# Patient Record
Sex: FEMALE | Race: WHITE | Employment: OTHER | URBAN - NONMETROPOLITAN AREA
[De-identification: names, ages, dates, MRNs, and addresses within clinical notes are randomized per-mention and may not be internally consistent; named-entity substitution may affect disease eponyms.]

---

## 2017-01-16 ENCOUNTER — NURSE ONLY (OUTPATIENT)
Dept: FAMILY MEDICINE CLINIC | Age: 82
End: 2017-01-16

## 2017-01-16 DIAGNOSIS — Z23 NEED FOR INFLUENZA VACCINATION: Primary | ICD-10-CM

## 2017-01-16 PROCEDURE — G0008 ADMIN INFLUENZA VIRUS VAC: HCPCS | Performed by: FAMILY MEDICINE

## 2017-03-08 ENCOUNTER — OFFICE VISIT (OUTPATIENT)
Dept: FAMILY MEDICINE CLINIC | Age: 82
End: 2017-03-08
Payer: MEDICARE

## 2017-03-08 ENCOUNTER — HOSPITAL ENCOUNTER (OUTPATIENT)
Dept: OTHER | Age: 82
Discharge: HOME | End: 2017-03-08
Attending: FAMILY MEDICINE | Admitting: FAMILY MEDICINE

## 2017-03-08 VITALS
BODY MASS INDEX: 30.36 KG/M2 | HEIGHT: 62 IN | DIASTOLIC BLOOD PRESSURE: 72 MMHG | OXYGEN SATURATION: 98 % | HEART RATE: 65 BPM | SYSTOLIC BLOOD PRESSURE: 138 MMHG | WEIGHT: 165 LBS | RESPIRATION RATE: 18 BRPM

## 2017-03-08 DIAGNOSIS — E53.8 B12 DEFICIENCY: ICD-10-CM

## 2017-03-08 DIAGNOSIS — I10 ESSENTIAL HYPERTENSION: ICD-10-CM

## 2017-03-08 DIAGNOSIS — E03.9 HYPOTHYROIDISM, UNSPECIFIED TYPE: ICD-10-CM

## 2017-03-08 DIAGNOSIS — I10 ESSENTIAL HYPERTENSION: Primary | ICD-10-CM

## 2017-03-08 DIAGNOSIS — M19.90 ARTHRITIS: ICD-10-CM

## 2017-03-08 LAB
A/G RATIO: 1.6 (ref 0.8–2)
ALBUMIN SERPL-MCNC: 4.4 G/DL (ref 3.4–4.8)
ALP BLD-CCNC: 73 U/L (ref 25–100)
ALT SERPL-CCNC: 20 U/L (ref 4–36)
ANION GAP SERPL CALCULATED.3IONS-SCNC: 13 MMOL/L (ref 3–16)
AST SERPL-CCNC: 19 U/L (ref 8–33)
BASOPHILS ABSOLUTE: 0.1 K/UL (ref 0–0.1)
BASOPHILS RELATIVE PERCENT: 1.2 %
BILIRUB SERPL-MCNC: <0.2 MG/DL (ref 0.3–1.2)
BUN BLDV-MCNC: 22 MG/DL (ref 6–20)
CALCIUM SERPL-MCNC: 9.5 MG/DL (ref 8.5–10.5)
CHLORIDE BLD-SCNC: 102 MMOL/L (ref 98–107)
CHOLESTEROL, TOTAL: 207 MG/DL (ref 0–200)
CO2: 27 MMOL/L (ref 20–30)
CREAT SERPL-MCNC: 1 MG/DL (ref 0.4–1.2)
EOSINOPHILS ABSOLUTE: 0.1 K/UL (ref 0–0.4)
EOSINOPHILS RELATIVE PERCENT: 2.2 %
FOLATE: 18.02 NG/ML
GFR AFRICAN AMERICAN: >59
GFR NON-AFRICAN AMERICAN: 53
GLOBULIN: 2.8 G/DL
GLUCOSE BLD-MCNC: 141 MG/DL (ref 74–106)
HCT VFR BLD CALC: 40.9 % (ref 37–47)
HDLC SERPL-MCNC: 36 MG/DL (ref 40–60)
HEMOGLOBIN: 13.2 G/DL (ref 11.5–16.5)
LDL CHOLESTEROL CALCULATED: 101 MG/DL
LYMPHOCYTES ABSOLUTE: 2.1 K/UL (ref 1.5–4)
LYMPHOCYTES RELATIVE PERCENT: 34.2 % (ref 20–40)
MCH RBC QN AUTO: 30.6 PG (ref 27–32)
MCHC RBC AUTO-ENTMCNC: 32.3 G/DL (ref 31–35)
MCV RBC AUTO: 94.9 FL (ref 80–100)
MONOCYTES ABSOLUTE: 0.4 K/UL (ref 0.2–0.8)
MONOCYTES RELATIVE PERCENT: 6.1 % (ref 3–10)
NEUTROPHILS ABSOLUTE: 3.4 K/UL (ref 2–7.5)
NEUTROPHILS RELATIVE PERCENT: 56.3 %
PDW BLD-RTO: 13.5 % (ref 11–16)
PLATELET # BLD: 238 K/UL (ref 150–400)
PMV BLD AUTO: 11.5 FL (ref 6–10)
POTASSIUM SERPL-SCNC: 4.5 MMOL/L (ref 3.4–5.1)
RBC # BLD: 4.31 M/UL (ref 3.8–5.8)
SODIUM BLD-SCNC: 142 MMOL/L (ref 136–145)
T4 FREE: 0.86 NG/DL (ref 0.89–1.76)
TOTAL PROTEIN: 7.2 G/DL (ref 6.4–8.3)
TRIGL SERPL-MCNC: 349 MG/DL (ref 0–249)
TSH SERPL DL<=0.05 MIU/L-ACNC: 3.56 UIU/ML (ref 0.35–5.5)
VITAMIN B-12: 454 PG/ML (ref 211–911)
VLDLC SERPL CALC-MCNC: 70 MG/DL
WBC # BLD: 6 K/UL (ref 4–11)

## 2017-03-08 PROCEDURE — G8484 FLU IMMUNIZE NO ADMIN: HCPCS | Performed by: FAMILY MEDICINE

## 2017-03-08 PROCEDURE — G8417 CALC BMI ABV UP PARAM F/U: HCPCS | Performed by: FAMILY MEDICINE

## 2017-03-08 PROCEDURE — G8400 PT W/DXA NO RESULTS DOC: HCPCS | Performed by: FAMILY MEDICINE

## 2017-03-08 PROCEDURE — 1123F ACP DISCUSS/DSCN MKR DOCD: CPT | Performed by: FAMILY MEDICINE

## 2017-03-08 PROCEDURE — 99214 OFFICE O/P EST MOD 30 MIN: CPT | Performed by: FAMILY MEDICINE

## 2017-03-08 PROCEDURE — 1090F PRES/ABSN URINE INCON ASSESS: CPT | Performed by: FAMILY MEDICINE

## 2017-03-08 PROCEDURE — 4040F PNEUMOC VAC/ADMIN/RCVD: CPT | Performed by: FAMILY MEDICINE

## 2017-03-08 PROCEDURE — G8427 DOCREV CUR MEDS BY ELIG CLIN: HCPCS | Performed by: FAMILY MEDICINE

## 2017-03-08 PROCEDURE — 1036F TOBACCO NON-USER: CPT | Performed by: FAMILY MEDICINE

## 2017-03-08 RX ORDER — FUROSEMIDE 20 MG/1
20 TABLET ORAL DAILY PRN
Qty: 30 TABLET | Refills: 5 | Status: SHIPPED | OUTPATIENT
Start: 2017-03-08 | End: 2018-01-09 | Stop reason: SDUPTHER

## 2017-03-08 RX ORDER — NITROGLYCERIN 0.4 MG/1
0.4 TABLET SUBLINGUAL EVERY 5 MIN PRN
Qty: 25 TABLET | Refills: 0 | Status: SHIPPED | OUTPATIENT
Start: 2017-03-08 | End: 2017-09-05 | Stop reason: SDUPTHER

## 2017-03-08 RX ORDER — OMEPRAZOLE 40 MG/1
40 CAPSULE, DELAYED RELEASE ORAL DAILY
Qty: 30 CAPSULE | Refills: 5 | Status: SHIPPED | OUTPATIENT
Start: 2017-03-08 | End: 2017-04-21 | Stop reason: ALTCHOICE

## 2017-03-08 RX ORDER — LORATADINE 10 MG/1
10 TABLET ORAL DAILY
Qty: 30 TABLET | Refills: 5 | Status: SHIPPED | OUTPATIENT
Start: 2017-03-08 | End: 2018-12-05

## 2017-03-08 RX ORDER — ACETAMINOPHEN AND CODEINE PHOSPHATE 300; 30 MG/1; MG/1
1 TABLET ORAL EVERY 6 HOURS PRN
Qty: 30 TABLET | Refills: 0 | Status: SHIPPED | OUTPATIENT
Start: 2017-03-08 | End: 2017-03-29 | Stop reason: SDUPTHER

## 2017-03-08 RX ORDER — LEVOTHYROXINE SODIUM 0.05 MG/1
50 TABLET ORAL DAILY
Qty: 30 TABLET | Refills: 5 | Status: SHIPPED | OUTPATIENT
Start: 2017-03-08 | End: 2017-09-25 | Stop reason: SDUPTHER

## 2017-03-08 RX ORDER — MELOXICAM 15 MG/1
15 TABLET ORAL DAILY
Qty: 30 TABLET | Refills: 5 | Status: SHIPPED | OUTPATIENT
Start: 2017-03-08 | End: 2017-09-05 | Stop reason: SDUPTHER

## 2017-03-08 ASSESSMENT — ENCOUNTER SYMPTOMS
GASTROINTESTINAL NEGATIVE: 1
EYES NEGATIVE: 1
RESPIRATORY NEGATIVE: 1
ROS SKIN COMMENTS: RIGHT ARM

## 2017-03-29 ENCOUNTER — OFFICE VISIT (OUTPATIENT)
Dept: FAMILY MEDICINE CLINIC | Age: 82
End: 2017-03-29
Payer: MEDICARE

## 2017-03-29 VITALS
SYSTOLIC BLOOD PRESSURE: 136 MMHG | DIASTOLIC BLOOD PRESSURE: 60 MMHG | BODY MASS INDEX: 29.63 KG/M2 | OXYGEN SATURATION: 98 % | HEART RATE: 63 BPM | HEIGHT: 62 IN | RESPIRATION RATE: 18 BRPM | WEIGHT: 161 LBS

## 2017-03-29 DIAGNOSIS — M19.90 ARTHRITIS: ICD-10-CM

## 2017-03-29 DIAGNOSIS — M54.2 NECK PAIN: Primary | ICD-10-CM

## 2017-03-29 DIAGNOSIS — I10 ESSENTIAL HYPERTENSION: ICD-10-CM

## 2017-03-29 DIAGNOSIS — K21.9 GASTROESOPHAGEAL REFLUX DISEASE, ESOPHAGITIS PRESENCE NOT SPECIFIED: ICD-10-CM

## 2017-03-29 PROCEDURE — 99214 OFFICE O/P EST MOD 30 MIN: CPT | Performed by: FAMILY MEDICINE

## 2017-03-29 RX ORDER — LISINOPRIL 5 MG/1
1 TABLET ORAL DAILY
Refills: 0 | COMMUNITY
Start: 2017-03-23 | End: 2017-05-08 | Stop reason: SDUPTHER

## 2017-03-29 RX ORDER — PANTOPRAZOLE SODIUM 40 MG/1
1 TABLET, DELAYED RELEASE ORAL DAILY
Refills: 1 | COMMUNITY
Start: 2017-03-23 | End: 2018-12-05

## 2017-03-29 ASSESSMENT — ENCOUNTER SYMPTOMS
EYES NEGATIVE: 1
RESPIRATORY NEGATIVE: 1
GASTROINTESTINAL NEGATIVE: 1
ROS SKIN COMMENTS: RIGHT ARM

## 2017-05-08 RX ORDER — LISINOPRIL 5 MG/1
5 TABLET ORAL DAILY
Qty: 30 TABLET | Refills: 3 | Status: SHIPPED | OUTPATIENT
Start: 2017-05-08 | End: 2017-09-05 | Stop reason: SDUPTHER

## 2017-05-12 RX ORDER — DONEPEZIL HYDROCHLORIDE 10 MG/1
TABLET, FILM COATED ORAL
Qty: 30 TABLET | Refills: 5 | Status: SHIPPED | OUTPATIENT
Start: 2017-05-12 | End: 2017-05-22

## 2017-05-22 ENCOUNTER — HOSPITAL ENCOUNTER (OUTPATIENT)
Dept: OTHER | Age: 82
Discharge: HOME | End: 2017-05-22
Attending: FAMILY MEDICINE | Admitting: FAMILY MEDICINE

## 2017-05-22 ENCOUNTER — OFFICE VISIT (OUTPATIENT)
Dept: FAMILY MEDICINE CLINIC | Age: 82
End: 2017-05-22
Payer: MEDICARE

## 2017-05-22 VITALS
DIASTOLIC BLOOD PRESSURE: 72 MMHG | RESPIRATION RATE: 18 BRPM | OXYGEN SATURATION: 98 % | SYSTOLIC BLOOD PRESSURE: 176 MMHG | HEIGHT: 62 IN | BODY MASS INDEX: 29.32 KG/M2 | WEIGHT: 159.3 LBS | HEART RATE: 62 BPM

## 2017-05-22 DIAGNOSIS — I10 ESSENTIAL HYPERTENSION: ICD-10-CM

## 2017-05-22 DIAGNOSIS — E03.9 HYPOTHYROIDISM, UNSPECIFIED TYPE: ICD-10-CM

## 2017-05-22 DIAGNOSIS — R30.0 DYSURIA: Primary | ICD-10-CM

## 2017-05-22 DIAGNOSIS — M19.90 ARTHRITIS: ICD-10-CM

## 2017-05-22 DIAGNOSIS — R41.3 MEMORY LOSS, SHORT TERM: ICD-10-CM

## 2017-05-22 LAB
A/G RATIO: 1.4 (ref 0.8–2)
ALBUMIN SERPL-MCNC: 4.2 G/DL (ref 3.4–4.8)
ALP BLD-CCNC: 65 U/L (ref 25–100)
ALT SERPL-CCNC: 15 U/L (ref 4–36)
ANION GAP SERPL CALCULATED.3IONS-SCNC: 16 MMOL/L (ref 3–16)
AST SERPL-CCNC: 14 U/L (ref 8–33)
BASOPHILS ABSOLUTE: 0.1 K/UL (ref 0–0.1)
BASOPHILS RELATIVE PERCENT: 1.2 %
BILIRUB SERPL-MCNC: <0.2 MG/DL (ref 0.3–1.2)
BILIRUBIN, POC: NORMAL
BLOOD URINE, POC: NORMAL
BUN BLDV-MCNC: 18 MG/DL (ref 6–20)
CALCIUM SERPL-MCNC: 9.6 MG/DL (ref 8.5–10.5)
CHLORIDE BLD-SCNC: 104 MMOL/L (ref 98–107)
CLARITY, POC: NORMAL
CO2: 23 MMOL/L (ref 20–30)
COLOR, POC: YELLOW
CREAT SERPL-MCNC: 0.8 MG/DL (ref 0.4–1.2)
EOSINOPHILS ABSOLUTE: 0.1 K/UL (ref 0–0.4)
EOSINOPHILS RELATIVE PERCENT: 2 %
GFR AFRICAN AMERICAN: >59
GFR NON-AFRICAN AMERICAN: >60
GLOBULIN: 2.9 G/DL
GLUCOSE BLD-MCNC: 99 MG/DL (ref 74–106)
GLUCOSE URINE, POC: NEGATIVE
HCT VFR BLD CALC: 37.6 % (ref 37–47)
HEMOGLOBIN: 12.7 G/DL (ref 11.5–16.5)
KETONES, POC: NEGATIVE
LEUKOCYTE EST, POC: NORMAL
LYMPHOCYTES ABSOLUTE: 1.4 K/UL (ref 1.5–4)
LYMPHOCYTES RELATIVE PERCENT: 24.6 % (ref 20–40)
MCH RBC QN AUTO: 31.5 PG (ref 27–32)
MCHC RBC AUTO-ENTMCNC: 33.8 G/DL (ref 31–35)
MCV RBC AUTO: 93.3 FL (ref 80–100)
MONOCYTES ABSOLUTE: 0.4 K/UL (ref 0.2–0.8)
MONOCYTES RELATIVE PERCENT: 7.7 % (ref 3–10)
NEUTROPHILS ABSOLUTE: 3.6 K/UL (ref 2–7.5)
NEUTROPHILS RELATIVE PERCENT: 64.5 %
NITRITE, POC: NEGATIVE
PDW BLD-RTO: 12.9 % (ref 11–16)
PH, POC: 5
PLATELET # BLD: 213 K/UL (ref 150–400)
PMV BLD AUTO: 11.9 FL (ref 6–10)
POTASSIUM SERPL-SCNC: 4.3 MMOL/L (ref 3.4–5.1)
PROTEIN, POC: NORMAL
RBC # BLD: 4.03 M/UL (ref 3.8–5.8)
SODIUM BLD-SCNC: 143 MMOL/L (ref 136–145)
SPECIFIC GRAVITY, POC: 1.03
TOTAL PROTEIN: 7.1 G/DL (ref 6.4–8.3)
TSH SERPL DL<=0.05 MIU/L-ACNC: 1.67 UIU/ML (ref 0.35–5.5)
UROBILINOGEN, POC: 0.2
WBC # BLD: 5.6 K/UL (ref 4–11)

## 2017-05-22 PROCEDURE — 81002 URINALYSIS NONAUTO W/O SCOPE: CPT | Performed by: FAMILY MEDICINE

## 2017-05-22 PROCEDURE — G8427 DOCREV CUR MEDS BY ELIG CLIN: HCPCS | Performed by: FAMILY MEDICINE

## 2017-05-22 PROCEDURE — G8420 CALC BMI NORM PARAMETERS: HCPCS | Performed by: FAMILY MEDICINE

## 2017-05-22 PROCEDURE — 99214 OFFICE O/P EST MOD 30 MIN: CPT | Performed by: FAMILY MEDICINE

## 2017-05-22 PROCEDURE — G8400 PT W/DXA NO RESULTS DOC: HCPCS | Performed by: FAMILY MEDICINE

## 2017-05-22 PROCEDURE — 1090F PRES/ABSN URINE INCON ASSESS: CPT | Performed by: FAMILY MEDICINE

## 2017-05-22 PROCEDURE — 1036F TOBACCO NON-USER: CPT | Performed by: FAMILY MEDICINE

## 2017-05-22 PROCEDURE — 1123F ACP DISCUSS/DSCN MKR DOCD: CPT | Performed by: FAMILY MEDICINE

## 2017-05-22 PROCEDURE — 4040F PNEUMOC VAC/ADMIN/RCVD: CPT | Performed by: FAMILY MEDICINE

## 2017-05-22 RX ORDER — CIPROFLOXACIN 250 MG/1
250 TABLET, FILM COATED ORAL 2 TIMES DAILY
Qty: 6 TABLET | Refills: 0 | Status: SHIPPED | OUTPATIENT
Start: 2017-05-22 | End: 2017-06-26 | Stop reason: ALTCHOICE

## 2017-05-22 ASSESSMENT — ENCOUNTER SYMPTOMS
ROS SKIN COMMENTS: RIGHT ARM
RESPIRATORY NEGATIVE: 1
GASTROINTESTINAL NEGATIVE: 1
EYES NEGATIVE: 1

## 2017-05-22 ASSESSMENT — PATIENT HEALTH QUESTIONNAIRE - PHQ9
SUM OF ALL RESPONSES TO PHQ9 QUESTIONS 1 & 2: 0
SUM OF ALL RESPONSES TO PHQ QUESTIONS 1-9: 0
1. LITTLE INTEREST OR PLEASURE IN DOING THINGS: 0
2. FEELING DOWN, DEPRESSED OR HOPELESS: 0

## 2017-05-24 LAB — URINE CULTURE, ROUTINE: NORMAL

## 2017-06-26 ENCOUNTER — OFFICE VISIT (OUTPATIENT)
Dept: FAMILY MEDICINE CLINIC | Age: 82
End: 2017-06-26
Payer: MEDICARE

## 2017-06-26 VITALS
BODY MASS INDEX: 28.34 KG/M2 | WEIGHT: 154 LBS | DIASTOLIC BLOOD PRESSURE: 70 MMHG | HEART RATE: 60 BPM | OXYGEN SATURATION: 98 % | SYSTOLIC BLOOD PRESSURE: 108 MMHG | RESPIRATION RATE: 12 BRPM | HEIGHT: 62 IN
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
## 2017-06-26 DIAGNOSIS — S70.362A: Primary | ICD-10-CM

## 2017-06-26 PROCEDURE — G8417 CALC BMI ABV UP PARAM F/U: HCPCS | Performed by: NURSE PRACTITIONER

## 2017-06-26 PROCEDURE — 1090F PRES/ABSN URINE INCON ASSESS: CPT | Performed by: NURSE PRACTITIONER

## 2017-06-26 PROCEDURE — 1123F ACP DISCUSS/DSCN MKR DOCD: CPT | Performed by: NURSE PRACTITIONER

## 2017-06-26 PROCEDURE — 4040F PNEUMOC VAC/ADMIN/RCVD: CPT | Performed by: NURSE PRACTITIONER

## 2017-06-26 PROCEDURE — G8400 PT W/DXA NO RESULTS DOC: HCPCS | Performed by: NURSE PRACTITIONER

## 2017-06-26 PROCEDURE — 99213 OFFICE O/P EST LOW 20 MIN: CPT | Performed by: NURSE PRACTITIONER

## 2017-06-26 PROCEDURE — 1036F TOBACCO NON-USER: CPT | Performed by: NURSE PRACTITIONER

## 2017-06-26 PROCEDURE — G8427 DOCREV CUR MEDS BY ELIG CLIN: HCPCS | Performed by: NURSE PRACTITIONER

## 2017-06-26 RX ORDER — DONEPEZIL HYDROCHLORIDE 10 MG/1
10 TABLET, FILM COATED ORAL NIGHTLY
Qty: 30 TABLET | Refills: 3 | Status: SHIPPED | OUTPATIENT
Start: 2017-06-26 | End: 2018-01-09

## 2017-06-26 RX ORDER — DOXYCYCLINE HYCLATE 100 MG
100 TABLET ORAL 2 TIMES DAILY
Qty: 20 TABLET | Refills: 0 | Status: SHIPPED | OUTPATIENT
Start: 2017-06-26 | End: 2017-07-06

## 2017-06-30 ASSESSMENT — ENCOUNTER SYMPTOMS
GASTROINTESTINAL NEGATIVE: 1
EYES NEGATIVE: 1
RESPIRATORY NEGATIVE: 1

## 2017-07-14 ENCOUNTER — HOSPITAL ENCOUNTER (OUTPATIENT)
Dept: OTHER | Age: 82
Discharge: HOME | End: 2017-07-14
Attending: FAMILY MEDICINE | Admitting: FAMILY MEDICINE

## 2017-07-14 ENCOUNTER — OFFICE VISIT (OUTPATIENT)
Dept: FAMILY MEDICINE CLINIC | Age: 82
End: 2017-07-14
Payer: MEDICARE

## 2017-07-14 VITALS
OXYGEN SATURATION: 97 % | DIASTOLIC BLOOD PRESSURE: 72 MMHG | WEIGHT: 151.7 LBS | SYSTOLIC BLOOD PRESSURE: 134 MMHG | HEART RATE: 64 BPM | HEIGHT: 62 IN | BODY MASS INDEX: 27.92 KG/M2 | RESPIRATION RATE: 18 BRPM

## 2017-07-14 DIAGNOSIS — R30.0 DYSURIA: Primary | ICD-10-CM

## 2017-07-14 LAB
BILIRUBIN, POC: ABNORMAL
BLOOD URINE, POC: ABNORMAL
CLARITY, POC: ABNORMAL
COLOR, POC: ABNORMAL
GLUCOSE URINE, POC: ABNORMAL
KETONES, POC: ABNORMAL
LEUKOCYTE EST, POC: ABNORMAL
NITRITE, POC: ABNORMAL
PH, POC: 5.5
PROTEIN, POC: 30
SPECIFIC GRAVITY, POC: >1.03
UROBILINOGEN, POC: 1

## 2017-07-14 PROCEDURE — G8427 DOCREV CUR MEDS BY ELIG CLIN: HCPCS | Performed by: FAMILY MEDICINE

## 2017-07-14 PROCEDURE — G8400 PT W/DXA NO RESULTS DOC: HCPCS | Performed by: FAMILY MEDICINE

## 2017-07-14 PROCEDURE — 1090F PRES/ABSN URINE INCON ASSESS: CPT | Performed by: FAMILY MEDICINE

## 2017-07-14 PROCEDURE — G8417 CALC BMI ABV UP PARAM F/U: HCPCS | Performed by: FAMILY MEDICINE

## 2017-07-14 PROCEDURE — 4040F PNEUMOC VAC/ADMIN/RCVD: CPT | Performed by: FAMILY MEDICINE

## 2017-07-14 PROCEDURE — 1123F ACP DISCUSS/DSCN MKR DOCD: CPT | Performed by: FAMILY MEDICINE

## 2017-07-14 PROCEDURE — 99214 OFFICE O/P EST MOD 30 MIN: CPT | Performed by: FAMILY MEDICINE

## 2017-07-14 PROCEDURE — 1036F TOBACCO NON-USER: CPT | Performed by: FAMILY MEDICINE

## 2017-07-14 PROCEDURE — 81002 URINALYSIS NONAUTO W/O SCOPE: CPT | Performed by: FAMILY MEDICINE

## 2017-07-14 RX ORDER — CIPROFLOXACIN 500 MG/1
500 TABLET, FILM COATED ORAL 2 TIMES DAILY
Qty: 10 TABLET | Refills: 0 | Status: SHIPPED | OUTPATIENT
Start: 2017-07-14 | End: 2018-01-09 | Stop reason: ALTCHOICE

## 2017-07-14 RX ORDER — OMEPRAZOLE 40 MG/1
1 CAPSULE, DELAYED RELEASE ORAL DAILY
Refills: 5 | COMMUNITY
Start: 2017-07-05 | End: 2018-01-09 | Stop reason: SDUPTHER

## 2017-07-14 ASSESSMENT — ENCOUNTER SYMPTOMS
GASTROINTESTINAL NEGATIVE: 1
RESPIRATORY NEGATIVE: 1
ROS SKIN COMMENTS: RIGHT ARM
EYES NEGATIVE: 1

## 2017-07-16 LAB
ORGANISM: ABNORMAL
URINE CULTURE, ROUTINE: ABNORMAL

## 2017-09-07 RX ORDER — LISINOPRIL 5 MG/1
TABLET ORAL
Qty: 30 TABLET | Refills: 3 | Status: SHIPPED | OUTPATIENT
Start: 2017-09-07 | End: 2018-01-02 | Stop reason: SDUPTHER

## 2017-09-07 RX ORDER — MELOXICAM 15 MG/1
TABLET ORAL
Qty: 30 TABLET | Refills: 3 | Status: SHIPPED | OUTPATIENT
Start: 2017-09-07 | End: 2018-01-02 | Stop reason: SDUPTHER

## 2017-09-07 RX ORDER — NITROGLYCERIN 0.4 MG/1
TABLET SUBLINGUAL
Qty: 25 TABLET | Refills: 0 | Status: SHIPPED | OUTPATIENT
Start: 2017-09-07 | End: 2018-12-05

## 2017-09-25 RX ORDER — LEVOTHYROXINE SODIUM 0.05 MG/1
TABLET ORAL
Qty: 30 TABLET | Refills: 5 | Status: SHIPPED | OUTPATIENT
Start: 2017-09-25 | End: 2018-03-29 | Stop reason: SDUPTHER

## 2017-12-07 ENCOUNTER — TELEPHONE (OUTPATIENT)
Dept: FAMILY MEDICINE CLINIC | Age: 82
End: 2017-12-07

## 2017-12-07 RX ORDER — AZITHROMYCIN 250 MG/1
TABLET, FILM COATED ORAL
Qty: 1 PACKET | Refills: 0 | Status: SHIPPED | OUTPATIENT
Start: 2017-12-07 | End: 2017-12-17

## 2018-01-02 RX ORDER — MELOXICAM 15 MG/1
TABLET ORAL
Qty: 30 TABLET | Refills: 5 | Status: SHIPPED | OUTPATIENT
Start: 2018-01-02 | End: 2018-06-28 | Stop reason: SDUPTHER

## 2018-01-02 RX ORDER — LISINOPRIL 5 MG/1
TABLET ORAL
Qty: 30 TABLET | Refills: 5 | Status: SHIPPED | OUTPATIENT
Start: 2018-01-02 | End: 2018-06-28 | Stop reason: SDUPTHER

## 2018-01-09 ENCOUNTER — HOSPITAL ENCOUNTER (OUTPATIENT)
Dept: OTHER | Age: 83
Discharge: HOME | End: 2018-01-09
Attending: FAMILY MEDICINE | Admitting: FAMILY MEDICINE

## 2018-01-09 ENCOUNTER — OFFICE VISIT (OUTPATIENT)
Dept: FAMILY MEDICINE CLINIC | Age: 83
End: 2018-01-09
Payer: MEDICARE

## 2018-01-09 VITALS
BODY MASS INDEX: 28.16 KG/M2 | DIASTOLIC BLOOD PRESSURE: 76 MMHG | HEIGHT: 62 IN | OXYGEN SATURATION: 98 % | HEART RATE: 60 BPM | RESPIRATION RATE: 18 BRPM | WEIGHT: 153 LBS | SYSTOLIC BLOOD PRESSURE: 138 MMHG

## 2018-01-09 DIAGNOSIS — I10 ESSENTIAL HYPERTENSION: ICD-10-CM

## 2018-01-09 DIAGNOSIS — N39.0 CHRONIC UTI (URINARY TRACT INFECTION): Primary | ICD-10-CM

## 2018-01-09 DIAGNOSIS — Z23 NEED FOR PROPHYLACTIC VACCINATION AGAINST STREPTOCOCCUS PNEUMONIAE (PNEUMOCOCCUS): ICD-10-CM

## 2018-01-09 DIAGNOSIS — M19.90 ARTHRITIS: ICD-10-CM

## 2018-01-09 DIAGNOSIS — E53.8 B12 DEFICIENCY: ICD-10-CM

## 2018-01-09 DIAGNOSIS — E55.9 VITAMIN D DEFICIENCY: ICD-10-CM

## 2018-01-09 DIAGNOSIS — Z23 INFLUENZA VACCINE NEEDED: ICD-10-CM

## 2018-01-09 DIAGNOSIS — E05.90 HYPERTHYROIDISM: ICD-10-CM

## 2018-01-09 LAB
A/G RATIO: 1.5 (ref 0.8–2)
ALBUMIN SERPL-MCNC: 4.5 G/DL (ref 3.4–4.8)
ALP BLD-CCNC: 58 U/L (ref 25–100)
ALT SERPL-CCNC: 10 U/L (ref 4–36)
ANION GAP SERPL CALCULATED.3IONS-SCNC: 12 MMOL/L (ref 3–16)
AST SERPL-CCNC: 12 U/L (ref 8–33)
BASOPHILS ABSOLUTE: 0.1 K/UL (ref 0–0.1)
BASOPHILS RELATIVE PERCENT: 1.2 %
BILIRUB SERPL-MCNC: <0.2 MG/DL (ref 0.3–1.2)
BILIRUBIN, POC: NEGATIVE
BLOOD URINE, POC: NEGATIVE
BUN BLDV-MCNC: 24 MG/DL (ref 6–20)
CALCIUM SERPL-MCNC: 9.5 MG/DL (ref 8.5–10.5)
CHLORIDE BLD-SCNC: 101 MMOL/L (ref 98–107)
CLARITY, POC: CLEAR
CO2: 29 MMOL/L (ref 20–30)
COLOR, POC: YELLOW
CREAT SERPL-MCNC: 0.9 MG/DL (ref 0.4–1.2)
EOSINOPHILS ABSOLUTE: 0.1 K/UL (ref 0–0.4)
EOSINOPHILS RELATIVE PERCENT: 1.6 %
FOLATE: 12.03 NG/ML
GFR AFRICAN AMERICAN: >59
GFR NON-AFRICAN AMERICAN: 60
GLOBULIN: 3 G/DL
GLUCOSE BLD-MCNC: 103 MG/DL (ref 74–106)
GLUCOSE URINE, POC: NEGATIVE
HCT VFR BLD CALC: 39.4 % (ref 37–47)
HEMOGLOBIN: 12.5 G/DL (ref 11.5–16.5)
IMMATURE GRANULOCYTES #: 0 K/UL
IMMATURE GRANULOCYTES %: 0.3 % (ref 0–5)
KETONES, POC: NEGATIVE
LEUKOCYTE EST, POC: NORMAL
LYMPHOCYTES ABSOLUTE: 2.2 K/UL (ref 1.5–4)
LYMPHOCYTES RELATIVE PERCENT: 36.7 %
MCH RBC QN AUTO: 31.2 PG (ref 27–32)
MCHC RBC AUTO-ENTMCNC: 31.7 G/DL (ref 31–35)
MCV RBC AUTO: 98.3 FL (ref 80–100)
MONOCYTES ABSOLUTE: 0.4 K/UL (ref 0.2–0.8)
MONOCYTES RELATIVE PERCENT: 6.9 %
NEUTROPHILS ABSOLUTE: 3.2 K/UL (ref 2–7.5)
NEUTROPHILS RELATIVE PERCENT: 53.3 %
NITRITE, POC: NEGATIVE
PDW BLD-RTO: 13.1 % (ref 11–16)
PH, POC: 5.5
PLATELET # BLD: 226 K/UL (ref 150–400)
PMV BLD AUTO: 11.7 FL (ref 6–10)
POTASSIUM SERPL-SCNC: 4.4 MMOL/L (ref 3.4–5.1)
PROTEIN, POC: NEGATIVE
RBC # BLD: 4.01 M/UL (ref 3.8–5.8)
SODIUM BLD-SCNC: 142 MMOL/L (ref 136–145)
SPECIFIC GRAVITY, POC: 1.02
TOTAL PROTEIN: 7.5 G/DL (ref 6.4–8.3)
TSH SERPL DL<=0.05 MIU/L-ACNC: 3.1 UIU/ML (ref 0.35–5.5)
UROBILINOGEN, POC: 0.2
VITAMIN B-12: 245 PG/ML (ref 211–911)
VITAMIN D 25-HYDROXY: 21.2 (ref 32–100)
WBC # BLD: 6.1 K/UL (ref 4–11)

## 2018-01-09 PROCEDURE — G0008 ADMIN INFLUENZA VIRUS VAC: HCPCS | Performed by: FAMILY MEDICINE

## 2018-01-09 PROCEDURE — 1090F PRES/ABSN URINE INCON ASSESS: CPT | Performed by: FAMILY MEDICINE

## 2018-01-09 PROCEDURE — G0009 ADMIN PNEUMOCOCCAL VACCINE: HCPCS | Performed by: FAMILY MEDICINE

## 2018-01-09 PROCEDURE — 4040F PNEUMOC VAC/ADMIN/RCVD: CPT | Performed by: FAMILY MEDICINE

## 2018-01-09 PROCEDURE — 81002 URINALYSIS NONAUTO W/O SCOPE: CPT | Performed by: FAMILY MEDICINE

## 2018-01-09 PROCEDURE — G8400 PT W/DXA NO RESULTS DOC: HCPCS | Performed by: FAMILY MEDICINE

## 2018-01-09 PROCEDURE — G8417 CALC BMI ABV UP PARAM F/U: HCPCS | Performed by: FAMILY MEDICINE

## 2018-01-09 PROCEDURE — 90688 IIV4 VACCINE SPLT 0.5 ML IM: CPT | Performed by: FAMILY MEDICINE

## 2018-01-09 PROCEDURE — 99214 OFFICE O/P EST MOD 30 MIN: CPT | Performed by: FAMILY MEDICINE

## 2018-01-09 PROCEDURE — 1123F ACP DISCUSS/DSCN MKR DOCD: CPT | Performed by: FAMILY MEDICINE

## 2018-01-09 PROCEDURE — G8484 FLU IMMUNIZE NO ADMIN: HCPCS | Performed by: FAMILY MEDICINE

## 2018-01-09 PROCEDURE — 1036F TOBACCO NON-USER: CPT | Performed by: FAMILY MEDICINE

## 2018-01-09 PROCEDURE — 90670 PCV13 VACCINE IM: CPT | Performed by: FAMILY MEDICINE

## 2018-01-09 PROCEDURE — G8427 DOCREV CUR MEDS BY ELIG CLIN: HCPCS | Performed by: FAMILY MEDICINE

## 2018-01-09 RX ORDER — OMEPRAZOLE 40 MG/1
40 CAPSULE, DELAYED RELEASE ORAL DAILY
Qty: 30 CAPSULE | Refills: 5 | Status: SHIPPED | OUTPATIENT
Start: 2018-01-09 | End: 2019-02-04 | Stop reason: SDUPTHER

## 2018-01-09 RX ORDER — FUROSEMIDE 20 MG/1
20 TABLET ORAL DAILY PRN
Qty: 30 TABLET | Refills: 5 | Status: SHIPPED | OUTPATIENT
Start: 2018-01-09 | End: 2018-12-05

## 2018-01-09 ASSESSMENT — ENCOUNTER SYMPTOMS
GASTROINTESTINAL NEGATIVE: 1
RESPIRATORY NEGATIVE: 1
ROS SKIN COMMENTS: RIGHT ARM
EYES NEGATIVE: 1

## 2018-01-09 NOTE — PROGRESS NOTES
SUBJECTIVE:    Patient ID: Valentina Sterling is a 80 y.o. female. Chief Complaint   Patient presents with    Hypertension     f/u    Arthritis       HPI:She is complaining of some urinary tract symptoms. She's had some dysuria. She's had some recurrent urinary tract issues. she is having good blood pressures at home. She is still having some memory issues. She Doesn't really feel like the medications working all that well. Her sons with her today. He says that she does seem to have some significant issues worse at times. He seems to be having more issues with her at night. She says that she feels like she sleeps relatively well. She's not have any visual or auditory hallucinations. She's not had any chest pain or shortness of breath. She's not have any other medication problems that she can tell. She does continue to struggle with some arthritic pains though she says they're not too bad. She is trying to continue to stay active. Review of Systems   Constitutional: Negative. HENT: Negative. Eyes: Negative. Respiratory: Negative. Cardiovascular: Negative. Gastrointestinal: Negative. Endocrine: Negative. Genitourinary: Negative. Musculoskeletal: Negative. Skin: Positive for rash. Right arm   Hematological: Bruises/bleeds easily. Psychiatric/Behavioral: Negative. All other systems reviewed and are negative. OBJECTIVE:  Wt Readings from Last 3 Encounters:   01/09/18 153 lb (69.4 kg)   07/14/17 151 lb 11.2 oz (68.8 kg)   06/26/17 154 lb (69.9 kg)     BP Readings from Last 3 Encounters:   01/09/18 138/76   07/14/17 134/72   06/26/17 108/70      Pulse Readings from Last 3 Encounters:   01/09/18 60   07/14/17 64   06/26/17 60     Body mass index is 27.98 kg/m². Resp Readings from Last 3 Encounters:   01/09/18 18   07/14/17 18   06/26/17 12     Physical Exam   Constitutional: She is oriented to person, place, and time. She appears well-developed and well-nourished.

## 2018-01-10 ENCOUNTER — TELEPHONE (OUTPATIENT)
Dept: FAMILY MEDICINE CLINIC | Age: 83
End: 2018-01-10

## 2018-01-10 RX ORDER — ERGOCALCIFEROL 1.25 MG/1
50000 CAPSULE ORAL WEEKLY
Qty: 4 CAPSULE | Refills: 2 | Status: SHIPPED | OUTPATIENT
Start: 2018-01-10 | End: 2018-12-05

## 2018-01-10 NOTE — TELEPHONE ENCOUNTER
Patient's son called, stated they did not get medication for patient's Dementia symptoms. Dr. Amador Baker notified, orders received to give sample of Namzaric Titration Pack. Daily maintenance dose will be sent to patient's pharmacy. Patient's son notified.

## 2018-01-19 ENCOUNTER — NURSE ONLY (OUTPATIENT)
Dept: FAMILY MEDICINE CLINIC | Age: 83
End: 2018-01-19
Payer: MEDICARE

## 2018-01-19 DIAGNOSIS — E53.8 B12 DEFICIENCY: Primary | ICD-10-CM

## 2018-01-19 PROCEDURE — 96372 THER/PROPH/DIAG INJ SC/IM: CPT | Performed by: FAMILY MEDICINE

## 2018-01-19 RX ORDER — CYANOCOBALAMIN 1000 UG/ML
1000 INJECTION INTRAMUSCULAR; SUBCUTANEOUS ONCE
Status: COMPLETED | OUTPATIENT
Start: 2018-01-19 | End: 2018-01-19

## 2018-01-19 RX ADMIN — CYANOCOBALAMIN 1000 MCG: 1000 INJECTION INTRAMUSCULAR; SUBCUTANEOUS at 12:35

## 2018-03-29 RX ORDER — LEVOTHYROXINE SODIUM 0.05 MG/1
TABLET ORAL
Qty: 30 TABLET | Refills: 5 | Status: SHIPPED | OUTPATIENT
Start: 2018-03-29 | End: 2018-09-24 | Stop reason: SDUPTHER

## 2018-06-28 RX ORDER — LISINOPRIL 5 MG/1
TABLET ORAL
Qty: 30 TABLET | Refills: 5 | Status: SHIPPED | OUTPATIENT
Start: 2018-06-28 | End: 2020-03-02 | Stop reason: ALTCHOICE

## 2018-06-28 RX ORDER — MELOXICAM 15 MG/1
TABLET ORAL
Qty: 30 TABLET | Refills: 5 | Status: SHIPPED | OUTPATIENT
Start: 2018-06-28 | End: 2021-06-16 | Stop reason: SDUPTHER

## 2018-08-14 ENCOUNTER — OFFICE VISIT (OUTPATIENT)
Dept: FAMILY MEDICINE CLINIC | Age: 83
End: 2018-08-14
Payer: MEDICARE

## 2018-08-14 ENCOUNTER — HOSPITAL ENCOUNTER (OUTPATIENT)
Facility: HOSPITAL | Age: 83
Discharge: HOME | End: 2018-08-14
Payer: MEDICARE

## 2018-08-14 VITALS
SYSTOLIC BLOOD PRESSURE: 132 MMHG | OXYGEN SATURATION: 94 % | DIASTOLIC BLOOD PRESSURE: 80 MMHG | RESPIRATION RATE: 18 BRPM | HEART RATE: 64 BPM | BODY MASS INDEX: 28.17 KG/M2 | WEIGHT: 153.1 LBS | HEIGHT: 62 IN

## 2018-08-14 DIAGNOSIS — R30.0 DYSURIA: ICD-10-CM

## 2018-08-14 DIAGNOSIS — N30.00 ACUTE CYSTITIS WITHOUT HEMATURIA: Primary | ICD-10-CM

## 2018-08-14 LAB
BILIRUBIN, POC: ABNORMAL
BLOOD URINE, POC: NEGATIVE
CLARITY, POC: CLEAR
COLOR, POC: YELLOW
GLUCOSE URINE, POC: NEGATIVE
KETONES, POC: NEGATIVE
LEUKOCYTE EST, POC: ABNORMAL
NITRITE, POC: NEGATIVE
PH, POC: 6
PROTEIN, POC: ABNORMAL
SPECIFIC GRAVITY, POC: 1.02
UROBILINOGEN, POC: 0.2

## 2018-08-14 PROCEDURE — G8400 PT W/DXA NO RESULTS DOC: HCPCS | Performed by: NURSE PRACTITIONER

## 2018-08-14 PROCEDURE — 1090F PRES/ABSN URINE INCON ASSESS: CPT | Performed by: NURSE PRACTITIONER

## 2018-08-14 PROCEDURE — 81002 URINALYSIS NONAUTO W/O SCOPE: CPT | Performed by: NURSE PRACTITIONER

## 2018-08-14 PROCEDURE — G8427 DOCREV CUR MEDS BY ELIG CLIN: HCPCS | Performed by: NURSE PRACTITIONER

## 2018-08-14 PROCEDURE — 87086 URINE CULTURE/COLONY COUNT: CPT

## 2018-08-14 PROCEDURE — 1101F PT FALLS ASSESS-DOCD LE1/YR: CPT | Performed by: NURSE PRACTITIONER

## 2018-08-14 PROCEDURE — 99213 OFFICE O/P EST LOW 20 MIN: CPT | Performed by: NURSE PRACTITIONER

## 2018-08-14 PROCEDURE — G8417 CALC BMI ABV UP PARAM F/U: HCPCS | Performed by: NURSE PRACTITIONER

## 2018-08-14 PROCEDURE — 1036F TOBACCO NON-USER: CPT | Performed by: NURSE PRACTITIONER

## 2018-08-14 PROCEDURE — 1123F ACP DISCUSS/DSCN MKR DOCD: CPT | Performed by: NURSE PRACTITIONER

## 2018-08-14 PROCEDURE — 4040F PNEUMOC VAC/ADMIN/RCVD: CPT | Performed by: NURSE PRACTITIONER

## 2018-08-14 RX ORDER — CIPROFLOXACIN 500 MG/1
500 TABLET, FILM COATED ORAL 2 TIMES DAILY
Qty: 14 TABLET | Refills: 0 | Status: SHIPPED | OUTPATIENT
Start: 2018-08-14 | End: 2021-08-25 | Stop reason: SDUPTHER

## 2018-08-14 ASSESSMENT — ENCOUNTER SYMPTOMS
ALLERGIC/IMMUNOLOGIC NEGATIVE: 1
VOMITING: 0
DIARRHEA: 0
NAUSEA: 0

## 2018-08-14 ASSESSMENT — PATIENT HEALTH QUESTIONNAIRE - PHQ9
1. LITTLE INTEREST OR PLEASURE IN DOING THINGS: 0
SUM OF ALL RESPONSES TO PHQ QUESTIONS 1-9: 1
SUM OF ALL RESPONSES TO PHQ9 QUESTIONS 1 & 2: 1
2. FEELING DOWN, DEPRESSED OR HOPELESS: 1
SUM OF ALL RESPONSES TO PHQ QUESTIONS 1-9: 1

## 2018-08-14 NOTE — PROGRESS NOTES
sounds are normal. There is no tenderness. There is no CVA tenderness. Neurological: She is alert and oriented to person, place, and time. Skin: Skin is warm and dry. Psychiatric: She has a normal mood and affect. Her behavior is normal.   Nursing note and vitals reviewed. ASSESSMENT/PLAN:   Kevin Aguilar was seen today for urinary tract infection. Diagnoses and all orders for this visit:    Acute cystitis without hematuria  -     ciprofloxacin (CIPRO) 500 MG tablet; Take 1 tablet by mouth 2 times daily for 7 days  -     POCT Urinalysis no Micro  -     Urine Culture  Decrease soda intake. Increase intake of water. RTC if symptoms worsen or fail to resolved. Go to the ER if symptoms worsen or fever develops. Dysuria  -     POCT Urinalysis no Micro- moderate leukocytes  -     Urine Culture        Return if symptoms worsen or fail to improve.         Current Outpatient Prescriptions on File Prior to Visit   Medication Sig Dispense Refill    meloxicam (MOBIC) 15 MG tablet TAKE 1 TABLET BY MOUTH DAILY 30 tablet 5    lisinopril (PRINIVIL;ZESTRIL) 5 MG tablet TAKE ONE TABLET BY MOUTH EVERY DAY 30 tablet 5    levothyroxine (SYNTHROID) 50 MCG tablet TAKE 1 TABLET BY MOUTH DAILY 30 tablet 5    vitamin D (ERGOCALCIFEROL) 53215 units CAPS capsule Take 1 capsule by mouth once a week Take 1 weekly for 3 months then take 2000 iu over the counter daily 4 capsule 2    Memantine HCl-Donepezil HCl (NAMZARIC) 7 & 14 & 21 &28 -10 MG C4PK Take 1 tablet by mouth daily 30 each 2    omeprazole (PRILOSEC) 40 MG delayed release capsule Take 1 capsule by mouth daily 30 capsule 5    furosemide (LASIX) 20 MG tablet Take 1 tablet by mouth daily as needed (Fluid retention) 30 tablet 5    nitroGLYCERIN (NITROSTAT) 0.4 MG SL tablet PLACE 1 TABLET UNDER THE TONGUE EVERY 5 MINUTES AS NEEDED FOR CHEST PAIN 25 tablet 0    pantoprazole (PROTONIX) 40 MG tablet Take 1 tablet by mouth daily  1    loratadine (CLARITIN) 10 MG tablet Take 1 tablet by mouth daily 30 tablet 5    ondansetron (ZOFRAN) 4 MG tablet Take 1 tablet by mouth daily as needed for Nausea or Vomiting 30 tablet 1    acetaminophen-codeine (TYLENOL #3) 300-30 MG per tablet Take 1 tablet by mouth every 6 hours as needed for Pain 30 tablet 2    estradiol (ESTRACE VAGINAL) 0.1 MG/GM vaginal cream Place 1 g vaginally daily To replace premarin cream 1 Tube 3     Current Facility-Administered Medications on File Prior to Visit   Medication Dose Route Frequency Provider Last Rate Last Dose    methylPREDNISolone acetate (DEPO-MEDROL) injection 40 mg  40 mg Intramuscular Once Krystle Duke MD        lidocaine 1 % injection 5 mL  5 mL Intradermal Once Krystle Duke MD

## 2018-08-17 LAB — URINE CULTURE, ROUTINE: NORMAL

## 2018-09-24 RX ORDER — LEVOTHYROXINE SODIUM 0.05 MG/1
TABLET ORAL
Qty: 30 TABLET | Refills: 5 | Status: SHIPPED | OUTPATIENT
Start: 2018-09-24 | End: 2019-03-26 | Stop reason: SDUPTHER

## 2018-10-02 ENCOUNTER — OFFICE VISIT (OUTPATIENT)
Dept: FAMILY MEDICINE CLINIC | Age: 83
End: 2018-10-02
Payer: MEDICARE

## 2018-10-02 ENCOUNTER — HOSPITAL ENCOUNTER (OUTPATIENT)
Facility: HOSPITAL | Age: 83
Discharge: HOME | End: 2018-10-02
Payer: MEDICARE

## 2018-10-02 VITALS
HEART RATE: 60 BPM | SYSTOLIC BLOOD PRESSURE: 128 MMHG | HEIGHT: 62 IN | WEIGHT: 150 LBS | RESPIRATION RATE: 18 BRPM | DIASTOLIC BLOOD PRESSURE: 72 MMHG | BODY MASS INDEX: 27.6 KG/M2 | OXYGEN SATURATION: 98 %

## 2018-10-02 DIAGNOSIS — E05.90 HYPERTHYROIDISM: ICD-10-CM

## 2018-10-02 DIAGNOSIS — I10 ESSENTIAL HYPERTENSION: ICD-10-CM

## 2018-10-02 DIAGNOSIS — E03.9 HYPOTHYROIDISM, UNSPECIFIED TYPE: ICD-10-CM

## 2018-10-02 DIAGNOSIS — E53.8 B12 DEFICIENCY: ICD-10-CM

## 2018-10-02 DIAGNOSIS — R32 URINARY INCONTINENCE, UNSPECIFIED TYPE: Primary | ICD-10-CM

## 2018-10-02 DIAGNOSIS — M19.90 ARTHRITIS: ICD-10-CM

## 2018-10-02 DIAGNOSIS — R41.3 MEMORY LOSS: ICD-10-CM

## 2018-10-02 LAB
A/G RATIO: 1.6 (ref 0.8–2)
ALBUMIN SERPL-MCNC: 4.9 G/DL (ref 3.4–4.8)
ALP BLD-CCNC: 71 U/L (ref 25–100)
ALT SERPL-CCNC: 11 U/L (ref 4–36)
ANION GAP SERPL CALCULATED.3IONS-SCNC: 12 MMOL/L (ref 3–16)
APPEARANCE FLUID: NORMAL
AST SERPL-CCNC: 15 U/L (ref 8–33)
BILIRUB SERPL-MCNC: 0.6 MG/DL (ref 0.3–1.2)
BILIRUBIN, POC: NORMAL
BLOOD URINE, POC: NORMAL
BUN BLDV-MCNC: 17 MG/DL (ref 6–20)
CALCIUM SERPL-MCNC: 10 MG/DL (ref 8.5–10.5)
CHLORIDE BLD-SCNC: 102 MMOL/L (ref 98–107)
CLARITY, POC: CLEAR
CO2: 28 MMOL/L (ref 20–30)
COLOR, POC: YELLOW
CREAT SERPL-MCNC: 0.9 MG/DL (ref 0.4–1.2)
FOLATE: 13.3 NG/ML
GFR AFRICAN AMERICAN: >59
GFR NON-AFRICAN AMERICAN: 60
GLOBULIN: 3 G/DL
GLUCOSE BLD-MCNC: 99 MG/DL (ref 74–106)
GLUCOSE URINE, POC: NORMAL
HCT VFR BLD CALC: 41.8 % (ref 37–47)
HEMOGLOBIN: 13.8 G/DL (ref 11.5–16.5)
KETONES, POC: NORMAL
LEUKOCYTE EST, POC: NORMAL
MCH RBC QN AUTO: 31.2 PG (ref 27–32)
MCHC RBC AUTO-ENTMCNC: 33 G/DL (ref 31–35)
MCV RBC AUTO: 94.6 FL (ref 80–100)
NITRITE, POC: NORMAL
PDW BLD-RTO: 13 % (ref 11–16)
PH, POC: 8.5
PLATELET # BLD: 205 K/UL (ref 150–400)
PMV BLD AUTO: 11.8 FL (ref 6–10)
POTASSIUM SERPL-SCNC: 4.9 MMOL/L (ref 3.4–5.1)
PROTEIN, POC: NORMAL
RBC # BLD: 4.42 M/UL (ref 3.8–5.8)
SODIUM BLD-SCNC: 142 MMOL/L (ref 136–145)
SPECIFIC GRAVITY, POC: 1.02
T4 FREE: 1.24 NG/DL (ref 0.89–1.76)
TOTAL PROTEIN: 7.9 G/DL (ref 6.4–8.3)
TSH SERPL DL<=0.05 MIU/L-ACNC: 2.18 UIU/ML (ref 0.35–5.5)
UROBILINOGEN, POC: 0.2
VITAMIN B-12: >2000 PG/ML (ref 211–911)
WBC # BLD: 5.8 K/UL (ref 4–11)

## 2018-10-02 PROCEDURE — 1036F TOBACCO NON-USER: CPT | Performed by: FAMILY MEDICINE

## 2018-10-02 PROCEDURE — 90688 IIV4 VACCINE SPLT 0.5 ML IM: CPT | Performed by: FAMILY MEDICINE

## 2018-10-02 PROCEDURE — 81002 URINALYSIS NONAUTO W/O SCOPE: CPT | Performed by: FAMILY MEDICINE

## 2018-10-02 PROCEDURE — 84443 ASSAY THYROID STIM HORMONE: CPT

## 2018-10-02 PROCEDURE — 1123F ACP DISCUSS/DSCN MKR DOCD: CPT | Performed by: FAMILY MEDICINE

## 2018-10-02 PROCEDURE — 0509F URINE INCON PLAN DOCD: CPT | Performed by: FAMILY MEDICINE

## 2018-10-02 PROCEDURE — 82607 VITAMIN B-12: CPT

## 2018-10-02 PROCEDURE — G8427 DOCREV CUR MEDS BY ELIG CLIN: HCPCS | Performed by: FAMILY MEDICINE

## 2018-10-02 PROCEDURE — 99214 OFFICE O/P EST MOD 30 MIN: CPT | Performed by: FAMILY MEDICINE

## 2018-10-02 PROCEDURE — 4040F PNEUMOC VAC/ADMIN/RCVD: CPT | Performed by: FAMILY MEDICINE

## 2018-10-02 PROCEDURE — 1090F PRES/ABSN URINE INCON ASSESS: CPT | Performed by: FAMILY MEDICINE

## 2018-10-02 PROCEDURE — G8400 PT W/DXA NO RESULTS DOC: HCPCS | Performed by: FAMILY MEDICINE

## 2018-10-02 PROCEDURE — G0008 ADMIN INFLUENZA VIRUS VAC: HCPCS | Performed by: FAMILY MEDICINE

## 2018-10-02 PROCEDURE — 82746 ASSAY OF FOLIC ACID SERUM: CPT

## 2018-10-02 PROCEDURE — G8482 FLU IMMUNIZE ORDER/ADMIN: HCPCS | Performed by: FAMILY MEDICINE

## 2018-10-02 PROCEDURE — 84439 ASSAY OF FREE THYROXINE: CPT

## 2018-10-02 PROCEDURE — G8417 CALC BMI ABV UP PARAM F/U: HCPCS | Performed by: FAMILY MEDICINE

## 2018-10-02 PROCEDURE — 80053 COMPREHEN METABOLIC PANEL: CPT

## 2018-10-02 PROCEDURE — 96372 THER/PROPH/DIAG INJ SC/IM: CPT | Performed by: FAMILY MEDICINE

## 2018-10-02 PROCEDURE — 85027 COMPLETE CBC AUTOMATED: CPT

## 2018-10-02 PROCEDURE — 1101F PT FALLS ASSESS-DOCD LE1/YR: CPT | Performed by: FAMILY MEDICINE

## 2018-10-02 PROCEDURE — 36415 COLL VENOUS BLD VENIPUNCTURE: CPT

## 2018-10-02 RX ORDER — CYANOCOBALAMIN 1000 UG/ML
1000 INJECTION INTRAMUSCULAR; SUBCUTANEOUS ONCE
Status: COMPLETED | OUTPATIENT
Start: 2018-10-02 | End: 2018-10-02

## 2018-10-02 RX ADMIN — CYANOCOBALAMIN 1000 MCG: 1000 INJECTION INTRAMUSCULAR; SUBCUTANEOUS at 13:18

## 2018-10-02 ASSESSMENT — ENCOUNTER SYMPTOMS
EYES NEGATIVE: 1
ROS SKIN COMMENTS: RIGHT ARM
GASTROINTESTINAL NEGATIVE: 1
RESPIRATORY NEGATIVE: 1

## 2018-10-02 NOTE — PROGRESS NOTES
6.4 - 8.3 g/dL Not in Time Range        Hemoglobin A1C (%)   Date Value   08/05/2015 5.4     Microscopic Examination (no units)   Date Value   06/09/2015 YES     LDL Calculated (mg/dL)   Date Value   03/08/2017 101         Lab Results   Component Value Date    WBC 5.8 10/02/2018    NEUTROABS 3.2 01/09/2018    HGB 13.8 10/02/2018    HCT 41.8 10/02/2018    MCV 94.6 10/02/2018     10/02/2018       Lab Results   Component Value Date    TSH 2.18 10/02/2018         ASSESSMENT:    Diagnosis Orders   1. Urinary incontinence, unspecified type  POCT Urinalysis no Micro    Mirabegron ER 25 MG TB24   2. Hyperthyroidism     3. Essential hypertension  CBC    COMPREHENSIVE METABOLIC PANEL   4. Hypothyroidism, unspecified type  TSH without Reflex    T4, FREE   5. Arthritis     6. Memory loss  Memantine HCl-Donepezil HCl 28-10 MG CP24   7.  B12 deficiency  cyanocobalamin injection 1,000 mcg    VITAMIN B12 & FOLATE        PLAN:  Orders Placed This Encounter   Medications    Mirabegron ER 25 MG TB24     Sig: Take 1 tablet by mouth daily     Dispense:  30 tablet     Refill:  2    Memantine HCl-Donepezil HCl 28-10 MG CP24     Sig: Take 1 tablet by mouth daily     Dispense:  30 capsule     Refill:  2    cyanocobalamin injection 1,000 mcg        Medications Discontinued During This Encounter   Medication Reason    Memantine HCl-Donepezil HCl (NAMZARIC) 7 & 14 & 21 &28 -10 MG C4PK        Controlled Substances Monitoring:

## 2018-12-05 ENCOUNTER — OFFICE VISIT (OUTPATIENT)
Dept: FAMILY MEDICINE CLINIC | Age: 83
End: 2018-12-05
Payer: MEDICARE

## 2018-12-05 ENCOUNTER — HOSPITAL ENCOUNTER (OUTPATIENT)
Facility: HOSPITAL | Age: 83
Discharge: HOME | End: 2018-12-05
Payer: MEDICARE

## 2018-12-05 VITALS
OXYGEN SATURATION: 97 % | HEART RATE: 60 BPM | DIASTOLIC BLOOD PRESSURE: 82 MMHG | WEIGHT: 149.9 LBS | RESPIRATION RATE: 20 BRPM | BODY MASS INDEX: 27.58 KG/M2 | SYSTOLIC BLOOD PRESSURE: 122 MMHG | HEIGHT: 62 IN

## 2018-12-05 DIAGNOSIS — I10 ESSENTIAL HYPERTENSION: ICD-10-CM

## 2018-12-05 DIAGNOSIS — E55.9 VITAMIN D DEFICIENCY: ICD-10-CM

## 2018-12-05 DIAGNOSIS — M19.90 ARTHRITIS: ICD-10-CM

## 2018-12-05 DIAGNOSIS — E05.90 HYPERTHYROIDISM: ICD-10-CM

## 2018-12-05 DIAGNOSIS — R41.3 MEMORY LOSS OR IMPAIRMENT: Primary | ICD-10-CM

## 2018-12-05 LAB
A/G RATIO: 1.6 (ref 0.8–2)
ALBUMIN SERPL-MCNC: 4.4 G/DL (ref 3.4–4.8)
ALP BLD-CCNC: 67 U/L (ref 25–100)
ALT SERPL-CCNC: 10 U/L (ref 4–36)
ANION GAP SERPL CALCULATED.3IONS-SCNC: 10 MMOL/L (ref 3–16)
AST SERPL-CCNC: 12 U/L (ref 8–33)
BILIRUB SERPL-MCNC: <0.2 MG/DL (ref 0.3–1.2)
BUN BLDV-MCNC: 23 MG/DL (ref 6–20)
CALCIUM SERPL-MCNC: 9.6 MG/DL (ref 8.5–10.5)
CHLORIDE BLD-SCNC: 105 MMOL/L (ref 98–107)
CO2: 28 MMOL/L (ref 20–30)
CREAT SERPL-MCNC: 0.9 MG/DL (ref 0.4–1.2)
GFR AFRICAN AMERICAN: >59
GFR NON-AFRICAN AMERICAN: 60
GLOBULIN: 2.7 G/DL
GLUCOSE BLD-MCNC: 116 MG/DL (ref 74–106)
POTASSIUM SERPL-SCNC: 4.7 MMOL/L (ref 3.4–5.1)
SODIUM BLD-SCNC: 143 MMOL/L (ref 136–145)
TOTAL PROTEIN: 7.1 G/DL (ref 6.4–8.3)
TSH SERPL DL<=0.05 MIU/L-ACNC: 2.76 UIU/ML (ref 0.35–5.5)
VITAMIN D 25-HYDROXY: 18.3 (ref 32–100)

## 2018-12-05 PROCEDURE — G8427 DOCREV CUR MEDS BY ELIG CLIN: HCPCS | Performed by: FAMILY MEDICINE

## 2018-12-05 PROCEDURE — 1036F TOBACCO NON-USER: CPT | Performed by: FAMILY MEDICINE

## 2018-12-05 PROCEDURE — G8400 PT W/DXA NO RESULTS DOC: HCPCS | Performed by: FAMILY MEDICINE

## 2018-12-05 PROCEDURE — 1123F ACP DISCUSS/DSCN MKR DOCD: CPT | Performed by: FAMILY MEDICINE

## 2018-12-05 PROCEDURE — 80053 COMPREHEN METABOLIC PANEL: CPT

## 2018-12-05 PROCEDURE — G8417 CALC BMI ABV UP PARAM F/U: HCPCS | Performed by: FAMILY MEDICINE

## 2018-12-05 PROCEDURE — 36415 COLL VENOUS BLD VENIPUNCTURE: CPT

## 2018-12-05 PROCEDURE — 1090F PRES/ABSN URINE INCON ASSESS: CPT | Performed by: FAMILY MEDICINE

## 2018-12-05 PROCEDURE — 4040F PNEUMOC VAC/ADMIN/RCVD: CPT | Performed by: FAMILY MEDICINE

## 2018-12-05 PROCEDURE — G8482 FLU IMMUNIZE ORDER/ADMIN: HCPCS | Performed by: FAMILY MEDICINE

## 2018-12-05 PROCEDURE — 99214 OFFICE O/P EST MOD 30 MIN: CPT | Performed by: FAMILY MEDICINE

## 2018-12-05 PROCEDURE — 84443 ASSAY THYROID STIM HORMONE: CPT

## 2018-12-05 PROCEDURE — 82306 VITAMIN D 25 HYDROXY: CPT

## 2018-12-05 PROCEDURE — 1101F PT FALLS ASSESS-DOCD LE1/YR: CPT | Performed by: FAMILY MEDICINE

## 2018-12-05 ASSESSMENT — ENCOUNTER SYMPTOMS
ROS SKIN COMMENTS: RIGHT ARM
EYES NEGATIVE: 1
GASTROINTESTINAL NEGATIVE: 1
RESPIRATORY NEGATIVE: 1

## 2018-12-05 NOTE — PROGRESS NOTES
CLEANUP    acetaminophen-codeine (TYLENOL #3) 300-30 MG per tablet LIST CLEANUP       Controlled Substances Monitoring:

## 2018-12-06 RX ORDER — ERGOCALCIFEROL 1.25 MG/1
50000 CAPSULE ORAL WEEKLY
Qty: 4 CAPSULE | Refills: 2 | Status: SHIPPED | OUTPATIENT
Start: 2018-12-06 | End: 2019-06-17 | Stop reason: SDUPTHER

## 2018-12-10 DIAGNOSIS — Z12.11 SCREENING FOR COLON CANCER: Primary | ICD-10-CM

## 2018-12-10 LAB
CONTROL: NORMAL
HEMOCCULT STL QL: NEGATIVE

## 2018-12-10 PROCEDURE — 82274 ASSAY TEST FOR BLOOD FECAL: CPT | Performed by: FAMILY MEDICINE

## 2019-02-04 DIAGNOSIS — R32 URINARY INCONTINENCE, UNSPECIFIED TYPE: ICD-10-CM

## 2019-02-04 RX ORDER — MIRABEGRON 25 MG/1
TABLET, FILM COATED, EXTENDED RELEASE ORAL
Qty: 30 TABLET | Refills: 5 | Status: SHIPPED | OUTPATIENT
Start: 2019-02-04 | End: 2021-06-16 | Stop reason: SDUPTHER

## 2019-02-04 RX ORDER — OMEPRAZOLE 40 MG/1
CAPSULE, DELAYED RELEASE ORAL
Qty: 90 CAPSULE | Refills: 3 | Status: SHIPPED | OUTPATIENT
Start: 2019-02-04 | End: 2019-05-24 | Stop reason: CLARIF

## 2019-03-27 RX ORDER — LEVOTHYROXINE SODIUM 0.05 MG/1
TABLET ORAL
Qty: 90 TABLET | Refills: 3 | Status: SHIPPED | OUTPATIENT
Start: 2019-03-27 | End: 2021-06-16 | Stop reason: SDUPTHER

## 2019-05-24 RX ORDER — OMEPRAZOLE AND SODIUM BICARBONATE 40; 1100 MG/1; MG/1
1 CAPSULE ORAL
Qty: 30 CAPSULE | Refills: 3 | Status: SHIPPED | OUTPATIENT
Start: 2019-05-24 | End: 2021-05-26

## 2019-06-18 RX ORDER — ERGOCALCIFEROL 1.25 MG/1
CAPSULE ORAL
Qty: 12 CAPSULE | Refills: 3 | Status: SHIPPED | OUTPATIENT
Start: 2019-06-18 | End: 2021-05-26

## 2020-01-06 ENCOUNTER — APPOINTMENT (OUTPATIENT)
Dept: GENERAL RADIOLOGY | Facility: HOSPITAL | Age: 85
End: 2020-01-06
Payer: MEDICARE

## 2020-01-06 ENCOUNTER — HOSPITAL ENCOUNTER (EMERGENCY)
Facility: HOSPITAL | Age: 85
Discharge: HOME | End: 2020-01-06
Attending: EMERGENCY MEDICINE
Payer: MEDICARE

## 2020-01-06 VITALS
OXYGEN SATURATION: 96 % | BODY MASS INDEX: 27.42 KG/M2 | SYSTOLIC BLOOD PRESSURE: 170 MMHG | HEART RATE: 76 BPM | DIASTOLIC BLOOD PRESSURE: 75 MMHG | TEMPERATURE: 98.4 F | HEIGHT: 62 IN | RESPIRATION RATE: 16 BRPM | WEIGHT: 149 LBS

## 2020-01-06 PROCEDURE — 99283 EMERGENCY DEPT VISIT LOW MDM: CPT

## 2020-01-06 PROCEDURE — 73030 X-RAY EXAM OF SHOULDER: CPT

## 2020-01-07 NOTE — ED NOTES
Sling and swathe intact. No new symptoms. Discharge instructions reviewed with patient with her verbalized understanding. Patient taken to vehicle per wheelchair.      Heron Guillen RN  01/06/20 [id number]

## 2020-01-07 NOTE — ED TRIAGE NOTES
Complaints of left shoulder and left rib pain. Patient tripped and fell going into house. Patient has prior fracture to left shoulder three weeks ago. No bruising noted. Patient has sling and swathe to left arm. Positive left radial pulses. Sensation intact. No SOA.  No LOC

## 2020-01-07 NOTE — ED PROVIDER NOTES
[address]  eMERGENCY dEPARTMENT eNCOUnter      Pt Name: Deuce Caro  MRN: [id number]  [place]: 1935  Date ofevaluation: 9/8/9793  Provider: Anna Mejía MD    [address]       Chief Complaint   Patient presents with    Shoulder Pain    Rib Pain         HISTORY OF PRESENT ILLNESS  (Location/Symptom, Timing/Onset, Context/Setting, Quality, Duration, Modifying Factors, Severity.)   Deuce Caro is a 80 y.o. female who presents to the emergency department who fell while stepping into the house tonight. She tripped on the uneven steps. She already has a broken humerus and saw Dr. Ely Zapata in the office today. Daughter wants to make sure she didn't make it worse. Nursing notes were reviewed. REVIEW OF SYSTEMS    (2-9systems for level 4, 10 or more for level 5)   ROS:  General:  No fevers, no chills, no weakness  HEENT: No sore throat, runny nose or ear pain  Cardiovascular:  No chest pain, no palpitations  Respiratory:  No shortness of breath, no cough, no wheezing  Gastrointestinal:  No pain, no nausea, no vomiting, no diarrhea  Musculoskeletal:  No muscle pain, + left shoulder pain as above. Skin:  No rash, no easy bruising  Genitourinary:  No dysuria, no hematuria    Except as noted above theremainder of the review of systems was reviewed and negative.        PASTMEDICAL HISTORY     Past Medical History:   Diagnosis Date    Allergic rhinitis     Hypertension     Hyperthyroidism     Osteoarthritis          SURGICAL HISTORY       Past Surgical History:   Procedure Laterality Date    BLADDER SUSPENSION  9/2015    CHOLECYSTECTOMY      HYSTERECTOMY      TUBAL LIGATION           CURRENT MEDICATIONS       Discharge Medication List as of 1/6/2020  8:58 PM      CONTINUE these medications which have NOT CHANGED    Details   vitamin D (ERGOCALCIFEROL) 08436 units CAPS capsule TAKE 1 CAPSULE BY MOUTH ONCE A WEEK FOR 3 MONTHS THEN TAKE 2000 IU OTC DAILY, Disp-12 Topics Concern    None   Social History Narrative    None         PHYSICAL EXAM    (up to 7 forlevel 4, 8 or more for level 5)     ED Triage Vitals [01/06/20 2025]   BP Temp Temp Source Pulse Resp SpO2 Height Weight   (!) 170/75 98.4 °F (36.9 °C) Oral 76 -- 96 % 5' 2\" (1.575 m) 149 lb (67.6 kg)       Physical Exam  General :Patient is awake, alert, oriented, in no acute distress, nontoxic appearing  HEENT: Pupils are equally round and reactive to light, EOMI. Cardiac: Heart regular rate, rhythm, no murmurs, rubs, or gallops  Lungs: Lungs are clear to auscultation, there is no wheezing, rhonchi, or rales. Abdomen:Abdomen is soft, nontender, nondistended. Musculoskeletal: Ambulatory; left shoulder with mild tenderness to palpation; some old ecchymosis is present. Back: No midline or bony tenderness. Dermatology: Skin is warm and dry  Psych: Mentation is grossly normal, cognition is grossly normal. Affect is appropriate. DIAGNOSTIC RESULTS       RADIOLOGY:   Non-plain film images such as CT, Ultrasoundand MRI are read by the radiologist. Plain radiographic images are visualized and preliminarily interpreted by the emergency physician with the below findings:      [] Radiologist's Report Reviewed:  XR SHOULDER RIGHT (MIN 2 VIEWS)   Final Result   Impression:    Proximal humerus fracture. ED BEDSIDE ULTRASOUND:   Performed by ED Physician - none    LABS:  Labs Reviewed - No data to display    I have reviewed and interpreted all of the currently available lab resultsfrom this visit (if applicable):  No results found for this visit on 01/06/20. All other labs were within normal range or not returned as of this dictation.     EMERGENCY DEPARTMENT COURSE and DIFFERENTIAL DIAGNOSIS/MDM:   Vitals:    Vitals:    01/06/20 2025 01/06/20 2104   BP: (!) 170/75    Pulse: 76    Resp:  16   Temp: 98.4 °F (36.9 °C)    TempSrc: Oral    SpO2: 96%    Weight: 149 lb (67.6 kg)    Height: 5' 2\" (1.575 m) Texted pictures of xrays to Dr. Pilar Guzman. He reviewed and said the fracture was unchanged. Replace splint and for her to keep her followup appointment as scheduled. The patient will follow-up with their PCP in 1-2 days for reevaluation. If the patient or family members have any further concerns or any worsening symptoms they will return to the ED for reevaluation. CONSULTS:  None    PROCEDURES:  Procedures    CRITICAL CARE TIME    Total Critical Care time was 0 minutes, excluding separately reportable procedures. There was a high probability of clinically significant/life threatening deterioration in the patient's condition which required my urgent intervention. FINAL IMPRESSION      1. Acute pain of left shoulder    2. Closed 3-part fracture of surgical neck of left humerus with routine healing, subsequent encounter          DISPOSITION/PLAN   DISPOSITION Decision To Discharge 01/06/2020 08:56:50 PM      PATIENT REFERRED TO:  Gavi Del Valle MD  Pancho Norton  [phone]    Schedule an appointment as soon as possible for a visit in 2 days  As needed      DISCHARGE MEDICATIONS:  Discharge Medication List as of 1/6/2020  8:58 PM          Comment: Please note this report has been produced using speech recognition software and may contain errors related tothat system including errors in grammar, punctuation, and spelling, as well as words and phrases that may be inappropriate. If there are any questions or concerns please feel free to contact the dictating provider forclarification.     Davonte Kee MD  Attending Emergency Physician                  Davonte Kee MD  [phone]/73/[phone]       Davonte Kee MD  01/12/20 [id number]

## 2020-01-27 ENCOUNTER — HOSPITAL ENCOUNTER (OUTPATIENT)
Dept: PHYSICAL THERAPY | Facility: HOSPITAL | Age: 85
Setting detail: THERAPIES SERIES
Discharge: HOME | End: 2020-01-27
Payer: MEDICARE

## 2020-01-27 PROCEDURE — 97161 PT EVAL LOW COMPLEX 20 MIN: CPT

## 2020-01-27 PROCEDURE — 97110 THERAPEUTIC EXERCISES: CPT

## 2020-01-27 NOTE — PROGRESS NOTES
Physical Therapy  Initial Assessment  Date: 2020  Patient Name: Radames Almaraz  MRN: [id number]  : 1935     Treatment Diagnosis: s/p left proximal humerus fracture; joint stiffness, muscle weakness    Restrictions  Restrictions/Precautions  Restrictions/Precautions: General Precautions  Required Braces or Orthoses?: No    Subjective   General  Chart Reviewed: Yes  Patient assessed for rehabilitation services?: Yes  Response To Previous Treatment: Not applicable  Family / Caregiver Present: No  Referring Practitioner: Jose Lanza MD  Diagnosis: s/p left proximal humerus fracture  Follows Commands: Within Functional Limits  General Comment  Comments: Exam/treatment somewhat complicated by patient having dementia  Subjective  Subjective: Patient/daughter report: patient had a fall at home about 5-6 weeks ago, diagnosed with proximal humerus fracture; c/o minimal pain; primary c/o shoulder stiffness; some weakness and functional loss of left UE.   Pain Screening  Patient Currently in Pain: Denies  Vital Signs  Patient Currently in Pain: Denies    Vision/Hearing  Vision  Vision: Within Functional Limits  Hearing  Hearing: Within functional limits    Orientation  Orientation  Overall Orientation Status: Within Functional Limits    Social/Functional History  Social/Functional History  Lives With: Daughter  Active : No  Mode of Transportation: Family    Objective     Observation/Palpation  Posture: Fair  Palpation: mild tenderness lateral arm  Observation: mild unsteady gait, using cane and SBA x 1; left GH, AC, SC joints; no obvious edema    AROM LUE (degrees)  L Shoulder Flexion 0-180: 100  L Shoulder ABduction 0-180: 75  L Shoulder Int Rotation  0-70: 70  L Shoulder Ext Rotation  0-90: 45    Strength LUE  L Shoulder Flexion: 3-/5  L Shoulder Extension: 3+/5  L Shoulder ABduction: 2+/5  L Shoulder Internal Rotation: 3-/5  L Shoulder External Rotation: 3-/5     Additional Measures  Other: Quick DASH = 48  Sensation  Overall Sensation Status: WFL                   Exercises  Exercise 1: scap squeezes: 2 x 15  Exercise 2: Begin pulleys - GENTLE - 2' x 2  Exercise 3: Left elbow AROM: flex, ext - 2 x 15  Exercise 4: Putty - tan - 2' x 2  Exercise 5: Table slide - AA/PROM - flexion - 3 x 15  Exercise 6: Pendulum: 1' x 3  Exercise 7: Manual: grade 1-2 shoulder mobs, gentle PROM left shoulder                      Assessment   Conditions Requiring Skilled Therapeutic Intervention  Body structures, Functions, Activity limitations: Decreased high-level IADLs;Decreased ROM; Decreased strength;Decreased ADL status  Assessment: s/p left proximal humerus fracture; joint stiffness, muscle weakness  Treatment Diagnosis: s/p left proximal humerus fracture; joint stiffness, muscle weakness  Prognosis: Excellent  Decision Making: Low Complexity  REQUIRES PT FOLLOW UP: Yes  Treatment Initiated : evaluation, therex/HEP  Activity Tolerance  Activity Tolerance: Patient Tolerated treatment well         Plan   Plan  Times per week: 2 x week  Plan weeks: 6-8 weeks  Current Treatment Recommendations: Strengthening, ROM, Modalities, Neuromuscular Re-education, Manual Therapy - Soft Tissue Mobilization, Home Exercise Program, Manual Therapy - Joint Manipulation, Safety Education & Training                 Goals  Short term goals  Time Frame for Short term goals: 2-3 weeks  Short term goal 1: Begin gentle strengthening exercises if cleared by MD. Florence Hernandez term goal 2: Independent with HEP. Long term goals  Time Frame for Long term goals : 6 weeks  Long term goal 1: Achieve left shoulder AROM: FF = 125. ABD = 85, ER = 60. Long term goal 2: Achieve 4/5 left shoulder strength within available ROM. Long term goal 3: Achieve a Quick DASH score of 28 or less.        Therapy Time   Individual Concurrent Group Co-treatment   Time In           Time Out           Minutes                   Electronically signed by Jarad Rios PT on 1/27/2020 at 11:12 AM      Certification of Medical Necessity: It will be understood that this treatment plan is certified medically necessary by the documenting therapist and referring physician mentioned in this report. Unless the physician indicated otherwise through written correspondence with our office, all further referrals will act as certification of medical necessity on this treatment plan. Thank you for this referral.  If you have questions regarding this plan of care, please call [phone].           Revisions to this plan (optional):                     Please sign and return this plan to:   FAX: [phone]      Signature:                                 Date:

## 2020-01-31 ENCOUNTER — HOSPITAL ENCOUNTER (OUTPATIENT)
Dept: PHYSICAL THERAPY | Facility: HOSPITAL | Age: 85
Setting detail: THERAPIES SERIES
Discharge: HOME | End: 2020-01-31
Payer: MEDICARE

## 2020-01-31 PROCEDURE — 97140 MANUAL THERAPY 1/> REGIONS: CPT

## 2020-01-31 PROCEDURE — 97110 THERAPEUTIC EXERCISES: CPT

## 2020-01-31 NOTE — FLOWSHEET NOTE
Physical Therapy Daily Treatment Note   Date:  2020    TIme In:       1033               Time Out:  1113    Patient Name:  Kaleb Caldera    :  1935  MRN: [id number]    Restrictions/Precautions:    Pertinent Medical History:  Medical/Treatment Diagnosis Information:  ·   s/p left proximal humerus fracture  ·    Insurance/Certification information:   Ana Johnston  Physician Information:    Mickey Akers MD  Plan of care signed (Y/N):    Visit# / total visits:    2 /    G-Code (if applicable):      Date / Visit # G-Code Applied:         Progress Note: []  Yes  [x]  No  Next due by: Visit #10      Pain level: 0/10    Subjective: No new c/o; good compliance with HEP.      Objective:   Observation:    Test measurements:     Palpation:    Exercises:  Exercise Resistance/Repetitions Other comments   scap squeezes 2 x 15    Gentle pulleys 2' x 2    Left elbow AROM: flex, ext 2 x 15    Putty Tan - 2' x 2    Table slide - FF AA/PROM 3 x 15    Pendulum: 1' x 3                                    Other Therapeutic Activities:      Manual Treatments:  Grade 1-2 mobs, gentle PROM left shoulder x 10'    Modalities:        Timed Code Treatment Minutes:  45'      Total Treatment Minutes:  36'    Treatment/Activity Tolerance:  [x] Patient tolerated treatment well [] Patient limited by fatigue  [] Patient limited by pain  [] Patient limited by other medical complications  [] Other:     Pain after treatment:    0 /10    Prognosis: [x] Good [] Fair  [] Poor    Patient Requires Follow-up: [x] Yes  [] No    Plan:   [x] Continue per plan of care [] Alter current plan (see comments)  [] Plan of care initiated [] Hold pending MD visit [] Discharge    Plan for Next Session:        Electronically signed by:  Carmen De La Cruz PT

## 2020-02-03 ENCOUNTER — HOSPITAL ENCOUNTER (OUTPATIENT)
Dept: PHYSICAL THERAPY | Facility: HOSPITAL | Age: 85
Setting detail: THERAPIES SERIES
Discharge: HOME | End: 2020-02-03
Payer: MEDICARE

## 2020-02-03 PROCEDURE — 97140 MANUAL THERAPY 1/> REGIONS: CPT

## 2020-02-03 PROCEDURE — 97110 THERAPEUTIC EXERCISES: CPT

## 2020-02-03 NOTE — FLOWSHEET NOTE
Physical Therapy Daily Treatment Note   Date:  2/3/2020    TIme In:       1059               Time Out:  2854    Patient Name:  Sushma Sandra    :  1935  MRN: [id number]    Restrictions/Precautions:    Pertinent Medical History:  Medical/Treatment Diagnosis Information:  ·   s/p left proximal humerus fracture     Insurance/Certification information:   Ana Johnston  Physician Information:    Devon Butler MD  Plan of care signed (Y/N):    Visit# / total visits:    3/    G-Code (if applicable):      Date / Visit # G-Code Applied:         Progress Note: []  Yes  [x]  No  Next due by: Visit #10      Pain level: 5/10    Subjective: Pt reports her sh is hurting some and her daughter expressed that she has to go to the othorpedics today. Objective:   Observation:    Test measurements:     Palpation:    Exercises:  Exercise Resistance/Repetitions Other comments   scap squeezes 2 x 15 3   Gentle pulleys 2' x 2 3   Left elbow AROM: flex, ext 2 x 15 3   Putty Tan - 2' x 2 3   Table slide - FF AA/PROM 3 x 15 3   Pendulum: 1' x 3 3                                   Other Therapeutic Activities:      Manual Treatments:  Grade 1-2 mobs, gentle PROM left shoulder x 8'    Modalities:        Timed Code Treatment Minutes:  50      Total Treatment Minutes:  56    Treatment/Activity Tolerance:  [x] Patient tolerated treatment well [] Patient limited by fatigue  [] Patient limited by pain  [] Patient limited by other medical complications  [x] Other:  Pt completed tx with no pain.     Pain after treatment:    0 /10    Prognosis: [x] Good [] Fair  [] Poor    Patient Requires Follow-up: [x] Yes  [] No    Plan:   [x] Continue per plan of care [] Alter current plan (see comments)  [] Plan of care initiated [] Hold pending MD visit [] Discharge    Plan for Next Session:        Electronically signed by:  Lavelle Bo PTA

## 2020-02-05 ENCOUNTER — HOSPITAL ENCOUNTER (OUTPATIENT)
Dept: PHYSICAL THERAPY | Facility: HOSPITAL | Age: 85
Setting detail: THERAPIES SERIES
Discharge: HOME | End: 2020-02-05
Payer: MEDICARE

## 2020-02-05 ENCOUNTER — APPOINTMENT (OUTPATIENT)
Dept: PHYSICAL THERAPY | Facility: HOSPITAL | Age: 85
End: 2020-02-05
Payer: MEDICARE

## 2020-02-05 PROCEDURE — 97110 THERAPEUTIC EXERCISES: CPT

## 2020-02-05 PROCEDURE — 97140 MANUAL THERAPY 1/> REGIONS: CPT

## 2020-02-10 ENCOUNTER — HOSPITAL ENCOUNTER (OUTPATIENT)
Dept: PHYSICAL THERAPY | Facility: HOSPITAL | Age: 85
Setting detail: THERAPIES SERIES
Discharge: HOME | End: 2020-02-10
Payer: MEDICARE

## 2020-02-10 PROCEDURE — 97140 MANUAL THERAPY 1/> REGIONS: CPT

## 2020-02-10 PROCEDURE — 97110 THERAPEUTIC EXERCISES: CPT

## 2020-02-12 ENCOUNTER — HOSPITAL ENCOUNTER (OUTPATIENT)
Dept: PHYSICAL THERAPY | Facility: HOSPITAL | Age: 85
Setting detail: THERAPIES SERIES
Discharge: HOME | End: 2020-02-12
Payer: MEDICARE

## 2020-02-12 PROCEDURE — 97110 THERAPEUTIC EXERCISES: CPT

## 2020-02-17 ENCOUNTER — HOSPITAL ENCOUNTER (OUTPATIENT)
Dept: PHYSICAL THERAPY | Facility: HOSPITAL | Age: 85
Setting detail: THERAPIES SERIES
Discharge: HOME | End: 2020-02-17
Payer: MEDICARE

## 2020-02-17 PROCEDURE — 97110 THERAPEUTIC EXERCISES: CPT

## 2020-02-17 PROCEDURE — 97140 MANUAL THERAPY 1/> REGIONS: CPT

## 2020-02-19 ENCOUNTER — HOSPITAL ENCOUNTER (OUTPATIENT)
Dept: PHYSICAL THERAPY | Facility: HOSPITAL | Age: 85
Setting detail: THERAPIES SERIES
Discharge: HOME | End: 2020-02-19
Payer: MEDICARE

## 2020-02-19 PROCEDURE — 97140 MANUAL THERAPY 1/> REGIONS: CPT

## 2020-02-19 PROCEDURE — 97110 THERAPEUTIC EXERCISES: CPT

## 2020-02-19 RX ORDER — DONEPEZIL HYDROCHLORIDE 5 MG/1
TABLET, FILM COATED ORAL
Qty: 49 TABLET | Refills: 0 | Status: SHIPPED | OUTPATIENT
Start: 2020-02-19 | End: 2020-03-02 | Stop reason: SINTOL

## 2020-02-19 NOTE — FLOWSHEET NOTE
Physical Therapy Daily Treatment Note   Date:  2020    TIme In:       1058               Time Out:        [address]    Patient Name:  Surendra Molina    :  1935  MRN: [id number]    Restrictions/Precautions:    Pertinent Medical History:  Medical/Treatment Diagnosis Information:  ·   s/p left proximal humerus fracture     Insurance/Certification information:   Ana Johnston  Physician Information:    Maggy Shannon MD  Plan of care signed (Y/N):    Visit# / total visits:    8 /    G-Code (if applicable):      Date / Visit # G-Code Applied:         Progress Note: []  Yes  [x]  No  Next due by: Visit #10      Pain level: 0/10    Subjective: Pt reports she is doing good today, no pain currently. Objective:   Observation:    Test measurements:     Palpation:    Exercises:  Exercise Resistance/Repetitions Other comments   scap squeezes 2 x 15 19   Gentle pulleys 2' x 2 19   Left elbow AROM: flex, ext 2 x 15 19   Putty Tan - 2' x 2 19   Table slide - FF AA/PROM 3 x 15 19   Pendulum: 1' x 3 19                                   Other Therapeutic Activities:      Manual Treatments:  Grade 1-2 mobs, gentle PROM left shoulder x 10'    Modalities:        Timed Code Treatment Minutes:  45'      Total Treatment Minutes:  39'    Treatment/Activity Tolerance:  [x] Patient tolerated treatment well [] Patient limited by fatigue  [] Patient limited by pain  [] Patient limited by other medical complications  [x] Other:  Pt completed tx with no pain.     Pain after treatment:    0 /10    Prognosis: [x] Good [] Fair  [] Poor    Patient Requires Follow-up: [x] Yes  [] No    Plan:   [x] Continue per plan of care [] Alter current plan (see comments)  [] Plan of care initiated [] Hold pending MD visit [] Discharge    Plan for Next Session:        Electronically signed by:  Sandor Slater PT

## 2020-02-24 ENCOUNTER — HOSPITAL ENCOUNTER (OUTPATIENT)
Dept: PHYSICAL THERAPY | Facility: HOSPITAL | Age: 85
Setting detail: THERAPIES SERIES
Discharge: HOME | End: 2020-02-24
Payer: MEDICARE

## 2020-02-24 PROCEDURE — 97140 MANUAL THERAPY 1/> REGIONS: CPT

## 2020-02-24 PROCEDURE — 97110 THERAPEUTIC EXERCISES: CPT

## 2020-02-26 ENCOUNTER — APPOINTMENT (OUTPATIENT)
Dept: PHYSICAL THERAPY | Facility: HOSPITAL | Age: 85
End: 2020-02-26
Payer: MEDICARE

## 2020-03-02 ENCOUNTER — HOSPITAL ENCOUNTER (OUTPATIENT)
Dept: PHYSICAL THERAPY | Facility: HOSPITAL | Age: 85
Setting detail: THERAPIES SERIES
Discharge: HOME | End: 2020-03-02
Payer: MEDICARE

## 2020-03-02 ENCOUNTER — OFFICE VISIT (OUTPATIENT)
Dept: PRIMARY CARE CLINIC | Age: 85
End: 2020-03-02
Payer: MEDICARE

## 2020-03-02 ENCOUNTER — HOSPITAL ENCOUNTER (OUTPATIENT)
Facility: HOSPITAL | Age: 85
Discharge: HOME | End: 2020-03-02
Payer: MEDICARE

## 2020-03-02 VITALS
DIASTOLIC BLOOD PRESSURE: 70 MMHG | HEIGHT: 62 IN | OXYGEN SATURATION: 97 % | HEART RATE: 74 BPM | SYSTOLIC BLOOD PRESSURE: 130 MMHG | WEIGHT: 149 LBS | BODY MASS INDEX: 27.42 KG/M2

## 2020-03-02 LAB
A/G RATIO: 1.5 (ref 0.8–2)
ALBUMIN SERPL-MCNC: 4.7 G/DL (ref 3.4–4.8)
ALP BLD-CCNC: 103 U/L (ref 25–100)
ALT SERPL-CCNC: 9 U/L (ref 4–36)
ANION GAP SERPL CALCULATED.3IONS-SCNC: 12 MMOL/L (ref 3–16)
AST SERPL-CCNC: 11 U/L (ref 8–33)
BILIRUB SERPL-MCNC: 0.3 MG/DL (ref 0.3–1.2)
BUN BLDV-MCNC: 21 MG/DL (ref 6–20)
CALCIUM SERPL-MCNC: 9.8 MG/DL (ref 8.5–10.5)
CHLORIDE BLD-SCNC: 102 MMOL/L (ref 98–107)
CHOLESTEROL, TOTAL: 249 MG/DL (ref 0–200)
CO2: 28 MMOL/L (ref 20–30)
CREAT SERPL-MCNC: 0.9 MG/DL (ref 0.4–1.2)
FOLATE: 7.91 NG/ML
GFR AFRICAN AMERICAN: >59
GFR NON-AFRICAN AMERICAN: 60
GLOBULIN: 3.1 G/DL
GLUCOSE BLD-MCNC: 95 MG/DL (ref 74–106)
HCT VFR BLD CALC: 42.8 % (ref 37–47)
HDLC SERPL-MCNC: 46 MG/DL (ref 40–60)
HEMOGLOBIN: 14 G/DL (ref 11.5–16.5)
LDL CHOLESTEROL CALCULATED: 160 MG/DL
MCH RBC QN AUTO: 31.3 PG (ref 27–32)
MCHC RBC AUTO-ENTMCNC: 32.7 G/DL (ref 31–35)
MCV RBC AUTO: 95.7 FL (ref 80–100)
PDW BLD-RTO: 12.8 % (ref 11–16)
PLATELET # BLD: 257 K/UL (ref 150–400)
PMV BLD AUTO: 11.2 FL (ref 6–10)
POTASSIUM SERPL-SCNC: 4.6 MMOL/L (ref 3.4–5.1)
RBC # BLD: 4.47 M/UL (ref 3.8–5.8)
SODIUM BLD-SCNC: 142 MMOL/L (ref 136–145)
TOTAL PROTEIN: 7.8 G/DL (ref 6.4–8.3)
TRIGL SERPL-MCNC: 213 MG/DL (ref 0–249)
TSH SERPL DL<=0.05 MIU/L-ACNC: 1.85 UIU/ML (ref 0.35–5.5)
VITAMIN B-12: 360 PG/ML (ref 211–911)
VITAMIN D 25-HYDROXY: 36 (ref 32–100)
VLDLC SERPL CALC-MCNC: 43 MG/DL
WBC # BLD: 6.3 K/UL (ref 4–11)

## 2020-03-02 PROCEDURE — 80053 COMPREHEN METABOLIC PANEL: CPT

## 2020-03-02 PROCEDURE — 80061 LIPID PANEL: CPT

## 2020-03-02 PROCEDURE — 97110 THERAPEUTIC EXERCISES: CPT

## 2020-03-02 PROCEDURE — 82746 ASSAY OF FOLIC ACID SERUM: CPT

## 2020-03-02 PROCEDURE — 82607 VITAMIN B-12: CPT

## 2020-03-02 PROCEDURE — 99214 OFFICE O/P EST MOD 30 MIN: CPT | Performed by: NURSE PRACTITIONER

## 2020-03-02 PROCEDURE — 85027 COMPLETE CBC AUTOMATED: CPT

## 2020-03-02 PROCEDURE — 84443 ASSAY THYROID STIM HORMONE: CPT

## 2020-03-02 PROCEDURE — 82306 VITAMIN D 25 HYDROXY: CPT

## 2020-03-02 PROCEDURE — 97140 MANUAL THERAPY 1/> REGIONS: CPT

## 2020-03-02 RX ORDER — LISINOPRIL 10 MG/1
10 TABLET ORAL DAILY
COMMUNITY
End: 2021-05-26

## 2020-03-02 RX ORDER — FUROSEMIDE 20 MG/1
20 TABLET ORAL DAILY
Qty: 60 TABLET | Refills: 3 | Status: SHIPPED | OUTPATIENT
Start: 2020-03-02 | End: 2021-05-26

## 2020-03-02 RX ORDER — QUETIAPINE FUMARATE 25 MG/1
25 TABLET, FILM COATED ORAL NIGHTLY
Qty: 30 TABLET | Refills: 3 | Status: SHIPPED | OUTPATIENT
Start: 2020-03-02 | End: 2020-11-23 | Stop reason: ALTCHOICE

## 2020-03-02 RX ORDER — POTASSIUM CHLORIDE 20 MEQ/1
20 TABLET, EXTENDED RELEASE ORAL DAILY
Qty: 30 TABLET | Refills: 0 | Status: SHIPPED | OUTPATIENT
Start: 2020-03-02 | End: 2021-05-26

## 2020-03-02 ASSESSMENT — ENCOUNTER SYMPTOMS
RESPIRATORY NEGATIVE: 1
GASTROINTESTINAL NEGATIVE: 1

## 2020-03-02 ASSESSMENT — PATIENT HEALTH QUESTIONNAIRE - PHQ9
SUM OF ALL RESPONSES TO PHQ9 QUESTIONS 1 & 2: 1
SUM OF ALL RESPONSES TO PHQ QUESTIONS 1-9: 1
1. LITTLE INTEREST OR PLEASURE IN DOING THINGS: 1
2. FEELING DOWN, DEPRESSED OR HOPELESS: 0
SUM OF ALL RESPONSES TO PHQ QUESTIONS 1-9: 1

## 2020-03-02 NOTE — PATIENT INSTRUCTIONS
dispose of used patches by folding them in half so that the sticky sides meet, and then flushing them down a toilet. They should not be placed in the household trash where children or pets can find them. · If you have any questions, ask your provider or pharmacist for more information. · Be sure to keep all appointments for provider visits or tests. We are committed to providing you with the best care possible. In order to help us achieve these goals please remember to bring all medications, herbal products, and over the counter supplements with you to each visit. If your provider has ordered testing for you, please be sure to follow up with our office if you have not received results within 7 days after the testing took place. *If you receive a survey after visiting one of our offices, please take time to share your experience concerning your physician office visit. These surveys are confidential and no health information about you is shared. We are eager to improve for you and we are counting on your feedback to help make that happen. ips to Help You Stop Smoking       Cigarette smoking is a preventable cause of death in the [place]. If you have thought about quitting but haven't been able to, here are some reasons why you should and some ways to do it. Here's Why   Quitting smoking now can decrease your risk of getting smoking-related illnesses like:   Heart disease   Stroke   Several types of cancer, including:   Lung   Mouth   Esophagus   Larynx   Bladder   Pancreas   Kidney   Chronic lung diseases:   Bronchitis   Emphysema   Asthma   Cataracts   Macular degeneration   Thyroid conditions   Hearing loss   Erectile dysfunction   Dementia   Osteoporosis   Here's How   Once you've decided to quit smoking, set your target quit date a few weeks away.  In the time leading up to your quit day, try some of these ideas offered by the [address] [place] [address] [facility] to help you successfully quit smoking. For the best results, work with your doctor. Together, you can test your lung function and compare the results to those of a nonsmoking person. The results can be given to you as your lung age. Finding out your lung age right after having the test done may help you to stop smoking. Your doctor can also discuss with you all of your options and refer you to smoking-cessation support groups. You may wish to use nicotine replacement (gum, patches, inhaler) or one of the prescription medications that have been shown to increase quit rates and prolong abstinence from smoking. But whatever you and your doctor decide on these matters, it will still be you who decides when an how to quit. Here are some techniques:   Switch Brands   Switch to a brand you find distasteful. Change to a brand that is low in tar and nicotine a couple of weeks before your target quit date. This will help change your smoking behavior. However, do not smoke more cigarettes, inhale them more often or more deeply, or place your fingertips over the holes in the filters. All of these actions will increase your nicotine intake, and the idea is to get your body used to functioning without nicotine. Cut Down the Number of Cigarettes You Smoke   Smoke only half of each cigarette. Each day, postpone the lighting of your first cigarette by one hour. Decide you'll only smoke during odd or even hours of the day. Decide beforehand how many cigarettes you'll smoke during the day. For each additional cigarette, give a dollar to your favorite josé miguel. Change your eating habits to help you cut down. For example, drink milk, which many people consider incompatible with smoking. End meals or snacks with something that won't lead to a cigarette. Reach for a glass of juice instead of a cigarette for a \"pick-me-up. \"   Remember: Cutting down can help you quit, but it's not a substitute for quitting.  If you're down to about seven cigarettes a day, it's time to set your target quit date, and get ready to stick to it. Don't Smoke \"Automatically\"   Smoke only those cigarettes you really want. Catch yourself before you light up a cigarette out of pure habit. Don't empty your ashtrays. This will remind you of how many cigarettes you've smoked each day, and the sight and the smell of stale cigarettes butts will be very unpleasant. Make yourself aware of each cigarette by using the opposite hand or putting cigarettes in an unfamiliar location or a different pocket to break the automatic reach. If you light up many times during the day without even thinking about it, try to look in a mirror each time you put a match to your cigarette. You may decide you don't need it. Make Smoking Inconvenient   Stop buying cigarettes by the carton. Wait until one pack is empty before you buy another. Stop carrying cigarettes with you at home or at work. Make them difficult to get to. Make Smoking Unpleasant   Smoke only under circumstances that aren't especially pleasurable for you. If you like to smoke with others, smoke alone. Turn your chair to an empty corner and focus only on the cigarette you are smoking and all its many negative effects. Collect all your cigarette butts in one large glass container as a visual reminder of the filth made by smoking. Just Before Quitting   Practice going without cigarettes. Don't think of never smoking again. Think of quitting in terms of one day at a time . Tell yourself you won't smoke today, and then don't. Clean your clothes to rid them of the cigarette smell, which can linger a long time. On the Day You Quit   Throw away all your cigarettes and matches. Hide your lighters and ashtrays. Visit the dentist and have your teeth cleaned to get rid of tobacco stains. Notice how nice they look and resolve to keep them that way.    Make a list of things you'd like to buy for

## 2020-03-02 NOTE — PROGRESS NOTES
Intramuscular Once Oliver Roca MD        lidocaine 1 % injection 5 mL  5 mL Intradermal Once Oliver Roca MD           Review of Systems   Constitutional: Negative. HENT: Negative. Respiratory: Negative. Cardiovascular: Positive for leg swelling (she has been off lasix). Gastrointestinal: Negative. Genitourinary: Negative. Musculoskeletal: Positive for arthralgias (left shoulder) and gait problem. Skin: Negative. Psychiatric/Behavioral: Positive for agitation, behavioral problems and confusion. OBJECTIVE:  /70   Pulse 74   Ht 5' 2\" (1.575 m)   Wt 149 lb (67.6 kg)   SpO2 97%   BMI 27.25 kg/m²    Physical Exam  Vitals signs and nursing note reviewed. Constitutional:       Appearance: She is well-developed. HENT:      Head: Normocephalic and atraumatic. Eyes:      Conjunctiva/sclera: Conjunctivae normal.      Pupils: Pupils are equal, round, and reactive to light. Neck:      Musculoskeletal: Normal range of motion and neck supple. Thyroid: No thyromegaly. Vascular: No JVD. Cardiovascular:      Rate and Rhythm: Normal rate and regular rhythm. Heart sounds: No murmur. No friction rub. No gallop. Pulmonary:      Effort: Pulmonary effort is normal. No respiratory distress. Breath sounds: Normal breath sounds. No wheezing or rales. Abdominal:      General: Bowel sounds are normal. There is no distension. Palpations: Abdomen is soft. Tenderness: There is no abdominal tenderness. There is no guarding. Musculoskeletal:      Left shoulder: She exhibits decreased range of motion and tenderness. Right lower le+ Edema present. Left lower le+ Edema present. Skin:     General: Skin is warm and dry. Findings: No rash. Neurological:      Mental Status: She is alert and oriented to person, place, and time. Coordination: Romberg sign negative.  Coordination abnormal.      Gait: Gait abnormal.      Comments: Right leg weakness, walks with a tilt to the right. Able to follow directions with additional prompting. Psychiatric:         Behavior: Behavior is agitated and aggressive. Cognition and Memory: Cognition is impaired. Memory is impaired. She exhibits impaired recent memory and impaired remote memory. Judgment: Judgment normal.      Comments: She became irritated and aggressive if I addressed her daughters instead of her. She says they try to boss her. She denied a memory problem but then when I asked her questions she admitted she could not remember.           Results in Past 30 Days  Result Component Current Result Ref Range Previous Result Ref Range   Alb 4.7 (3/2/2020) 3.4 - 4.8 g/dL Not in Time Range    Albumin/Globulin Ratio 1.5 (3/2/2020) 0.8 - 2.0 Not in Time Range    Alkaline Phosphatase 103 (H) (3/2/2020) 25 - 100 U/L Not in Time Range    ALT 9 (3/2/2020) 4 - 36 U/L Not in Time Range    AST 11 (3/2/2020) 8 - 33 U/L Not in Time Range    BUN 21 (H) (3/2/2020) 6 - 20 mg/dL Not in Time Range    Calcium 9.8 (3/2/2020) 8.5 - 10.5 mg/dL Not in Time Range    Chloride 102 (3/2/2020) 98 - 107 mmol/L Not in Time Range    CO2 28 (3/2/2020) 20 - 30 mmol/L Not in Time Range    CREATININE 0.9 (3/2/2020) 0.4 - 1.2 mg/dL Not in Time Range    GFR  >59 (3/2/2020) >59 Not in Time Range    GFR Non- 60 (3/2/2020) >59 Not in Time Range    Globulin 3.1 (3/2/2020) g/dL Not in Time Range    Glucose 95 (3/2/2020) 74 - 106 mg/dL Not in Time Range    Potassium 4.6 (3/2/2020) 3.4 - 5.1 mmol/L Not in Time Range    Sodium 142 (3/2/2020) 136 - 145 mmol/L Not in Time Range    Total Bilirubin 0.3 (3/2/2020) 0.3 - 1.2 mg/dL Not in Time Range    Total Protein 7.8 (3/2/2020) 6.4 - 8.3 g/dL Not in Time Range        Hemoglobin A1C (%)   Date Value   08/05/2015 5.4     Microscopic Examination (no units)   Date Value   06/09/2015 YES     LDL Calculated (mg/dL)   Date Value   03/02/2020 160 (H)

## 2020-03-03 RX ORDER — FOLIC ACID 1 MG/1
1 TABLET ORAL DAILY
Qty: 90 TABLET | Refills: 1 | Status: SHIPPED | OUTPATIENT
Start: 2020-03-03 | End: 2021-02-17

## 2020-03-04 ENCOUNTER — HOSPITAL ENCOUNTER (OUTPATIENT)
Dept: PHYSICAL THERAPY | Facility: HOSPITAL | Age: 85
Setting detail: THERAPIES SERIES
Discharge: HOME | End: 2020-03-04
Payer: MEDICARE

## 2020-03-04 PROCEDURE — 97110 THERAPEUTIC EXERCISES: CPT

## 2020-03-13 ENCOUNTER — TELEPHONE (OUTPATIENT)
Dept: PRIMARY CARE CLINIC | Age: 85
End: 2020-03-13

## 2020-03-13 NOTE — TELEPHONE ENCOUNTER
Daughter Anne Case [employer] [facility]) called asking for advice r/t she allowed pt to go to her brothers house to stay for a few nights, but now brother will now allow her to have her back. States she is not getting all of her meds or going to appts. I spoke with Alpesh Del Valle and she advised that the daughter go to the court house and file for emergency guardianship and that she would help with any paper work that she needs for courts.  Daughter V/U and appreciative

## 2020-04-15 ENCOUNTER — TELEPHONE (OUTPATIENT)
Dept: PRIMARY CARE CLINIC | Age: 85
End: 2020-04-15

## 2020-04-15 NOTE — TELEPHONE ENCOUNTER
Patients daughter Verlene Pon called to say she talked with the  and  today and the  may be calling you. She had told them that patient needed to have a US due to mini strokes and needed a b12 injection which she is not getting in her brothers care.

## 2020-11-23 ENCOUNTER — OFFICE VISIT (OUTPATIENT)
Dept: PRIMARY CARE CLINIC | Age: 85
End: 2020-11-23
Payer: MEDICARE

## 2020-11-23 VITALS
TEMPERATURE: 96.9 F | HEART RATE: 55 BPM | DIASTOLIC BLOOD PRESSURE: 80 MMHG | HEIGHT: 62 IN | BODY MASS INDEX: 26.5 KG/M2 | RESPIRATION RATE: 16 BRPM | OXYGEN SATURATION: 96 % | SYSTOLIC BLOOD PRESSURE: 158 MMHG | WEIGHT: 144 LBS

## 2020-11-23 PROCEDURE — G0008 ADMIN INFLUENZA VIRUS VAC: HCPCS | Performed by: NURSE PRACTITIONER

## 2020-11-23 PROCEDURE — 99214 OFFICE O/P EST MOD 30 MIN: CPT | Performed by: NURSE PRACTITIONER

## 2020-11-23 PROCEDURE — 90688 IIV4 VACCINE SPLT 0.5 ML IM: CPT | Performed by: NURSE PRACTITIONER

## 2020-11-23 RX ORDER — OMEPRAZOLE 40 MG/1
CAPSULE, DELAYED RELEASE ORAL
COMMUNITY
Start: 2020-09-17 | End: 2021-09-13 | Stop reason: SDUPTHER

## 2020-11-23 ASSESSMENT — ENCOUNTER SYMPTOMS
RESPIRATORY NEGATIVE: 1
GASTROINTESTINAL NEGATIVE: 1

## 2020-11-23 NOTE — PROGRESS NOTES
SUBJECTIVE:    Patient ID: Yane Little is a 80 y.o. female. Chief Complaint   Patient presents with    Follow-up     evaluate for court papers    Dementia         HPI:  She presents about her dementia. Her daughter brings her in. She states she does live with her son and has been there for awhile. She says she likes living there. She has home health and she states she is taking her own bathes. She doesn't know the year. Doesn't know the season. She looks at her daughter for assistance with answers. She knows her birth date is [birth year] but not the year. She says she lives at [facility]    She says she walks every day. She keeps saying she runs all the time. Her daughter says she doesn't know if she is taking her medication. Her blood pressure is elevated today. She becomes increasing agitated if asked questions stating she isn't ready for a test.  She doesn't know why she is here. Patient's medications,allergies, past medical, surgical, social and family histories were reviewed and updated as appropriate.   .  Current Outpatient Medications on File Prior to Visit   Medication Sig Dispense Refill    omeprazole (PRILOSEC) 40 MG delayed release capsule       folic acid (FOLVITE) 1 MG tablet Take 1 tablet by mouth daily 90 tablet 1    lisinopril (PRINIVIL;ZESTRIL) 10 MG tablet Take 10 mg by mouth daily      furosemide (LASIX) 20 MG tablet Take 1 tablet by mouth daily 60 tablet 3    potassium chloride (KLOR-CON M) 20 MEQ extended release tablet Take 1 tablet by mouth daily 30 tablet 0    vitamin D (ERGOCALCIFEROL) 89980 units CAPS capsule TAKE 1 CAPSULE BY MOUTH ONCE A WEEK FOR 3 MONTHS THEN TAKE 2000 IU OTC DAILY 12 capsule 3    omeprazole-sodium bicarbonate (ZEGERID)  MG per capsule Take 1 capsule by mouth every morning (before breakfast) 30 capsule 3    levothyroxine (SYNTHROID) 50 MCG tablet TAKE 1 TABLET BY MOUTH ONCE A DAY 90 tablet 3    MYRBETRIQ 25 MG TB24 TAKE 1 TABLET BY MOUTH ONCE A DAY 30 tablet 5    meloxicam (MOBIC) 15 MG tablet TAKE 1 TABLET BY MOUTH DAILY 30 tablet 5     Current Facility-Administered Medications on File Prior to Visit   Medication Dose Route Frequency Provider Last Rate Last Dose    methylPREDNISolone acetate (DEPO-MEDROL) injection 40 mg  40 mg Intramuscular Once Irena Carlisle MD        lidocaine 1 % injection 5 mL  5 mL Intradermal Once Irena Carlisle MD           Review of Systems   Constitutional: Negative. HENT: Negative. Respiratory: Negative. Cardiovascular: Negative. Gastrointestinal: Negative. Genitourinary: Negative. Musculoskeletal: Negative. Skin: Negative. Neurological: Negative. Psychiatric/Behavioral: Positive for agitation, behavioral problems and decreased concentration. The patient is nervous/anxious. OBJECTIVE:  BP (!) 158/80 (Site: Right Upper Arm, Position: Sitting, Cuff Size: Medium Adult)   Pulse 55   Temp 96.9 °F (36.1 °C) (Temporal)   Resp 16   Ht 5' 2\" (1.575 m)   Wt 144 lb (65.3 kg)   SpO2 96% Comment: room air  BMI 26.34 kg/m²    Physical Exam  Vitals signs and nursing note reviewed. Constitutional:       Appearance: Normal appearance. She is well-developed. Comments: She was dressed appropriate for the season. Appeared with good hygiene and good eye contact. She was socrates cooperative with the exam.  She did not want to answer some questions. Weight has decreased since last visit. HENT:      Head: Normocephalic and atraumatic. Right Ear: External ear normal.      Left Ear: External ear normal.      Nose: Nose normal.   Eyes:      Conjunctiva/sclera: Conjunctivae normal.      Pupils: Pupils are equal, round, and reactive to light. Neck:      Musculoskeletal: Normal range of motion and neck supple. Thyroid: No thyromegaly. Vascular: No JVD. Cardiovascular:      Rate and Rhythm: Normal rate and regular rhythm. Heart sounds: Normal heart sounds. No murmur.  No friction rub. No gallop. Pulmonary:      Effort: Pulmonary effort is normal. No respiratory distress. Breath sounds: Normal breath sounds. No wheezing or rales. Abdominal:      General: Bowel sounds are normal. There is no distension. Palpations: Abdomen is soft. Tenderness: There is no abdominal tenderness. There is no guarding. Hernia: There is no hernia in the left inguinal area. Genitourinary:     Exam position: Supine. Labia:         Right: No rash, tenderness, lesion or injury. Left: No rash, tenderness, lesion or injury. Vagina: No signs of injury and foreign body. No vaginal discharge, erythema, tenderness or bleeding. Cervix: No cervical motion tenderness, discharge or friability. Adnexa:         Right: No mass, tenderness or fullness. Left: No mass, tenderness or fullness. Rectum: No anal fissure or external hemorrhoid. Normal anal tone. Musculoskeletal:         General: No tenderness. Skin:     General: Skin is warm and dry. Findings: No erythema or rash. Neurological:      Mental Status: She is alert and oriented to person, place, and time. Deep Tendon Reflexes: Reflexes are normal and symmetric. Psychiatric:         Mood and Affect: Mood is anxious. Behavior: Behavior is agitated and aggressive. Judgment: Judgment normal.         No results found for requested labs within last 30 days. Hemoglobin A1C (%)   Date Value   08/05/2015 5.4     Microscopic Examination (no units)   Date Value   06/09/2015 YES     LDL Calculated (mg/dL)   Date Value   03/02/2020 160 (H)           Lab Results   Component Value Date    TSH 1.85 03/02/2020         ASSESSMENT/PLAN:     Leon Jones was seen today for follow-up and dementia. Diagnoses and all orders for this visit:    Severe dementia [facility])  She scored a 5/30 on her mini mental exam.  Recommend guardianship for her. See attached forms.   Essential hypertension  Blood pressure elevated. Unable to verify if she is taking blood pressure medication. Will contact the pharmacy to check on medication. I have asked her daughter to check about medication as the medication may need adjusted. Need for influenza vaccination  -     INFLUENZA, QUADV, 3 YRS AND OLDER, IM, MDV, 0.5ML (S[facility] Galvin)    Weight loss  She has had a 5 pound weight loss since last visit several months ago. Advised dietary supplement and close monitoring.      Medications Discontinued During This Encounter   Medication Reason    QUEtiapine (SEROQUEL) 25 MG tablet Therapy completed

## 2020-11-23 NOTE — PROGRESS NOTES
Chief Complaint   Patient presents with    Follow-up     evaluate for court papers    Dementia       Have you seen any other physician or provider since your last visit yes - [place]    Have you had any other diagnostic tests since your last visit? no    Have you changed or stopped any medications since your last visit? yes - stopped seroquel       Diabetic retinal exam completed this year? No                       * If yes please have patient sign a records release to obtain record to update Health Maintenance                       * If no, please order referral for patient to be scheduled     Patient is here for evaluation for papers for court. She does not know her medications. Vaccine Information Sheet, \"Influenza - Inactivated\"  given to Melonie Burnette, or parent/legal guardian of  Melonie Burnette and verbalized understanding. Patient responses:    Have you ever had a reaction to a flu vaccine? No  Do you have any current illness? No  Have you ever had Guillian [place] Syndrome? No  Do you have a serious allergy to any of the follow: Neomycin, Polymyxin, Thimerosal, eggs or egg products? No    Flu vaccine given per order. Please see immunization tab. Risks and benefits explained. Current VIS given.

## 2021-02-17 RX ORDER — FOLIC ACID 1 MG/1
TABLET ORAL
Qty: 90 TABLET | Refills: 0 | Status: SHIPPED | OUTPATIENT
Start: 2021-02-17 | End: 2021-04-26

## 2021-02-17 NOTE — TELEPHONE ENCOUNTER
Refill request received from pharmacy.  Medication pending for approval.       Last Office Visit With PCP:  11/23/2020    Next Visit Date:  Future Appointments   Date Time Provider Torri Lr   3/22/2021  9:30 AM Davied Pollack, APRN [facility]       LISA COPE

## 2021-03-10 ENCOUNTER — OFFICE VISIT (OUTPATIENT)
Dept: PRIMARY CARE CLINIC | Age: 86
End: 2021-03-10
Payer: MEDICARE

## 2021-03-10 VITALS
RESPIRATION RATE: 16 BRPM | HEART RATE: 69 BPM | SYSTOLIC BLOOD PRESSURE: 168 MMHG | HEIGHT: 62 IN | BODY MASS INDEX: 24.66 KG/M2 | TEMPERATURE: 97.1 F | OXYGEN SATURATION: 97 % | DIASTOLIC BLOOD PRESSURE: 80 MMHG | WEIGHT: 134 LBS

## 2021-03-10 DIAGNOSIS — F03.91 DEMENTIA WITH BEHAVIORAL DISTURBANCE, UNSPECIFIED DEMENTIA TYPE: ICD-10-CM

## 2021-03-10 DIAGNOSIS — I10 ESSENTIAL HYPERTENSION: ICD-10-CM

## 2021-03-10 DIAGNOSIS — Z91.81 AT HIGH RISK FOR FALLS: Primary | ICD-10-CM

## 2021-03-10 PROCEDURE — 99214 OFFICE O/P EST MOD 30 MIN: CPT | Performed by: NURSE PRACTITIONER

## 2021-03-10 ASSESSMENT — ENCOUNTER SYMPTOMS
RESPIRATORY NEGATIVE: 1
GASTROINTESTINAL NEGATIVE: 1

## 2021-03-10 ASSESSMENT — PATIENT HEALTH QUESTIONNAIRE - PHQ9: SUM OF ALL RESPONSES TO PHQ QUESTIONS 1-9: 0

## 2021-03-10 NOTE — PROGRESS NOTES
SUBJECTIVE:    Patient ID: Consuelo Tyson is a 80 y.o. female. Chief Complaint   Patient presents with    Dementia     needs to get POA         HPI:  She comes in with her daughter. She has been living with her son. There has been a lot of issues with gardfabianhip due to covid. During her exam today, she says he lives in [facility] with her son Una Scott. She says she walks a lot. She says she fell in the grass. She has home health coming out to the house. She says a lot of good people help her. She says it is , and doesn't know the year. She says she doesn't know her     She says she is 8 years old. She says she walks and and runs every day. She says she has people who wants her to take a shower, but she says she does her own laundry. When asked 2+2 she answers 3. Asked 3 animals she says dog, cats and rabbit. She knows the name of her dog. She cannot remember any words. She has been taking her medication. She says if she had her choice she would like to live back with her ex . She says he looked good to her. He visits her sometimes. She says she would be scared if she had to live by herself. She says she has people who help her take showers. [address] says she and Pineda Huies is working with her better. Leyla Cooper says she would like for Aleyda Joyce Wayne to be her Guardian and she wants to live with Pineda Del Valle. If it is raining out side she says she would need an umbrella. She says she and Soldotnaana Del Valle work together to pay her bills. Patient's medications,allergies, past medical, surgical, social and family histories were reviewed and updated as appropriate.   .  Current Outpatient Medications on File Prior to Visit   Medication Sig Dispense Refill    folic acid (FOLVITE) 1 MG tablet TAKE 1 TABLET BY MOUTH EVERY DAY 90 tablet 0    omeprazole (PRILOSEC) 40 MG delayed release capsule       lisinopril (PRINIVIL;ZESTRIL) 10 MG tablet Take 10 mg by mouth daily      furosemide (LASIX) 20 MG tablet Take 1 tablet by mouth daily 60 tablet 3    potassium chloride (KLOR-CON M) 20 MEQ extended release tablet Take 1 tablet by mouth daily 30 tablet 0    levothyroxine (SYNTHROID) 50 MCG tablet TAKE 1 TABLET BY MOUTH ONCE A DAY 90 tablet 3    meloxicam (MOBIC) 15 MG tablet TAKE 1 TABLET BY MOUTH DAILY 30 tablet 5    vitamin D (ERGOCALCIFEROL) 49251 units CAPS capsule TAKE 1 CAPSULE BY MOUTH ONCE A WEEK FOR 3 MONTHS THEN TAKE 2000 IU OTC DAILY 12 capsule 3    omeprazole-sodium bicarbonate (ZEGERID)  MG per capsule Take 1 capsule by mouth every morning (before breakfast) 30 capsule 3    MYRBETRIQ 25 MG TB24 TAKE 1 TABLET BY MOUTH ONCE A DAY 30 tablet 5     Current Facility-Administered Medications on File Prior to Visit   Medication Dose Route Frequency Provider Last Rate Last Admin    methylPREDNISolone acetate (DEPO-MEDROL) injection 40 mg  40 mg Intramuscular Once Ivan Ortiz MD        lidocaine 1 % injection 5 mL  5 mL Intradermal Once Ivan Ortiz MD           Review of Systems   Constitutional: Negative. HENT: Negative. Respiratory: Negative. Cardiovascular: Negative. Gastrointestinal: Negative. Genitourinary: Negative. Musculoskeletal: Negative. Skin: Negative. Neurological: Negative. Psychiatric/Behavioral: Positive for behavioral problems and confusion. OBJECTIVE:  BP (!) 168/80 (Site: Left Upper Arm, Position: Sitting, Cuff Size: Medium Adult)   Pulse 69   Temp 97.1 °F (36.2 °C) (Temporal)   Resp 16   Ht 5' 2\" (1.575 m)   Wt 134 lb (60.8 kg)   SpO2 97% Comment: room air  BMI 24.51 kg/m²    Physical Exam  Vitals signs and nursing note reviewed. Constitutional:       Appearance: She is well-developed. HENT:      Head: Normocephalic and atraumatic. Eyes:      Conjunctiva/sclera: Conjunctivae normal.      Pupils: Pupils are equal, round, and reactive to light. Neck:      Musculoskeletal: Normal range of motion and neck supple.       Thyroid: No thyromegaly. Vascular: No JVD. Cardiovascular:      Rate and Rhythm: Normal rate and regular rhythm. Heart sounds: No murmur. No friction rub. No gallop. Pulmonary:      Effort: Pulmonary effort is normal. No respiratory distress. Breath sounds: Normal breath sounds. No wheezing or rales. Abdominal:      General: Bowel sounds are normal. There is no distension. Palpations: Abdomen is soft. Tenderness: There is no abdominal tenderness. There is no guarding. Musculoskeletal:         General: No tenderness. Skin:     General: Skin is warm and dry. Findings: No rash. Neurological:      Mental Status: She is alert and oriented to person, place, and time. Psychiatric:         Cognition and Memory: Cognition is impaired. Memory is impaired. She exhibits impaired recent memory and impaired remote memory. Judgment: Judgment normal.         No results found for requested labs within last 30 days. Hemoglobin A1C (%)   Date Value   08/05/2015 5.4     Microscopic Examination (no units)   Date Value   06/09/2015 YES     LDL Calculated (mg/dL)   Date Value   03/02/2020 160 (H)           Lab Results   Component Value Date    TSH 1.85 03/02/2020         ASSESSMENT/PLAN:     Edita Dawson was seen today for dementia. Diagnoses and all orders for this visit:    At high risk for falls    Essential hypertension  Blood pressure is elevated. Unsure about her medication. She is suppose to be seeing another physician. Her daughter doesn't know if she is taking medication. Dementia with behavioral disturbance, unspecified dementia type [facility])  She has advancing dementia. She will require guardianship. Will complete legal paperwork. There are no discontinued medications.

## 2021-03-10 NOTE — PROGRESS NOTES
Chief Complaint   Patient presents with    Dementia     needs to get POA       Have you seen any other physician or provider since your last visit yes - [facility] for UTI    Have you had any other diagnostic tests since your last visit? no    Have you changed or stopped any medications since your last visit? No    Patient has SILVA PITTMAN twice a week. She is here for her dementia. Her daughter states that is is getting worse. Patient has been staying with her son but does stay with her daughter at times. She is needing another evaluation to send to the .

## 2021-03-10 NOTE — PROGRESS NOTES
On the basis of positive falls risk screening, assessment and plan is as follows: in-office gait and balance testing performed using The 30 Second Chair Stand Test was positive for increased falls risk.

## 2021-04-26 RX ORDER — FOLIC ACID 1 MG/1
TABLET ORAL
Qty: 90 TABLET | Refills: 0 | Status: SHIPPED | OUTPATIENT
Start: 2021-04-26 | End: 2021-06-16 | Stop reason: SDUPTHER

## 2021-04-26 NOTE — TELEPHONE ENCOUNTER
Refill request received from pharmacy. Medication pending for approval.       Last Office Visit With PCP:  03/10/21  Next Visit Date:  No future appointments.     LISA COPE

## 2021-05-26 ENCOUNTER — HOSPITAL ENCOUNTER (OUTPATIENT)
Dept: GENERAL RADIOLOGY | Facility: HOSPITAL | Age: 86
Discharge: HOME | End: 2021-05-26
Payer: MEDICARE

## 2021-05-26 ENCOUNTER — HOSPITAL ENCOUNTER (OUTPATIENT)
Facility: HOSPITAL | Age: 86
Discharge: HOME | End: 2021-05-26
Payer: MEDICARE

## 2021-05-26 ENCOUNTER — OFFICE VISIT (OUTPATIENT)
Dept: FAMILY MEDICINE CLINIC | Age: 86
End: 2021-05-26
Payer: MEDICARE

## 2021-05-26 VITALS
HEART RATE: 64 BPM | OXYGEN SATURATION: 96 % | BODY MASS INDEX: 25.38 KG/M2 | DIASTOLIC BLOOD PRESSURE: 78 MMHG | RESPIRATION RATE: 18 BRPM | SYSTOLIC BLOOD PRESSURE: 210 MMHG | WEIGHT: 137.9 LBS | HEIGHT: 62 IN

## 2021-05-26 DIAGNOSIS — M25.521 RIGHT ELBOW PAIN: ICD-10-CM

## 2021-05-26 DIAGNOSIS — R26.2 DIFFICULTY WALKING: ICD-10-CM

## 2021-05-26 DIAGNOSIS — E53.8 B12 DEFICIENCY: ICD-10-CM

## 2021-05-26 DIAGNOSIS — I10 ESSENTIAL HYPERTENSION: Primary | ICD-10-CM

## 2021-05-26 PROCEDURE — 99214 OFFICE O/P EST MOD 30 MIN: CPT | Performed by: FAMILY MEDICINE

## 2021-05-26 PROCEDURE — 96372 THER/PROPH/DIAG INJ SC/IM: CPT | Performed by: FAMILY MEDICINE

## 2021-05-26 PROCEDURE — 73070 X-RAY EXAM OF ELBOW: CPT

## 2021-05-26 RX ORDER — CYANOCOBALAMIN 1000 UG/ML
1000 INJECTION INTRAMUSCULAR; SUBCUTANEOUS ONCE
Status: COMPLETED | OUTPATIENT
Start: 2021-05-26 | End: 2021-05-26

## 2021-05-26 RX ORDER — LISINOPRIL 40 MG/1
40 TABLET ORAL DAILY
Qty: 30 TABLET | Refills: 5 | Status: SHIPPED | OUTPATIENT
Start: 2021-05-26 | End: 2021-07-14

## 2021-05-26 RX ORDER — LISINOPRIL 20 MG/1
20 TABLET ORAL DAILY
COMMUNITY
End: 2021-05-26

## 2021-05-26 RX ADMIN — CYANOCOBALAMIN 1000 MCG: 1000 INJECTION INTRAMUSCULAR; SUBCUTANEOUS at 15:07

## 2021-05-26 SDOH — ECONOMIC STABILITY: FOOD INSECURITY: WITHIN THE PAST 12 MONTHS, THE FOOD YOU BOUGHT JUST DIDN'T LAST AND YOU DIDN'T HAVE MONEY TO GET MORE.: NEVER TRUE

## 2021-05-26 SDOH — ECONOMIC STABILITY: FOOD INSECURITY: WITHIN THE PAST 12 MONTHS, YOU WORRIED THAT YOUR FOOD WOULD RUN OUT BEFORE YOU GOT MONEY TO BUY MORE.: NEVER TRUE

## 2021-05-26 SDOH — ECONOMIC STABILITY: TRANSPORTATION INSECURITY
IN THE PAST 12 MONTHS, HAS THE LACK OF TRANSPORTATION KEPT YOU FROM MEDICAL APPOINTMENTS OR FROM GETTING MEDICATIONS?: NO

## 2021-05-26 SDOH — ECONOMIC STABILITY: TRANSPORTATION INSECURITY
IN THE PAST 12 MONTHS, HAS LACK OF TRANSPORTATION KEPT YOU FROM MEETINGS, WORK, OR FROM GETTING THINGS NEEDED FOR DAILY LIVING?: NO

## 2021-05-26 ASSESSMENT — SOCIAL DETERMINANTS OF HEALTH (SDOH): HOW HARD IS IT FOR YOU TO PAY FOR THE VERY BASICS LIKE FOOD, HOUSING, MEDICAL CARE, AND HEATING?: SOMEWHAT HARD

## 2021-05-26 NOTE — PROGRESS NOTES
SUBJECTIVE:    Patient ID: Vivian Cummins is a 80 y.o. female. Chief Complaint   Patient presents with    Arm Pain     right    Hypertension       HPI: office visit  The office today for some high blood pressure. She has had some fluctuating blood pressures. She is staying part-time with her daughter and part-time with her son. She says she is feeling pretty good. She has had some pain in her shoulder on the right side. She says that she feels like that is probably just arthritis. She does have some difficulty walking at times with some hip and knee pain. She has not had any significant falls or injuries. She still struggling with her memory. Her daughter is taking care of her business at this point. She has not had any chest pain. She denies any shortness of breath. She is not having any significant appetite issues. She denies any bowel or bladder dysfunction. Review of Systems   All other systems reviewed and are negative. OBJECTIVE:  BP (!) 210/78   Pulse 64   Resp 18   Ht 5' 2\" (1.575 m)   Wt 137 lb 14.4 oz (62.6 kg)   SpO2 96% Comment: ra  BMI 25.22 kg/m²    Wt Readings from Last 3 Encounters:   06/16/21 141 lb (64 kg)   05/26/21 137 lb 14.4 oz (62.6 kg)   03/10/21 134 lb (60.8 kg)     BP Readings from Last 3 Encounters:   06/16/21 (!) 176/74   05/26/21 (!) 210/78   03/10/21 (!) 168/80      Pulse Readings from Last 3 Encounters:   06/16/21 65   05/26/21 64   03/10/21 69     Body mass index is 25.22 kg/m². Resp Readings from Last 3 Encounters:   06/16/21 18   05/26/21 18   03/10/21 16     Past medical, surgical, family and social history were reviewed and updated with the patient. Physical Exam  Vitals and nursing note reviewed. Constitutional:       Appearance: She is well-developed. HENT:      Head: Normocephalic and atraumatic. Eyes:      Pupils: Pupils are equal, round, and reactive to light. Cardiovascular:      Rate and Rhythm: Normal rate and regular rhythm. Heart sounds: Normal heart sounds. Pulmonary:      Effort: Pulmonary effort is normal.      Breath sounds: Normal breath sounds. Abdominal:      General: Bowel sounds are normal.      Palpations: Abdomen is soft. Musculoskeletal:         General: Normal range of motion. Cervical back: Normal range of motion and neck supple. Skin:     General: Skin is warm and dry. Neurological:      Mental Status: She is alert and oriented to person, place, and time. No results found for requested labs within last 30 days. Hemoglobin A1C (%)   Date Value   08/05/2015 5.4     Microscopic Examination (no units)   Date Value   06/09/2015 YES     LDL Calculated (mg/dL)   Date Value   03/02/2020 160 (H)       Lab Results   Component Value Date    WBC 6.3 03/02/2020    NEUTROABS 3.2 01/09/2018    HGB 14.0 03/02/2020    HCT 42.8 03/02/2020    MCV 95.7 03/02/2020     03/02/2020     Lab Results   Component Value Date    TSH 1.85 03/02/2020       ASSESSMENT:    Diagnosis Orders   1. Essential hypertension     2. Right elbow pain  XR ELBOW RIGHT (2 VIEWS)   3. Difficulty walking  External Referral To Physical Therapy    XR ELBOW RIGHT (2 VIEWS)   4. B12 deficiency          PLAN:  Orders Placed This Encounter   Medications    cyanocobalamin injection 1,000 mcg    lisinopril (PRINIVIL;ZESTRIL) 40 MG tablet     Sig: Take 1 tablet by mouth daily     Dispense:  30 tablet     Refill:  5        Medications Discontinued During This Encounter   Medication Reason    lisinopril (PRINIVIL;ZESTRIL) 10 MG tablet     omeprazole-sodium bicarbonate (ZEGERID)  MG per capsule LIST CLEANUP    vitamin D (ERGOCALCIFEROL) 24086 units CAPS capsule LIST CLEANUP    potassium chloride (KLOR-CON M) 20 MEQ extended release tablet LIST CLEANUP    furosemide (LASIX) 20 MG tablet LIST CLEANUP    lisinopril (PRINIVIL;ZESTRIL) 20 MG tablet        Controlled Substances Monitoring:      Please note:  This chart was generated using Dragon dictation software. Although every effort was made to ensure the accuracy of this automated transcription, some errors in transcription may have occurred.

## 2021-06-08 ENCOUNTER — HOSPITAL ENCOUNTER (OUTPATIENT)
Dept: PHYSICAL THERAPY | Facility: HOSPITAL | Age: 86
Setting detail: THERAPIES SERIES
Discharge: HOME | End: 2021-06-08
Payer: MEDICARE

## 2021-06-08 PROCEDURE — 97161 PT EVAL LOW COMPLEX 20 MIN: CPT

## 2021-06-08 NOTE — PROGRESS NOTES
Physical Therapy  Initial Assessment  Date: 2021  Patient Name: Laban Mcardle  MRN: [id number]  : 1935     Treatment Diagnosis: Balance deficits    Subjective   General  Chart Reviewed: Yes  Patient assessed for rehabilitation services?: Yes  Family / Caregiver Present: Yes (daughter came in to [facility] to assist with history)  Referring Practitioner: Lebron Gusman MD  Referral Date : 21  Diagnosis: difficulty walking  PT Visit Information  PT Insurance Information: Arthur Ni / [employer]  Subjective  Subjective: Pt presents stating she is unsure why she is here and that she doesn't need PT.  Pt's daughter was brought into the clinic to assist with functional history. Pt's daughter states that her mother has had some falls in the past, the last one just under one year ago. Pt reports that she walks each evening with her son. Daughter states that pt uses a cane or walking stick with walking long distances. Daughter states they are mostly concerned with risk of falls and instability. Pain Screening  Patient Currently in Pain: Denies  Vital Signs  Patient Currently in Pain: Denies    Orientation  Orientation  Overall Orientation Status:  (pt has history of dementia; demonstrates some confusion during PT evaluation, requiring frequent repeating of instructions / questions)  Orientation Level: Oriented to person    Social/Functional History  Social/Functional History  Lives With: Son (pt lives with son and rotates staying with her son and at daughter's house)    Objective     Observation/Palpation  Posture: Fair  Observation: Gait: short step length, occasional lateral sway but able to self correct.     AROM RLE (degrees)  RLE AROM: WNL  AROM LLE (degrees)  LLE AROM : WNL    Strength RLE  Strength RLE: WFL  Strength LLE  Strength LLE: WFL     Additional Measures  Special Tests: Ruelas Balance: 44/56, TU\", MCTSB: 12\" (body sway and timid on balance foam)           Exercises to be issued at next visit:  Exercise 1: Nustep: 5' x L5 for warm up  Exercise 2: Mini squats 3x10  Exercise 3: Calf raises 3x10  Exercise 4: Standing hip abd: 3x10 B  Exercise 5: Standing marching: 3x30  Exercise 6: Stance on foam: 5x20\"  Exercise 7: EO / EC 5x20\"  Issue HEP at next visit          Assessment   Conditions Requiring Skilled Therapeutic Intervention  Body structures, Functions, Activity limitations: Decreased balance  Assessment: Pt will benefit from skilled PT to address balance deficits and decrease risk of falls and improve overall function. Treatment Diagnosis: Balance deficits  Prognosis: Good  Decision Making: Low Complexity  REQUIRES PT FOLLOW UP: Yes  Activity Tolerance  Activity Tolerance: Patient Tolerated treatment well         Plan   Plan  Times per week: 1x/week  Plan weeks: 4-6 weeks  Current Treatment Recommendations: Strengthening, Neuromuscular Re-education, Home Exercise Program, ROM, Safety Education & Training, Patient/Caregiver Education & Training, Balance Training, Functional Mobility Training, Equipment Evaluation, Education, & procurement    Goals  Long term goals  Time Frame for Long term goals : 4 weeks  Long term goal 1: Pt / family to be educated in and I with HEP. Long term goal 2: Ruelas Balance score to improve to 48/56 indicating improved balance and decrease risk of falls. Long term goal 3: MCSTB score to improve to 3/4 indicating improved function. Long term goal 4: Pt to have no falls during POC period. Mercedes Lara, PT     Certification of Medical Necessity: It will be understood that this treatment plan is certified medically necessary by the documenting therapist and referring physician mentioned in this report. Unless the physician indicated otherwise through written correspondence with our office, all further referrals will act as certification of medical necessity on this treatment plan.       Thank you for this referral.  If you have questions regarding this plan of care, please call [phone].           Revisions to this plan (optional):                     Please sign and return this plan to:   FAX: [phone]      Signature:                                 Date:

## 2021-06-14 ENCOUNTER — HOSPITAL ENCOUNTER (OUTPATIENT)
Dept: PHYSICAL THERAPY | Facility: HOSPITAL | Age: 86
Setting detail: THERAPIES SERIES
Discharge: HOME | End: 2021-06-14
Payer: MEDICARE

## 2021-06-14 PROCEDURE — 97110 THERAPEUTIC EXERCISES: CPT

## 2021-06-14 NOTE — FLOWSHEET NOTE
Physical Therapy Daily Treatment Note   Date:  2021    TIme In:   0977                   Time Out: 7720    Patient Name:  Ray Patel    :  1935  MRN: [id number]    Restrictions/Precautions:    Pertinent Medical History: Dementia, difficulty following commands  Medical/Treatment Diagnosis Information:  ·   Balance deficits  ·    Insurance/Certification information:    Forrestine Mons / [employer]  Physician Information:    William Cabot, MD  Plan of care signed (Y/N):    Visit# / total visits:     2/    G-Code (if applicable):      Date / Visit # G-Code Applied:         Progress Note: []  Yes  [x]  No  Next due by: Visit #10      Pain level:   0/10  Subjective: Pt reports she walks a lot for exercise and has good balance. Objective:   Observation:    Test measurements:     Palpation:    Exercises:  Exercise Resistance/Repetitions Other comments   Nustep L5x5' 14   Sit to stands 3x10 14   Calf raises 3x10 14   St hip abd 3x10 B 14   St marches 3x30\" 14   Stance on foam 5x20\" 14   EO/EC 5x20\" 14                              Other Therapeutic Activities:      Manual Treatments:      Modalities:        Timed Code Treatment Minutes:  28      Total Treatment Minutes:  30    Treatment/Activity Tolerance:  [x] Patient tolerated treatment well [] Patient limited by fatigue  [] Patient limited by pain  [] Patient limited by other medical complications  [x] Other: Pt required mod verbal and tactile cueing throughout to perform todays activities.     Pain after treatment:      0/10    Prognosis: [] Good [x] Fair  [] Poor    Patient Requires Follow-up: [x] Yes  [] No    Plan:   [x] Continue per plan of care [] Alter current plan (see comments)  [] Plan of care initiated [] Hold pending MD visit [] Discharge    Plan for Next Session:        Electronically signed by:  Joaquín Bo PTA

## 2021-06-16 ENCOUNTER — OFFICE VISIT (OUTPATIENT)
Dept: FAMILY MEDICINE CLINIC | Age: 86
End: 2021-06-16
Payer: MEDICARE

## 2021-06-16 VITALS
TEMPERATURE: 97.3 F | BODY MASS INDEX: 25.95 KG/M2 | HEIGHT: 62 IN | OXYGEN SATURATION: 97 % | RESPIRATION RATE: 18 BRPM | SYSTOLIC BLOOD PRESSURE: 176 MMHG | DIASTOLIC BLOOD PRESSURE: 74 MMHG | WEIGHT: 141 LBS | HEART RATE: 65 BPM

## 2021-06-16 DIAGNOSIS — R32 URINARY INCONTINENCE, UNSPECIFIED TYPE: ICD-10-CM

## 2021-06-16 DIAGNOSIS — E53.8 B12 DEFICIENCY: ICD-10-CM

## 2021-06-16 DIAGNOSIS — R41.3 MEMORY LOSS, SHORT TERM: ICD-10-CM

## 2021-06-16 DIAGNOSIS — I10 ESSENTIAL HYPERTENSION: Primary | ICD-10-CM

## 2021-06-16 PROCEDURE — 99214 OFFICE O/P EST MOD 30 MIN: CPT | Performed by: FAMILY MEDICINE

## 2021-06-16 PROCEDURE — 96372 THER/PROPH/DIAG INJ SC/IM: CPT | Performed by: FAMILY MEDICINE

## 2021-06-16 RX ORDER — FOLIC ACID 1 MG/1
TABLET ORAL
Qty: 90 TABLET | Refills: 3 | Status: SHIPPED | OUTPATIENT
Start: 2021-06-16 | End: 2021-09-13 | Stop reason: SDUPTHER

## 2021-06-16 RX ORDER — ATORVASTATIN CALCIUM 10 MG/1
10 TABLET, FILM COATED ORAL DAILY
Qty: 30 TABLET | Refills: 5 | Status: SHIPPED | OUTPATIENT
Start: 2021-06-16 | End: 2021-07-19 | Stop reason: SDUPTHER

## 2021-06-16 RX ORDER — CYANOCOBALAMIN 1000 UG/ML
1000 INJECTION INTRAMUSCULAR; SUBCUTANEOUS ONCE
Status: COMPLETED | OUTPATIENT
Start: 2021-06-16 | End: 2021-06-16

## 2021-06-16 RX ORDER — MELOXICAM 15 MG/1
TABLET ORAL
Qty: 90 TABLET | Refills: 3 | Status: SHIPPED | OUTPATIENT
Start: 2021-06-16 | End: 2021-09-13 | Stop reason: SDUPTHER

## 2021-06-16 RX ORDER — LEVOTHYROXINE SODIUM 0.05 MG/1
TABLET ORAL
Qty: 90 TABLET | Refills: 3 | Status: SHIPPED | OUTPATIENT
Start: 2021-06-16 | End: 2021-09-30 | Stop reason: SDUPTHER

## 2021-06-16 RX ADMIN — CYANOCOBALAMIN 1000 MCG: 1000 INJECTION INTRAMUSCULAR; SUBCUTANEOUS at 16:01

## 2021-06-16 NOTE — PROGRESS NOTES
Chief Complaint   Patient presents with    Hypertension       Have you seen any other physician or provider since your last visit no    Have you had any other diagnostic tests since your last visit? no    Have you changed or stopped any medications since your last visit? no          Administrations This Visit     cyanocobalamin injection 1,000 mcg     Admin Date  06/16/2021  16:01 Action  Given Dose  1,000 mcg Route  Intramuscular Site  Deltoid Left Administered By  Robert Salinas MA    Ordering Provider: Sultana Arevalo MD    [facility]: [id number]    : [address] [place] [address]    Patient Supplied?: No              Patient tolerated injection well. Patient advised to wait 20 minutes in the office following the injection. No signs/symptoms of reaction noted after 20 minutes.

## 2021-06-16 NOTE — PROGRESS NOTES
SUBJECTIVE:    Patient ID: Endy Driscoll is a 80 y.o. female. Chief Complaint   Patient presents with    Hypertension       HPI: office visit  He is in the office today in follow-up of her hypertension. Her blood pressures are doing quite a bit better. She seems like she is feeling relatively well. She is spending part of the time with her daughter part of the time with her son. She says that that seems to be doing pretty well. She has not had any recent falls. She continues to struggle with a lot of memory issues. She is eating decent. She has not got as much energy as she did though she is trying to walk. Review of Systems   All other systems reviewed and are negative. OBJECTIVE:  BP (!) 176/74   Pulse 65   Temp 97.3 °F (36.3 °C)   Resp 18   Ht 5' 2\" (1.575 m)   Wt 141 lb (64 kg)   SpO2 97% Comment: ra  BMI 25.79 kg/m²    Wt Readings from Last 3 Encounters:   06/16/21 141 lb (64 kg)   05/26/21 137 lb 14.4 oz (62.6 kg)   03/10/21 134 lb (60.8 kg)     BP Readings from Last 3 Encounters:   06/16/21 (!) 176/74   05/26/21 (!) 210/78   03/10/21 (!) 168/80      Pulse Readings from Last 3 Encounters:   06/16/21 65   05/26/21 64   03/10/21 69     Body mass index is 25.79 kg/m². Resp Readings from Last 3 Encounters:   06/16/21 18   05/26/21 18   03/10/21 16     Past medical, surgical, family and social history were reviewed and updated with the patient. Physical Exam  Vitals and nursing note reviewed. Constitutional:       Appearance: She is well-developed. HENT:      Head: Normocephalic and atraumatic. Eyes:      Pupils: Pupils are equal, round, and reactive to light. Cardiovascular:      Rate and Rhythm: Normal rate and regular rhythm. Heart sounds: Normal heart sounds. Pulmonary:      Effort: Pulmonary effort is normal.      Breath sounds: Normal breath sounds. Abdominal:      General: Bowel sounds are normal.      Palpations: Abdomen is soft.    Musculoskeletal: chart was generated using Dragon dictation software. Although every effort was made to ensure the accuracy of this automated transcription, some errors in transcription may have occurred.

## 2021-06-21 ENCOUNTER — NURSE ONLY (OUTPATIENT)
Dept: FAMILY MEDICINE CLINIC | Age: 86
End: 2021-06-21
Payer: MEDICARE

## 2021-06-21 ENCOUNTER — HOSPITAL ENCOUNTER (OUTPATIENT)
Dept: PHYSICAL THERAPY | Facility: HOSPITAL | Age: 86
Setting detail: THERAPIES SERIES
Discharge: HOME | End: 2021-06-21
Payer: MEDICARE

## 2021-06-21 DIAGNOSIS — E53.8 B12 DEFICIENCY: Primary | ICD-10-CM

## 2021-06-21 PROCEDURE — 96372 THER/PROPH/DIAG INJ SC/IM: CPT | Performed by: FAMILY MEDICINE

## 2021-06-21 PROCEDURE — 97110 THERAPEUTIC EXERCISES: CPT

## 2021-06-21 RX ORDER — CYANOCOBALAMIN 1000 UG/ML
1000 INJECTION INTRAMUSCULAR; SUBCUTANEOUS ONCE
Status: COMPLETED | OUTPATIENT
Start: 2021-06-21 | End: 2021-06-21

## 2021-06-21 RX ADMIN — CYANOCOBALAMIN 1000 MCG: 1000 INJECTION INTRAMUSCULAR; SUBCUTANEOUS at 14:48

## 2021-06-21 NOTE — FLOWSHEET NOTE
Physical Therapy Daily Treatment Note   Date:  2021    TIme In:   1325                   Time Out: [address] [facility]    Patient Name:  Daysi Odell    :  1935  MRN: [id number]    Restrictions/Precautions:    Pertinent Medical History: Dementia, difficulty following commands  Medical/Treatment Diagnosis Information:  ·   Balance deficits     Insurance/Certification information:    [address] / [employer] [facility]  Physician Information:    Carmencita Branch MD  Plan of care signed (Y/N):    Visit# / total visits:     3/    G-Code (if applicable):      Date / Visit # G-Code Applied:         Progress Note: []  Yes  [x]  No  Next due by: Visit #10      Pain level:   0/10  Subjective: Pt reports doing well today; no new reports. Objective:   Observation:    Test measurements:     Palpation:    Exercises:  Exercise Resistance/Repetitions Other comments   Nustep L5x5' 21   Sit to stands 3x10 21   Calf raises 3x10 21   St hip abd 3x10 B 21   St marches 3x30\" 21   Stance on foam 5x20\" 21   EO/EC 5x20\" 21                              Other Therapeutic Activities:      Manual Treatments:      Modalities:        Timed Code Treatment Minutes: 29       Total Treatment Minutes:  29    Treatment/Activity Tolerance:  [x] Patient tolerated treatment well [] Patient limited by fatigue  [] Patient limited by pain  [] Patient limited by other medical complications  [x] Other: Pt required frequent cueing and encouragement during PT session due to dementia. Pt tolerated activity well.      Pain after treatment:      0/10    Prognosis: [] Good [x] Fair  [] Poor    Patient Requires Follow-up: [x] Yes  [] No    Plan:   [x] Continue per plan of care [] Alter current plan (see comments)  [] Plan of care initiated [] Hold pending MD visit [] Discharge    Plan for Next Session:        Electronically signed by:  Gloria Dumont PT

## 2021-06-21 NOTE — PROGRESS NOTES
Administrations This Visit     cyanocobalamin injection 1,000 mcg     Admin Date  06/21/2021  14:48 Action  Given Dose  1,000 mcg Route  Intramuscular Site  Deltoid Left Administered By  Don Bernardo MA    Ordering Provider: Julio C Linton MD    NDC: 56053-151-59    Lot#:     : [id number] [place] [address]    Patient Supplied?: No              Patient tolerated injection well. Patient advised to wait 20 minutes in the office following the injection. No signs/symptoms of reaction noted after 20 minutes.

## 2021-06-24 DIAGNOSIS — R32 URINARY INCONTINENCE, UNSPECIFIED TYPE: ICD-10-CM

## 2021-06-28 ENCOUNTER — NURSE ONLY (OUTPATIENT)
Dept: FAMILY MEDICINE CLINIC | Age: 86
End: 2021-06-28
Payer: MEDICARE

## 2021-06-28 ENCOUNTER — TELEPHONE (OUTPATIENT)
Dept: FAMILY MEDICINE CLINIC | Age: 86
End: 2021-06-28

## 2021-06-28 DIAGNOSIS — E53.8 B12 DEFICIENCY: Primary | ICD-10-CM

## 2021-06-28 PROCEDURE — 96372 THER/PROPH/DIAG INJ SC/IM: CPT | Performed by: NURSE PRACTITIONER

## 2021-06-28 RX ORDER — CYANOCOBALAMIN 1000 UG/ML
1000 INJECTION INTRAMUSCULAR; SUBCUTANEOUS ONCE
Status: COMPLETED | OUTPATIENT
Start: 2021-06-28 | End: 2021-06-28

## 2021-06-28 RX ORDER — QUETIAPINE FUMARATE 25 MG/1
25 TABLET, FILM COATED ORAL NIGHTLY
Qty: 30 TABLET | Refills: 0 | Status: SHIPPED | OUTPATIENT
Start: 2021-06-28 | End: 2021-07-22 | Stop reason: SDUPTHER

## 2021-06-28 RX ADMIN — CYANOCOBALAMIN 1000 MCG: 1000 INJECTION INTRAMUSCULAR; SUBCUTANEOUS at 11:49

## 2021-06-28 NOTE — PROGRESS NOTES
Administrations This Visit     cyanocobalamin injection 1,000 mcg     Admin Date  06/28/2021  11:49 Action  Given Dose  1,000 mcg Route  Intramuscular Site  Deltoid Right Administered By  Carolyn Vargas MA    Ordering Provider: ARNULFO Zimmerman    NDC: 05962-065-43    : [address] [place] [address]    Patient Supplied?: No                Patient tolerated injection well. Patient advised to wait 20 minutes in the office following the injection. No signs/symptoms of reaction noted after 20 minutes.

## 2021-06-28 NOTE — TELEPHONE ENCOUNTER
Patient's daughter/caretaker asked if medication could be sent to help Jose Moore with her behavior - she is angry and acting out aggressively toward her caretaker    Per Dewey Frederick MD orders Seroquel 25mg 1 QHS sent to pharmacy     Patient's daughter notified.

## 2021-07-13 ENCOUNTER — HOSPITAL ENCOUNTER (EMERGENCY)
Facility: HOSPITAL | Age: 86
Discharge: HOME | End: 2021-07-13
Attending: EMERGENCY MEDICINE
Payer: MEDICARE

## 2021-07-13 ENCOUNTER — APPOINTMENT (OUTPATIENT)
Dept: CT IMAGING | Facility: HOSPITAL | Age: 86
End: 2021-07-13
Payer: MEDICARE

## 2021-07-13 VITALS
OXYGEN SATURATION: 98 % | DIASTOLIC BLOOD PRESSURE: 86 MMHG | RESPIRATION RATE: 19 BRPM | TEMPERATURE: 97.8 F | HEART RATE: 71 BPM | SYSTOLIC BLOOD PRESSURE: 162 MMHG

## 2021-07-13 DIAGNOSIS — R55 NEAR SYNCOPE: Primary | ICD-10-CM

## 2021-07-13 LAB
A/G RATIO: 1.3 (ref 0.8–2)
ALBUMIN SERPL-MCNC: 4.2 G/DL (ref 3.4–4.8)
ALP BLD-CCNC: 100 U/L (ref 25–100)
ALT SERPL-CCNC: 8 U/L (ref 4–36)
ANION GAP SERPL CALCULATED.3IONS-SCNC: 7 MMOL/L (ref 3–16)
AST SERPL-CCNC: 12 U/L (ref 8–33)
BASOPHILS ABSOLUTE: 0.1 K/UL (ref 0–0.1)
BASOPHILS RELATIVE PERCENT: 0.8 %
BILIRUB SERPL-MCNC: 0.4 MG/DL (ref 0.3–1.2)
BUN BLDV-MCNC: 21 MG/DL (ref 6–20)
C-REACTIVE PROTEIN: <3 MG/L (ref 0–5.1)
CALCIUM SERPL-MCNC: 9.5 MG/DL (ref 8.5–10.5)
CHLORIDE BLD-SCNC: 104 MMOL/L (ref 98–107)
CO2: 30 MMOL/L (ref 20–30)
CREAT SERPL-MCNC: 1 MG/DL (ref 0.4–1.2)
EOSINOPHILS ABSOLUTE: 0 K/UL (ref 0–0.4)
EOSINOPHILS RELATIVE PERCENT: 0.5 %
GFR AFRICAN AMERICAN: >59
GFR NON-AFRICAN AMERICAN: 53
GLOBULIN: 3.2 G/DL
GLUCOSE BLD-MCNC: 116 MG/DL (ref 74–106)
HCT VFR BLD CALC: 40.6 % (ref 37–47)
HEMOGLOBIN: 12.9 G/DL (ref 11.5–16.5)
IMMATURE GRANULOCYTES #: 0 K/UL
IMMATURE GRANULOCYTES %: 0.2 % (ref 0–5)
INR BLD: 1.07 (ref 0.88–1.11)
LACTIC ACID: 1.8 MMOL/L (ref 0.4–2)
LYMPHOCYTES ABSOLUTE: 0.9 K/UL (ref 1.5–4)
LYMPHOCYTES RELATIVE PERCENT: 11.3 %
MCH RBC QN AUTO: 30.9 PG (ref 27–32)
MCHC RBC AUTO-ENTMCNC: 31.8 G/DL (ref 31–35)
MCV RBC AUTO: 97.1 FL (ref 80–100)
MONOCYTES ABSOLUTE: 0.4 K/UL (ref 0.2–0.8)
MONOCYTES RELATIVE PERCENT: 4.5 %
NEUTROPHILS ABSOLUTE: 6.8 K/UL (ref 2–7.5)
NEUTROPHILS RELATIVE PERCENT: 82.7 %
PDW BLD-RTO: 13 % (ref 11–16)
PLATELET # BLD: 191 K/UL (ref 150–400)
PMV BLD AUTO: 10.7 FL (ref 6–10)
POTASSIUM REFLEX MAGNESIUM: 4.1 MMOL/L (ref 3.4–5.1)
PRO-BNP: 334 PG/ML (ref 0–1800)
PROTHROMBIN TIME: 13.4 SEC (ref 11.6–13.8)
RBC # BLD: 4.18 M/UL (ref 3.8–5.8)
REASON FOR REJECTION: NORMAL
REJECTED TEST: NORMAL
SODIUM BLD-SCNC: 141 MMOL/L (ref 136–145)
TOTAL PROTEIN: 7.4 G/DL (ref 6.4–8.3)
TROPONIN: <0.3 NG/ML
WBC # BLD: 8.2 K/UL (ref 4–11)

## 2021-07-13 PROCEDURE — 36415 COLL VENOUS BLD VENIPUNCTURE: CPT

## 2021-07-13 PROCEDURE — 86140 C-REACTIVE PROTEIN: CPT

## 2021-07-13 PROCEDURE — 6360000004 HC RX CONTRAST MEDICATION: Performed by: EMERGENCY MEDICINE

## 2021-07-13 PROCEDURE — 83880 ASSAY OF NATRIURETIC PEPTIDE: CPT

## 2021-07-13 PROCEDURE — 84484 ASSAY OF TROPONIN QUANT: CPT

## 2021-07-13 PROCEDURE — 85610 PROTHROMBIN TIME: CPT

## 2021-07-13 PROCEDURE — 85025 COMPLETE CBC W/AUTO DIFF WBC: CPT

## 2021-07-13 PROCEDURE — 99283 EMERGENCY DEPT VISIT LOW MDM: CPT

## 2021-07-13 PROCEDURE — 83605 ASSAY OF LACTIC ACID: CPT

## 2021-07-13 PROCEDURE — 70450 CT HEAD/BRAIN W/O DYE: CPT

## 2021-07-13 PROCEDURE — 74177 CT ABD & PELVIS W/CONTRAST: CPT

## 2021-07-13 PROCEDURE — 80053 COMPREHEN METABOLIC PANEL: CPT

## 2021-07-13 RX ADMIN — IOPAMIDOL 100 ML: 755 INJECTION, SOLUTION INTRAVENOUS at 16:11

## 2021-07-13 NOTE — ED PROVIDER NOTES
[address] [place] [address] Court  eMERGENCY dEPARTMENT eNCOUnter      Pt Name: Yane Little  MRN: [id number]  YOB: 1935  Date ofevaluation: 7/13/2021  Provider: Alaina Kauffman, [address] [place] [address]       Chief Complaint   Patient presents with    Loss of Consciousness         HISTORY OF PRESENT ILLNESS  (Location/Symptom, Timing/Onset, Context/Setting, Quality, Duration, Modifying Factors, Severity.)   Yane Little is a 80 y.o. female who presents to the emergency department with complaint of near syncopal episode while trying to have bowel movement. This is the third time she tried to have a bowel movement with near syncopal episodes because of vasovagal problems. She was just recently seen at [facility] for the same complaints. Denies any vision changes, headache, fever, chills, chest pain or shortness of breath. Nursing notes were reviewed. REVIEW OF SYSTEMS    (2-9systems for level 4, 10 or more for level 5)   ROS:  General:  No fevers, no chills, no weakness  HEENT: No sore throat, runny nose or ear pain  Cardiovascular:  No chest pain, no palpitations  Respiratory:  No shortness of breath, no cough, no wheezing  Gastrointestinal:  No pain, no nausea, no vomiting, no diarrhea  Musculoskeletal:  No muscle pain, no joint pain. Neurology: Patient admits to near syncopal episode during bowel movement. Skin:  No rash, no easy bruising  Genitourinary:  No dysuria, no hematuria    Except as noted above theremainder of the review of systems was reviewed and negative.        PASTMEDICAL HISTORY     Past Medical History:   Diagnosis Date    Allergic rhinitis     Dementia ([address]     Hypertension     Hyperthyroidism     Osteoarthritis          SURGICAL HISTORY       Past Surgical History:   Procedure Laterality Date    BLADDER SUSPENSION  9/2015    CHOLECYSTECTOMY      HYSTERECTOMY      TUBAL LIGATION           CURRENT MEDICATIONS       Previous Medications ATORVASTATIN (LIPITOR) 10 MG TABLET    Take 1 tablet by mouth daily    FOLIC ACID (FOLVITE) 1 MG TABLET    TAKE 1 TABLET BY MOUTH EVERY DAY    LEVOTHYROXINE (SYNTHROID) 50 MCG TABLET    TAKE 1 TABLET BY MOUTH ONCE A DAY    LISINOPRIL (PRINIVIL;ZESTRIL) 40 MG TABLET    Take 1 tablet by mouth daily    MELOXICAM (MOBIC) 15 MG TABLET    TAKE 1 TABLET BY MOUTH DAILY    MIRABEGRON (MYRBETRIQ) 25 MG TB24    TAKE 1 TABLET BY MOUTH ONCE A DAY    OMEPRAZOLE (PRILOSEC) 40 MG DELAYED RELEASE CAPSULE        QUETIAPINE (SEROQUEL) 25 MG TABLET    Take 1 tablet by mouth nightly       ALLERGIES     Sulfa antibiotics    FAMILY HISTORY     History reviewed. No pertinent family history. SOCIAL HISTORY       Social History     Socioeconomic History    Marital status:      Spouse name: None    Number of children: None    Years of education: None    Highest education level: None   Occupational History    None   Tobacco Use    Smoking status: Never Smoker    Smokeless tobacco: Never Used   Substance and Sexual Activity    Alcohol use: No     Alcohol/week: 0.0 standard drinks    Drug use: No    Sexual activity: Not Currently   Other Topics Concern    None   Social History Narrative    None     Social Determinants of Health     Financial Resource Strain: Medium Risk    Difficulty of Paying Living Expenses: Somewhat hard   Food Insecurity: No Food Insecurity    Worried About Running Out of Food in the Last Year: Never true    Abigail of Food in the Last Year: Never true   Transportation Needs: No Transportation Needs    Lack of Transportation (Medical): No    Lack of Transportation (Non-Medical):  No   Physical Activity:     Days of Exercise per Week:     Minutes of Exercise per Session:    Stress:     Feeling of Stress :    Social Connections:     Frequency of Communication with Friends and Family:     Frequency of Social Gatherings with Friends and Family:     Attends Adventism Services:     Active Member of Clubs or Organizations:     Attends Club or Organization Meetings:     Marital Status:    Intimate Partner Violence:     Fear of Current or Ex-Partner:     Emotionally Abused:     Physically Abused:     Sexually Abused:          PHYSICAL EXAM    (up to 7 forlevel 4, 8 or more for level 5)     ED Triage Vitals   BP Temp Temp src Pulse Resp SpO2 Height Weight   -- -- -- -- -- -- -- --       Physical Exam  General :Patient is awake, alert, oriented, in no acute distress, nontoxic appearing  HEENT: Pupils are equally round and reactive to light, EOMI. Cardiac: Heart regular rate, rhythm, no murmurs, rubs, or gallops  Lungs: Lungs are clear to auscultation, there is no wheezing, rhonchi, or rales. Abdomen:Abdomen is soft, nontender, nondistended. Musculoskeletal: Ambulatory  Neuro: Cranial nerves II through XII are intact and no focal sensorimotor deficits  Back: No midline or bony tenderness. Dermatology: Skin is warm and dry  Psych: Mentation is grossly normal, cognition is grossly normal. Affect is appropriate. DIAGNOSTIC RESULTS       RADIOLOGY:   Non-plain film images such as CT, Ultrasoundand MRI are read by the radiologist. Plain radiographic images are visualized and preliminarily interpreted by the emergency physician with the below findings:      [] Radiologist's Report Reviewed:  CT ABDOMEN PELVIS W IV CONTRAST Additional Contrast? None   Final Result      1. No acute intra-abdominopelvic abnormality. 2. 5.4 cm complex cystic lesion in the dome of the liver which has thick, calcified walls. A follow-up CT of the abdomen and pelvis is recommended in 3-6 months. If there is any prior imaging, this would be helpful for comparison. CT Head WO Contrast   Final Result      Age-related changes in the brain without acute intracranial abnormality.                   ED BEDSIDE ULTRASOUND:   Performed by ED Physician - none    LABS:  Labs Reviewed   CBC WITH AUTO DIFFERENTIAL - Abnormal; Notable for the following components:       Result Value    MPV 10.7 (*)     Lymphocytes Absolute 0.9 (*)     All other components within normal limits    Narrative:     Performed at:  [address] [place] [address] Laboratory  [address] [place], [place] [address], [address]   Phone [phone]   COMPREHENSIVE METABOLIC PANEL W/ REFLEX TO MG FOR LOW K - Abnormal; Notable for the following components:    Glucose 116 (*)     BUN 21 (*)     GFR Non- 53 (*)     All other components within normal limits    Narrative:     Performed at:  [address] [place] [address] Laboratory  [address] [place] [address],  [place], [address]   Phone [phone]   PROTIME-INR    Narrative:     Performed at:  [address] [place] [address] Laboratory  [address] [place], [place] [address], [address]   Phone [phone]   LACTIC ACID, PLASMA    Narrative:     Performed at:  [address] [place] [address] Laboratory  [address] [place], [place] [address], [address]   Phone [phone]    Narrative:     Performed at:  [address] [place] [address] Laboratory  [address] [place], [place] [address], [address]   Phone [phone]   TROPONIN    Narrative:     Performed at:  [address] [place] [address] Laboratory  [address] [place], [place] [address], [address]   Phone [phone]   C-REACTIVE PROTEIN    Narrative:     Performed at:  [address] [place] [address] Laboratory  [address] [place], [place] [address], [address]   Phone [phone]   SPECIMEN REJECTION    Narrative:     Performed at:  [address] [place] [address] Laboratory  [address] [place], [place] [address], [address]   Phone [phone]   URINE RT REFLEX TO CULTURE       I have reviewed and interpreted all of the currently available lab resultsfrom this visit (if applicable):  Results for orders placed or performed during the hospital encounter of 07/13/21   CBC Auto Differential   Result Value Ref Range    WBC 8.2 4.0 - 11.0 K/uL    RBC 4.18 3.80 - 5.80 M/uL    Hemoglobin 12.9 11.5 - 16.5 g/dL    Hematocrit 40.6 37.0 - 47.0 %    MCV 97.1 80.0 - 100.0 fL    MCH 30.9 27.0 - 32.0 pg    MCHC 31.8 31.0 - 35.0 g/dL    RDW 13.0 11.0 - 16.0 %    Platelets 153 259 - 082 K/uL    MPV 10.7 (H) 6.0 - 10.0 fL    Neutrophils % 82.7 %    Immature Granulocytes % 0.2 0.0 - 5.0 %    Lymphocytes % 11.3 %    Monocytes % 4.5 %    Eosinophils % 0.5 %    Basophils % 0.8 %    Neutrophils Absolute 6.8 2.0 - 7.5 K/uL    Immature Granulocytes # 0.0 K/uL    Lymphocytes Absolute 0.9 (L) 1.5 - 4.0 K/uL    Monocytes Absolute 0.4 0.2 - 0.8 K/uL    Eosinophils Absolute 0.0 0.0 - 0.4 K/uL    Basophils Absolute 0.1 0.0 - 0.1 K/uL   Protime-INR   Result Value Ref Range    Protime 13.4 11.6 - 13.8 sec    INR 1.07 0.88 - 1.11   Lactic Acid, Plasma   Result Value Ref Range    Lactic Acid 1.8 0.4 - 2.0 mmol/L   Comprehensive Metabolic Panel w/ Reflex to MG   Result Value Ref Range    Sodium 141 136 - 145 mmol/L    Potassium reflex Magnesium 4.1 3.4 - 5.1 mmol/L    Chloride 104 98 - 107 mmol/L    CO2 30 20 - 30 mmol/L    Anion Gap 7 3 - 16    Glucose 116 (H) 74 - 106 mg/dL    BUN 21 (H) 6 - 20 mg/dL    CREATININE 1.0 0.4 - 1.2 mg/dL    GFR Non- 53 (L) >59    GFR African American >59 >59    Calcium 9.5 8.5 - 10.5 mg/dL    Total Protein 7.4 6.4 - 8.3 g/dL    Albumin 4.2 3.4 - 4.8 g/dL    Albumin/Globulin Ratio 1.3 0.8 - 2.0    Total Bilirubin 0.4 0.3 - 1.2 mg/dL    Alkaline Phosphatase 100 25 - 100 U/L    ALT 8 4 - 36 U/L    AST 12 8 - 33 U/L    Globulin 3.2 g/dL   Brain Natriuretic Peptide   Result Value Ref Range    Pro- 0 - 1,800 pg/mL   Troponin   Result Value Ref Range    Troponin <0.30 <0.30 ng/mL   C-Reactive Protein   Result Value Ref Range    CRP <3.0 0.0 - 5.1 mg/L   SPECIMEN REJECTION   Result Value Ref Range    Rejected Test chemistries     Reason for Rejection see below         All other labs were within normal range or not returned as of this dictation. EMERGENCY DEPARTMENT COURSE and DIFFERENTIAL DIAGNOSIS/MDM:   Vitals:    Vitals:    07/13/21 1315 07/13/21 1330   BP: (!) 171/71 (!) 159/72   Pulse:  72   Resp:  17   SpO2:  99%           The patient will follow-up with their PCP in 1-2 days for reevaluation. If the patient or family members have any further concerns or any worsening symptoms they will return to the ED for reevaluation. CONSULTS:  None    PROCEDURES:  Procedures    CRITICAL CARE TIME    Total Critical Care time was 0 minutes, excluding separately reportable procedures. There was a high probability of clinically significant/life threatening deterioration in the patient's condition which required my urgent intervention. FINAL IMPRESSION      1. Near syncope Stable         DISPOSITION/PLAN   DISPOSITION Decision To Discharge 07/13/2021 05:42:16 PM      PATIENT REFERRED TO:  MD Pancho Wright  [phone]    In 1 day  If symptoms worsen. Patient is scheduled to see her primary care physician in the morning. DISCHARGE MEDICATIONS:  New Prescriptions    No medications on file       Comment: Please note this report has been produced using speech recognition software and may contain errors related tothat system including errors in grammar, punctuation, and spelling, as well as words and phrases that may be inappropriate. If there are any questions or concerns please feel free to contact the dictating provider forclarification.     Trey Akers DO  Attending Emergency Physician                  Kerry Chamberlain DO  07/13/21 2207

## 2021-07-13 NOTE — ED NOTES
Patient was trying to have a bowel movement this am and had a syncopal episode, family states that patient had the diarrhea. Family states that this is the 3rd time that she has done this and she knows that 2 of the times patient was having a diarrhea stool when this happened, a couple weeks ago patient did this at her pcp office and patient was given cpr and became responsive in the office and sent to [facility]. Daughter states that they did ekg, chest xrys and head ct with normal results.      Ana Lawler RN  07/13/21 4513

## 2021-07-14 ENCOUNTER — OFFICE VISIT (OUTPATIENT)
Dept: FAMILY MEDICINE CLINIC | Age: 86
End: 2021-07-14
Payer: MEDICARE

## 2021-07-14 VITALS
WEIGHT: 136 LBS | DIASTOLIC BLOOD PRESSURE: 68 MMHG | SYSTOLIC BLOOD PRESSURE: 148 MMHG | BODY MASS INDEX: 24.87 KG/M2 | TEMPERATURE: 97.9 F | HEART RATE: 66 BPM

## 2021-07-14 DIAGNOSIS — R41.3 MEMORY LOSS, SHORT TERM: ICD-10-CM

## 2021-07-14 DIAGNOSIS — F41.9 ANXIETY: ICD-10-CM

## 2021-07-14 DIAGNOSIS — R45.1 AGITATION: ICD-10-CM

## 2021-07-14 DIAGNOSIS — G47.00 INSOMNIA, UNSPECIFIED TYPE: ICD-10-CM

## 2021-07-14 DIAGNOSIS — R55 SYNCOPE, UNSPECIFIED SYNCOPE TYPE: Primary | ICD-10-CM

## 2021-07-14 PROCEDURE — 99214 OFFICE O/P EST MOD 30 MIN: CPT | Performed by: FAMILY MEDICINE

## 2021-07-14 RX ORDER — SERTRALINE HYDROCHLORIDE 25 MG/1
25 TABLET, FILM COATED ORAL DAILY
Qty: 90 TABLET | Refills: 1 | Status: SHIPPED | OUTPATIENT
Start: 2021-07-14 | End: 2021-08-12

## 2021-07-14 RX ORDER — BUSPIRONE HYDROCHLORIDE 10 MG/1
10 TABLET ORAL 3 TIMES DAILY
Qty: 90 TABLET | Refills: 0 | Status: SHIPPED | OUTPATIENT
Start: 2021-07-14 | End: 2021-08-12 | Stop reason: SDUPTHER

## 2021-07-14 NOTE — PROGRESS NOTES
Chief Complaint   Patient presents with    Altered Mental Status     aggression    Hypertension     what mg of lisinopril she is on       Have you seen any other physician or provider since your last visit yes - [facility]    Have you had any other diagnostic tests since your last visit?  yes - CT of head and and abdomen    Have you changed or stopped any medications since your last visit? no

## 2021-07-14 NOTE — PROGRESS NOTES
SUBJECTIVE:    Patient ID: Sherman Johnson is a 80 y.o. female. Chief Complaint   Patient presents with    Altered Mental Status     aggression    Hypertension     what mg of lisinopril she is on       HPI: office visit  In the office today to follow-up on her hypertension. She is still struggling a little bit with her blood sugars. She is doing pretty well with her other medicines. She is having a lot of agitation though the daughter says that she is constantly getting frustrated. She is having more difficulty with her day-to-day activities. She is staying frustrated all the time. She is sleeping a little better. Still having some significant memory problems. Review of Systems   Constitutional: Positive for fatigue. Neurological: Positive for weakness. All other systems reviewed and are negative. OBJECTIVE:  BP (!) 148/68   Pulse 66   Temp 97.9 °F (36.6 °C)   Wt 136 lb (61.7 kg)   BMI 24.87 kg/m²    Wt Readings from Last 3 Encounters:   07/14/21 136 lb (61.7 kg)   06/16/21 141 lb (64 kg)   05/26/21 137 lb 14.4 oz (62.6 kg)     BP Readings from Last 3 Encounters:   07/14/21 (!) 148/68   07/13/21 (!) 162/86   06/16/21 (!) 176/74      Pulse Readings from Last 3 Encounters:   07/14/21 66   07/13/21 71   06/16/21 65     Body mass index is 24.87 kg/m². Resp Readings from Last 3 Encounters:   07/13/21 19   06/16/21 18   05/26/21 18     Past medical, surgical, family and social history were reviewed and updated with the patient. Physical Exam  Vitals and nursing note reviewed. Constitutional:       Appearance: She is well-developed. HENT:      Head: Normocephalic and atraumatic. Right Ear: Decreased hearing noted. Left Ear: Decreased hearing noted. Nose: Nose normal.   Eyes:      Pupils: Pupils are equal, round, and reactive to light. Cardiovascular:      Rate and Rhythm: Normal rate and regular rhythm.       Heart sounds: Normal heart sounds, S1 normal and S2 normal. No murmur heard. No friction rub. No gallop. Pulmonary:      Effort: Pulmonary effort is normal.      Breath sounds: Normal breath sounds. Abdominal:      General: Bowel sounds are normal.      Palpations: Abdomen is soft. Musculoskeletal:         General: Normal range of motion. Cervical back: Normal range of motion and neck supple. Right lower leg: No edema. Left lower leg: No edema. Skin:     General: Skin is warm and dry. Neurological:      Mental Status: She is alert and oriented to person, place, and time. Psychiatric:         Attention and Perception: Attention normal.         Mood and Affect: Affect normal. Mood is anxious. Speech: Speech normal.         Behavior: Behavior is cooperative. Cognition and Memory: Memory is impaired. She exhibits impaired recent memory and impaired remote memory.           Results in Past 30 Days  Result Component Current Result Ref Range Previous Result Ref Range   Albumin/Globulin Ratio 1.3 (7/13/2021) 0.8 - 2.0 Not in Time Range    Albumin 4.2 (7/13/2021) 3.4 - 4.8 g/dL Not in Time Range    Alkaline Phosphatase 100 (7/13/2021) 25 - 100 U/L Not in Time Range    ALT 8 (7/13/2021) 4 - 36 U/L Not in Time Range    AST 12 (7/13/2021) 8 - 33 U/L Not in Time Range    BUN 21 (H) (7/13/2021) 6 - 20 mg/dL Not in Time Range    Calcium 9.5 (7/13/2021) 8.5 - 10.5 mg/dL Not in Time Range    Chloride 104 (7/13/2021) 98 - 107 mmol/L Not in Time Range    CO2 30 (7/13/2021) 20 - 30 mmol/L Not in Time Range    CREATININE 1.0 (7/13/2021) 0.4 - 1.2 mg/dL Not in Time Range    GFR  >59 (7/13/2021) >59 Not in Time Range    GFR Non- 53 (L) (7/13/2021) >59 Not in Time Range    Globulin 3.2 (7/13/2021) g/dL Not in Time Range    Glucose 116 (H) (7/13/2021) 74 - 106 mg/dL Not in Time Range    Potassium reflex Magnesium 4.1 (7/13/2021) 3.4 - 5.1 mmol/L Not in Time Range    Sodium 141 (7/13/2021) 136 - 145 mmol/L Not in Time Range Total Bilirubin 0.4 (7/13/2021) 0.3 - 1.2 mg/dL Not in Time Range    Total Protein 7.4 (7/13/2021) 6.4 - 8.3 g/dL Not in Time Range      Hemoglobin A1C (%)   Date Value   08/05/2015 5.4     Microscopic Examination (no units)   Date Value   06/09/2015 YES     LDL Calculated (mg/dL)   Date Value   03/02/2020 160 (H)       Lab Results   Component Value Date    WBC 8.2 07/13/2021    NEUTROABS 6.8 07/13/2021    HGB 12.9 07/13/2021    HCT 40.6 07/13/2021    MCV 97.1 07/13/2021     07/13/2021     Lab Results   Component Value Date    TSH 1.85 03/02/2020       ASSESSMENT:    Diagnosis Orders   1. Syncope, unspecified syncope type  External Referral To Cardiology   2. Memory loss, short term     3. Anxiety     4. Agitation     5. Insomnia, unspecified type          PLAN:  Orders Placed This Encounter   Medications    busPIRone (BUSPAR) 10 MG tablet     Sig: Take 1 tablet by mouth 3 times daily     Dispense:  90 tablet     Refill:  0    sertraline (ZOLOFT) 25 MG tablet     Sig: Take 1 tablet by mouth daily     Dispense:  90 tablet     Refill:  1        Medications Discontinued During This Encounter   Medication Reason    lisinopril (PRINIVIL;ZESTRIL) 40 MG tablet        Controlled Substances Monitoring:      Please note: This chart was generated using Dragon dictation software. Although every effort was made to ensure the accuracy of this automated transcription, some errors in transcription may have occurred.

## 2021-07-19 RX ORDER — ATORVASTATIN CALCIUM 10 MG/1
10 TABLET, FILM COATED ORAL DAILY
Qty: 30 TABLET | Refills: 5 | Status: SHIPPED | OUTPATIENT
Start: 2021-07-19 | End: 2022-01-04

## 2021-07-22 DIAGNOSIS — R32 URINARY INCONTINENCE, UNSPECIFIED TYPE: ICD-10-CM

## 2021-07-22 RX ORDER — QUETIAPINE FUMARATE 25 MG/1
25 TABLET, FILM COATED ORAL NIGHTLY
Qty: 90 TABLET | Refills: 3 | Status: SHIPPED | OUTPATIENT
Start: 2021-07-22 | End: 2022-07-16

## 2021-07-26 RX ORDER — QUETIAPINE FUMARATE 25 MG/1
TABLET, FILM COATED ORAL
Qty: 30 TABLET | OUTPATIENT
Start: 2021-07-26

## 2021-07-26 NOTE — TELEPHONE ENCOUNTER
Refill request received from pharmacy.  Medication pending for approval.       Last Office Visit With PCP:  07/14/2021    Next Visit Date:  Future Appointments   Date Time Provider Torri Lr   8/12/2021 10:45 AM Irena Carlisle MD [facility]

## 2021-08-12 ENCOUNTER — OFFICE VISIT (OUTPATIENT)
Dept: FAMILY MEDICINE CLINIC | Age: 86
End: 2021-08-12
Payer: MEDICARE

## 2021-08-12 VITALS
RESPIRATION RATE: 18 BRPM | SYSTOLIC BLOOD PRESSURE: 160 MMHG | BODY MASS INDEX: 27.82 KG/M2 | HEIGHT: 59 IN | TEMPERATURE: 98.2 F | HEART RATE: 66 BPM | WEIGHT: 138 LBS | OXYGEN SATURATION: 98 % | DIASTOLIC BLOOD PRESSURE: 60 MMHG

## 2021-08-12 DIAGNOSIS — R41.3 MEMORY LOSS, SHORT TERM: ICD-10-CM

## 2021-08-12 DIAGNOSIS — R55 SYNCOPE, UNSPECIFIED SYNCOPE TYPE: Primary | ICD-10-CM

## 2021-08-12 DIAGNOSIS — I10 ESSENTIAL HYPERTENSION: ICD-10-CM

## 2021-08-12 PROCEDURE — 99214 OFFICE O/P EST MOD 30 MIN: CPT | Performed by: FAMILY MEDICINE

## 2021-08-12 RX ORDER — LISINOPRIL 40 MG/1
40 TABLET ORAL DAILY
Qty: 30 TABLET | Refills: 5 | Status: SHIPPED | OUTPATIENT
Start: 2021-08-12 | End: 2022-02-03

## 2021-08-12 RX ORDER — BUSPIRONE HYDROCHLORIDE 10 MG/1
10 TABLET ORAL 3 TIMES DAILY
Qty: 90 TABLET | Refills: 5 | Status: SHIPPED | OUTPATIENT
Start: 2021-08-12 | End: 2021-09-11

## 2021-08-12 ASSESSMENT — PATIENT HEALTH QUESTIONNAIRE - PHQ9
1. LITTLE INTEREST OR PLEASURE IN DOING THINGS: 0
SUM OF ALL RESPONSES TO PHQ9 QUESTIONS 1 & 2: 0
SUM OF ALL RESPONSES TO PHQ QUESTIONS 1-9: 0
SUM OF ALL RESPONSES TO PHQ QUESTIONS 1-9: 0
2. FEELING DOWN, DEPRESSED OR HOPELESS: 0
SUM OF ALL RESPONSES TO PHQ QUESTIONS 1-9: 0

## 2021-08-12 NOTE — PROGRESS NOTES
SUBJECTIVE:    Patient ID: Morgan Waddell is a 80 y.o. female. Chief Complaint   Patient presents with    Memory Loss    Hypertension     has not taken Lisinopril x4 days     Anxiety       HPI: office visit  She is in the office after her syncopal episode. She says that she has had a couple more episodes. She does have cardiology follow-up. She still having some issues with her blood pressures. She is having some low blood pressure so her daughter has stopped the lisinopril. She still having some memory issues. She still has a lot of fatigue. She has not had any recent chest pain or shortness of breath. She is still struggling with her dementia. Review of Systems   Constitutional: Positive for fatigue. Neurological: Positive for weakness. All other systems reviewed and are negative. OBJECTIVE:  BP (!) 160/60   Pulse 66   Temp 98.2 °F (36.8 °C) (Infrared)   Resp 18   Ht 4' 11\" (1.499 m)   Wt 138 lb (62.6 kg)   SpO2 98% Comment: room air  Breastfeeding No   BMI 27.87 kg/m²    Wt Readings from Last 3 Encounters:   08/25/21 134 lb (60.8 kg)   08/12/21 138 lb (62.6 kg)   07/14/21 136 lb (61.7 kg)     BP Readings from Last 3 Encounters:   08/25/21 116/72   08/12/21 (!) 160/60   07/14/21 (!) 148/68      Pulse Readings from Last 3 Encounters:   08/25/21 67   08/12/21 66   07/14/21 66     Body mass index is 27.87 kg/m². Resp Readings from Last 3 Encounters:   08/25/21 14   08/12/21 18   07/13/21 19     Past medical, surgical, family and social history were reviewed and updated with the patient. Physical Exam  Vitals and nursing note reviewed. Constitutional:       Appearance: She is well-developed. HENT:      Head: Normocephalic and atraumatic. Right Ear: Decreased hearing noted. Left Ear: Decreased hearing noted. Nose: Nose normal.   Eyes:      Pupils: Pupils are equal, round, and reactive to light.    Cardiovascular:      Rate and Rhythm: Normal rate and regular rhythm. Heart sounds: Normal heart sounds, S1 normal and S2 normal. No murmur heard. No friction rub. No gallop. Pulmonary:      Effort: Pulmonary effort is normal.      Breath sounds: Normal breath sounds. Abdominal:      General: Bowel sounds are normal.      Palpations: Abdomen is soft. Musculoskeletal:         General: Normal range of motion. Cervical back: Normal range of motion and neck supple. Right lower leg: No edema. Left lower leg: No edema. Skin:     General: Skin is warm and dry. Neurological:      Mental Status: She is alert and oriented to person, place, and time. Psychiatric:         Attention and Perception: Attention normal.         Mood and Affect: Affect normal. Mood is anxious. Speech: Speech normal.         Behavior: Behavior is cooperative. Cognition and Memory: Memory is impaired. She exhibits impaired recent memory and impaired remote memory. No results found for requested labs within last 30 days. Hemoglobin A1C (%)   Date Value   08/05/2015 5.4     Microscopic Examination (no units)   Date Value   06/09/2015 YES     LDL Calculated (mg/dL)   Date Value   03/02/2020 160 (H)       Lab Results   Component Value Date    WBC 8.2 07/13/2021    NEUTROABS 6.8 07/13/2021    HGB 12.9 07/13/2021    HCT 40.6 07/13/2021    MCV 97.1 07/13/2021     07/13/2021     Lab Results   Component Value Date    TSH 1.85 03/02/2020       ASSESSMENT:    Diagnosis Orders   1. Syncope, unspecified syncope type     2. Essential hypertension     3.  Memory loss, short term          PLAN:  Orders Placed This Encounter   Medications    busPIRone (BUSPAR) 10 MG tablet     Sig: Take 1 tablet by mouth 3 times daily     Dispense:  90 tablet     Refill:  5    lisinopril (PRINIVIL;ZESTRIL) 40 MG tablet     Sig: Take 1 tablet by mouth daily     Dispense:  30 tablet     Refill:  5    linaCLOtide (LINZESS) 72 MCG CAPS capsule     Sig: Take 1 capsule by mouth every morning (before breakfast)     Dispense:  30 capsule     Refill:  2    sertraline (ZOLOFT) 50 MG tablet     Sig: Take 1 tablet by mouth daily     Dispense:  30 tablet     Refill:  5        Medications Discontinued During This Encounter   Medication Reason    busPIRone (BUSPAR) 10 MG tablet REORDER    sertraline (ZOLOFT) 25 MG tablet        Controlled Substances Monitoring:      Please note: This chart was generated using Dragon dictation software. Although every effort was made to ensure the accuracy of this automated transcription, some errors in transcription may have occurred.

## 2021-08-25 ENCOUNTER — OFFICE VISIT (OUTPATIENT)
Dept: FAMILY MEDICINE CLINIC | Age: 86
End: 2021-08-25
Payer: MEDICARE

## 2021-08-25 VITALS
WEIGHT: 134 LBS | DIASTOLIC BLOOD PRESSURE: 72 MMHG | HEIGHT: 59 IN | TEMPERATURE: 97.4 F | OXYGEN SATURATION: 97 % | RESPIRATION RATE: 14 BRPM | BODY MASS INDEX: 27.01 KG/M2 | HEART RATE: 67 BPM | SYSTOLIC BLOOD PRESSURE: 116 MMHG

## 2021-08-25 DIAGNOSIS — M54.50 ACUTE BILATERAL LOW BACK PAIN WITHOUT SCIATICA: Primary | ICD-10-CM

## 2021-08-25 DIAGNOSIS — N30.00 ACUTE CYSTITIS WITHOUT HEMATURIA: ICD-10-CM

## 2021-08-25 LAB
BILIRUBIN, POC: NORMAL
BLOOD URINE, POC: NORMAL
CLARITY, POC: NORMAL
COLOR, POC: NORMAL
GLUCOSE URINE, POC: NORMAL
KETONES, POC: NORMAL
LEUKOCYTE EST, POC: NORMAL
NITRITE, POC: NORMAL
PH, POC: 5.5
PROTEIN, POC: 30
SPECIFIC GRAVITY, POC: >=1.03
UROBILINOGEN, POC: 1

## 2021-08-25 PROCEDURE — 81002 URINALYSIS NONAUTO W/O SCOPE: CPT | Performed by: NURSE PRACTITIONER

## 2021-08-25 PROCEDURE — 99213 OFFICE O/P EST LOW 20 MIN: CPT | Performed by: NURSE PRACTITIONER

## 2021-08-25 RX ORDER — CIPROFLOXACIN 500 MG/1
500 TABLET, FILM COATED ORAL 2 TIMES DAILY
Qty: 14 TABLET | Refills: 0 | Status: SHIPPED | OUTPATIENT
Start: 2021-08-25 | End: 2021-09-01

## 2021-08-25 ASSESSMENT — ENCOUNTER SYMPTOMS
EYES NEGATIVE: 1
RESPIRATORY NEGATIVE: 1
GASTROINTESTINAL NEGATIVE: 1
BACK PAIN: 1

## 2021-08-25 NOTE — PROGRESS NOTES
SUBJECTIVE:  Sapna Bowen is a 80 y.o. female that presents with   Chief Complaint   Patient presents with    Back Pain     low back pain - X2 days    Urinary Frequency   . HPI:    This is an 80year old white female who presents today with complaints of low back pain and polyuria. She denies dysuria but does says the urine is foul and strong. She denies nausea but caregiver tells me that yesterday she got upset with her and would not eat and would not drink anything. She denies fever or chills. No chest pain or shortness of breath. Review of Systems   Constitutional: Negative. HENT: Negative. Eyes: Negative. Respiratory: Negative. Cardiovascular: Negative. Gastrointestinal: Negative. Endocrine: Negative. Genitourinary: Negative. Musculoskeletal: Positive for arthralgias, back pain and myalgias. Skin: Negative. Neurological: Negative. Hematological: Negative. Psychiatric/Behavioral: Negative. All other systems reviewed and are negative. OBJECTIVE:  /72   Pulse 67   Temp 97.4 °F (36.3 °C) (Infrared)   Resp 14   Ht 4' 11\" (1.499 m)   Wt 134 lb (60.8 kg)   SpO2 97%   BMI 27.06 kg/m²   Physical Exam  Vitals and nursing note reviewed. Constitutional:       Appearance: Normal appearance. She is well-developed and normal weight. HENT:      Head: Normocephalic and atraumatic. Right Ear: External ear normal.      Left Ear: External ear normal.      Nose: Nose normal.   Eyes:      Conjunctiva/sclera: Conjunctivae normal.      Pupils: Pupils are equal, round, and reactive to light. Cardiovascular:      Rate and Rhythm: Normal rate and regular rhythm. Pulses: Normal pulses. Heart sounds: Normal heart sounds. Pulmonary:      Effort: Pulmonary effort is normal.      Breath sounds: Normal breath sounds. Abdominal:      General: Abdomen is flat. Bowel sounds are normal.      Palpations: Abdomen is soft.    Musculoskeletal: General: Normal range of motion. Cervical back: Normal range of motion and neck supple. Skin:     General: Skin is warm and dry. Capillary Refill: Capillary refill takes less than 2 seconds. Neurological:      Mental Status: She is alert and oriented to person, place, and time. Deep Tendon Reflexes: Reflexes are normal and symmetric. Psychiatric:         Attention and Perception: Attention normal.         Mood and Affect: Mood normal. Affect is blunt. Behavior: Behavior is uncooperative. Thought Content: Thought content normal.         Cognition and Memory: Cognition is impaired. Memory is impaired. Judgment: Judgment normal.       No results found for requested labs within last 30 days. Hemoglobin A1C (%)   Date Value   08/05/2015 5.4     Microscopic Examination (no units)   Date Value   06/09/2015 YES     LDL Calculated (mg/dL)   Date Value   03/02/2020 160 (H)         Lab Results   Component Value Date    WBC 8.2 07/13/2021    NEUTROABS 6.8 07/13/2021    HGB 12.9 07/13/2021    HCT 40.6 07/13/2021    MCV 97.1 07/13/2021     07/13/2021       Lab Results   Component Value Date    TSH 1.85 03/02/2020         ASSESSMENT/PLAN:   Diagnosis Orders   1. Acute bilateral low back pain without sciatica  POCT Urinalysis no Micro   2.  Acute cystitis without hematuria  ciprofloxacin (CIPRO) 500 MG tablet           Orders Placed This Encounter   Procedures    POCT Urinalysis no Micro        Outpatient Encounter Medications as of 8/25/2021   Medication Sig Dispense Refill    ciprofloxacin (CIPRO) 500 MG tablet Take 1 tablet by mouth 2 times daily for 7 days 14 tablet 0    busPIRone (BUSPAR) 10 MG tablet Take 1 tablet by mouth 3 times daily 90 tablet 5    lisinopril (PRINIVIL;ZESTRIL) 40 MG tablet Take 1 tablet by mouth daily 30 tablet 5    linaCLOtide (LINZESS) 72 MCG CAPS capsule Take 1 capsule by mouth every morning (before breakfast) 30 capsule 2    sertraline (ZOLOFT) 50 MG tablet Take 1 tablet by mouth daily 30 tablet 5    QUEtiapine (SEROQUEL) 25 MG tablet Take 1 tablet by mouth nightly 90 tablet 3    mirabegron (MYRBETRIQ) 25 MG TB24 TAKE 1 TABLET BY MOUTH ONCE A DAY 90 tablet 3    atorvastatin (LIPITOR) 10 MG tablet Take 1 tablet by mouth daily 30 tablet 5    folic acid (FOLVITE) 1 MG tablet TAKE 1 TABLET BY MOUTH EVERY DAY 90 tablet 3    levothyroxine (SYNTHROID) 50 MCG tablet TAKE 1 TABLET BY MOUTH ONCE A DAY 90 tablet 3    meloxicam (MOBIC) 15 MG tablet TAKE 1 TABLET BY MOUTH DAILY 90 tablet 3    omeprazole (PRILOSEC) 40 MG delayed release capsule       [DISCONTINUED] methylPREDNISolone acetate (DEPO-MEDROL) injection 40 mg       [DISCONTINUED] lidocaine 1 % injection 5 mL        No facility-administered encounter medications on file as of 8/25/2021. Return if symptoms worsen or fail to improve. PATIENT COUNSELING    Counseling was provided today regardingthe following topics: Healthy eating habits, Regular exercise, substance abuse and healthy sleep habits. Discussed use, benefit, and side effectsof prescribed medications. Barriers to medication compliance addressed. All patient questions answered. Pt voiced understanding.     increase fluids

## 2021-09-08 ENCOUNTER — NURSE ONLY (OUTPATIENT)
Dept: FAMILY MEDICINE CLINIC | Age: 86
End: 2021-09-08

## 2021-09-08 ENCOUNTER — TELEPHONE (OUTPATIENT)
Dept: FAMILY MEDICINE CLINIC | Age: 86
End: 2021-09-08

## 2021-09-08 DIAGNOSIS — Z20.822 EXPOSURE TO COVID-19 VIRUS: Primary | ICD-10-CM

## 2021-09-08 NOTE — TELEPHONE ENCOUNTER
----- Message from Trell Cornejo sent at 9/8/2021 10:15 AM EDT -----  Subject: Message to Provider    QUESTIONS  Information for Provider? Patient would like to get a covid 19 test, no   symptoms but may have been around someone that had been exposed to it.  ---------------------------------------------------------------------------  --------------  [address]  What is the best way for the office to contact you? OK to leave message on   voicemail  Preferred Call Back Phone Number? [phone]  ---------------------------------------------------------------------------  --------------  SCRIPT ANSWERS  Relationship to Patient? Other  Representative Name? Caterina Devine  Is the Representative on the appropriate HIPAA document in Epic?  Yes

## 2021-09-13 RX ORDER — MELOXICAM 15 MG/1
TABLET ORAL
Qty: 90 TABLET | Refills: 3 | Status: SHIPPED | OUTPATIENT
Start: 2021-09-13 | End: 2022-06-08

## 2021-09-13 RX ORDER — FOLIC ACID 1 MG/1
TABLET ORAL
Qty: 90 TABLET | Refills: 3 | Status: SHIPPED | OUTPATIENT
Start: 2021-09-13 | End: 2022-06-08 | Stop reason: SDUPTHER

## 2021-09-13 RX ORDER — OMEPRAZOLE 40 MG/1
40 CAPSULE, DELAYED RELEASE ORAL DAILY
Qty: 90 CAPSULE | Refills: 3 | Status: SHIPPED | OUTPATIENT
Start: 2021-09-13 | End: 2022-06-08 | Stop reason: SDUPTHER

## 2021-09-23 ENCOUNTER — OFFICE VISIT (OUTPATIENT)
Dept: FAMILY MEDICINE CLINIC | Age: 86
End: 2021-09-23
Payer: MEDICARE

## 2021-09-23 VITALS
SYSTOLIC BLOOD PRESSURE: 130 MMHG | TEMPERATURE: 97.5 F | BODY MASS INDEX: 26.21 KG/M2 | WEIGHT: 130 LBS | HEART RATE: 72 BPM | OXYGEN SATURATION: 95 % | RESPIRATION RATE: 18 BRPM | DIASTOLIC BLOOD PRESSURE: 72 MMHG | HEIGHT: 59 IN

## 2021-09-23 DIAGNOSIS — Z23 NEED FOR INFLUENZA VACCINATION: ICD-10-CM

## 2021-09-23 DIAGNOSIS — R41.3 MEMORY LOSS, SHORT TERM: ICD-10-CM

## 2021-09-23 DIAGNOSIS — I10 ESSENTIAL HYPERTENSION: ICD-10-CM

## 2021-09-23 DIAGNOSIS — R63.4 WEIGHT LOSS: Primary | ICD-10-CM

## 2021-09-23 PROCEDURE — 90694 VACC AIIV4 NO PRSRV 0.5ML IM: CPT | Performed by: FAMILY MEDICINE

## 2021-09-23 PROCEDURE — G0008 ADMIN INFLUENZA VIRUS VAC: HCPCS | Performed by: FAMILY MEDICINE

## 2021-09-23 PROCEDURE — 99214 OFFICE O/P EST MOD 30 MIN: CPT | Performed by: FAMILY MEDICINE

## 2021-09-23 ASSESSMENT — PATIENT HEALTH QUESTIONNAIRE - PHQ9
2. FEELING DOWN, DEPRESSED OR HOPELESS: 0
SUM OF ALL RESPONSES TO PHQ QUESTIONS 1-9: 0
SUM OF ALL RESPONSES TO PHQ9 QUESTIONS 1 & 2: 0
SUM OF ALL RESPONSES TO PHQ QUESTIONS 1-9: 0
SUM OF ALL RESPONSES TO PHQ QUESTIONS 1-9: 0
1. LITTLE INTEREST OR PLEASURE IN DOING THINGS: 0

## 2021-09-23 NOTE — PROGRESS NOTES
Immunizations Administered     Name Date Dose Route    Influenza, Quadv, adjuvanted, 65 yrs +, IM, PF (Fluad) 9/23/2021 0.5 mL Intramuscular    Site: Deltoid- Right    Lot: [id number]    NDC: 75236-547-05        Patient tolerated injection well. Patient advised to wait 20 minutes in the office following the injection. No signs/symptoms of reaction noted after 20 minutes.

## 2021-09-30 RX ORDER — LEVOTHYROXINE SODIUM 0.05 MG/1
TABLET ORAL
Qty: 90 TABLET | Refills: 3 | Status: SHIPPED | OUTPATIENT
Start: 2021-09-30 | End: 2022-06-08 | Stop reason: SDUPTHER

## 2021-10-18 NOTE — TELEPHONE ENCOUNTER
Patient called, requested refill.        Next Office Visit Date:  Future Appointments   Date Time Provider Torri Adeline   3/23/2022  1:00 PM Michell You MD [address] please review via [address]

## 2021-10-18 NOTE — TELEPHONE ENCOUNTER
----- Message from Luis Matute sent at 10/18/2021  1:15 PM EDT -----  Memantine HCl-Donepezil HCl 7 & 14 & 21 &28 -10 MG Ranjit Sawyer is needing this refilled at [facility]

## 2021-11-05 RX ORDER — LINACLOTIDE 72 UG/1
CAPSULE, GELATIN COATED ORAL
Qty: 30 CAPSULE | Refills: 2 | Status: SHIPPED | OUTPATIENT
Start: 2021-11-05 | End: 2022-02-03

## 2021-11-05 NOTE — TELEPHONE ENCOUNTER
Patient called, requested refill.        Next Office Visit Date:  Future Appointments   Date Time Provider Torri Lr   3/23/2022  1:00 PM Natasha Cox MD [address] please review via [address]

## 2021-11-09 ENCOUNTER — OFFICE VISIT (OUTPATIENT)
Dept: FAMILY MEDICINE CLINIC | Age: 86
End: 2021-11-09
Payer: MEDICARE

## 2021-11-09 VITALS
HEART RATE: 67 BPM | HEIGHT: 59 IN | DIASTOLIC BLOOD PRESSURE: 70 MMHG | OXYGEN SATURATION: 100 % | WEIGHT: 129.4 LBS | RESPIRATION RATE: 18 BRPM | BODY MASS INDEX: 26.08 KG/M2 | SYSTOLIC BLOOD PRESSURE: 118 MMHG

## 2021-11-09 DIAGNOSIS — K14.8 TONGUE DISCOLORATION: Primary | ICD-10-CM

## 2021-11-09 PROCEDURE — 99213 OFFICE O/P EST LOW 20 MIN: CPT | Performed by: NURSE PRACTITIONER

## 2021-11-09 RX ORDER — SERTRALINE HYDROCHLORIDE 25 MG/1
TABLET, FILM COATED ORAL
COMMUNITY
Start: 2021-10-08 | End: 2022-01-03

## 2021-11-09 NOTE — PROGRESS NOTES
Agustina Santacruz 80 y.o. presents today for   Chief Complaint   Patient presents with    Other     tip of tongue red and pulling at it        HPI:  Agustina Santacruz is an 79 y/o female who presents to the clinic today with her daughter. Her daughter says for the past few days she has been sticking her tongue out and picking at it. They haven't been able to tell what is wrong with it and don't really know why she is doing it. The patient states her tongue has felt strange but says she doesn't know why and thinks maybe her dentures have caused it. The patient has cognitive decline and was confused during visit. No family history on file. Social History     Socioeconomic History    Marital status:      Spouse name: Not on file    Number of children: Not on file    Years of education: Not on file    Highest education level: Not on file   Occupational History    Not on file   Tobacco Use    Smoking status: Never Smoker    Smokeless tobacco: Never Used   Substance and Sexual Activity    Alcohol use: No     Alcohol/week: 0.0 standard drinks    Drug use: No    Sexual activity: Not Currently   Other Topics Concern    Not on file   Social History Narrative    Not on file     Social Determinants of Health     Financial Resource Strain: Medium Risk    Difficulty of Paying Living Expenses: Somewhat hard   Food Insecurity: No Food Insecurity    Worried About Running Out of Food in the Last Year: Never true    Abigail of Food in the Last Year: Never true   Transportation Needs: No Transportation Needs    Lack of Transportation (Medical): No    Lack of Transportation (Non-Medical):  No   Physical Activity:     Days of Exercise per Week: Not on file    Minutes of Exercise per Session: Not on file   Stress:     Feeling of Stress : Not on file   Social Connections:     Frequency of Communication with Friends and Family: Not on file    Frequency of Social Gatherings with Friends and Family: Not on file    Attends Bahai Services: Not on file    Active Member of Clubs or Organizations: Not on file    Attends Club or Organization Meetings: Not on file    Marital Status: Not on file   Intimate Partner Violence:     Fear of Current or Ex-Partner: Not on file    Emotionally Abused: Not on file    Physically Abused: Not on file    Sexually Abused: Not on file   Housing Stability:     Unable to Pay for Housing in the Last Year: Not on file    Number of [place] in the Last Year: Not on file    Unstable Housing in the Last Year: Not on file        Past Surgical History:   Procedure Laterality Date    BLADDER SUSPENSION  9/2015    CHOLECYSTECTOMY      HYSTERECTOMY      TUBAL LIGATION          Past Medical History:   Diagnosis Date    Allergic rhinitis     Dementia ([facility] [address]     Hypertension     Hyperthyroidism     Osteoarthritis         Current Outpatient Medications   Medication Sig Dispense Refill    sertraline (ZOLOFT) 25 MG tablet TAKE 1 TABLET BY MOUTH DAILY      nystatin (MYCOSTATIN) 832249 UNIT/ML suspension Take 5 mLs by mouth 4 times daily for 10 days Retain in mouth as long as possible 200 mL 0    LINZESS 72 MCG CAPS capsule TAKE 1 CAPSULE BY MOUTH EVERY MORNING BEFORE BREAKFAST 30 capsule 2    Memantine HCl-Donepezil HCl 7 & 14 & 21 &28 -10 MG C4PK Take 1 tablet by mouth daily 28 each 0    levothyroxine (SYNTHROID) 50 MCG tablet TAKE 1 TABLET BY MOUTH ONCE A DAY 90 tablet 3    folic acid (FOLVITE) 1 MG tablet TAKE 1 TABLET BY MOUTH EVERY DAY 90 tablet 3    meloxicam (MOBIC) 15 MG tablet TAKE 1 TABLET BY MOUTH DAILY 90 tablet 3    omeprazole (PRILOSEC) 40 MG delayed release capsule Take 1 capsule by mouth daily 90 capsule 3    lisinopril (PRINIVIL;ZESTRIL) 40 MG tablet Take 1 tablet by mouth daily 30 tablet 5    sertraline (ZOLOFT) 50 MG tablet Take 1 tablet by mouth daily 30 tablet 5    QUEtiapine (SEROQUEL) 25 MG tablet Take 1 tablet by mouth nightly 90 tablet 3    mirabegron (MYRBETRIQ) 25 MG TB24 TAKE 1 TABLET BY MOUTH ONCE A DAY 90 tablet 3    atorvastatin (LIPITOR) 10 MG tablet Take 1 tablet by mouth daily 30 tablet 5     No current facility-administered medications for this visit. Review of Systems   HENT:        Tongue irritation   All other systems reviewed and are negative. /70   Pulse 67   Resp 18   Ht 4' 11\" (1.499 m)   Wt 129 lb 6.4 oz (58.7 kg)   SpO2 100% Comment: RA  BMI 26.14 kg/m²      Physical Exam  Vitals reviewed. Constitutional:       Appearance: Normal appearance. HENT:      Head: Normocephalic and atraumatic. Right Ear: Tympanic membrane, ear canal and external ear normal.      Left Ear: Tympanic membrane, ear canal and external ear normal.      Nose: Nose normal.      Mouth/Throat:      Mouth: Mucous membranes are moist.      Pharynx: Oropharynx is clear. Eyes:      Extraocular Movements: Extraocular movements intact. Conjunctiva/sclera: Conjunctivae normal.      Pupils: Pupils are equal, round, and reactive to light. Cardiovascular:      Rate and Rhythm: Normal rate and regular rhythm. Pulses: Normal pulses. Heart sounds: Normal heart sounds. Pulmonary:      Effort: Pulmonary effort is normal.      Breath sounds: Normal breath sounds. Musculoskeletal:         General: Normal range of motion. Cervical back: Normal range of motion and neck supple. Skin:     General: Skin is warm and dry. Capillary Refill: Capillary refill takes less than 2 seconds. Comments: Three small pinhead erythematous bumps on tip of tongue   Neurological:      General: No focal deficit present. Mental Status: She is alert and oriented to person, place, and time. Psychiatric:         Mood and Affect: Mood normal.         Behavior: Behavior normal.          ASSESSMENT/PLAN    1.  Tongue discoloration  Advised pt and her daughter to assist her in using medication as directed and to f/u if s/s persist or worsen. They are both agreeable to poc.  - nystatin (MYCOSTATIN) 734316 UNIT/ML suspension;  Take 5 mLs by mouth 4 times daily for 10 days Retain in mouth as long as possible  Dispense: 200 mL; Refill: 0             ARNULFO Martel - CNP

## 2021-11-09 NOTE — PROGRESS NOTES
Chief Complaint   Patient presents with    Mouth Injury     tip of tongue red and pulling at it       Have you seen any other physician or provider since your last visit no    Have you had any other diagnostic tests since your last visit? no    Have you changed or stopped any medications since your last visit? no

## 2021-12-09 ENCOUNTER — OFFICE VISIT (OUTPATIENT)
Dept: FAMILY MEDICINE CLINIC | Age: 86
End: 2021-12-09
Payer: MEDICARE

## 2021-12-09 VITALS
HEART RATE: 61 BPM | HEIGHT: 59 IN | RESPIRATION RATE: 18 BRPM | TEMPERATURE: 97.2 F | SYSTOLIC BLOOD PRESSURE: 172 MMHG | WEIGHT: 130 LBS | DIASTOLIC BLOOD PRESSURE: 72 MMHG | BODY MASS INDEX: 26.21 KG/M2 | OXYGEN SATURATION: 98 %

## 2021-12-09 DIAGNOSIS — R41.3 MEMORY LOSS, SHORT TERM: Primary | ICD-10-CM

## 2021-12-09 DIAGNOSIS — I10 ESSENTIAL HYPERTENSION: ICD-10-CM

## 2021-12-09 DIAGNOSIS — R45.1 AGITATION: ICD-10-CM

## 2021-12-09 DIAGNOSIS — G47.00 INSOMNIA, UNSPECIFIED TYPE: ICD-10-CM

## 2021-12-09 DIAGNOSIS — F41.9 ANXIETY: ICD-10-CM

## 2021-12-09 PROCEDURE — 96372 THER/PROPH/DIAG INJ SC/IM: CPT | Performed by: FAMILY MEDICINE

## 2021-12-09 PROCEDURE — 99214 OFFICE O/P EST MOD 30 MIN: CPT | Performed by: FAMILY MEDICINE

## 2021-12-09 RX ORDER — CYANOCOBALAMIN 1000 UG/ML
1000 INJECTION INTRAMUSCULAR; SUBCUTANEOUS ONCE
Status: COMPLETED | OUTPATIENT
Start: 2021-12-09 | End: 2021-12-09

## 2021-12-09 RX ORDER — BUSPIRONE HYDROCHLORIDE 10 MG/1
TABLET ORAL
COMMUNITY
Start: 2021-11-05 | End: 2021-12-09 | Stop reason: SDUPTHER

## 2021-12-09 RX ORDER — BUSPIRONE HYDROCHLORIDE 10 MG/1
TABLET ORAL
Qty: 90 TABLET | Refills: 2 | Status: SHIPPED | OUTPATIENT
Start: 2021-12-09 | End: 2022-06-08 | Stop reason: SDUPTHER

## 2021-12-09 RX ADMIN — CYANOCOBALAMIN 1000 MCG: 1000 INJECTION INTRAMUSCULAR; SUBCUTANEOUS at 11:42

## 2021-12-09 NOTE — PROGRESS NOTES
SUBJECTIVE:    Patient ID: Gibson Babinski is a 80 y.o. female. Chief Complaint   Patient presents with    Hypertension    Memory Loss    Headache       HPI: office visit  She is in the office today in follow-up of her memory issues. She seems to be doing pretty well with her current medicines. She is having some anxiety at times. Still having some significant confusion at times as well. She does seem to be somewhat better. Her blood pressures have been up and down. She has not been having any significantly high blood pressures. She is complaining occasionally of a headache. She is not had a being obvious medication problems. She has not had any falls recently. She denies any chest pain or shortness of breath. Review of Systems   Constitutional: Positive for fatigue. Neurological: Positive for weakness. All other systems reviewed and are negative. OBJECTIVE:  BP (!) 172/72   Pulse 61   Temp 97.2 °F (36.2 °C)   Resp 18   Ht 4' 11\" (1.499 m)   Wt 130 lb (59 kg)   SpO2 98% Comment: ra  Breastfeeding No   BMI 26.26 kg/m²    Wt Readings from Last 3 Encounters:   12/09/21 130 lb (59 kg)   11/09/21 129 lb 6.4 oz (58.7 kg)   09/23/21 130 lb (59 kg)     BP Readings from Last 3 Encounters:   12/09/21 (!) 172/72   11/09/21 118/70   09/23/21 130/72      Pulse Readings from Last 3 Encounters:   12/09/21 61   11/09/21 67   09/23/21 72     Body mass index is 26.26 kg/m². Resp Readings from Last 3 Encounters:   12/09/21 18   11/09/21 18   09/23/21 18     Past medical, surgical, family and social history were reviewed and updated with the patient. Physical Exam  Vitals and nursing note reviewed. Constitutional:       Appearance: She is well-developed. HENT:      Head: Normocephalic and atraumatic. Right Ear: Decreased hearing noted. Left Ear: Decreased hearing noted. Nose: Nose normal.   Eyes:      Pupils: Pupils are equal, round, and reactive to light.    Cardiovascular:      Rate and Rhythm: Normal rate and regular rhythm. Heart sounds: Normal heart sounds, S1 normal and S2 normal. No murmur heard. No friction rub. No gallop. Pulmonary:      Effort: Pulmonary effort is normal.      Breath sounds: Normal breath sounds. Abdominal:      General: Bowel sounds are normal.      Palpations: Abdomen is soft. Musculoskeletal:         General: Normal range of motion. Cervical back: Normal range of motion and neck supple. Right lower leg: No edema. Left lower leg: No edema. Skin:     General: Skin is warm and dry. Neurological:      Mental Status: She is alert and oriented to person, place, and time. Psychiatric:         Attention and Perception: Attention normal.         Mood and Affect: Affect normal. Mood is anxious. Speech: Speech normal.         Behavior: Behavior is cooperative. Cognition and Memory: Memory is impaired. She exhibits impaired recent memory and impaired remote memory. No results found for requested labs within last 30 days. Hemoglobin A1C (%)   Date Value   08/05/2015 5.4     Microscopic Examination (no units)   Date Value   06/09/2015 YES     LDL Calculated (mg/dL)   Date Value   03/02/2020 160 (H)       Lab Results   Component Value Date    WBC 8.2 07/13/2021    NEUTROABS 6.8 07/13/2021    HGB 12.9 07/13/2021    HCT 40.6 07/13/2021    MCV 97.1 07/13/2021     07/13/2021     Lab Results   Component Value Date    TSH 1.85 03/02/2020       ASSESSMENT:    Diagnosis Orders   1. Memory loss, short term     2. Essential hypertension     3. Anxiety     4. Agitation     5.  Insomnia, unspecified type          PLAN:  Orders Placed This Encounter   Medications    busPIRone (BUSPAR) 10 MG tablet     Sig: TAKE 1 TABLET BY MOUTH THREE TIMES DAILY     Dispense:  90 tablet     Refill:  2    cyanocobalamin injection 1,000 mcg        Medications Discontinued During This Encounter   Medication Reason    busPIRone (BUSPAR) 10 MG

## 2021-12-09 NOTE — PROGRESS NOTES
Chief Complaint   Patient presents with    Hypertension    Memory Loss       Have you seen any other physician or provider since your last visit yes - maonhar    Have you had any other diagnostic tests since your last visit? yes - heart moniter, echo and ekg    Have you changed or stopped any medications since your last visit? no       Administrations This Visit     cyanocobalamin injection 1,000 mcg     Admin Date  12/09/2021  11:42 Action  Given Dose  1,000 mcg Route  IntraMUSCular Site  Deltoid Left Administered By  Darrin Lawson MA    Ordering Provider: Michell You MD    NDC: 66787-581-89    : [facility] [place]    Patient Supplied?: No              Patient tolerated injection well. Patient advised to wait 20 minutes in the office following the injection. No signs/symptoms of reaction noted after 20 minutes.

## 2022-01-03 RX ORDER — SERTRALINE HYDROCHLORIDE 25 MG/1
TABLET, FILM COATED ORAL
Qty: 90 TABLET | Refills: 1 | Status: SHIPPED | OUTPATIENT
Start: 2022-01-03 | End: 2022-02-21

## 2022-01-03 NOTE — TELEPHONE ENCOUNTER
Patient called, requested refill.        Next Office Visit Date:  Future Appointments   Date Time Provider Torri Adeline   1/12/2022  2:15 PM Megha Ordoñez MD [address]   3/9/2022  1:15 PM [facility] via [address]

## 2022-01-04 RX ORDER — ATORVASTATIN CALCIUM 10 MG/1
10 TABLET, FILM COATED ORAL DAILY
Qty: 30 TABLET | Refills: 5 | Status: SHIPPED | OUTPATIENT
Start: 2022-01-04 | End: 2022-06-06 | Stop reason: SDUPTHER

## 2022-01-12 ENCOUNTER — OFFICE VISIT (OUTPATIENT)
Dept: FAMILY MEDICINE CLINIC | Age: 87
End: 2022-01-12
Payer: MEDICARE

## 2022-01-12 VITALS
BODY MASS INDEX: 26.53 KG/M2 | OXYGEN SATURATION: 97 % | TEMPERATURE: 97.2 F | HEART RATE: 58 BPM | HEIGHT: 59 IN | DIASTOLIC BLOOD PRESSURE: 82 MMHG | RESPIRATION RATE: 18 BRPM | WEIGHT: 131.6 LBS | SYSTOLIC BLOOD PRESSURE: 180 MMHG

## 2022-01-12 DIAGNOSIS — Z00.00 ROUTINE GENERAL MEDICAL EXAMINATION AT A HEALTH CARE FACILITY: Primary | ICD-10-CM

## 2022-01-12 PROCEDURE — G0438 PPPS, INITIAL VISIT: HCPCS | Performed by: FAMILY MEDICINE

## 2022-01-12 ASSESSMENT — LIFESTYLE VARIABLES: HOW OFTEN DO YOU HAVE A DRINK CONTAINING ALCOHOL: 0

## 2022-01-12 ASSESSMENT — PATIENT HEALTH QUESTIONNAIRE - PHQ9
SUM OF ALL RESPONSES TO PHQ QUESTIONS 1-9: 0
1. LITTLE INTEREST OR PLEASURE IN DOING THINGS: 0
SUM OF ALL RESPONSES TO PHQ QUESTIONS 1-9: 0
2. FEELING DOWN, DEPRESSED OR HOPELESS: 0
SUM OF ALL RESPONSES TO PHQ QUESTIONS 1-9: 0
SUM OF ALL RESPONSES TO PHQ9 QUESTIONS 1 & 2: 0
SUM OF ALL RESPONSES TO PHQ QUESTIONS 1-9: 0

## 2022-01-12 NOTE — PROGRESS NOTES
Medicare Annual Wellness Visit  Name: Marcy Cabral Date: 2022   MRN: [id number] Sex: Female   Age: 80 y.o. Ethnicity: Non- / Non    : 1935 Race: White (non-)      Akanksha Han is here for Medicare AWV    Screenings for behavioral, psychosocial and functional/safety risks, and cognitive dysfunction are all negative except as indicated below. These results, as well as other patient data from the [address] [facility] [address] form, are documented in Flowsheets linked to this Encounter. Allergies   Allergen Reactions    Sulfa Antibiotics        Prior to Visit Medications    Medication Sig Taking?  Authorizing Provider   atorvastatin (LIPITOR) 10 MG tablet TAKE 1 TABLET BY MOUTH DAILY Yes Cody Clemons MD   sertraline (ZOLOFT) 25 MG tablet TAKE 1 TABLET BY MOUTH DAILY Yes Cody Clemons MD   busPIRone (BUSPAR) 10 MG tablet TAKE 1 TABLET BY MOUTH THREE TIMES DAILY Yes Cody Clemons MD   Memantine HCl-Donepezil HCl 28-10 MG CP24 Take 1 capsule by mouth daily Yes oCdy Clemons MD   LINZESS 72 MCG CAPS capsule TAKE 1 CAPSULE BY MOUTH EVERY MORNING BEFORE BREAKFAST Yes Cody Clemons MD   levothyroxine (SYNTHROID) 50 MCG tablet TAKE 1 TABLET BY MOUTH ONCE A DAY Yes Cody Clemons MD   folic acid (FOLVITE) 1 MG tablet TAKE 1 TABLET BY MOUTH EVERY DAY Yes Cody Clemons MD   meloxicam (MOBIC) 15 MG tablet TAKE 1 TABLET BY MOUTH DAILY Yes Cody Clemons MD   omeprazole (PRILOSEC) 40 MG delayed release capsule Take 1 capsule by mouth daily Yes Cody Clemons MD   lisinopril (PRINIVIL;ZESTRIL) 40 MG tablet Take 1 tablet by mouth daily Yes Cody Clemons MD   sertraline (ZOLOFT) 50 MG tablet Take 1 tablet by mouth daily Yes Cody Clemons MD   QUEtiapine (SEROQUEL) 25 MG tablet Take 1 tablet by mouth nightly Yes Cody Clemons MD   mirabegron (MYRBETRIQ) 25 MG TB24 TAKE 1 TABLET BY MOUTH ONCE A DAY Yes Cody Clemons MD       Past Medical History:   Diagnosis Date    Allergic rhinitis     Dementia ([facility] [address]     Hypertension     Hyperthyroidism     Osteoarthritis        Past Surgical History:   Procedure Laterality Date    BLADDER SUSPENSION  9/2015    CHOLECYSTECTOMY      HYSTERECTOMY      TUBAL LIGATION         History reviewed. No pertinent family history. CareTeam (Including outside providers/suppliers regularly involved in providing care):   Patient Care Team:  Laura Zarco MD as PCP - General (Family Medicine)  Laura Zarco MD as PCP - [facility]    Wt Readings from Last 3 Encounters:   01/12/22 131 lb 9.6 oz (59.7 kg)   12/09/21 130 lb (59 kg)   11/09/21 129 lb 6.4 oz (58.7 kg)     Vitals:    01/12/22 1506 01/12/22 1511 01/12/22 1512   BP: (!) 160/80 (!) 172/80 (!) 180/82   Pulse: 58     Resp: 18     Temp: 97.2 °F (36.2 °C)     SpO2: 97%     Weight: 131 lb 9.6 oz (59.7 kg)     Height: 4' 11\" (1.499 m)       Body mass index is 26.58 kg/m². Based upon direct observation of the patient, evaluation of cognition reveals she is still having some issues with memory        Patient's complete Health Risk Assessment and screening values have been reviewed and are found in Flowsheets. The following problems were reviewed today and where indicated follow up appointments were made and/or referrals ordered. Positive Risk Factor Screenings with Interventions:      Cognitive: Words recalled: 2 Words Recalled  Clock Drawing Test (CDT) Score: Normal  Total Score Interpretation: Positive Mini-Cog  Cognitive Impairment Interventions:  · Known diagnosis of dementia         General Health and ACP:  General  In general, how would you say your health is?: Very Good  In the past 7 days, have you experienced any of the following?  New or Increased Pain, New or Increased Fatigue, Loneliness, Social Isolation, Stress or Anger?: None of These  Do you get the social and emotional support that you need?: Yes  Do you have a Living Will?: (!) No  Advance Directives     Power of [facility] Will ACP-Advance Directive ACP-Power of     Not on File Not on File Not on File Filed      General Health Risk Interventions:  · Discussed the need for a living will , discussed with emi     Hearing/Vision:  No exam data present  Hearing/Vision  Do you or your family notice any trouble with your hearing that hasn't been managed with hearing aids?: (!) Yes  Do you have difficulty driving, watching TV, or doing any of your daily activities because of your eyesight?: (!) Yes  Have you had an eye exam within the past year?: Yes  Hearing/Vision Interventions:  · Discussed the need for vision evals, and hearing test     ADL:  ADLs  In the past 7 days, did you need help from others to perform any of the following everyday activities? Eating, dressing, grooming, bathing, toileting, or walking/balance?: (!) Dressing,Bathing,Walking/Balance  In the past 7 days, did you need help from others to take care of any of the following?  Laundry, housekeeping, banking/finances, shopping, telephone use, food preparation, transportation, or taking medications?: (!) Laundry,Housekeeping,Banking/Finances,Shopping,Food Preparation,Transportation,Taking Medications  ADL Interventions:  · She lives with her daughter and she helps her with all that      Personalized Preventive Plan   Current Health Maintenance Status  Immunization History   Administered Date(s) Administered    COVID-19, [employer], Primary or Immunocompromised, PF, 100mcg/0.5mL 03/18/2021, 04/19/2021    Influenza 10/12/2012, 10/29/2013    Influenza Virus Vaccine 10/08/2014, 10/30/2015    Influenza, Quadv, 6 mo and older, IM (Fluzone, Flulaval) 01/09/2018    Influenza, Quadv, IM, (6 mo and older Fluzone, Flulaval, Fluarix and 3 yrs and older Afluria) 10/02/2018, 11/23/2020    Influenza, Quadv, adjuvanted, 65 yrs +, IM, PF (Fluad) 09/23/2021    Influenza, Triv, 3 Years and older, IM (Afluria (5 yrs and older) 01/16/2017    Pneumococcal Conjugate 13-valent [place]) 01/09/2018    Pneumococcal Polysaccharide ([id number]) 09/08/2016        Health Maintenance   Topic Date Due    DTaP/Tdap/Td vaccine (1 - Tdap) Never done    Shingles Vaccine (1 of 2) Never done   [employer] Visit (AWV)  Never done    Lipid screen  03/02/2021    TSH testing  03/02/2021    COVID-19 Vaccine (3 - Booster for Moderna series) 10/19/2021    Potassium monitoring  07/13/2022    Creatinine monitoring  07/13/2022    Depression Screen  09/23/2022    Flu vaccine  Completed    Pneumococcal 65+ years Vaccine  Completed    Hepatitis A vaccine  Aged Out    Hepatitis B vaccine  Aged Out    Hib vaccine  Aged Out    Meningococcal (ACWY) vaccine  Aged Out     Recommendations for [employer] Due: see orders and patient instructions/AVS.  . Recommended screening schedule for the next 5-10 years is provided to the patient in written form: see Patient Instructions/AVS.    There are no diagnoses linked to this encounter.

## 2022-01-12 NOTE — PATIENT INSTRUCTIONS
The medication list included in this document is our record of what you are currently taking, including any changes that were made at today's visit.  If you find any differences when compared to your medications at home, or have any questions that were not answered at your visit, please contact the office. We are committed to providing you with the best care possible. In order to help us achieve these goals please remember to bring all medications, herbal products, and over the counter supplements with you to each visit. If your provider has ordered testing for you, please be sure to follow up with our office if you have not received results within 7 days after the testing took place. *If you receive a survey after visiting one of our offices, please take time to share your experience concerning your physician office visit. These surveys are confidential and no health information about you is shared. We are eager to improve for you and we are counting on your feedback to help make that happen. · Keep a list of your medicines with you. List all of the prescription medicines, nonprescription medicines, supplements, natural remedies, and vitamins that you take. Tell your healthcare providers who treat you about all of the products you are taking. Your provider can provide you with a form to keep track of them. Just ask. · Follow the directions that come with your medicine, including information about food or alcohol. Make sure you know how and when to take your medicine. Do not take more or less than you are supposed to take. · Keep all medicines out of the reach of children. · Store medicines according to the directions on the label. · Monitor yourself. Learn to know how your body reacts to your new medicine and keep track of how it makes you feel before attempting (If your provider has allowed you to do so) to drive or go to work.    · Seek emergency medical attention if you think you have used too much of this medicine. An overdose of any prescription medicine can be fatal. Overdose symptoms may include extreme drowsiness, muscle weakness, confusion, cold and clammy skin, pinpoint pupils, shallow breathing, slow heart rate, fainting, or coma. · Don't share prescription medicines with others, even when they seem to have the same symptoms. What may be good for you may be harmful to others. · If you are no longer taking a prescribed medication and you have pills left please take your pills out of their original containers. Mix crushed pills with an undesirable substance, such as cat litter or used coffee grounds. Put the mixture into a disposable container with a lid, such as an empty margarine tub, or into a sealable bag. Cover up or remove any of your personal information on the empty containers by covering it with black permanent marker or duct tape. Place the sealed container with the mixture, and the empty drug containers, in the trash. · If you use a medication that is in the form of a patch, dispose of used patches by folding them in half so that the sticky sides meet, and then flushing them down a toilet. They should not be placed in the household trash where children or pets can find them. · If you have any questions, ask your provider or pharmacist for more information. · Be sure to keep all appointments for provider visits or tests. ·   Personalized Preventive Plan for Luisito Dodge - 1/12/2022  Medicare offers a range of preventive health benefits. Some of the tests and screenings are paid in full while other may be subject to a deductible, co-insurance, and/or copay. Some of these benefits include a comprehensive review of your medical history including lifestyle, illnesses that may run in your family, and various assessments and screenings as appropriate.     After reviewing your medical record and screening and assessments performed today your provider may have ordered immunizations, labs, imaging, and/or referrals for you. A list of these orders (if applicable) as well as your Preventive Care list are included within your After Visit Summary for your review. Other Preventive Recommendations:    A preventive eye exam performed by an eye specialist is recommended every 1-2 years to screen for glaucoma; cataracts, macular degeneration, and other eye disorders. A preventive dental visit is recommended every 6 months. Try to get at least 150 minutes of exercise per week or 10,000 steps per day on a pedometer . Order or download the FREE \"Exercise & Physical Activity: Your Everyday Guide\" from The [employer] on A[employer]. Call 4-161.447.1994 or search The [employer] Aging online. You need 8380-1682 mg of calcium and 4563-8844 IU of vitamin D per day. It is possible to meet your calcium requirement with diet alone, but a vitamin D supplement is usually necessary to meet this goal.  When exposed to the sun, use a sunscreen that protects against both UVA and UVB radiation with an SPF of 30 or greater. Reapply every 2 to 3 hours or after sweating, drying off with a towel, or swimming. Always wear a seat belt when traveling in a car. Always wear a helmet when riding a bicycle or motorcycle. Personalized Preventive Plan for Autumn Foster - 1/12/2022  Medicare offers a range of preventive health benefits. Some of the tests and screenings are paid in full while other may be subject to a deductible, co-insurance, and/or copay. Some of these benefits include a comprehensive review of your medical history including lifestyle, illnesses that may run in your family, and various assessments and screenings as appropriate. After reviewing your medical record and screening and assessments performed today your provider may have ordered immunizations, labs, imaging, and/or referrals for you.   A list of these orders (if applicable) as well as your Preventive Care list are included within your After Visit Summary for your review. Other Preventive Recommendations:    A preventive eye exam performed by an eye specialist is recommended every 1-2 years to screen for glaucoma; cataracts, macular degeneration, and other eye disorders. A preventive dental visit is recommended every 6 months. Try to get at least 150 minutes of exercise per week or 10,000 steps per day on a pedometer . Order or download the FREE \"Exercise & Physical Activity: Your Everyday Guide\" from [employer]. Call 8-220.691.3739 or search [employer] online. You need 2546-2492 mg of calcium and 9379-1091 IU of vitamin D per day. It is possible to meet your calcium requirement with diet alone, but a vitamin D supplement is usually necessary to meet this goal.  When exposed to the sun, use a sunscreen that protects against both UVA and UVB radiation with an SPF of 30 or greater. Reapply every 2 to 3 hours or after sweating, drying off with a towel, or swimming. Always wear a seat belt when traveling in a car. Always wear a helmet when riding a bicycle or motorcycle.

## 2022-02-03 RX ORDER — LINACLOTIDE 72 UG/1
CAPSULE, GELATIN COATED ORAL
Qty: 30 CAPSULE | Refills: 2 | Status: SHIPPED | OUTPATIENT
Start: 2022-02-03 | End: 2022-05-04

## 2022-02-03 RX ORDER — LISINOPRIL 40 MG/1
40 TABLET ORAL DAILY
Qty: 30 TABLET | Refills: 5 | Status: ON HOLD | OUTPATIENT
Start: 2022-02-03 | End: 2022-08-11

## 2022-02-21 ENCOUNTER — OFFICE VISIT (OUTPATIENT)
Dept: FAMILY MEDICINE CLINIC | Age: 87
End: 2022-02-21
Payer: MEDICARE

## 2022-02-21 ENCOUNTER — HOSPITAL ENCOUNTER (OUTPATIENT)
Facility: HOSPITAL | Age: 87
Discharge: HOME | End: 2022-02-21
Payer: MEDICARE

## 2022-02-21 VITALS
HEART RATE: 53 BPM | SYSTOLIC BLOOD PRESSURE: 118 MMHG | RESPIRATION RATE: 16 BRPM | BODY MASS INDEX: 26.61 KG/M2 | HEIGHT: 59 IN | OXYGEN SATURATION: 98 % | TEMPERATURE: 97.6 F | DIASTOLIC BLOOD PRESSURE: 68 MMHG | WEIGHT: 132 LBS

## 2022-02-21 DIAGNOSIS — R10.9 FLANK PAIN: Primary | ICD-10-CM

## 2022-02-21 LAB
BILIRUBIN, POC: NORMAL
BLOOD URINE, POC: NORMAL
CLARITY, POC: CLEAR
COLOR, POC: YELLOW
GLUCOSE URINE, POC: NORMAL
KETONES, POC: NORMAL
LEUKOCYTE EST, POC: NORMAL
NITRITE, POC: NORMAL
PH, POC: 5.5
PROTEIN, POC: NORMAL
SPECIFIC GRAVITY, POC: 1.03
UROBILINOGEN, POC: 1

## 2022-02-21 PROCEDURE — 99213 OFFICE O/P EST LOW 20 MIN: CPT | Performed by: NURSE PRACTITIONER

## 2022-02-21 PROCEDURE — 81002 URINALYSIS NONAUTO W/O SCOPE: CPT | Performed by: NURSE PRACTITIONER

## 2022-02-21 PROCEDURE — 87086 URINE CULTURE/COLONY COUNT: CPT

## 2022-02-21 RX ORDER — NITROFURANTOIN 25; 75 MG/1; MG/1
100 CAPSULE ORAL 2 TIMES DAILY
Qty: 20 CAPSULE | Refills: 0 | Status: SHIPPED | OUTPATIENT
Start: 2022-02-21 | End: 2022-03-03

## 2022-02-21 ASSESSMENT — ENCOUNTER SYMPTOMS: BACK PAIN: 1

## 2022-02-21 NOTE — PROGRESS NOTES
Rangel Fair 80 y.o. presents today for   Chief Complaint   Patient presents with    Back Pain     daughter thinks its a poss UTI        HPI:  Rangel Fair states her mid back is hurting x several days, daugheter says it is always a kidney infection when her back hurts. She admits she never pees very much at a time just goes frequently and that is normal for her. No hematuria or fever. History reviewed. No pertinent family history. Social History     Socioeconomic History    Marital status:      Spouse name: Not on file    Number of children: Not on file    Years of education: Not on file    Highest education level: Not on file   Occupational History    Not on file   Tobacco Use    Smoking status: Never Smoker    Smokeless tobacco: Never Used   Substance and Sexual Activity    Alcohol use: No     Alcohol/week: 0.0 standard drinks    Drug use: No    Sexual activity: Not Currently   Other Topics Concern    Not on file   Social History Narrative    Not on file     Social Determinants of Health     Financial Resource Strain: Medium Risk    Difficulty of Paying Living Expenses: Somewhat hard   Food Insecurity: No Food Insecurity    Worried About Running Out of Food in the Last Year: Never true    Abigail of Food in the Last Year: Never true   Transportation Needs: No Transportation Needs    Lack of Transportation (Medical): No    Lack of Transportation (Non-Medical):  No   Physical Activity:     Days of Exercise per Week: Not on file    Minutes of Exercise per Session: Not on file   Stress:     Feeling of Stress : Not on file   Social Connections:     Frequency of Communication with Friends and Family: Not on file    Frequency of Social Gatherings with Friends and Family: Not on file    Attends Jain Services: Not on file    Active Member of Clubs or Organizations: Not on file    Attends Club or Organization Meetings: Not on file    Marital Status: Not on file Intimate Partner Violence:     Fear of Current or Ex-Partner: Not on file    Emotionally Abused: Not on file    Physically Abused: Not on file    Sexually Abused: Not on file   Housing Stability:     Unable to Pay for Housing in the Last Year: Not on file    Number of Places Lived in the Last Year: Not on file    Unstable Housing in the Last Year: Not on file        Past Surgical History:   Procedure Laterality Date    BLADDER SUSPENSION  9/2015    CHOLECYSTECTOMY      HYSTERECTOMY      TUBAL LIGATION          Past Medical History:   Diagnosis Date    Allergic rhinitis     [facility] [address] [address]     Hypertension     Hyperthyroidism     Osteoarthritis         Current Outpatient Medications   Medication Sig Dispense Refill    nitrofurantoin, macrocrystal-monohydrate, (MACROBID) 100 MG capsule Take 1 capsule by mouth 2 times daily for 10 days 20 capsule 0    lisinopril (PRINIVIL;ZESTRIL) 40 MG tablet TAKE 1 TABLET BY MOUTH DAILY 30 tablet 5    LINZESS 72 MCG CAPS capsule TAKE 1 CAPSULE BY MOUTH EVERY MORNING BEFORE BREAKFAST 30 capsule 2    sertraline (ZOLOFT) 50 MG tablet TAKE 1 TABLET BY MOUTH DAILY 30 tablet 5    atorvastatin (LIPITOR) 10 MG tablet TAKE 1 TABLET BY MOUTH DAILY 30 tablet 5    busPIRone (BUSPAR) 10 MG tablet TAKE 1 TABLET BY MOUTH THREE TIMES DAILY 90 tablet 2    Memantine HCl-Donepezil HCl 28-10 MG CP24 Take 1 capsule by mouth daily 30 capsule 5    levothyroxine (SYNTHROID) 50 MCG tablet TAKE 1 TABLET BY MOUTH ONCE A DAY 90 tablet 3    folic acid (FOLVITE) 1 MG tablet TAKE 1 TABLET BY MOUTH EVERY DAY 90 tablet 3    meloxicam (MOBIC) 15 MG tablet TAKE 1 TABLET BY MOUTH DAILY 90 tablet 3    omeprazole (PRILOSEC) 40 MG delayed release capsule Take 1 capsule by mouth daily 90 capsule 3    QUEtiapine (SEROQUEL) 25 MG tablet Take 1 tablet by mouth nightly 90 tablet 3    mirabegron (MYRBETRIQ) 25 MG TB24 TAKE 1 TABLET BY MOUTH ONCE A DAY 90 tablet 3     No current facility-administered medications for this visit. Review of Systems   Genitourinary: Positive for flank pain. Musculoskeletal: Positive for back pain. All other systems reviewed and are negative. /68   Pulse 53   Temp 97.6 °F (36.4 °C) (Infrared)   Resp 16   Ht 4' 11\" (1.499 m)   Wt 132 lb (59.9 kg)   SpO2 98% Comment: ra  BMI 26.66 kg/m²      Physical Exam  Constitutional:       Appearance: Normal appearance. HENT:      Head: Normocephalic and atraumatic. Nose: Nose normal.      Mouth/Throat:      Mouth: Mucous membranes are moist.      Pharynx: Oropharynx is clear. Eyes:      Extraocular Movements: Extraocular movements intact. Conjunctiva/sclera: Conjunctivae normal.      Pupils: Pupils are equal, round, and reactive to light. Cardiovascular:      Rate and Rhythm: Normal rate and regular rhythm. Pulses: Normal pulses. Heart sounds: Normal heart sounds. Pulmonary:      Effort: Pulmonary effort is normal.      Breath sounds: Normal breath sounds. Abdominal:      General: Bowel sounds are normal.      Palpations: Abdomen is soft. Tenderness: There is right CVA tenderness and left CVA tenderness. Musculoskeletal:         General: Normal range of motion. Cervical back: Normal range of motion and neck supple. Skin:     General: Skin is warm and dry. Capillary Refill: Capillary refill takes less than 2 seconds. Neurological:      General: No focal deficit present. Mental Status: She is alert and oriented to person, place, and time. Psychiatric:         Mood and Affect: Mood normal.         Behavior: Behavior normal.          ASSESSMENT/PLAN    1. Flank pain  Advised pt's daughter to help pt take medication as directed and to ensure she is drinking plenty of water. They are both agreeable to poc. - POCT Urinalysis no Micro  - nitrofurantoin, macrocrystal-monohydrate, (MACROBID) 100 MG capsule;  Take 1 capsule by mouth 2 times daily for 10 days  Dispense: 20 capsule;  Refill: 0          Gretchen Dom, APRN - CNP

## 2022-02-21 NOTE — PROGRESS NOTES
Chief Complaint   Patient presents with    Back Pain     daughter thinks its a poss UTI       Have you seen any other physician or provider since your last visit no    Have you had any other diagnostic tests since your last visit? no    Have you changed or stopped any medications since your last visit? no

## 2022-02-23 LAB — URINE CULTURE, ROUTINE: NORMAL

## 2022-03-17 ENCOUNTER — HOSPITAL ENCOUNTER (OUTPATIENT)
Facility: HOSPITAL | Age: 87
Discharge: HOME | End: 2022-03-17
Payer: MEDICARE

## 2022-03-17 ENCOUNTER — OFFICE VISIT (OUTPATIENT)
Dept: FAMILY MEDICINE CLINIC | Age: 87
End: 2022-03-17
Payer: MEDICARE

## 2022-03-17 VITALS
OXYGEN SATURATION: 96 % | RESPIRATION RATE: 16 BRPM | WEIGHT: 132 LBS | DIASTOLIC BLOOD PRESSURE: 70 MMHG | HEIGHT: 59 IN | HEART RATE: 64 BPM | TEMPERATURE: 98.4 F | BODY MASS INDEX: 26.61 KG/M2 | SYSTOLIC BLOOD PRESSURE: 138 MMHG

## 2022-03-17 DIAGNOSIS — F41.9 ANXIETY: ICD-10-CM

## 2022-03-17 DIAGNOSIS — E53.8 B12 DEFICIENCY: ICD-10-CM

## 2022-03-17 DIAGNOSIS — I10 ESSENTIAL HYPERTENSION: ICD-10-CM

## 2022-03-17 DIAGNOSIS — R53.83 FATIGUE, UNSPECIFIED TYPE: ICD-10-CM

## 2022-03-17 DIAGNOSIS — I10 ESSENTIAL HYPERTENSION: Primary | ICD-10-CM

## 2022-03-17 PROCEDURE — 85027 COMPLETE CBC AUTOMATED: CPT

## 2022-03-17 PROCEDURE — 80053 COMPREHEN METABOLIC PANEL: CPT

## 2022-03-17 PROCEDURE — 96372 THER/PROPH/DIAG INJ SC/IM: CPT | Performed by: FAMILY MEDICINE

## 2022-03-17 PROCEDURE — 80061 LIPID PANEL: CPT

## 2022-03-17 PROCEDURE — 84443 ASSAY THYROID STIM HORMONE: CPT

## 2022-03-17 PROCEDURE — 99213 OFFICE O/P EST LOW 20 MIN: CPT | Performed by: FAMILY MEDICINE

## 2022-03-17 PROCEDURE — 82607 VITAMIN B-12: CPT

## 2022-03-17 PROCEDURE — 82746 ASSAY OF FOLIC ACID SERUM: CPT

## 2022-03-17 RX ORDER — CYANOCOBALAMIN 1000 UG/ML
1000 INJECTION INTRAMUSCULAR; SUBCUTANEOUS ONCE
Status: COMPLETED | OUTPATIENT
Start: 2022-03-17 | End: 2022-03-17

## 2022-03-17 RX ADMIN — CYANOCOBALAMIN 1000 MCG: 1000 INJECTION INTRAMUSCULAR; SUBCUTANEOUS at 16:43

## 2022-03-17 ASSESSMENT — PATIENT HEALTH QUESTIONNAIRE - PHQ9
SUM OF ALL RESPONSES TO PHQ QUESTIONS 1-9: 0
2. FEELING DOWN, DEPRESSED OR HOPELESS: 0
1. LITTLE INTEREST OR PLEASURE IN DOING THINGS: 0
SUM OF ALL RESPONSES TO PHQ QUESTIONS 1-9: 0
SUM OF ALL RESPONSES TO PHQ QUESTIONS 1-9: 0
SUM OF ALL RESPONSES TO PHQ9 QUESTIONS 1 & 2: 0
SUM OF ALL RESPONSES TO PHQ QUESTIONS 1-9: 0

## 2022-03-17 NOTE — PROGRESS NOTES
Administrations This Visit     cyanocobalamin injection 1,000 mcg     Admin Date  03/17/2022  16:43 Action  Given Dose  1,000 mcg Route  IntraMUSCular Site  Deltoid Left Administered By  Jaylin Saini RN    Ordering Provider: Odie Cowden, MD [address]: [phone]    Lot#: [id number]    : [employer]    Patient Supplied?: No              Patient tolerated injection well. Patient advised to wait 20 minutes in the office following the injection. No signs/symptoms of reaction noted after 20 minutes.

## 2022-03-17 NOTE — PROGRESS NOTES
SUBJECTIVE:    Patient Lubna Akins is a 80 y.o. female. Chief Complaint   Patient presents with    Memory Loss    Hypertension    Mood Swings     Situational \"doing a little better\" family states      HPI:office visit  She is doing little better. Her daughter is with her today. She is struggling still with some significant dementia but she is pleasant and she is not having as much anxiety. Her fatigue level seems to be a little less. Her blood pressures have been doing good at home. They are not have any medication problems. She is not having quite as much agitation. Patient'smedications, allergies, past medical, surgical, social and family histories werereviewed and updated as appropriate. Review of Systems   Constitutional: Positive for fatigue. Neurological: Positive for weakness. All other systems reviewed and are negative. OBJECTIVE:  /70   Pulse 64   Temp 98.4 °F (36.9 °C) (Infrared)   Resp 16   Ht 4' 11\" (1.499 m)   Wt 132 lb (59.9 kg)   SpO2 96% Comment: room air  Breastfeeding No   BMI 26.66 kg/m²    Physical Exam  Vitals and nursing note reviewed. Constitutional:       Appearance: She is well-developed. HENT:      Head: Normocephalic and atraumatic. Right Ear: Decreased hearing noted. Left Ear: Decreased hearing noted. Nose: Nose normal.   Eyes:      Pupils: Pupils are equal, round, and reactive to light. Cardiovascular:      Rate and Rhythm: Normal rate and regular rhythm. Heart sounds: Normal heart sounds, S1 normal and S2 normal. No murmur heard. No friction rub. No gallop. Pulmonary:      Effort: Pulmonary effort is normal.      Breath sounds: Normal breath sounds. Abdominal:      General: Bowel sounds are normal.      Palpations: Abdomen is soft. Musculoskeletal:         General: Normal range of motion. Cervical back: Normal range of motion and neck supple. Right lower leg: No edema.       Left lower leg: No edema.   Skin:     General: Skin is warm and dry. Neurological:      Mental Status: She is alert and oriented to person, place, and time. Psychiatric:         Attention and Perception: Attention normal.         Mood and Affect: Affect normal. Mood is anxious. Speech: Speech normal.         Behavior: Behavior is cooperative. Cognition and Memory: Memory is impaired. She exhibits impaired recent memory and impaired remote memory.          Results in Past 30 Days  Result Component Current Result Ref Range Previous Result Ref Range   Albumin/Globulin Ratio 1.7 (3/17/2022) 0.8 - 2.0 Not in Time Range    Albumin 4.5 (3/17/2022) 3.4 - 4.8 g/dL Not in Time Range    Alkaline Phosphatase 73 (3/17/2022) 25 - 100 U/L Not in Time Range    ALT 6 (3/17/2022) 4 - 36 U/L Not in Time Range    AST 11 (3/17/2022) 8 - 33 U/L Not in Time Range    BUN 20 (3/17/2022) 6 - 20 mg/dL Not in Time Range    Calcium 9.5 (3/17/2022) 8.5 - 10.5 mg/dL Not in Time Range    Chloride 105 (3/17/2022) 98 - 107 mmol/L Not in Time Range    CO2 26 (3/17/2022) 20 - 30 mmol/L Not in Time Range    CREATININE 1.1 (3/17/2022) 0.4 - 1.2 mg/dL Not in Time Range    GFR  57 (L) (3/17/2022) >59 Not in Time Range    GFR Non- 47 (L) (3/17/2022) >59 Not in Time Range    Globulin 2.7 (3/17/2022) Not Established g/dL Not in Time Range    Glucose 94 (3/17/2022) 74 - 106 mg/dL Not in Time Range    Potassium 4.6 (3/17/2022) 3.4 - 5.1 mmol/L Not in Time Range    Sodium 142 (3/17/2022) 136 - 145 mmol/L Not in Time Range    Total Bilirubin 0.4 (3/17/2022) 0.3 - 1.2 mg/dL Not in Time Range    Total Protein 7.2 (3/17/2022) 6.4 - 8.3 g/dL Not in Time Range        Hemoglobin A1C (%)   Date Value   08/05/2015 5.4     Microscopic Examination (no units)   Date Value   06/09/2015 YES     LDL Calculated (mg/dL)   Date Value   03/17/2022 79         @BRIEFLAB(WBC:1,NEUTROABS:1,HGB:1,HCT:1,MCV:1,PLT:1)@    Lab Results   Component Value Date TSH 1.96 03/17/2022         ASSESSMENT/PLAN:     Problem List Items Addressed This Visit     Essential hypertension - Primary    Relevant Orders    Comprehensive Metabolic Panel    LIPID PANEL    CBC  Continue to monitor levels. Her blood pressure monitor at home seem to be reading well. She is not having any significant chest pain or other symptoms but no meds need to change medicines at this time.     B12 deficiency    Relevant Orders    Vitamin B12 & Folate  Monitoring levels to make sure were doing correct adjustments in medication      Other Visit Diagnoses     Anxiety    seems to be stable with the current regimen    Fatigue, unspecified type        Relevant Orders    TSH as evidenced by level of oral intake equating to between 26 and 50% of estimated needs.

## 2022-03-18 LAB
A/G RATIO: 1.7 (ref 0.8–2)
ALBUMIN SERPL-MCNC: 4.5 G/DL (ref 3.4–4.8)
ALP BLD-CCNC: 73 U/L (ref 25–100)
ALT SERPL-CCNC: 6 U/L (ref 4–36)
ANION GAP SERPL CALCULATED.3IONS-SCNC: 11 MMOL/L (ref 3–16)
AST SERPL-CCNC: 11 U/L (ref 8–33)
BILIRUB SERPL-MCNC: 0.4 MG/DL (ref 0.3–1.2)
BUN BLDV-MCNC: 20 MG/DL (ref 6–20)
CALCIUM SERPL-MCNC: 9.5 MG/DL (ref 8.5–10.5)
CHLORIDE BLD-SCNC: 105 MMOL/L (ref 98–107)
CHOLESTEROL, TOTAL: 159 MG/DL (ref 0–200)
CO2: 26 MMOL/L (ref 20–30)
CREAT SERPL-MCNC: 1.1 MG/DL (ref 0.4–1.2)
FOLATE: >20 NG/ML
GFR AFRICAN AMERICAN: 57
GFR NON-AFRICAN AMERICAN: 47
GLOBULIN: 2.7 G/DL
GLUCOSE BLD-MCNC: 94 MG/DL (ref 74–106)
HCT VFR BLD CALC: 38.4 % (ref 37–47)
HDLC SERPL-MCNC: 57 MG/DL (ref 40–60)
HEMOGLOBIN: 12.3 G/DL (ref 11.5–16.5)
LDL CHOLESTEROL CALCULATED: 79 MG/DL
MCH RBC QN AUTO: 31.3 PG (ref 27–32)
MCHC RBC AUTO-ENTMCNC: 32 G/DL (ref 31–35)
MCV RBC AUTO: 97.7 FL (ref 80–100)
PDW BLD-RTO: 13.5 % (ref 11–16)
PLATELET # BLD: 162 K/UL (ref 150–400)
PMV BLD AUTO: 12.2 FL (ref 6–10)
POTASSIUM SERPL-SCNC: 4.6 MMOL/L (ref 3.4–5.1)
RBC # BLD: 3.93 M/UL (ref 3.8–5.8)
SODIUM BLD-SCNC: 142 MMOL/L (ref 136–145)
TOTAL PROTEIN: 7.2 G/DL (ref 6.4–8.3)
TRIGL SERPL-MCNC: 115 MG/DL (ref 0–249)
TSH SERPL DL<=0.05 MIU/L-ACNC: 1.96 UIU/ML (ref 0.27–4.2)
VITAMIN B-12: 438 PG/ML (ref 211–911)
VLDLC SERPL CALC-MCNC: 23 MG/DL
WBC # BLD: 5.6 K/UL (ref 4–11)

## 2022-04-11 ENCOUNTER — NURSE ONLY (OUTPATIENT)
Dept: FAMILY MEDICINE CLINIC | Age: 87
End: 2022-04-11

## 2022-04-11 DIAGNOSIS — Z01.812 ENCOUNTER FOR PREOPERATIVE SCREENING LABORATORY TESTING FOR COVID-19 VIRUS: Primary | ICD-10-CM

## 2022-04-11 DIAGNOSIS — Z20.822 ENCOUNTER FOR PREOPERATIVE SCREENING LABORATORY TESTING FOR COVID-19 VIRUS: Primary | ICD-10-CM

## 2022-04-11 LAB
Lab: NORMAL
QC PASS/FAIL: NORMAL
SARS-COV-2 RDRP RESP QL NAA+PROBE: NEGATIVE

## 2022-04-11 PROCEDURE — 87635 SARS-COV-2 COVID-19 AMP PRB: CPT | Performed by: FAMILY MEDICINE

## 2022-04-25 ENCOUNTER — NURSE ONLY (OUTPATIENT)
Dept: FAMILY MEDICINE CLINIC | Age: 87
End: 2022-04-25

## 2022-04-25 DIAGNOSIS — Z01.818 PRE-OP TESTING: Primary | ICD-10-CM

## 2022-04-25 LAB
Lab: NORMAL
QC PASS/FAIL: NORMAL
SARS-COV-2 RDRP RESP QL NAA+PROBE: NEGATIVE

## 2022-04-25 PROCEDURE — 87635 SARS-COV-2 COVID-19 AMP PRB: CPT | Performed by: FAMILY MEDICINE

## 2022-04-25 NOTE — PROGRESS NOTES
Chief Complaint   Patient presents with    Covid Testing     pre op     Pt here for Pre op testing for covid only.

## 2022-05-04 RX ORDER — LINACLOTIDE 72 UG/1
CAPSULE, GELATIN COATED ORAL
Qty: 30 CAPSULE | Refills: 2 | Status: SHIPPED | OUTPATIENT
Start: 2022-05-04 | End: 2022-06-08 | Stop reason: SDUPTHER

## 2022-05-27 RX ORDER — MEMANTINE HYDROCHLORIDE AND DONEPEZIL HYDROCHLORIDE 28; 10 MG/1; MG/1
CAPSULE ORAL
Qty: 30 CAPSULE | Refills: 5 | OUTPATIENT
Start: 2022-05-27

## 2022-05-31 ENCOUNTER — NURSE ONLY (OUTPATIENT)
Dept: FAMILY MEDICINE CLINIC | Age: 87
End: 2022-05-31

## 2022-05-31 DIAGNOSIS — Z01.812 ENCOUNTER FOR PREOPERATIVE SCREENING LABORATORY TESTING FOR COVID-19 VIRUS: Primary | ICD-10-CM

## 2022-05-31 DIAGNOSIS — Z20.822 ENCOUNTER FOR PREOPERATIVE SCREENING LABORATORY TESTING FOR COVID-19 VIRUS: Primary | ICD-10-CM

## 2022-05-31 LAB
Lab: NORMAL
QC PASS/FAIL: NORMAL
SARS-COV-2 RDRP RESP QL NAA+PROBE: NEGATIVE

## 2022-05-31 PROCEDURE — 87635 SARS-COV-2 COVID-19 AMP PRB: CPT | Performed by: FAMILY MEDICINE

## 2022-06-02 ENCOUNTER — CARE COORDINATION (OUTPATIENT)
Dept: CARE COORDINATION | Age: 87
End: 2022-06-02

## 2022-06-03 NOTE — CARE COORDINATION
[facility] Transitions Initial Follow Up Call    Call within 2 business days of discharge: Yes     Patient: Romana Dallas Patient : 1935 MRN: [id number]    Last Discharge [facility]       Complaint Diagnosis Description Type Department Provider    21 Loss of Consciousness Near syncope ED (DISCHARGE) [facility] ED Dennie Hand, [address] [place] [address]  22  Syncope       RARS: No data recorded     Spoke with: Attempting HFU , unsuccessful. Message left for daughter Nicole Yanez with contact information.      Discharge department/facility: [facility]    Non-face-to-face services provided:  Scheduled appointment with PCP-Demond  Obtained and reviewed discharge summary and/or continuity of care documents    Follow Up  Future Appointments   Date Time Provider Torri Lr   2022  1:00 PM Christopher Ashton MD [address] [place] [address]   6/15/2022  3:15 PM Christopher Ashton MD [address] [place] [address]   2023  1:00 PM Christopher Ashton MD [address] [place] [address]       Nidhi Bran RN

## 2022-06-06 RX ORDER — ATORVASTATIN CALCIUM 10 MG/1
10 TABLET, FILM COATED ORAL DAILY
Qty: 90 TABLET | Refills: 3 | Status: SHIPPED | OUTPATIENT
Start: 2022-06-06

## 2022-06-06 NOTE — CARE COORDINATION
[facility] Transitions Initial Follow Up Call    Call within 2 business days of discharge: Yes     Patient: Morgan Waddell Patient : 1935 MRN: [id number]    Last Discharge [facility]       Complaint Diagnosis Description Type Department Provider    21 Loss of Consciousness Near syncope ED (DISCHARGE) [facility] ED Melchor Frost Nzeogu, DO   DC  [facility] 22  Syncope       RARS: No data recorded     Spoke with: Confirmed with Granddaughter that Georgean Kocher is doing well, no issues.       Discharge department/facility: [facility]    Non-face-to-face services provided:  Scheduled appointment with PCP-Demond  Obtained and reviewed discharge summary and/or continuity of care documents    Follow Up  Future Appointments   Date Time Provider Torri Lr   2022  1:00 PM Dov James MD [address] [place] [address]   6/15/2022  3:15 PM Dov James MD [address] [place] [address]   2023  1:00 PM Dov James MD [address] [place] [address]       Kathlynn Severin, RN

## 2022-06-08 ENCOUNTER — OFFICE VISIT (OUTPATIENT)
Dept: FAMILY MEDICINE CLINIC | Age: 87
End: 2022-06-08
Payer: MEDICARE

## 2022-06-08 VITALS — DIASTOLIC BLOOD PRESSURE: 70 MMHG | TEMPERATURE: 97 F | SYSTOLIC BLOOD PRESSURE: 132 MMHG | HEART RATE: 61 BPM

## 2022-06-08 DIAGNOSIS — M19.90 ARTHRITIS: ICD-10-CM

## 2022-06-08 DIAGNOSIS — I10 ESSENTIAL HYPERTENSION: ICD-10-CM

## 2022-06-08 DIAGNOSIS — F41.9 ANXIETY: Primary | ICD-10-CM

## 2022-06-08 DIAGNOSIS — E03.9 HYPOTHYROIDISM, UNSPECIFIED TYPE: ICD-10-CM

## 2022-06-08 DIAGNOSIS — R41.3 MEMORY LOSS, SHORT TERM: ICD-10-CM

## 2022-06-08 DIAGNOSIS — R32 URINARY INCONTINENCE, UNSPECIFIED TYPE: ICD-10-CM

## 2022-06-08 PROCEDURE — 1123F ACP DISCUSS/DSCN MKR DOCD: CPT | Performed by: FAMILY MEDICINE

## 2022-06-08 PROCEDURE — 99213 OFFICE O/P EST LOW 20 MIN: CPT | Performed by: FAMILY MEDICINE

## 2022-06-08 RX ORDER — BUSPIRONE HYDROCHLORIDE 10 MG/1
TABLET ORAL
Qty: 90 TABLET | Refills: 5 | Status: SHIPPED | OUTPATIENT
Start: 2022-06-08

## 2022-06-08 RX ORDER — FOLIC ACID 1 MG/1
TABLET ORAL
Qty: 90 TABLET | Refills: 3 | Status: SHIPPED | OUTPATIENT
Start: 2022-06-08

## 2022-06-08 RX ORDER — LEVOTHYROXINE SODIUM 0.05 MG/1
TABLET ORAL
Qty: 90 TABLET | Refills: 3 | Status: SHIPPED | OUTPATIENT
Start: 2022-06-08 | End: 2022-10-28

## 2022-06-08 RX ORDER — OMEPRAZOLE 40 MG/1
40 CAPSULE, DELAYED RELEASE ORAL DAILY
Qty: 90 CAPSULE | Refills: 3 | Status: SHIPPED | OUTPATIENT
Start: 2022-06-08 | End: 2022-09-19 | Stop reason: SDUPTHER

## 2022-06-08 SDOH — ECONOMIC STABILITY: FOOD INSECURITY: WITHIN THE PAST 12 MONTHS, YOU WORRIED THAT YOUR FOOD WOULD RUN OUT BEFORE YOU GOT MONEY TO BUY MORE.: NEVER TRUE

## 2022-06-08 SDOH — ECONOMIC STABILITY: FOOD INSECURITY: WITHIN THE PAST 12 MONTHS, THE FOOD YOU BOUGHT JUST DIDN'T LAST AND YOU DIDN'T HAVE MONEY TO GET MORE.: NEVER TRUE

## 2022-06-08 ASSESSMENT — SOCIAL DETERMINANTS OF HEALTH (SDOH): HOW HARD IS IT FOR YOU TO PAY FOR THE VERY BASICS LIKE FOOD, HOUSING, MEDICAL CARE, AND HEATING?: VERY HARD

## 2022-06-08 NOTE — PROGRESS NOTES
SUBJECTIVE:    Patient ID: Melonie Burnette is a 80 y.o. female. Chief Complaint   Patient presents with    Loss of Consciousness     hospital f/u       HPI: office visit  She has had a spell last week. She was not having a bowel movement. She was in the car with her daughter. She was passed out for several minutes. Her daughter says her teeth fell out and she was having a lot of saliva. She had all kinds of test.  She says the cardiologist is going to in plant an event recorder. Review of Systems   Constitutional: Positive for fatigue. Neurological: Positive for weakness. All other systems reviewed and are negative. OBJECTIVE:  /70   Pulse 61   Temp 97 °F (36.1 °C)    Wt Readings from Last 3 Encounters:   03/17/22 132 lb (59.9 kg)   02/21/22 132 lb (59.9 kg)   01/12/22 131 lb 9.6 oz (59.7 kg)     BP Readings from Last 3 Encounters:   06/08/22 132/70   03/17/22 138/70   02/21/22 118/68      Pulse Readings from Last 3 Encounters:   06/08/22 61   03/17/22 64   02/21/22 53     There is no height or weight on file to calculate BMI. Resp Readings from Last 3 Encounters:   03/17/22 16   02/21/22 16   01/12/22 18     Past medical, surgical, family and social history were reviewed and updated with the patient. Physical Exam  Vitals and nursing note reviewed. Constitutional:       Appearance: She is well-developed. HENT:      Head: Normocephalic and atraumatic. Right Ear: Decreased hearing noted. Left Ear: Decreased hearing noted. Nose: Nose normal.   Eyes:      Pupils: Pupils are equal, round, and reactive to light. Cardiovascular:      Rate and Rhythm: Normal rate and regular rhythm. Heart sounds: Normal heart sounds, S1 normal and S2 normal. No murmur heard. No friction rub. No gallop. Pulmonary:      Effort: Pulmonary effort is normal.      Breath sounds: Normal breath sounds.    Abdominal:      General: Bowel sounds are normal.      Palpations: Abdomen is soft.   Musculoskeletal:         General: Normal range of motion. Cervical back: Normal range of motion and neck supple. Right lower leg: No edema. Left lower leg: No edema. Skin:     General: Skin is warm and dry. Neurological:      Mental Status: She is alert and oriented to person, place, and time. Psychiatric:         Attention and Perception: Attention normal.         Mood and Affect: Affect normal. Mood is anxious. Speech: Speech normal.         Behavior: Behavior is cooperative. Cognition and Memory: Memory is impaired. She exhibits impaired recent memory and impaired remote memory. No results found for requested labs within last 30 days. Hemoglobin A1C (%)   Date Value   08/05/2015 5.4     Microscopic Examination (no units)   Date Value   06/09/2015 YES     LDL Calculated (mg/dL)   Date Value   03/17/2022 79       Lab Results   Component Value Date    WBC 5.6 03/17/2022    NEUTROABS 6.8 07/13/2021    HGB 12.3 03/17/2022    HCT 38.4 03/17/2022    MCV 97.7 03/17/2022     03/17/2022     Lab Results   Component Value Date    TSH 1.96 03/17/2022       ASSESSMENT/PLAN    Diagnosis Orders   1. Anxiety  busPIRone (BUSPAR) 10 MG tablet   2. Urinary incontinence, unspecified type  mirabegron (MYRBETRIQ) 25 MG TB24   3. Arthritis     4. Memory loss, short term     5. Essential hypertension     6.  Hypothyroidism, unspecified type  levothyroxine (SYNTHROID) 50 MCG tablet       Orders Placed This Encounter   Medications    linaCLOtide (LINZESS) 72 MCG CAPS capsule     Sig: TAKE 1 CAPSULE BY MOUTH EVERY MORNING BEFORE BREAKFAST     Dispense:  90 capsule     Refill:  3    busPIRone (BUSPAR) 10 MG tablet     Sig: TAKE 1 TABLET BY MOUTH THREE TIMES DAILY     Dispense:  90 tablet     Refill:  5    levothyroxine (SYNTHROID) 50 MCG tablet     Sig: TAKE 1 TABLET BY MOUTH ONCE A DAY     Dispense:  90 tablet     Refill:  3    folic acid (FOLVITE) 1 MG tablet     Sig: TAKE 1 TABLET BY MOUTH EVERY DAY     Dispense:  90 tablet     Refill:  3     04/23/2021 2:54:47 PM    omeprazole (PRILOSEC) 40 MG delayed release capsule     Sig: Take 1 capsule by mouth daily     Dispense:  90 capsule     Refill:  3    mirabegron (MYRBETRIQ) 25 MG TB24     Sig: TAKE 1 TABLET BY MOUTH ONCE A DAY     Dispense:  90 tablet     Refill:  3        Medications Discontinued During This Encounter   Medication Reason    meloxicam (MOBIC) 15 MG tablet LIST CLEANUP    mirabegron (MYRBETRIQ) 25 MG TQ06 REORDER    folic acid (FOLVITE) 1 MG tablet REORDER    omeprazole (PRILOSEC) 40 MG delayed release capsule REORDER    levothyroxine (SYNTHROID) 50 MCG tablet REORDER    busPIRone (BUSPAR) 10 MG tablet REORDER    LINZESS 72 MCG CAPS capsule REORDER       Controlled Substances Monitoring:      Please note: This chart was generated using Dragon dictation software. Although every effort was made to ensure the accuracy of this automated transcription, some errors in transcription may have occurred.

## 2022-06-08 NOTE — PATIENT INSTRUCTIONS
The medication list included in this document is our record of what you are currently taking, including any changes that were made at today's visit.  If you find any differences when compared to your medications at home, or have any questions that were not answered at your visit, please contact the office. The medication list included in this document is our record of what you are currently taking, including any changes that were made at today's visit.  If you find any differences when compared to your medications at home, or have any questions that were not answered at your visit, please contact the office. We are committed to providing you with the best care possible. In order to help us achieve these goals please remember to bring all medications, herbal products, and over the counter supplements with you to each visit. If your provider has ordered testing for you, please be sure to follow up with our office if you have not received results within 7 days after the testing took place. *If you receive a survey after visiting one of our offices, please take time to share your experience concerning your physician office visit. These surveys are confidential and no health information about you is shared. We are eager to improve for you and we are counting on your feedback to help make that happen. · Keep a list of your medicines with you. List all of the prescription medicines, nonprescription medicines, supplements, natural remedies, and vitamins that you take. Tell your healthcare providers who treat you about all of the products you are taking. Your provider can provide you with a form to keep track of them. Just ask. · Follow the directions that come with your medicine, including information about food or alcohol. Make sure you know how and when to take your medicine. Do not take more or less than you are supposed to take. · Keep all medicines out of the reach of children.   · Store medicines according to the directions on the label. · Monitor yourself. Learn to know how your body reacts to your new medicine and keep track of how it makes you feel before attempting (If your provider has allowed you to do so) to drive or go to work. · Seek emergency medical attention if you think you have used too much of this medicine. An overdose of any prescription medicine can be fatal. Overdose symptoms may include extreme drowsiness, muscle weakness, confusion, cold and clammy skin, pinpoint pupils, shallow breathing, slow heart rate, fainting, or coma. · Don't share prescription medicines with others, even when they seem to have the same symptoms. What may be good for you may be harmful to others. · If you are no longer taking a prescribed medication and you have pills left please take your pills out of their original containers. Mix crushed pills with an undesirable substance, such as cat litter or used coffee grounds. Put the mixture into a disposable container with a lid, such as an empty margarine tub, or into a sealable bag. Cover up or remove any of your personal information on the empty containers by covering it with black permanent marker or duct tape. Place the sealed container with the mixture, and the empty drug containers, in the trash. · If you use a medication that is in the form of a patch, dispose of used patches by folding them in half so that the sticky sides meet, and then flushing them down a toilet. They should not be placed in the household trash where children or pets can find them. · If you have any questions, ask your provider or pharmacist for more information. · Be sure to keep all appointments for provider visits or tests.   ·

## 2022-06-08 NOTE — PROGRESS NOTES
Chief Complaint   Patient presents with    Loss of Consciousness     hospital f/u       Have you seen any other physician or provider since your last visit yes [facility] hospital f/u    Have you had any other diagnostic tests since your last visit? no    Have you changed or stopped any medications since your last visit? no

## 2022-06-23 NOTE — FLOWSHEET NOTE
Physical Therapy Daily Treatment Note   Date:  3/2/2020    TIme In:       1100               Time Out:        1140    Patient Name:  Lexi Castillo    :  1935  MRN: [id number]    Restrictions/Precautions:    Pertinent Medical History:  Medical/Treatment Diagnosis Information:  ·   s/p left proximal humerus fracture     Insurance/Certification information:   [facility]  Physician Information:    Che Lafleur MD  Plan of care signed (Y/N):    Visit# / total visits:    10 /    G-Code (if applicable):      Date / Visit # G-Code Applied:         Progress Note: []  Yes  [x]  No  Next due by: Visit #10      Pain level: 0/10    Subjective: Pt reports she is doing good today, no pain currently. Objective:   Observation:    Test measurements:     Palpation:    Exercises:  Exercise Resistance/Repetitions Other comments   scap squeezes 2 x 15 2   Gentle pulleys 2' x 2 2   Left elbow AROM: flex, ext 2 x 15 2   Putty Tan - 2' x 2 2   Table slide - FF AA/PROM 3 x 15 2   Pendulum: 1' x 3 2                                   Other Therapeutic Activities:      Manual Treatments:  Grade 1-2 mobs, gentle PROM left shoulder x 8'    Modalities:        Timed Code Treatment Minutes:  38      Total Treatment Minutes:  40    Treatment/Activity Tolerance:  [x] Patient tolerated treatment well [] Patient limited by fatigue  [] Patient limited by pain  [] Patient limited by other medical complications  [x] Other:  Pt completed tx with no pain, pleased with progress.     Pain after treatment:    0 /10    Prognosis: [x] Good [] Fair  [] Poor    Patient Requires Follow-up: [x] Yes  [] No    Plan:   [x] Continue per plan of care [] Alter current plan (see comments)  [] Plan of care initiated [] Hold pending MD visit [] Discharge    Plan for Next Session:        Electronically signed by:  Michael Lagunas PTA
[employer]

## 2022-06-28 ENCOUNTER — OFFICE VISIT (OUTPATIENT)
Dept: FAMILY MEDICINE CLINIC | Age: 87
End: 2022-06-28
Payer: MEDICARE

## 2022-06-28 VITALS — OXYGEN SATURATION: 94 % | HEART RATE: 86 BPM

## 2022-06-28 DIAGNOSIS — R05.9 COUGH: ICD-10-CM

## 2022-06-28 DIAGNOSIS — U07.1 COVID-19 VIRUS RNA TEST RESULT POSITIVE AT LIMIT OF DETECTION: Primary | ICD-10-CM

## 2022-06-28 DIAGNOSIS — Z11.52 ENCOUNTER FOR SCREENING FOR COVID-19: ICD-10-CM

## 2022-06-28 LAB
Lab: ABNORMAL
QC PASS/FAIL: ABNORMAL
SARS-COV-2 RDRP RESP QL NAA+PROBE: POSITIVE

## 2022-06-28 PROCEDURE — 1123F ACP DISCUSS/DSCN MKR DOCD: CPT | Performed by: NURSE PRACTITIONER

## 2022-06-28 PROCEDURE — 87635 SARS-COV-2 COVID-19 AMP PRB: CPT | Performed by: NURSE PRACTITIONER

## 2022-06-28 PROCEDURE — 99213 OFFICE O/P EST LOW 20 MIN: CPT | Performed by: NURSE PRACTITIONER

## 2022-06-28 RX ORDER — AZITHROMYCIN 250 MG/1
250 TABLET, FILM COATED ORAL SEE ADMIN INSTRUCTIONS
Qty: 6 TABLET | Refills: 0 | Status: SHIPPED | OUTPATIENT
Start: 2022-06-28 | End: 2022-07-03

## 2022-06-28 RX ORDER — BROMPHENIRAMINE MALEATE, PSEUDOEPHEDRINE HYDROCHLORIDE, AND DEXTROMETHORPHAN HYDROBROMIDE 2; 30; 10 MG/5ML; MG/5ML; MG/5ML
5 SYRUP ORAL 4 TIMES DAILY PRN
Qty: 118 ML | Refills: 0 | Status: SHIPPED | OUTPATIENT
Start: 2022-06-28 | End: 2022-07-08

## 2022-06-28 ASSESSMENT — ENCOUNTER SYMPTOMS: COUGH: 1

## 2022-06-28 NOTE — PATIENT INSTRUCTIONS
We are committed to providing you with the best care possible. In order to help us achieve these goals please remember to bring all medications, herbal products, and over the counter supplements with you to each visit. If your provider has ordered testing for you, please be sure to follow up with our office if you have not received results within 7 days after the testing took place. *If you receive a survey after visiting one of our offices, please take time to share your experience concerning your physician office visit. These surveys are confidential and no health information about you is shared. We are eager to improve for you and we are counting on your feedback to help make that happen. We are committed to providing you with the best care possible. In order to help us achieve these goals please remember to bring all medications, herbal products, and over the counter supplements with you to each visit. If your provider has ordered testing for you, please be sure to follow up with our office if you have not received results within 7 days after the testing took place. *If you receive a survey after visiting one of our offices, please take time to share your experience concerning your physician office visit. These surveys are confidential and no health information about you is shared. We are eager to improve for you and we are counting on your feedback to help make that happen.

## 2022-06-28 NOTE — PROGRESS NOTES
Yaakov Landeros 80 y.o. presents today for   Chief Complaint   Patient presents with    Congestion    Cough    Fever        HPI:  Yaakov Landeros presents with her daughter today for complaints of congestion, fever and cough. She says they were at a baby shower this weekend and a  and they didn't wear masks. She would like to see if she has COVID. No family history on file. Social History     Socioeconomic History    Marital status:      Spouse name: Not on file    Number of children: Not on file    Years of education: Not on file    Highest education level: Not on file   Occupational History    Not on file   Tobacco Use    Smoking status: Never Smoker    Smokeless tobacco: Never Used   Substance and Sexual Activity    Alcohol use: No     Alcohol/week: 0.0 standard drinks    Drug use: No    Sexual activity: Not Currently   Other Topics Concern    Not on file   Social History Narrative    Not on file     Social Determinants of Health     Financial Resource Strain: High Risk    Difficulty of Paying Living Expenses: Very hard   Food Insecurity: No Food Insecurity    Worried About Running Out of Food in the Last Year: Never true    Abigail of Food in the Last Year: Never true   Transportation Needs:     Lack of Transportation (Medical): Not on file    Lack of Transportation (Non-Medical):  Not on file   Physical Activity:     Days of Exercise per Week: Not on file    Minutes of Exercise per Session: Not on file   Stress:     Feeling of Stress : Not on file   Social Connections:     Frequency of Communication with Friends and Family: Not on file    Frequency of Social Gatherings with Friends and Family: Not on file    Attends Christian Services: Not on file    Active Member of Clubs or Organizations: Not on file    Attends Club or Organization Meetings: Not on file    Marital Status: Not on file   Intimate Partner Violence:     Fear of Current or Ex-Partner: Not on file    Emotionally Abused: Not on file    Physically Abused: Not on file    Sexually Abused: Not on file   Housing Stability:     Unable to Pay for Housing in the Last Year: Not on file    Number of [place] in the Last Year: Not on file    Unstable Housing in the Last Year: Not on file        Past Surgical History:   Procedure Laterality Date    BLADDER SUSPENSION  9/2015    CHOLECYSTECTOMY      HYSTERECTOMY (CERVIX STATUS UNKNOWN)      TUBAL LIGATION          Past Medical History:   Diagnosis Date    Allergic rhinitis     Dementia ([facility] [address]     Hypertension     Hyperthyroidism     Osteoarthritis         Current Outpatient Medications   Medication Sig Dispense Refill    azithromycin (ZITHROMAX) 250 MG tablet Take 1 tablet by mouth See Admin Instructions for 5 days 500mg on day 1 followed by 250mg on days 2 - 5 6 tablet 0    brompheniramine-pseudoephedrine-DM 2-30-10 MG/5ML syrup Take 5 mLs by mouth 4 times daily as needed for Congestion or Cough 118 mL 0    molnupiravir 200 MG capsule Take 4 capsules by mouth every 12 hours for 5 days 40 capsule 0    linaCLOtide (LINZESS) 72 MCG CAPS capsule TAKE 1 CAPSULE BY MOUTH EVERY MORNING BEFORE BREAKFAST 90 capsule 3    busPIRone (BUSPAR) 10 MG tablet TAKE 1 TABLET BY MOUTH THREE TIMES DAILY 90 tablet 5    levothyroxine (SYNTHROID) 50 MCG tablet TAKE 1 TABLET BY MOUTH ONCE A DAY 90 tablet 3    folic acid (FOLVITE) 1 MG tablet TAKE 1 TABLET BY MOUTH EVERY DAY 90 tablet 3    omeprazole (PRILOSEC) 40 MG delayed release capsule Take 1 capsule by mouth daily 90 capsule 3    mirabegron (MYRBETRIQ) 25 MG TB24 TAKE 1 TABLET BY MOUTH ONCE A DAY 90 tablet 3    atorvastatin (LIPITOR) 10 MG tablet Take 1 tablet by mouth daily 90 tablet 3    Memantine HCl-Donepezil HCl 28-10 MG CP24 Take 1 capsule by mouth daily 30 capsule 5    lisinopril (PRINIVIL;ZESTRIL) 40 MG tablet TAKE 1 TABLET BY MOUTH DAILY 30 tablet 5    sertraline (ZOLOFT) 50 MG tablet TAKE 1 TABLET BY MOUTH DAILY 30 tablet 5    QUEtiapine (SEROQUEL) 25 MG tablet Take 1 tablet by mouth nightly 90 tablet 3     No current facility-administered medications for this visit. Review of Systems   Constitutional: Positive for fatigue and fever. HENT: Positive for congestion. Respiratory: Positive for cough. All other systems reviewed and are negative. Pulse 86   SpO2 94% Comment: ra     Physical Exam  Constitutional:       Appearance: Normal appearance. HENT:      Head: Normocephalic and atraumatic. Nose: Congestion present. Mouth/Throat:      Mouth: Mucous membranes are moist.      Pharynx: Oropharynx is clear. Eyes:      Extraocular Movements: Extraocular movements intact. Conjunctiva/sclera: Conjunctivae normal.      Pupils: Pupils are equal, round, and reactive to light. Cardiovascular:      Rate and Rhythm: Normal rate and regular rhythm. Pulses: Normal pulses. Heart sounds: Normal heart sounds. Pulmonary:      Effort: Pulmonary effort is normal.      Breath sounds: Normal breath sounds. Musculoskeletal:      Cervical back: Normal range of motion and neck supple. Skin:     General: Skin is warm and dry. Capillary Refill: Capillary refill takes less than 2 seconds. Neurological:      General: No focal deficit present. Mental Status: She is alert and oriented to person, place, and time. Psychiatric:         Mood and Affect: Mood normal.         Behavior: Behavior normal.          ASSESSMENT/PLAN    1. COVID-19 virus RNA test result positive at limit of detection  Advised pt's daughter to give her medication as directed and to f/u if s/s persist or worsen. She is agreeable. - azithromycin (ZITHROMAX) 250 MG tablet; Take 1 tablet by mouth See Admin Instructions for 5 days 500mg on day 1 followed by 250mg on days 2 - 5  Dispense: 6 tablet; Refill: 0  - molnupiravir 200 MG capsule;  Take 4 capsules by mouth every 12 hours for 5 days Dispense: 40 capsule; Refill: 0    2. Cough  Advised pt's daughter to give her medication as directed and to f/u if s/s persist or worsen. She is agreeable. - brompheniramine-pseudoephedrine-DM 2-30-10 MG/5ML syrup; Take 5 mLs by mouth 4 times daily as needed for Congestion or Cough  Dispense: 118 mL; Refill: 0    3.  Encounter for screening for COVID-19  POSITIVE COVID  - POCT COVID-19 Rapid, NAAT             ARNULFO Lopez - CNP

## 2022-07-03 ENCOUNTER — APPOINTMENT (OUTPATIENT)
Dept: GENERAL RADIOLOGY | Facility: HOSPITAL | Age: 87
End: 2022-07-03
Payer: MEDICARE

## 2022-07-03 ENCOUNTER — HOSPITAL ENCOUNTER (EMERGENCY)
Facility: HOSPITAL | Age: 87
Discharge: HOME | End: 2022-07-03
Attending: EMERGENCY MEDICINE
Payer: MEDICARE

## 2022-07-03 VITALS
BODY MASS INDEX: 24.19 KG/M2 | OXYGEN SATURATION: 100 % | SYSTOLIC BLOOD PRESSURE: 160 MMHG | TEMPERATURE: 98.6 F | HEIGHT: 59 IN | RESPIRATION RATE: 14 BRPM | WEIGHT: 120 LBS | HEART RATE: 60 BPM | DIASTOLIC BLOOD PRESSURE: 69 MMHG

## 2022-07-03 DIAGNOSIS — J12.82 PNEUMONIA DUE TO COVID-19 VIRUS: Primary | ICD-10-CM

## 2022-07-03 DIAGNOSIS — U07.1 PNEUMONIA DUE TO COVID-19 VIRUS: Primary | ICD-10-CM

## 2022-07-03 LAB
A/G RATIO: 1.3 (ref 0.8–2)
ALBUMIN SERPL-MCNC: 3.8 G/DL (ref 3.4–4.8)
ALP BLD-CCNC: 60 U/L (ref 25–100)
ALT SERPL-CCNC: 9 U/L (ref 4–36)
ANION GAP SERPL CALCULATED.3IONS-SCNC: 7 MMOL/L (ref 3–16)
AST SERPL-CCNC: 14 U/L (ref 8–33)
BASOPHILS ABSOLUTE: 0 K/UL (ref 0–0.1)
BASOPHILS RELATIVE PERCENT: 0.3 %
BILIRUB SERPL-MCNC: 0.4 MG/DL (ref 0.3–1.2)
BUN BLDV-MCNC: 22 MG/DL (ref 6–20)
CALCIUM SERPL-MCNC: 8.9 MG/DL (ref 8.5–10.5)
CHLORIDE BLD-SCNC: 102 MMOL/L (ref 98–107)
CO2: 29 MMOL/L (ref 20–30)
CREAT SERPL-MCNC: 1.1 MG/DL (ref 0.4–1.2)
EOSINOPHILS ABSOLUTE: 0 K/UL (ref 0–0.4)
EOSINOPHILS RELATIVE PERCENT: 0.3 %
GFR AFRICAN AMERICAN: 57
GFR NON-AFRICAN AMERICAN: 47
GLOBULIN: 2.9 G/DL
GLUCOSE BLD-MCNC: 124 MG/DL (ref 74–106)
HCT VFR BLD CALC: 37.2 % (ref 37–47)
HEMOGLOBIN: 12.1 G/DL (ref 11.5–16.5)
IMMATURE GRANULOCYTES #: 0 K/UL
IMMATURE GRANULOCYTES %: 0.3 % (ref 0–5)
LYMPHOCYTES ABSOLUTE: 0.9 K/UL (ref 1.5–4)
LYMPHOCYTES RELATIVE PERCENT: 30.7 %
MCH RBC QN AUTO: 31 PG (ref 27–32)
MCHC RBC AUTO-ENTMCNC: 32.5 G/DL (ref 31–35)
MCV RBC AUTO: 95.4 FL (ref 80–100)
MONOCYTES ABSOLUTE: 0.2 K/UL (ref 0.2–0.8)
MONOCYTES RELATIVE PERCENT: 7 %
NEUTROPHILS ABSOLUTE: 1.8 K/UL (ref 2–7.5)
NEUTROPHILS RELATIVE PERCENT: 61.4 %
PDW BLD-RTO: 12.7 % (ref 11–16)
PLATELET # BLD: 102 K/UL (ref 150–400)
PMV BLD AUTO: 11.4 FL (ref 6–10)
POTASSIUM REFLEX MAGNESIUM: 3.7 MMOL/L (ref 3.4–5.1)
RBC # BLD: 3.9 M/UL (ref 3.8–5.8)
SODIUM BLD-SCNC: 138 MMOL/L (ref 136–145)
TOTAL PROTEIN: 6.7 G/DL (ref 6.4–8.3)
WBC # BLD: 3 K/UL (ref 4–11)

## 2022-07-03 PROCEDURE — 80053 COMPREHEN METABOLIC PANEL: CPT

## 2022-07-03 PROCEDURE — 71045 X-RAY EXAM CHEST 1 VIEW: CPT

## 2022-07-03 PROCEDURE — 93005 ELECTROCARDIOGRAM TRACING: CPT

## 2022-07-03 PROCEDURE — 2580000003 HC RX 258: Performed by: EMERGENCY MEDICINE

## 2022-07-03 PROCEDURE — 36415 COLL VENOUS BLD VENIPUNCTURE: CPT

## 2022-07-03 PROCEDURE — 99285 EMERGENCY DEPT VISIT HI MDM: CPT

## 2022-07-03 PROCEDURE — 85025 COMPLETE CBC W/AUTO DIFF WBC: CPT

## 2022-07-03 RX ORDER — MEGESTROL ACETATE 125 MG/ML
625 SUSPENSION ORAL DAILY
Qty: 50 ML | Refills: 0 | Status: SHIPPED | OUTPATIENT
Start: 2022-07-03 | End: 2022-07-20

## 2022-07-03 RX ORDER — SODIUM CHLORIDE, SODIUM LACTATE, POTASSIUM CHLORIDE, AND CALCIUM CHLORIDE .6; .31; .03; .02 G/100ML; G/100ML; G/100ML; G/100ML
1000 INJECTION, SOLUTION INTRAVENOUS ONCE
Status: COMPLETED | OUTPATIENT
Start: 2022-07-03 | End: 2022-07-03

## 2022-07-03 RX ADMIN — SODIUM CHLORIDE, POTASSIUM CHLORIDE, SODIUM LACTATE AND CALCIUM CHLORIDE 1000 ML: 600; 310; 30; 20 INJECTION, SOLUTION INTRAVENOUS at 14:55

## 2022-07-03 NOTE — ED NOTES
Patients daughter Terry Enamorado called [phone] at this time, she did not answer her phone at this time.      Awilda Goodman RN  07/03/22 2284

## 2022-07-03 NOTE — ED PROVIDER NOTES
 TUBAL LIGATION           CURRENT MEDICATIONS       Previous Medications    ATORVASTATIN (LIPITOR) 10 MG TABLET    Take 1 tablet by mouth daily    AZITHROMYCIN (ZITHROMAX) 250 MG TABLET    Take 1 tablet by mouth See Admin Instructions for 5 days 500mg on day 1 followed by 250mg on days 2 - 5    BROMPHENIRAMINE-PSEUDOEPHEDRINE-DM 2-30-10 MG/5ML SYRUP    Take 5 mLs by mouth 4 times daily as needed for Congestion or Cough    BUSPIRONE (BUSPAR) 10 MG TABLET    TAKE 1 TABLET BY MOUTH THREE TIMES DAILY    FOLIC ACID (FOLVITE) 1 MG TABLET    TAKE 1 TABLET BY MOUTH EVERY DAY    LEVOTHYROXINE (SYNTHROID) 50 MCG TABLET    TAKE 1 TABLET BY MOUTH ONCE A DAY    LINACLOTIDE (LINZESS) 72 MCG CAPS CAPSULE    TAKE 1 CAPSULE BY MOUTH EVERY MORNING BEFORE BREAKFAST    LISINOPRIL (PRINIVIL;ZESTRIL) 40 MG TABLET    TAKE 1 TABLET BY MOUTH DAILY    MEMANTINE HCL-DONEPEZIL HCL 28-10 MG CP24    Take 1 capsule by mouth daily    MIRABEGRON (MYRBETRIQ) 25 MG TB24    TAKE 1 TABLET BY MOUTH ONCE A DAY    MOLNUPIRAVIR 200 MG CAPSULE    Take 4 capsules by mouth every 12 hours for 5 days    OMEPRAZOLE (PRILOSEC) 40 MG DELAYED RELEASE CAPSULE    Take 1 capsule by mouth daily    QUETIAPINE (SEROQUEL) 25 MG TABLET    Take 1 tablet by mouth nightly    SERTRALINE (ZOLOFT) 50 MG TABLET    TAKE 1 TABLET BY MOUTH DAILY       ALLERGIES     Sulfa antibiotics    FAMILY HISTORY     History reviewed. No pertinent family history.        SOCIAL HISTORY       Social History     Socioeconomic History    Marital status:      Spouse name: None    Number of children: None    Years of education: None    Highest education level: None   Occupational History    None   Tobacco Use    Smoking status: Never Smoker    Smokeless tobacco: Never Used   Substance and Sexual Activity    Alcohol use: No     Alcohol/week: 0.0 standard drinks    Drug use: No    Sexual activity: Not Currently   Other Topics Concern    None   Social History Narrative    None Social Determinants of Health     Financial Resource Strain: High Risk    Difficulty of Paying Living Expenses: Very hard   Food Insecurity: No Food Insecurity    Worried About Running Out of Food in the Last Year: Never true    Abigail of Food in the Last Year: Never true   Transportation Needs:     Lack of Transportation (Medical): Not on file    Lack of Transportation (Non-Medical): Not on file   Physical Activity:     Days of Exercise per Week: Not on file    Minutes of Exercise per Session: Not on file   Stress:     Feeling of Stress : Not on file   Social Connections:     Frequency of Communication with Friends and Family: Not on file    Frequency of Social Gatherings with Friends and Family: Not on file    Attends Bahai Services: Not on file    Active Member of [address] [place] [address] [employer] or Organizations: Not on file    Attends Club or Organization Meetings: Not on file    Marital Status: Not on file   Intimate Partner Violence:     Fear of Current or Ex-Partner: Not on file    Emotionally Abused: Not on file    Physically Abused: Not on file    Sexually Abused: Not on file   Housing Stability:     Unable to Pay for Housing in the Last Year: Not on file    Number of [place] in the Last Year: Not on file    Unstable Housing in the Last Year: Not on file         PHYSICAL EXAM    (up to 7 for level 4, 8 or more for level 5)     ED Triage Vitals [07/03/22 1425]   BP Temp Temp Source Heart Rate Resp SpO2 Height Weight   (!) 144/65 98.6 °F (37 °C) Oral 57 18 96 % 4' 11\" (1.499 m) 120 lb (54.4 kg)       Physical Exam  General :Patient is awake, alert, in no acute distress, nontoxic appearing  HEENT: Pupils are equally round and reactive to light, EOMI, conjunctivae clear. Neck: Neck is supple, full range of motion, trachea midline  Cardiac: Hear bradycardic t rate, rhythm, no murmurs, rubs, or gallops  Lungs: Lungs are clear to auscultation, there is no wheezing, rhonchi, or rales.  There is no use of accessory muscles. Chest wall: There is no tenderness to palpation over the chest wall or over ribs  Abdomen: Abdomen is soft, nontender, nondistended. There is no firm or pulsatile masses, no rebound rigidity or guarding. Musculoskeletal:  No focal muscle deficits are appreciated  Neuro: Motor intact, sensory intact, level of consciousness is normal,    Dermatology: Skin is warm and dry  Psych: Deepa Red Affect is appropriate. DIAGNOSTIC RESULTS     EKG: All EKG's are interpreted by the Emergency Department Physician who either signs or Co-signs this chart in the [address] a cardiologist.    The EKG interpreted by me shows paced rhythm rate of 58 no acute ST changes    RADIOLOGY:   Non-plain film images such as CT, Ultrasound and MRI are read by the radiologist. Plain radiographic images are visualized and preliminarily interpreted by the emergency physician with the below findings:      ? Radiologist's Report Reviewed:  XR CHEST PORTABLE   Final Result   Diffuse mild interstitial process compatible with an atypical viral etiology such as Covid.             ED BEDSIDE ULTRASOUND:   Performed by ED Physician - none    LABS:    I have reviewed and interpreted all of the currently available lab results from this visit (ifapplicable):  Results for orders placed or performed during the hospital encounter of 07/03/22   CBC with Auto Differential   Result Value Ref Range    WBC 3.0 (L) 4.0 - 11.0 K/uL    RBC 3.90 3.80 - 5.80 M/uL    Hemoglobin 12.1 11.5 - 16.5 g/dL    Hematocrit 37.2 37.0 - 47.0 %    MCV 95.4 80.0 - 100.0 fL    MCH 31.0 27.0 - 32.0 pg    MCHC 32.5 31.0 - 35.0 g/dL    RDW 12.7 11.0 - 16.0 %    Platelets 610 (L) 421 - 400 K/uL    MPV 11.4 (H) 6.0 - 10.0 fL    Neutrophils % 61.4 %    Immature Granulocytes % 0.3 0.0 - 5.0 %    Lymphocytes % 30.7 %    Monocytes % 7.0 %    Eosinophils % 0.3 %    Basophils % 0.3 %    Neutrophils Absolute 1.8 (L) 2.0 - 7.5 K/uL    Immature Granulocytes # 0.0 K/uL    Lymphocytes Absolute 0.9 (L) 1.5 - 4.0 K/uL    Monocytes Absolute 0.2 0.2 - 0.8 K/uL    Eosinophils Absolute 0.0 0.0 - 0.4 K/uL    Basophils Absolute 0.0 0.0 - 0.1 K/uL   Comprehensive Metabolic Panel w/ Reflex to MG   Result Value Ref Range    Sodium 138 136 - 145 mmol/L    Potassium reflex Magnesium 3.7 3.4 - 5.1 mmol/L    Chloride 102 98 - 107 mmol/L    CO2 29 20 - 30 mmol/L    Anion Gap 7 3 - 16    Glucose 124 (H) 74 - 106 mg/dL    BUN 22 (H) 6 - 20 mg/dL    CREATININE 1.1 0.4 - 1.2 mg/dL    GFR Non- 47 (L) >59    GFR  57 (L) >59    Calcium 8.9 8.5 - 10.5 mg/dL    Total Protein 6.7 6.4 - 8.3 g/dL    Albumin 3.8 3.4 - 4.8 g/dL    Albumin/Globulin Ratio 1.3 0.8 - 2.0    Total Bilirubin 0.4 0.3 - 1.2 mg/dL    Alkaline Phosphatase 60 25 - 100 U/L    ALT 9 4 - 36 U/L    AST 14 8 - 33 U/L    Globulin 2.9 Not Established g/dL        All other labs were within normal range or not returned as of this dictation. EMERGENCY DEPARTMENT COURSE and DIFFERENTIAL DIAGNOSIS/MDM:   Vitals:    Vitals:    07/03/22 1455 07/03/22 1500 07/03/22 1510 07/03/22 1530   BP:  130/60 (!) 146/71 (!) 159/66   Pulse: 54 54 57 58   Resp: 14 13 16 14   Temp:       TempSrc:       SpO2: 95% 95% 94% 96%   Weight:       Height:           MEDICATIONS ADMINISTERED IN ED:  Medications   lactated ringers bolus (1,000 mLs IntraVENous New Bag 7/3/22 1455)       Doing well says she is very ready to go home and promised to eat. She is really not dehydrated she does have diffuse mild interstitial pneumonia from the COVID-19 her white count is somewhat low 3000 platelet count little lower 102,000 therefore I feel she would be best at home rather than being exposed to the hospital environment. Her sats have been hanging 95% without oxygen. I will prescribe her some Megace to try to stimulate her appetite and get her eating again and she should follow-up with her primary care in 4 to 5 days.     The patient will follow-up with their PCP in 1-2 days for reevaluation. If the patient or family members have anyfurther concerns or any worsening symptoms they will return to the ED for reevaluation. CONSULTS:  None    PROCEDURES:  Procedures    CRITICAL CARE TIME    Total Critical Care time was 0 minutes, excluding separately reportable procedures. There was a high probability of clinically significant/life threatening deterioration in the patient's condition which required my urgent intervention. FINAL IMPRESSION      1. Pneumonia due to COVID-19 virus Stable         DISPOSITION/PLAN   DISPOSITION    Stable discharge to home    PATIENT REFERRED TO:  MD Pancho Elkins  Torri Rhodesin  [phone]    Schedule an appointment as soon as possible for a visit in 3 days        DISCHARGE MEDICATIONS:  New Prescriptions    MEGESTROL (MEGACE ES) 625 MG/5ML SUSPENSION    Take 5 mLs by mouth daily for 10 days       Comment: Please note this report has been produced using speech recognition software and may contain errorsrelated to that system including errors in grammar, punctuation, and spelling, as well as words and phrases that may be inappropriate. If there are any questions or concerns please feel free to contact the dictating providerfor clarification.     Anitha Magana MD  Attending Emergency Physician              Anitha Magana MD  07/03/22 5963

## 2022-07-03 NOTE — ED NOTES
Patients daughter called back at this time, discussed plan of care and treatment with her at this time. She verbalized understanding and will watch for me to bring patient out via wheelchair.      Chilo Adames RN  07/03/22 6977

## 2022-07-03 NOTE — ED NOTES
Patient diagnosed with covid on Tuesday as [facility] office, patient is not eating or drinking, patient with diarrhea off and on, patient has had diarrhea x3 today. Patient also has a cough that started Monday.      Kristi Burgos RN  07/03/22 [id number]

## 2022-07-05 RX ORDER — SERTRALINE HYDROCHLORIDE 25 MG/1
TABLET, FILM COATED ORAL
Qty: 90 TABLET | Refills: 1 | OUTPATIENT
Start: 2022-07-05

## 2022-07-09 ENCOUNTER — HOSPITAL ENCOUNTER (EMERGENCY)
Facility: HOSPITAL | Age: 87
Discharge: HOME | End: 2022-07-09
Attending: HOSPITALIST
Payer: MEDICARE

## 2022-07-09 VITALS
DIASTOLIC BLOOD PRESSURE: 62 MMHG | HEART RATE: 59 BPM | WEIGHT: 120 LBS | BODY MASS INDEX: 24.19 KG/M2 | SYSTOLIC BLOOD PRESSURE: 134 MMHG | HEIGHT: 59 IN | RESPIRATION RATE: 14 BRPM | TEMPERATURE: 97.6 F | OXYGEN SATURATION: 100 %

## 2022-07-09 DIAGNOSIS — N30.00 ACUTE CYSTITIS WITHOUT HEMATURIA: Primary | ICD-10-CM
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
## 2022-07-09 LAB
A/G RATIO: 1.1 (ref 0.8–2)
ALBUMIN SERPL-MCNC: 3.9 G/DL (ref 3.4–4.8)
ALP BLD-CCNC: 66 U/L (ref 25–100)
ALT SERPL-CCNC: 6 U/L (ref 4–36)
ANION GAP SERPL CALCULATED.3IONS-SCNC: 11 MMOL/L (ref 3–16)
AST SERPL-CCNC: 14 U/L (ref 8–33)
BACTERIA: ABNORMAL /HPF
BASOPHILS ABSOLUTE: 0 K/UL (ref 0–0.1)
BASOPHILS RELATIVE PERCENT: 0.4 %
BILIRUB SERPL-MCNC: 0.7 MG/DL (ref 0.3–1.2)
BILIRUBIN URINE: NEGATIVE
BLOOD, URINE: ABNORMAL
BUN BLDV-MCNC: 22 MG/DL (ref 6–20)
CALCIUM SERPL-MCNC: 9.2 MG/DL (ref 8.5–10.5)
CHLORIDE BLD-SCNC: 102 MMOL/L (ref 98–107)
CLARITY: ABNORMAL
CO2: 28 MMOL/L (ref 20–30)
COLOR: YELLOW
CREAT SERPL-MCNC: 1.3 MG/DL (ref 0.4–1.2)
EOSINOPHILS ABSOLUTE: 0.1 K/UL (ref 0–0.4)
EOSINOPHILS RELATIVE PERCENT: 0.7 %
EPITHELIAL CELLS, UA: ABNORMAL /HPF (ref 0–5)
GFR AFRICAN AMERICAN: 47
GFR NON-AFRICAN AMERICAN: 39
GLOBULIN: 3.4 G/DL
GLUCOSE BLD-MCNC: 106 MG/DL (ref 74–106)
GLUCOSE URINE: NEGATIVE MG/DL
HCT VFR BLD CALC: 37.9 % (ref 37–47)
HEMOGLOBIN: 12.4 G/DL (ref 11.5–16.5)
IMMATURE GRANULOCYTES #: 0.1 K/UL
IMMATURE GRANULOCYTES %: 0.7 % (ref 0–5)
KETONES, URINE: NEGATIVE MG/DL
LEUKOCYTE ESTERASE, URINE: ABNORMAL
LYMPHOCYTES ABSOLUTE: 1.3 K/UL (ref 1.5–4)
LYMPHOCYTES RELATIVE PERCENT: 19.4 %
MAGNESIUM: 2.2 MG/DL (ref 1.7–2.4)
MCH RBC QN AUTO: 30.9 PG (ref 27–32)
MCHC RBC AUTO-ENTMCNC: 32.7 G/DL (ref 31–35)
MCV RBC AUTO: 94.5 FL (ref 80–100)
MICROSCOPIC EXAMINATION: YES
MONOCYTES ABSOLUTE: 0.6 K/UL (ref 0.2–0.8)
MONOCYTES RELATIVE PERCENT: 9 %
NEUTROPHILS ABSOLUTE: 4.8 K/UL (ref 2–7.5)
NEUTROPHILS RELATIVE PERCENT: 69.8 %
NITRITE, URINE: NEGATIVE
PDW BLD-RTO: 12.6 % (ref 11–16)
PH UA: 6.5 (ref 5–8)
PLATELET # BLD: 184 K/UL (ref 150–400)
PMV BLD AUTO: 11.5 FL (ref 6–10)
POTASSIUM REFLEX MAGNESIUM: 3.1 MMOL/L (ref 3.4–5.1)
PRO-BNP: 798 PG/ML (ref 0–1800)
PROTEIN UA: ABNORMAL MG/DL
RBC # BLD: 4.01 M/UL (ref 3.8–5.8)
RBC UA: ABNORMAL /HPF (ref 0–4)
SODIUM BLD-SCNC: 141 MMOL/L (ref 136–145)
SPECIFIC GRAVITY UA: 1.02 (ref 1–1.03)
TOTAL PROTEIN: 7.3 G/DL (ref 6.4–8.3)
TROPONIN: <0.3 NG/ML
URINE REFLEX TO CULTURE: YES
URINE TYPE: ABNORMAL
UROBILINOGEN, URINE: 1 E.U./DL
WBC # BLD: 6.9 K/UL (ref 4–11)
WBC UA: >100 /HPF (ref 0–5)

## 2022-07-09 PROCEDURE — 81001 URINALYSIS AUTO W/SCOPE: CPT

## 2022-07-09 PROCEDURE — 6360000002 HC RX W HCPCS: Performed by: HOSPITALIST

## 2022-07-09 PROCEDURE — 80053 COMPREHEN METABOLIC PANEL: CPT

## 2022-07-09 PROCEDURE — 96365 THER/PROPH/DIAG IV INF INIT: CPT

## 2022-07-09 PROCEDURE — 36415 COLL VENOUS BLD VENIPUNCTURE: CPT

## 2022-07-09 PROCEDURE — 83735 ASSAY OF MAGNESIUM: CPT

## 2022-07-09 PROCEDURE — 87077 CULTURE AEROBIC IDENTIFY: CPT

## 2022-07-09 PROCEDURE — 99284 EMERGENCY DEPT VISIT MOD MDM: CPT

## 2022-07-09 PROCEDURE — 87186 SC STD MICRODIL/AGAR DIL: CPT

## 2022-07-09 PROCEDURE — 85025 COMPLETE CBC W/AUTO DIFF WBC: CPT

## 2022-07-09 PROCEDURE — 87086 URINE CULTURE/COLONY COUNT: CPT

## 2022-07-09 PROCEDURE — 84484 ASSAY OF TROPONIN QUANT: CPT

## 2022-07-09 PROCEDURE — 2580000003 HC RX 258: Performed by: HOSPITALIST

## 2022-07-09 PROCEDURE — 93005 ELECTROCARDIOGRAM TRACING: CPT

## 2022-07-09 PROCEDURE — 83880 ASSAY OF NATRIURETIC PEPTIDE: CPT

## 2022-07-09 RX ORDER — CEFDINIR 300 MG/1
300 CAPSULE ORAL DAILY
Qty: 10 CAPSULE | Refills: 0 | Status: SHIPPED | OUTPATIENT
Start: 2022-07-09 | End: 2022-07-19

## 2022-07-09 RX ORDER — 0.9 % SODIUM CHLORIDE 0.9 %
500 INTRAVENOUS SOLUTION INTRAVENOUS ONCE
Status: COMPLETED | OUTPATIENT
Start: 2022-07-09 | End: 2022-07-09

## 2022-07-09 RX ADMIN — SODIUM CHLORIDE 500 ML: 9 INJECTION, SOLUTION INTRAVENOUS at 14:04

## 2022-07-09 RX ADMIN — CEFTRIAXONE 1000 MG: 1 INJECTION, POWDER, FOR SOLUTION INTRAMUSCULAR; INTRAVENOUS at 15:03

## 2022-07-09 NOTE — ED TRIAGE NOTES
Pt arrives to ED via wheelchair after a fall at home. Daughter advised that Pt has history of dementia. Pt has no obvious injuries and denies pain. After fall, daughter advised that Pt had BP in the 80's. Pt has history of dementia.

## 2022-07-09 NOTE — ED PROVIDER NOTES
[address] ENCOUNTER      Pt Name: Katrin Wong  MRN: [id number]  YOB: 1935  Date of evaluation: 7/9/2022  Provider: Kimberly Rincon [address] [place] [address]       Chief Complaint   Patient presents with   Dez Allred    Positive For Covid-19     6/28         HISTORY OF PRESENT ILLNESS  (Location/Symptom, Timing/Onset, Context/Setting, Quality, Duration, Modifying Factors, Severity.)   Katrin Wong is a 80 y.o. female who presents to the emergency department for COVID-19 possible fall possible low blood pressure. Patient has dementia so she is declining any problems at this time. She does not even know why she is here she states. She is only alert and light oriented times self. Do believe this is the patient's baseline she is answering questions appropriately she tries to. We had patient's family come back to find out a little more why she was here for evaluation. Patient tested positive for COVID-19 back on 6/28/2022. She has had a negative home COVID test since then. Family states that she was in the bathroom using the shower and when she opened the shower to get out she noticed that her mother was laying in the floor with her pants down. She states she never heard her come in and she never heard her fall that she was awake and oriented normal normal self. She denied any pain so she helped her back up. She states that yesterday she had an episode where she got a little lightheaded and dizzy putting her clothes on like she was going to fall. Patient's daughter states that she helped her sit down.   She checked her blood pressure at that time and she states it was reading 89 systolic but she checked it multiple times within the next 30 minutes without switching arms or giving it a break in between and states that it never got any better so she was just concerned when she had the episode today when she found her down so she brought her here to the emergency department for evaluation. Patient was seen here on 7/3/2022 and was found to have COVID-pneumonia on chest radiograph but she had normal oxygen saturations. Her blood work was really not out of the realm of ordinary for her. She was found to be safe to discharge home which she was. Family was concerned because she is not eating which could be secondary to her dimensional status. She was given Megace prescription on the third. Family states that she still not eating and drinking much at this time. Otherwise again the patient denies any complaints. Nursing notes were reviewed. REVIEW OFSYSTEMS    (2-9 systems for level 4, 10 or more for level 5)   ROS:  General:  No fevers, no chills, no weakness, + creased appetite  Cardiovascular:  No chest pain, no palpitations, ? Hypotension-resolved  Respiratory:  No shortness of breath, no cough, no wheezing  Gastrointestinal:  No pain, no nausea, no vomiting, no diarrhea  Musculoskeletal:  No muscle pain, no joint pain  Skin:  No rash, no easy bruising  Neurologic:  No speech problems, no headache, no extremity weakness, + dementia  Psychiatric:  No anxiety  Genitourinary:  No dysuria, no hematuria    Except as noted above the remainder of the review of systems was reviewed and negative.        PAST MEDICAL HISTORY     Past Medical History:   Diagnosis Date    Allergic rhinitis     Dementia ([facility] [address]     Hypertension     Hyperthyroidism     Osteoarthritis          SURGICAL HISTORY       Past Surgical History:   Procedure Laterality Date    BLADDER SUSPENSION  9/2015    CHOLECYSTECTOMY      HYSTERECTOMY (CERVIX STATUS UNKNOWN)      TUBAL LIGATION           CURRENT MEDICATIONS       Previous Medications    ATORVASTATIN (LIPITOR) 10 MG TABLET    Take 1 tablet by mouth daily    BUSPIRONE (BUSPAR) 10 MG TABLET    TAKE 1 TABLET BY MOUTH THREE TIMES DAILY    FOLIC ACID (FOLVITE) 1 MG TABLET    TAKE 1 TABLET BY MOUTH EVERY DAY    LEVOTHYROXINE : Not on file   Social Connections:     Frequency of Communication with Friends and Family: Not on file    Frequency of Social Gatherings with Friends and Family: Not on file    Attends [employer]ist Services: Not on file    Active Member of [address] [place] [address] or Organizations: Not on file    Attends Club or Organization Meetings: Not on file    Marital Status: Not on file   Intimate Partner Violence:     Fear of Current or Ex-Partner: Not on file    Emotionally Abused: Not on file    Physically Abused: Not on file    Sexually Abused: Not on file   Housing Stability:     Unable to Pay for Housing in the Last Year: Not on file    Number of [place] in the Last Year: Not on file    Unstable Housing in the Last Year: Not on file         PHYSICAL EXAM    (up to 7 for level 4, 8 or more for level 5)     ED Triage Vitals   BP Temp Temp Source Heart Rate Resp SpO2 Height Weight   07/09/22 1314 07/09/22 1320 07/09/22 1320 07/09/22 1316 07/09/22 1316 07/09/22 1314 07/09/22 1317 07/09/22 1317   112/63 97.6 °F (36.4 °C) Oral 68 17 100 % 4' 11\" (1.499 m) 120 lb (54.4 kg)       Physical Exam  General :Patient is awake, alert, oriented, in no acute distress, nontoxic appearing  HEENT: Pupils are equally round and reactive to light, EOMI, conjunctivae clear. Oral mucosa is moist, no exudate. Uvula is midline  Cardiac: Heart regular rate, rhythm, no murmurs, rubs, or gallops  Lungs: Lungs are clear to auscultation, there is no wheezing, rhonchi, or rales. There is no use of accessory muscles. Chest wall: There is no tenderness to palpation over the chest wall or over ribs  Abdomen: Abdomen is soft, nontender, nondistended. There is no firm or pulsatile masses, no rebound rigidity or guarding. Musculoskeletal: 5 out of 5 strength in all 4 extremities. No focal muscle deficits are appreciated. No decreased range of motion to the upper or lower extremities. There is no obvious injury noted.   No obvious deformity to any of her joints. No contusion or bruising noted. Neuro: Motor intact, sensory intact, level of consciousness is normal  Dermatology: Skin is warm and dry  Psych: Mentation is grossly normal, cognition is grossly normal. Affect is appropriate. DIAGNOSTIC RESULTS     EKG: All EKG's are interpreted by the Emergency Department Physician who either signs or Co-signs this chart in the [address] a cardiologist.    The EKG interpreted by me shows sinus or ectopic atrial rhythm. Right bundle branch block. Heart rate is 65 bpm, GA intervals 177, QRS durations 141, QT is 452 and QTC is 469 ms. No acute T wave inversions concerning for acute myocardial ischemia. No ST elevations concerning for acute myocardial infarction.     RADIOLOGY:   Non-plain film images such as CT, Ultrasound and MRI are read by the radiologist. Plain radiographic images are visualized and preliminarily interpreted by the emergency physician with the below findings:      ? Radiologist's Report Reviewed:  No orders to display         ED BEDSIDE ULTRASOUND:   Performed by ED Physician - none    LABS:    I have reviewed and interpreted all of the currently available lab results from this visit (ifapplicable):  Results for orders placed or performed during the hospital encounter of 07/09/22   CBC with Auto Differential   Result Value Ref Range    WBC 6.9 4.0 - 11.0 K/uL    RBC 4.01 3.80 - 5.80 M/uL    Hemoglobin 12.4 11.5 - 16.5 g/dL    Hematocrit 37.9 37.0 - 47.0 %    MCV 94.5 80.0 - 100.0 fL    MCH 30.9 27.0 - 32.0 pg    MCHC 32.7 31.0 - 35.0 g/dL    RDW 12.6 11.0 - 16.0 %    Platelets 111 893 - 908 K/uL    MPV 11.5 (H) 6.0 - 10.0 fL    Neutrophils % 69.8 %    Immature Granulocytes % 0.7 0.0 - 5.0 %    Lymphocytes % 19.4 %    Monocytes % 9.0 %    Eosinophils % 0.7 %    Basophils % 0.4 %    Neutrophils Absolute 4.8 2.0 - 7.5 K/uL    Immature Granulocytes # 0.1 K/uL    Lymphocytes Absolute 1.3 (L) 1.5 - 4.0 K/uL    Monocytes Absolute 0.6 0.2 - 0.8 K/uL Eosinophils Absolute 0.1 0.0 - 0.4 K/uL    Basophils Absolute 0.0 0.0 - 0.1 K/uL   Comprehensive Metabolic Panel w/ Reflex to MG   Result Value Ref Range    Sodium 141 136 - 145 mmol/L    Potassium reflex Magnesium 3.1 (L) 3.4 - 5.1 mmol/L    Chloride 102 98 - 107 mmol/L    CO2 28 20 - 30 mmol/L    Anion Gap 11 3 - 16    Glucose 106 74 - 106 mg/dL    BUN 22 (H) 6 - 20 mg/dL    CREATININE 1.3 (H) 0.4 - 1.2 mg/dL    GFR Non-African American 39 (L) >59    GFR  47 (L) >59    Calcium 9.2 8.5 - 10.5 mg/dL    Total Protein 7.3 6.4 - 8.3 g/dL    Albumin 3.9 3.4 - 4.8 g/dL    Albumin/Globulin Ratio 1.1 0.8 - 2.0    Total Bilirubin 0.7 0.3 - 1.2 mg/dL    Alkaline Phosphatase 66 25 - 100 U/L    ALT 6 4 - 36 U/L    AST 14 8 - 33 U/L    Globulin 3.4 Not Established g/dL   Troponin   Result Value Ref Range    Troponin <0.30 <0.30 ng/mL   Brain Natriuretic Peptide   Result Value Ref Range    Pro- 0 - 1,800 pg/mL   Urinalysis with Reflex to Culture    Specimen: Urine   Result Value Ref Range    Color, UA Yellow Straw/Yellow    Clarity, UA CLOUDY (A) Clear    Glucose, Ur Negative Negative mg/dL    Bilirubin Urine Negative Negative    Ketones, Urine Negative Negative mg/dL    Specific Gravity, UA 1.020 1.005 - 1.030    Blood, Urine TRACE-INTACT (A) Negative    pH, UA 6.5 5.0 - 8.0    Protein, UA TRACE (A) Negative mg/dL    Urobilinogen, Urine 1.0 <2.0 E.U./dL    Nitrite, Urine Negative Negative    Leukocyte Esterase, Urine LARGE (A) Negative    Microscopic Examination YES     Urine Type clean catch     Urine Reflex to Culture Yes    Microscopic Urinalysis   Result Value Ref Range    WBC, UA >100 (A) 0 - 5 /HPF    RBC, UA None seen 0 - 4 /HPF    Epithelial Cells, UA 2-5 0 - 5 /HPF    Bacteria, UA 3+ (A) None Seen /HPF   Magnesium   Result Value Ref Range    Magnesium 2.2 1.7 - 2.4 mg/dL        All other labs were within normal range or not returned as of this dictation.     EMERGENCY DEPARTMENT COURSE and benign except for potassium of 3.1 slightly low, BUN of 22 and a creatinine 1.3. Troponin was nondetectable less than 0.30. proBNP is negative at 798. Magnesium is normal at 2.2. Previous blood work back on 7/3/2022 showed a BUN of 22 and a creatinine of 1.1. Patient still around baseline. UA showed trace intact blood, large leukoesterase. Greater than 100 white cells. +3 bacteria. No red cells seen. Patient's diagnostic studies were discussed with her family they do state her understanding. Ana with the patient and her family about the finding of the urinary tract infection which could have been the reason why the patient was in the floor today and then may have had the episode yesterday. Patient's family was offered admission to the hospital but they prefer taking her back home because she still able to eat and drink and take antibiotics they are hoping that they can get it cleared up there without having to be admitted because of the COVID status between her and the rest of the family they are afraid that no one else would be able to come in and be with her because of the positive status. Advised that the patient's alert white orient her vitals are normal on the monitor she has had no acute distress here I am okay with discharging her home with oral antibiotics as long as the family is. This is their preference. She states she has Pedialyte and Gatorade along with Ensure at home for her to drink if she is not eating. Otherwise we will give her a dose of Rocephin here 1 g IV will place her on Omnicef 1 tablet twice a day for 10 days her computer calculation criteria 300 mg twice a day is apparently too high of a dose for this patient. Advised that will take 3 to 5 days for culture results to return.   If Motrin sensitivity is appropriate to the antibiotic and then they will not hear anything from the hospital however if the antibiotic is not appropriate based on the patient's the culture results then someone from the hospital contact them to either a have her come back for reevaluation or send another antibiotic into the pharmacy. Otherwise the patient be discharged home in stable condition after infusion of the Rocephin 1 g IV and appropriate wait time. Advised they do need to follow-up with a regular family physician within the next 1 to 2 days for evaluation. Also given instruction of the symptoms worsens or new symptoms arise or should return back to the emergency department for further evaluation work-up. CONSULTS:  None    PROCEDURES:  Procedures    CRITICAL CARE TIME    Total Critical Care time was 0 minutes, excluding separately reportable procedures. There was a high probability of clinically significant/life threatening deterioration in the patient's condition which required my urgent intervention. FINAL IMPRESSION      1. Acute cystitis without hematuria    2. Fall, initial encounter          DISPOSITION/PLAN   DISPOSITION        PATIENT REFERRED TO:  Michell You MD  [facility]  [phone]    In 2 days      [facility] Emergency Department  [facility] [address] [facility]  [phone]    As needed, If symptoms worsen      DISCHARGE MEDICATIONS:  New Prescriptions    CEFDINIR (OMNICEF) 300 MG CAPSULE    Take 1 capsule by mouth daily for 10 days       Comment: Please note this report has been produced using speech recognition software and may contain errorsrelated to that system including errors in grammar, punctuation, and spelling, as well as words and phrases that may be inappropriate. If there are any questions or concerns please feel free to contact the dictating providerfor clarification.     Becca Aleman DO  Attending Emergency Physician              Becca Aleman, DO  07/09/22 [address],   07/09/22 5706

## 2022-07-09 NOTE — ED NOTES
Daughter at bedside to discuss plan of care with Dr. Minerva Decker.       Suresh Hopper, RN  07/09/22 1102
23-Dec-2020 12:59

## 2022-07-11 ENCOUNTER — HOSPITAL ENCOUNTER (OUTPATIENT)
Facility: HOSPITAL | Age: 87
Discharge: HOME | End: 2022-07-11
Payer: MEDICARE

## 2022-07-11 DIAGNOSIS — R34 DECREASED URINE OUTPUT: ICD-10-CM

## 2022-07-11 DIAGNOSIS — R34 DECREASED URINE OUTPUT: Primary | ICD-10-CM

## 2022-07-11 LAB
A/G RATIO: 1.4 (ref 0.8–2)
ALBUMIN SERPL-MCNC: 3.7 G/DL (ref 3.4–4.8)
ALP BLD-CCNC: 64 U/L (ref 25–100)
ALT SERPL-CCNC: 6 U/L (ref 4–36)
ANION GAP SERPL CALCULATED.3IONS-SCNC: 13 MMOL/L (ref 3–16)
AST SERPL-CCNC: 8 U/L (ref 8–33)
BILIRUB SERPL-MCNC: 0.6 MG/DL (ref 0.3–1.2)
BUN BLDV-MCNC: 17 MG/DL (ref 6–20)
CALCIUM SERPL-MCNC: 8.7 MG/DL (ref 8.5–10.5)
CHLORIDE BLD-SCNC: 107 MMOL/L (ref 98–107)
CO2: 25 MMOL/L (ref 20–30)
CREAT SERPL-MCNC: 1 MG/DL (ref 0.4–1.2)
GFR AFRICAN AMERICAN: >59
GFR NON-AFRICAN AMERICAN: 52
GLOBULIN: 2.7 G/DL
GLUCOSE BLD-MCNC: 97 MG/DL (ref 74–106)
HCT VFR BLD CALC: 33.8 % (ref 37–47)
HEMOGLOBIN: 11.3 G/DL (ref 11.5–16.5)
MCH RBC QN AUTO: 30.9 PG (ref 27–32)
MCHC RBC AUTO-ENTMCNC: 33.4 G/DL (ref 31–35)
MCV RBC AUTO: 92.3 FL (ref 80–100)
PDW BLD-RTO: 12.7 % (ref 11–16)
PLATELET # BLD: 195 K/UL (ref 150–400)
PMV BLD AUTO: 11.2 FL (ref 6–10)
POTASSIUM SERPL-SCNC: 3.5 MMOL/L (ref 3.4–5.1)
RBC # BLD: 3.66 M/UL (ref 3.8–5.8)
SODIUM BLD-SCNC: 145 MMOL/L (ref 136–145)
TOTAL PROTEIN: 6.4 G/DL (ref 6.4–8.3)
WBC # BLD: 6.6 K/UL (ref 4–11)

## 2022-07-11 PROCEDURE — 85027 COMPLETE CBC AUTOMATED: CPT

## 2022-07-11 PROCEDURE — 80053 COMPREHEN METABOLIC PANEL: CPT

## 2022-07-11 PROCEDURE — 36415 COLL VENOUS BLD VENIPUNCTURE: CPT

## 2022-07-12 LAB
ORGANISM: ABNORMAL
URINE CULTURE, ROUTINE: ABNORMAL

## 2022-07-15 NOTE — TELEPHONE ENCOUNTER
Patient called, requested refill.        Next Office Visit Date:  Future Appointments   Date Time Provider [address] Lr   7/20/2022  2:45 PM Mitch Munoz MD [address]   1/17/2023  1:00 PM Mitch Munoz MD [address] please review via [address]

## 2022-07-16 RX ORDER — QUETIAPINE FUMARATE 25 MG/1
TABLET, FILM COATED ORAL
Qty: 90 TABLET | Refills: 3 | Status: SHIPPED | OUTPATIENT
Start: 2022-07-16 | End: 2022-10-25

## 2022-07-18 DIAGNOSIS — D64.9 LOW HEMOGLOBIN: Primary | ICD-10-CM

## 2022-07-18 LAB
HEMOCCULT STL QL: NEGATIVE

## 2022-07-18 PROCEDURE — 82270 OCCULT BLOOD FECES: CPT | Performed by: FAMILY MEDICINE

## 2022-07-20 ENCOUNTER — HOSPITAL ENCOUNTER (OUTPATIENT)
Facility: HOSPITAL | Age: 87
Discharge: HOME | End: 2022-07-20
Payer: MEDICARE

## 2022-07-20 ENCOUNTER — OFFICE VISIT (OUTPATIENT)
Dept: FAMILY MEDICINE CLINIC | Age: 87
End: 2022-07-20
Payer: MEDICARE

## 2022-07-20 VITALS
HEART RATE: 67 BPM | OXYGEN SATURATION: 98 % | TEMPERATURE: 97.3 F | HEIGHT: 59 IN | BODY MASS INDEX: 24.88 KG/M2 | SYSTOLIC BLOOD PRESSURE: 112 MMHG | DIASTOLIC BLOOD PRESSURE: 52 MMHG | WEIGHT: 123.4 LBS

## 2022-07-20 DIAGNOSIS — R41.3 MEMORY LOSS, SHORT TERM: ICD-10-CM

## 2022-07-20 DIAGNOSIS — R55 SYNCOPE, UNSPECIFIED SYNCOPE TYPE: ICD-10-CM

## 2022-07-20 DIAGNOSIS — I10 ESSENTIAL HYPERTENSION: ICD-10-CM

## 2022-07-20 DIAGNOSIS — U07.1 COVID-19 VIRUS RNA TEST RESULT POSITIVE AT LIMIT OF DETECTION: ICD-10-CM

## 2022-07-20 DIAGNOSIS — K59.00 CONSTIPATION, UNSPECIFIED CONSTIPATION TYPE: Primary | ICD-10-CM

## 2022-07-20 PROCEDURE — 36415 COLL VENOUS BLD VENIPUNCTURE: CPT

## 2022-07-20 PROCEDURE — 80053 COMPREHEN METABOLIC PANEL: CPT

## 2022-07-20 PROCEDURE — 1123F ACP DISCUSS/DSCN MKR DOCD: CPT | Performed by: FAMILY MEDICINE

## 2022-07-20 PROCEDURE — 85027 COMPLETE CBC AUTOMATED: CPT

## 2022-07-20 PROCEDURE — 99213 OFFICE O/P EST LOW 20 MIN: CPT | Performed by: FAMILY MEDICINE

## 2022-07-20 NOTE — PROGRESS NOTES
SUBJECTIVE:    Patient ID: Sri Han is a 80 y.o. female. Chief Complaint   Patient presents with    Follow-up    Constipation    Post-COVID Symptoms       HPI: office visit  Today in follow-up of her constipation. She still having some issues. Her daughter is concerned because it seems like she does vagal down whenever she is having a lot of constipation. She does not want her to have on those passing out spells. She does seem to be recovering from [place] pretty well. She is still a little fatigued but not as much as the daughter is. She says she is feeling pretty well. She seems to be eating and drinking well. She has not had any chest pain shortness of breath or other issues. She is not having any medication problems. She is staying active. She has not had any falls or injuries recently. Blood pressures at home seem to be doing well. She does have cardiology follow-up about the syncopal episodes. Review of Systems   Constitutional:  Positive for fatigue. Neurological:  Positive for weakness. All other systems reviewed and are negative. OBJECTIVE:  BP (!) 112/52 (Site: Left Upper Arm, Position: Sitting)   Pulse 67   Temp 97.3 °F (36.3 °C) (Temporal)   Ht 4' 11\" (1.499 m)   Wt 123 lb 6.4 oz (56 kg)   SpO2 98%   BMI 24.92 kg/m²    Wt Readings from Last 3 Encounters:   07/20/22 123 lb 6.4 oz (56 kg)   07/09/22 120 lb (54.4 kg)   07/03/22 120 lb (54.4 kg)     BP Readings from Last 3 Encounters:   07/20/22 (!) 112/52   07/09/22 134/62   07/03/22 (!) 160/69      Pulse Readings from Last 3 Encounters:   07/20/22 67   07/09/22 59   07/03/22 60     Body mass index is 24.92 kg/m². Resp Readings from Last 3 Encounters:   07/09/22 14   07/03/22 14   03/17/22 16     Past medical, surgical, family and social history were reviewed and updated with the patient. Physical Exam  Vitals and nursing note reviewed. Constitutional:       Appearance: She is well-developed.    HENT:      Head: 0.4 - 1.2 mg/dL 1.0 (7/11/2022) 0.4 - 1.2 mg/dL   GFR  51 (L) (7/20/2022) >59 >59 (7/11/2022) >59   GFR Non- 42 (L) (7/20/2022) >59 52 (L) (7/11/2022) >59   Globulin 2.9 (7/20/2022) Not Established g/dL 2.7 (7/11/2022) Not Established g/dL   Glucose 86 (7/20/2022) 74 - 106 mg/dL 97 (7/11/2022) 74 - 106 mg/dL   Potassium 4.2 (7/20/2022) 3.4 - 5.1 mmol/L 3.5 (7/11/2022) 3.4 - 5.1 mmol/L   Sodium 142 (7/20/2022) 136 - 145 mmol/L 145 (7/11/2022) 136 - 145 mmol/L   Total Bilirubin <0.2 (L) (7/20/2022) 0.3 - 1.2 mg/dL 0.6 (7/11/2022) 0.3 - 1.2 mg/dL   Total Protein 6.9 (7/20/2022) 6.4 - 8.3 g/dL 6.4 (7/11/2022) 6.4 - 8.3 g/dL     Hemoglobin A1C (%)   Date Value   08/05/2015 5.4     Microscopic Examination (no units)   Date Value   07/09/2022 YES     LDL Calculated (mg/dL)   Date Value   03/17/2022 79       Lab Results   Component Value Date/Time    WBC 6.4 07/20/2022 03:43 PM    NEUTROABS 4.8 07/09/2022 01:18 PM    HGB 11.6 07/20/2022 03:43 PM    HCT 35.9 07/20/2022 03:43 PM    MCV 96.5 07/20/2022 03:43 PM     07/20/2022 03:43 PM     Lab Results   Component Value Date    TSH 1.96 03/17/2022       ASSESSMENT/PLAN    Diagnosis Orders   1. Constipation, unspecified constipation type getting a little worse. We will go ahead and start some Linzess. Hopefully that will help. She is encouraged to drink plenty of fluids. And be as active as she can. 2. Essential hypertension to be doing well at home. I given she and her daughter the copy of the blood pressure parameters of the know when to be concerned       3. Memory loss, short term to be progressing into full-blown dementia. She is aware of who I am today. Tells me the same thing at least 4 times. 4. Syncope, unspecified syncope type recently. She does have cardiology follow-up scheduled.            Orders Placed This Encounter   Medications    linaclotide (LINZESS) 145 MCG capsule     Sig: Take 1 capsule by mouth every morning (before breakfast)     Dispense:  30 capsule     Refill:  5        Medications Discontinued During This Encounter   Medication Reason    linaCLOtide (LINZESS) 72 MCG CAPS capsule     megestrol (MEGACE ES) 625 MG/5ML suspension        Controlled Substances Monitoring:      Please note: This chart was generated using Dragon dictation software. Although every effort was made to ensure the accuracy of this automated transcription, some errors in transcription may have occurred.

## 2022-07-21 LAB
A/G RATIO: 1.4 (ref 0.8–2)
ALBUMIN SERPL-MCNC: 4 G/DL (ref 3.4–4.8)
ALP BLD-CCNC: 74 U/L (ref 25–100)
ALT SERPL-CCNC: 6 U/L (ref 4–36)
ANION GAP SERPL CALCULATED.3IONS-SCNC: 13 MMOL/L (ref 3–16)
AST SERPL-CCNC: 9 U/L (ref 8–33)
BILIRUB SERPL-MCNC: <0.2 MG/DL (ref 0.3–1.2)
BUN BLDV-MCNC: 17 MG/DL (ref 6–20)
CALCIUM SERPL-MCNC: 9.3 MG/DL (ref 8.5–10.5)
CHLORIDE BLD-SCNC: 106 MMOL/L (ref 98–107)
CO2: 23 MMOL/L (ref 20–30)
CREAT SERPL-MCNC: 1.2 MG/DL (ref 0.4–1.2)
GFR AFRICAN AMERICAN: 51
GFR NON-AFRICAN AMERICAN: 42
GLOBULIN: 2.9 G/DL
GLUCOSE BLD-MCNC: 86 MG/DL (ref 74–106)
HCT VFR BLD CALC: 35.9 % (ref 37–47)
HEMOGLOBIN: 11.6 G/DL (ref 11.5–16.5)
MCH RBC QN AUTO: 31.2 PG (ref 27–32)
MCHC RBC AUTO-ENTMCNC: 32.3 G/DL (ref 31–35)
MCV RBC AUTO: 96.5 FL (ref 80–100)
PDW BLD-RTO: 13.8 % (ref 11–16)
PLATELET # BLD: 226 K/UL (ref 150–400)
PMV BLD AUTO: 11.2 FL (ref 6–10)
POTASSIUM SERPL-SCNC: 4.2 MMOL/L (ref 3.4–5.1)
RBC # BLD: 3.72 M/UL (ref 3.8–5.8)
SODIUM BLD-SCNC: 142 MMOL/L (ref 136–145)
TOTAL PROTEIN: 6.9 G/DL (ref 6.4–8.3)
WBC # BLD: 6.4 K/UL (ref 4–11)

## 2022-08-01 ENCOUNTER — HOSPITAL ENCOUNTER (EMERGENCY)
Facility: HOSPITAL | Age: 87
Discharge: ANOTHER HOSPITAL | End: 2022-08-02
Attending: EMERGENCY MEDICINE
Payer: MEDICARE

## 2022-08-01 ENCOUNTER — APPOINTMENT (OUTPATIENT)
Dept: GENERAL RADIOLOGY | Facility: HOSPITAL | Age: 87
End: 2022-08-01
Payer: MEDICARE

## 2022-08-01 DIAGNOSIS — S72.001A CLOSED RIGHT HIP FRACTURE, INITIAL ENCOUNTER (HCC): Primary | ICD-10-CM

## 2022-08-01 LAB
A/G RATIO: 1.3 (ref 0.8–2)
ALBUMIN SERPL-MCNC: 4.1 G/DL (ref 3.4–4.8)
ALP BLD-CCNC: 74 U/L (ref 25–100)
ALT SERPL-CCNC: 16 U/L (ref 4–36)
ANION GAP SERPL CALCULATED.3IONS-SCNC: 10 MMOL/L (ref 3–16)
AST SERPL-CCNC: 14 U/L (ref 8–33)
BACTERIA: ABNORMAL /HPF
BASOPHILS ABSOLUTE: 0 K/UL (ref 0–0.1)
BASOPHILS RELATIVE PERCENT: 0.5 %
BILIRUB SERPL-MCNC: 0.4 MG/DL (ref 0.3–1.2)
BILIRUBIN URINE: NEGATIVE
BLOOD, URINE: NEGATIVE
BUN BLDV-MCNC: 19 MG/DL (ref 6–20)
CALCIUM SERPL-MCNC: 8.8 MG/DL (ref 8.5–10.5)
CHLORIDE BLD-SCNC: 106 MMOL/L (ref 98–107)
CLARITY: CLEAR
CO2: 24 MMOL/L (ref 20–30)
COLOR: YELLOW
CREAT SERPL-MCNC: 1.2 MG/DL (ref 0.4–1.2)
EOSINOPHILS ABSOLUTE: 0 K/UL (ref 0–0.4)
EOSINOPHILS RELATIVE PERCENT: 0.4 %
EPITHELIAL CELLS, UA: ABNORMAL /HPF (ref 0–5)
GFR AFRICAN AMERICAN: 51
GFR NON-AFRICAN AMERICAN: 42
GLOBULIN: 3.2 G/DL
GLUCOSE BLD-MCNC: 102 MG/DL (ref 74–106)
GLUCOSE URINE: NEGATIVE MG/DL
HCT VFR BLD CALC: 34.8 % (ref 37–47)
HEMOGLOBIN: 11.6 G/DL (ref 11.5–16.5)
IMMATURE GRANULOCYTES #: 0.1 K/UL
IMMATURE GRANULOCYTES %: 0.6 % (ref 0–5)
KETONES, URINE: NEGATIVE MG/DL
LEUKOCYTE ESTERASE, URINE: NEGATIVE
LYMPHOCYTES ABSOLUTE: 0.9 K/UL (ref 1.5–4)
LYMPHOCYTES RELATIVE PERCENT: 10.9 %
MCH RBC QN AUTO: 31.2 PG (ref 27–32)
MCHC RBC AUTO-ENTMCNC: 33.3 G/DL (ref 31–35)
MCV RBC AUTO: 93.5 FL (ref 80–100)
MICROSCOPIC EXAMINATION: YES
MONOCYTES ABSOLUTE: 0.3 K/UL (ref 0.2–0.8)
MONOCYTES RELATIVE PERCENT: 3.8 %
NEUTROPHILS ABSOLUTE: 7.1 K/UL (ref 2–7.5)
NEUTROPHILS RELATIVE PERCENT: 83.8 %
NITRITE, URINE: NEGATIVE
PDW BLD-RTO: 13.5 % (ref 11–16)
PH UA: 5 (ref 5–8)
PLATELET # BLD: 146 K/UL (ref 150–400)
PMV BLD AUTO: 10.7 FL (ref 6–10)
POTASSIUM SERPL-SCNC: 3.8 MMOL/L (ref 3.4–5.1)
PROTEIN UA: ABNORMAL MG/DL
RBC # BLD: 3.72 M/UL (ref 3.8–5.8)
RBC UA: ABNORMAL /HPF (ref 0–4)
SARS-COV-2, NAAT: NOT DETECTED
SODIUM BLD-SCNC: 140 MMOL/L (ref 136–145)
SPECIFIC GRAVITY UA: >=1.03 (ref 1–1.03)
TOTAL PROTEIN: 7.3 G/DL (ref 6.4–8.3)
URINE REFLEX TO CULTURE: ABNORMAL
URINE TYPE: ABNORMAL
UROBILINOGEN, URINE: 1 E.U./DL
WBC # BLD: 8.5 K/UL (ref 4–11)
WBC UA: ABNORMAL /HPF (ref 0–5)

## 2022-08-01 PROCEDURE — 93005 ELECTROCARDIOGRAM TRACING: CPT

## 2022-08-01 PROCEDURE — 96376 TX/PRO/DX INJ SAME DRUG ADON: CPT

## 2022-08-01 PROCEDURE — 71045 X-RAY EXAM CHEST 1 VIEW: CPT

## 2022-08-01 PROCEDURE — 96375 TX/PRO/DX INJ NEW DRUG ADDON: CPT

## 2022-08-01 PROCEDURE — 85025 COMPLETE CBC W/AUTO DIFF WBC: CPT

## 2022-08-01 PROCEDURE — 73502 X-RAY EXAM HIP UNI 2-3 VIEWS: CPT

## 2022-08-01 PROCEDURE — 6360000002 HC RX W HCPCS: Performed by: EMERGENCY MEDICINE

## 2022-08-01 PROCEDURE — 87635 SARS-COV-2 COVID-19 AMP PRB: CPT

## 2022-08-01 PROCEDURE — 99285 EMERGENCY DEPT VISIT HI MDM: CPT

## 2022-08-01 PROCEDURE — 36415 COLL VENOUS BLD VENIPUNCTURE: CPT

## 2022-08-01 PROCEDURE — 96374 THER/PROPH/DIAG INJ IV PUSH: CPT

## 2022-08-01 PROCEDURE — 51702 INSERT TEMP BLADDER CATH: CPT

## 2022-08-01 PROCEDURE — 80053 COMPREHEN METABOLIC PANEL: CPT

## 2022-08-01 PROCEDURE — 81001 URINALYSIS AUTO W/SCOPE: CPT

## 2022-08-01 RX ORDER — MORPHINE SULFATE 2 MG/ML
2 INJECTION, SOLUTION INTRAMUSCULAR; INTRAVENOUS ONCE
Status: COMPLETED | OUTPATIENT
Start: 2022-08-01 | End: 2022-08-01

## 2022-08-01 RX ORDER — ONDANSETRON 2 MG/ML
4 INJECTION INTRAMUSCULAR; INTRAVENOUS ONCE
Status: COMPLETED | OUTPATIENT
Start: 2022-08-01 | End: 2022-08-01

## 2022-08-01 RX ORDER — MORPHINE SULFATE 4 MG/ML
2 INJECTION, SOLUTION INTRAMUSCULAR; INTRAVENOUS
Status: DISCONTINUED | OUTPATIENT
Start: 2022-08-01 | End: 2022-08-02 | Stop reason: HOSPADM

## 2022-08-01 RX ADMIN — MORPHINE SULFATE 2 MG: 2 INJECTION, SOLUTION INTRAMUSCULAR; INTRAVENOUS at 19:04

## 2022-08-01 RX ADMIN — MORPHINE SULFATE 2 MG: 4 INJECTION, SOLUTION INTRAMUSCULAR; INTRAVENOUS at 22:49

## 2022-08-01 RX ADMIN — ONDANSETRON 4 MG: 2 INJECTION INTRAMUSCULAR; INTRAVENOUS at 19:04

## 2022-08-01 NOTE — ED NOTES
Dr Benedicto Donis (ortho) called from [facility] to speak with Dr Missael Cabrales at this time.      Nancy Galvin, KOBE  08/01/22 2601

## 2022-08-01 NOTE — ED NOTES
[facility] to contact [facility]. Awaiting call back.      Christos Shall  08/01/22 9336

## 2022-08-01 NOTE — ED NOTES
Chepe Centeno at [facility] called requesting face sheet to be faxed- reports may be a little a while before we can get a bed- this was reported to KOBE [place] RN  08/01/22 1922

## 2022-08-01 NOTE — ED PROVIDER NOTES
[facility] ENCOUNTER      Pt Name: Jolene Howard  MRN: [id number]  [place]: 1935  Date of evaluation: 8/1/2022  Provider: Bre Levine MD    CHIEF COMPLAINT       Chief Complaint   Patient presents with    Hip Pain         HISTORY OF PRESENT ILLNESS  (Location/Symptom, Timing/Onset, Context/Setting, Quality, Duration, Modifying Factors, Severity.)   Jolene Howard is a 80 y.o. female who presents to the emergency department patient with dementia history per daughter's states that her  was outside the house the son-in-law saw her fall landing on her buttock on the porch floor he is not sure if she slipped or tripped they went and got her up into a chair and when they did she grabbed her right hip and complained of pain there she did not hit her head did not lose consciousness. She has no other complaints other than right hip thigh pain at this time      Nursing notes were reviewed. REVIEW OFSYSTEMS    (2-9 systems for level 4, 10 or more for level 5)   ROS:  General:  No fevers, no chills, no weakness  Cardiovascular:  No chest pain, no palpitations  Respiratory:  No shortness of breath, no cough, no wheezing  Gastrointestinal:  No pain, no nausea, no vomiting, no diarrhea  Musculoskeletal:  + muscle pain, + joint pain  Skin:  No rash, no easy bruising  Neurologic:  No speech problems, no headache, no extremity weakness  Psychiatric:  No anxiety  Genitourinary:  No dysuria, no hematuria    Except as noted above the remainder of the review of systems was reviewed and negative.        PAST MEDICAL HISTORY     Past Medical History:   Diagnosis Date    Allergic rhinitis     Dementia ([facility] [address]     Hypertension     Hyperthyroidism     Osteoarthritis          SURGICAL HISTORY       Past Surgical History:   Procedure Laterality Date    BLADDER SUSPENSION  9/2015    CHOLECYSTECTOMY      HYSTERECTOMY (CERVIX STATUS UNKNOWN)      TUBAL LIGATION CURRENT MEDICATIONS       Previous Medications    ATORVASTATIN (LIPITOR) 10 MG TABLET    Take 1 tablet by mouth daily    BUSPIRONE (BUSPAR) 10 MG TABLET    TAKE 1 TABLET BY MOUTH THREE TIMES DAILY    FOLIC ACID (FOLVITE) 1 MG TABLET    TAKE 1 TABLET BY MOUTH EVERY DAY    LEVOTHYROXINE (SYNTHROID) 50 MCG TABLET    TAKE 1 TABLET BY MOUTH ONCE A DAY    LINACLOTIDE (LINZESS) 145 MCG CAPSULE    Take 1 capsule by mouth every morning (before breakfast)    LISINOPRIL (PRINIVIL;ZESTRIL) 40 MG TABLET    TAKE 1 TABLET BY MOUTH DAILY    MEMANTINE HCL-DONEPEZIL HCL 28-10 MG CP24    Take 1 capsule by mouth daily    MIRABEGRON (MYRBETRIQ) 25 MG TB24    TAKE 1 TABLET BY MOUTH ONCE A DAY    OMEPRAZOLE (PRILOSEC) 40 MG DELAYED RELEASE CAPSULE    Take 1 capsule by mouth daily    QUETIAPINE (SEROQUEL) 25 MG TABLET    TAKE 1 TABLET BY MOUTH EVERY NIGHT    SERTRALINE (ZOLOFT) 50 MG TABLET    TAKE 1 TABLET BY MOUTH DAILY       ALLERGIES     Sulfa antibiotics    FAMILY HISTORY     History reviewed. No pertinent family history.        SOCIAL HISTORY       Social History     Socioeconomic History    Marital status:      Spouse name: None    Number of children: None    Years of education: None    Highest education level: None   Tobacco Use    Smoking status: Never    Smokeless tobacco: Never   Vaping Use    Vaping Use: Never used   Substance and Sexual Activity    Alcohol use: No     Alcohol/week: 0.0 standard drinks    Drug use: No    Sexual activity: Not Currently     Social Determinants of Health     Financial Resource Strain: High Risk    Difficulty of Paying Living Expenses: Very hard   Food Insecurity: No Food Insecurity    Worried About Running Out of Food in the Last Year: Never true    Ran Out of Food in the Last Year: Never true         PHYSICAL EXAM    (up to 7 for level 4, 8 or more for level 5)     ED Triage Vitals [08/01/22 1625]   BP Temp Temp Source Heart Rate Resp SpO2 Height Weight   124/63 97.9 °F (36.6 °C) Oral 57 16 96 % 4' 11\" (1.499 m) 123 lb (55.8 kg)       Physical Exam  General :Patient is awake, alert, oriented, in no acute distress, nontoxic appearing  HEENT: Pupils are equally round and reactive to light, EOMI, conjunctivae clear. Neck: Neck is supple, full range of motion, trachea midline  Cardiac: Heart regular rate, rhythm, no murmurs, rubs, or gallops  Lungs: Lungs are clear to auscultation, there is no wheezing, rhonchi, or rales. There is no use of accessory muscles. Chest wall: There is no tenderness to palpation over the chest wall or over ribs  Abdomen: Abdomen is soft, nontender, nondistended. There is no firm or pulsatile masses, no rebound rigidity or guarding. Musculoskeletal: 5 out of 5 strength in all 4 extremities. No focal muscle deficits are appreciated patient is unable to flex her right knee or flex her right hip secondary to severe pain. She has 2+ femoral popliteal posterior tib and dorsal pedal pulses sensation is intact there is a questionable decrease sensation on the medial aspect of the right foot. She has full range of motion of the ankle and toes. Neuro: Motor intact, sensory intact, level of consciousness is normal, Dermatology: Skin is warm and dry  Psych: Mentation is grossly normal, cognition is grossly normal. Affect is appropriate. DIAGNOSTIC RESULTS     EKG: All EKG's are interpreted by the Emergency Department Physician who either signs or Co-signs this chart in the [address] a cardiologist.    The EKG interpreted by me shows paced rhythm with occasional PVCs no acute ST changes baseline artifact. RADIOLOGY:   Non-plain film images such as CT, Ultrasound and MRI are read by the radiologist. Plain radiographic images are visualized and preliminarily interpreted by the emergency physician with the below findings:      [] Radiologist's Report Reviewed:  XR CHEST PORTABLE   Final Result      No acute cardiopulmonary findings.       XR HIP RIGHT (2-3 VIEWS) Final Result      Right femoral neck bases cervical fracture with displacement and varus impaction. No definite intertrochanteric extension but consider CT if needed for preoperative planning.             ED BEDSIDE ULTRASOUND:   Performed by ED Physician - none    LABS:    I have reviewed and interpreted all of the currently available lab results from this visit (ifapplicable):  Results for orders placed or performed during the hospital encounter of 08/01/22   COVID-19, Rapid    Specimen: Nasopharyngeal Swab   Result Value Ref Range    SARS-CoV-2, NAAT Not Detected Not Detected   CBC with Auto Differential   Result Value Ref Range    WBC 8.5 4.0 - 11.0 K/uL    RBC 3.72 (L) 3.80 - 5.80 M/uL    Hemoglobin 11.6 11.5 - 16.5 g/dL    Hematocrit 34.8 (L) 37.0 - 47.0 %    MCV 93.5 80.0 - 100.0 fL    MCH 31.2 27.0 - 32.0 pg    MCHC 33.3 31.0 - 35.0 g/dL    RDW 13.5 11.0 - 16.0 %    Platelets 507 (L) 764 - 400 K/uL    MPV 10.7 (H) 6.0 - 10.0 fL    Neutrophils % 83.8 %    Immature Granulocytes % 0.6 0.0 - 5.0 %    Lymphocytes % 10.9 %    Monocytes % 3.8 %    Eosinophils % 0.4 %    Basophils % 0.5 %    Neutrophils Absolute 7.1 2.0 - 7.5 K/uL    Immature Granulocytes # 0.1 K/uL    Lymphocytes Absolute 0.9 (L) 1.5 - 4.0 K/uL    Monocytes Absolute 0.3 0.2 - 0.8 K/uL    Eosinophils Absolute 0.0 0.0 - 0.4 K/uL    Basophils Absolute 0.0 0.0 - 0.1 K/uL   Comprehensive Metabolic Panel   Result Value Ref Range    Sodium 140 136 - 145 mmol/L    Potassium 3.8 3.4 - 5.1 mmol/L    Chloride 106 98 - 107 mmol/L    CO2 24 20 - 30 mmol/L    Anion Gap 10 3 - 16    Glucose 102 74 - 106 mg/dL    BUN 19 6 - 20 mg/dL    Creatinine 1.2 0.4 - 1.2 mg/dL    GFR Non-African American 42 (L) >59    GFR  51 (L) >59    Calcium 8.8 8.5 - 10.5 mg/dL    Total Protein 7.3 6.4 - 8.3 g/dL    Albumin 4.1 3.4 - 4.8 g/dL    Albumin/Globulin Ratio 1.3 0.8 - 2.0    Total Bilirubin 0.4 0.3 - 1.2 mg/dL    Alkaline Phosphatase 74 25 - 100 U/L    ALT 16 4 - 36 U/L    AST 14 8 - 33 U/L    Globulin 3.2 Not Established g/dL   Urinalysis with Reflex to Culture    Specimen: Urine   Result Value Ref Range    Color, UA Yellow Straw/Yellow    Clarity, UA Clear Clear    Glucose, Ur Negative Negative mg/dL    Bilirubin Urine Negative Negative    Ketones, Urine Negative Negative mg/dL    Specific Gravity, UA >=1.030 1.005 - 1.030    Blood, Urine Negative Negative    pH, UA 5.0 5.0 - 8.0    Protein, UA TRACE (A) Negative mg/dL    Urobilinogen, Urine 1.0 <2.0 E.U./dL    Nitrite, Urine Negative Negative    Leukocyte Esterase, Urine Negative Negative    Microscopic Examination YES     Urine Type Catheter     Urine Reflex to Culture Not Indicated    Microscopic Urinalysis   Result Value Ref Range    WBC, UA 3-5 0 - 5 /HPF    RBC, UA None seen 0 - 4 /HPF    Epithelial Cells, UA 6-10 (A) 0 - 5 /HPF    Bacteria, UA Rare (A) None Seen /HPF        All other labs were within normal range or not returned as of this dictation. EMERGENCY DEPARTMENT COURSE and DIFFERENTIAL DIAGNOSIS/MDM:   Vitals:    Vitals:    08/01/22 1830 08/01/22 1845 08/01/22 1900 08/01/22 1915   BP: 139/73 (!) 149/66 113/64 127/69   Pulse:       Resp:       Temp:       TempSrc:       SpO2:       Weight:       Height:           MEDICATIONS ADMINISTERED IN ED:  Medications   morphine (PF) injection 2 mg (2 mg IntraVENous Given 8/1/22 1904)   ondansetron (ZOFRAN) injection 4 mg (4 mg IntraVENous Given 8/1/22 1904)       Patient is stable chest x-ray is clear blood work is within normal limits EKG shows occasional PVC. Hip x-ray shows a right femoral neck fracture. I discussed the case with Dr. Ailyn Ca orthopedics at [facility] which was the patient's family's request I have also spoken to the hospitalist Dr. Peewee Bagley at [facility] the patient is on the wait list they are not sure when they will have a bed Dr. Ailyn Ca is putting the patient on his or schedule for the morning however.   Therefore she will be kept n.p.o. after midnight and we presume that if the unable to get a bed tonight she will probably be transferred directly to pre. Is this patient to be included in the SEP-1 Core Measure due to severe sepsis or septic shock? No   Exclusion criteria - the patient is NOT to be included for SEP-1 Core Measure due to: Infection is not suspected     The patient will follow-up with their PCP in 1-2 days for reevaluation. If the patient or family members have anyfurther concerns or any worsening symptoms they will return to the ED for reevaluation. CONSULTS:  None    PROCEDURES:  Procedures    CRITICAL CARE TIME    Total Critical Care time was 0 minutes, excluding separately reportable procedures. There was a high probability of clinically significant/life threatening deterioration in the patient's condition which required my urgent intervention. FINAL IMPRESSION      1. Closed right hip fracture, initial encounter [facility]) New Problem         DISPOSITION/PLAN   DISPOSITION Decision To Transfer 08/01/2022 07:46:39 PM  Stable transfer to [facility]. PATIENT REFERRED TO:  No follow-up provider specified. DISCHARGE MEDICATIONS:  New Prescriptions    No medications on file       Comment: Please note this report has been produced using speech recognition software and may contain errorsrelated to that system including errors in grammar, punctuation, and spelling, as well as words and phrases that may be inappropriate. If there are any questions or concerns please feel free to contact the dictating providerfor clarification.     Kevin Rm MD  Attending Emergency Physician                 Kevin Rm MD  08/01/22 1104

## 2022-08-02 ENCOUNTER — APPOINTMENT (OUTPATIENT)
Dept: GENERAL RADIOLOGY | Facility: HOSPITAL | Age: 87
End: 2022-08-02

## 2022-08-02 ENCOUNTER — ANESTHESIA EVENT (OUTPATIENT)
Dept: PERIOP | Facility: HOSPITAL | Age: 87
End: 2022-08-02

## 2022-08-02 ENCOUNTER — ANESTHESIA (OUTPATIENT)
Dept: PERIOP | Facility: HOSPITAL | Age: 87
End: 2022-08-02

## 2022-08-02 ENCOUNTER — HOSPITAL ENCOUNTER (INPATIENT)
Facility: HOSPITAL | Age: 87
LOS: 8 days | Discharge: NURSING, REHAB OR LONG-TERM CARE | End: 2022-08-10
Attending: INTERNAL MEDICINE | Admitting: HOSPITALIST

## 2022-08-02 ENCOUNTER — ANESTHESIA EVENT CONVERTED (OUTPATIENT)
Dept: ANESTHESIOLOGY | Facility: HOSPITAL | Age: 87
End: 2022-08-02

## 2022-08-02 ENCOUNTER — APPOINTMENT (OUTPATIENT)
Dept: OTHER | Facility: HOSPITAL | Age: 87
End: 2022-08-02

## 2022-08-02 VITALS
BODY MASS INDEX: 24.8 KG/M2 | HEIGHT: 59 IN | TEMPERATURE: 97.9 F | OXYGEN SATURATION: 93 % | WEIGHT: 123 LBS | RESPIRATION RATE: 16 BRPM | HEART RATE: 69 BPM | SYSTOLIC BLOOD PRESSURE: 133 MMHG | DIASTOLIC BLOOD PRESSURE: 73 MMHG

## 2022-08-02 DIAGNOSIS — S72.001A CLOSED FRACTURE OF RIGHT HIP, INITIAL ENCOUNTER: Primary | ICD-10-CM

## 2022-08-02 PROBLEM — F03.90 DEMENTIA: Status: ACTIVE | Noted: 2022-08-02

## 2022-08-02 PROBLEM — E03.9 HYPOTHYROIDISM (ACQUIRED): Status: ACTIVE | Noted: 2022-08-02

## 2022-08-02 PROBLEM — S72.009A HIP FRACTURE: Status: ACTIVE | Noted: 2022-08-02

## 2022-08-02 PROBLEM — I10 HTN (HYPERTENSION): Status: ACTIVE | Noted: 2022-08-02

## 2022-08-02 LAB
ANION GAP SERPL CALCULATED.3IONS-SCNC: 12 MMOL/L (ref 5–15)
BUN SERPL-MCNC: 20 MG/DL (ref 8–23)
BUN/CREAT SERPL: 20 (ref 7–25)
CALCIUM SPEC-SCNC: 8.8 MG/DL (ref 8.6–10.5)
CHLORIDE SERPL-SCNC: 109 MMOL/L (ref 98–107)
CO2 SERPL-SCNC: 22 MMOL/L (ref 22–29)
CREAT SERPL-MCNC: 1 MG/DL (ref 0.57–1)
EGFRCR SERPLBLD CKD-EPI 2021: 54.6 ML/MIN/1.73
EKG ATRIAL RATE: 67 BPM
EKG DIAGNOSIS: NORMAL
EKG P AXIS: 57 DEGREES
EKG P-R INTERVAL: 142 MS
EKG Q-T INTERVAL: 452 MS
EKG QRS DURATION: 116 MS
EKG QTC CALCULATION (BAZETT): 477 MS
EKG R AXIS: -23 DEGREES
EKG T AXIS: 52 DEGREES
EKG VENTRICULAR RATE: 67 BPM
FLUAV SUBTYP SPEC NAA+PROBE: NOT DETECTED
FLUBV RNA ISLT QL NAA+PROBE: NOT DETECTED
GLUCOSE SERPL-MCNC: 117 MG/DL (ref 65–99)
INR PPP: 1.22 (ref 0.84–1.13)
POTASSIUM SERPL-SCNC: 4.1 MMOL/L (ref 3.5–5.2)
PROTHROMBIN TIME: 15.3 SECONDS (ref 11.4–14.4)
QT INTERVAL: 468 MS
QTC INTERVAL: 490 MS
SARS-COV-2 RNA PNL SPEC NAA+PROBE: NOT DETECTED
SODIUM SERPL-SCNC: 143 MMOL/L (ref 136–145)

## 2022-08-02 PROCEDURE — 0SRR0J9 REPLACEMENT OF RIGHT HIP JOINT, FEMORAL SURFACE WITH SYNTHETIC SUBSTITUTE, CEMENTED, OPEN APPROACH: ICD-10-PCS | Performed by: ORTHOPAEDIC SURGERY

## 2022-08-02 PROCEDURE — C1889 IMPLANT/INSERT DEVICE, NOC: HCPCS | Performed by: ORTHOPAEDIC SURGERY

## 2022-08-02 PROCEDURE — C1776 JOINT DEVICE (IMPLANTABLE): HCPCS | Performed by: ORTHOPAEDIC SURGERY

## 2022-08-02 PROCEDURE — 25010000002 FENTANYL CITRATE (PF) 50 MCG/ML SOLUTION: Performed by: NURSE ANESTHETIST, CERTIFIED REGISTERED

## 2022-08-02 PROCEDURE — 25010000002 PROPOFOL 10 MG/ML EMULSION: Performed by: NURSE ANESTHETIST, CERTIFIED REGISTERED

## 2022-08-02 PROCEDURE — 99223 1ST HOSP IP/OBS HIGH 75: CPT | Performed by: INTERNAL MEDICINE

## 2022-08-02 PROCEDURE — 80048 BASIC METABOLIC PNL TOTAL CA: CPT | Performed by: INTERNAL MEDICINE

## 2022-08-02 PROCEDURE — 25010000002 NEOSTIGMINE 10 MG/10ML SOLUTION: Performed by: NURSE ANESTHETIST, CERTIFIED REGISTERED

## 2022-08-02 PROCEDURE — 85610 PROTHROMBIN TIME: CPT | Performed by: INTERNAL MEDICINE

## 2022-08-02 PROCEDURE — 25010000002 HYDROMORPHONE 1 MG/ML SOLUTION

## 2022-08-02 PROCEDURE — 93005 ELECTROCARDIOGRAM TRACING: CPT | Performed by: INTERNAL MEDICINE

## 2022-08-02 PROCEDURE — 25010000002 CEFAZOLIN PER 500 MG: Performed by: ORTHOPAEDIC SURGERY

## 2022-08-02 PROCEDURE — 25010000002 DEXAMETHASONE PER 1 MG: Performed by: NURSE ANESTHETIST, CERTIFIED REGISTERED

## 2022-08-02 PROCEDURE — 25010000002 ROPIVACAINE PER 1 MG: Performed by: NURSE ANESTHETIST, CERTIFIED REGISTERED

## 2022-08-02 PROCEDURE — 25010000002 ONDANSETRON PER 1 MG: Performed by: NURSE ANESTHETIST, CERTIFIED REGISTERED

## 2022-08-02 PROCEDURE — 73502 X-RAY EXAM HIP UNI 2-3 VIEWS: CPT

## 2022-08-02 PROCEDURE — C1713 ANCHOR/SCREW BN/BN,TIS/BN: HCPCS | Performed by: ORTHOPAEDIC SURGERY

## 2022-08-02 PROCEDURE — 25010000002 FENTANYL CITRATE (PF) 50 MCG/ML SOLUTION

## 2022-08-02 PROCEDURE — 87636 SARSCOV2 & INF A&B AMP PRB: CPT | Performed by: INTERNAL MEDICINE

## 2022-08-02 PROCEDURE — 25010000002 MORPHINE PER 10 MG: Performed by: INTERNAL MEDICINE

## 2022-08-02 DEVICE — AVENIR® STANDARD STEM CEMENTED 4
Type: IMPLANTABLE DEVICE | Site: HIP | Status: FUNCTIONAL
Brand: AVENIR®

## 2022-08-02 DEVICE — HD FEM/HIP UNIV COCR 12/14TPR 28MM MIN3.5: Type: IMPLANTABLE DEVICE | Site: HIP | Status: FUNCTIONAL

## 2022-08-02 DEVICE — SUT FW 5 W .5 CIR CUT NDL 48M AR7211: Type: IMPLANTABLE DEVICE | Site: HIP | Status: FUNCTIONAL

## 2022-08-02 DEVICE — DEV CONTRL TISS STRATAFIX SPIRAL PDO BIDIR MO4 36X36CM: Type: IMPLANTABLE DEVICE | Site: HIP | Status: FUNCTIONAL

## 2022-08-02 DEVICE — IMPLANTABLE DEVICE
Type: IMPLANTABLE DEVICE | Site: HIP | Status: FUNCTIONAL
Brand: MULTIPOLAR®

## 2022-08-02 DEVICE — IMPLANTABLE DEVICE
Type: IMPLANTABLE DEVICE | Site: HIP | Status: FUNCTIONAL
Brand: REFOBACIN® BONE CEMENT R

## 2022-08-02 DEVICE — SHLL VERSYS M/BIPOL MTL OD 44MM: Type: IMPLANTABLE DEVICE | Site: HIP | Status: FUNCTIONAL

## 2022-08-02 DEVICE — CAP PRT HIP BIPOL: Type: IMPLANTABLE DEVICE | Site: HIP | Status: FUNCTIONAL

## 2022-08-02 RX ORDER — DEXAMETHASONE SODIUM PHOSPHATE 4 MG/ML
INJECTION, SOLUTION INTRA-ARTICULAR; INTRALESIONAL; INTRAMUSCULAR; INTRAVENOUS; SOFT TISSUE AS NEEDED
Status: DISCONTINUED | OUTPATIENT
Start: 2022-08-02 | End: 2022-08-02 | Stop reason: SURG

## 2022-08-02 RX ORDER — ESMOLOL HYDROCHLORIDE 10 MG/ML
INJECTION INTRAVENOUS AS NEEDED
Status: DISCONTINUED | OUTPATIENT
Start: 2022-08-02 | End: 2022-08-02 | Stop reason: SURG

## 2022-08-02 RX ORDER — IPRATROPIUM BROMIDE AND ALBUTEROL SULFATE 2.5; .5 MG/3ML; MG/3ML
3 SOLUTION RESPIRATORY (INHALATION) ONCE AS NEEDED
Status: DISCONTINUED | OUTPATIENT
Start: 2022-08-02 | End: 2022-08-02

## 2022-08-02 RX ORDER — QUETIAPINE FUMARATE 25 MG/1
25 TABLET, FILM COATED ORAL NIGHTLY
COMMUNITY

## 2022-08-02 RX ORDER — AMOXICILLIN 250 MG
2 CAPSULE ORAL 2 TIMES DAILY
Status: DISCONTINUED | OUTPATIENT
Start: 2022-08-02 | End: 2022-08-04

## 2022-08-02 RX ORDER — ATORVASTATIN CALCIUM 10 MG/1
10 TABLET, FILM COATED ORAL DAILY
COMMUNITY

## 2022-08-02 RX ORDER — ONDANSETRON 2 MG/ML
INJECTION INTRAMUSCULAR; INTRAVENOUS AS NEEDED
Status: DISCONTINUED | OUTPATIENT
Start: 2022-08-02 | End: 2022-08-02 | Stop reason: SURG

## 2022-08-02 RX ORDER — ROCURONIUM BROMIDE 10 MG/ML
INJECTION, SOLUTION INTRAVENOUS AS NEEDED
Status: DISCONTINUED | OUTPATIENT
Start: 2022-08-02 | End: 2022-08-02 | Stop reason: SURG

## 2022-08-02 RX ORDER — NALOXONE HCL 0.4 MG/ML
0.4 VIAL (ML) INJECTION AS NEEDED
Status: DISCONTINUED | OUTPATIENT
Start: 2022-08-02 | End: 2022-08-02

## 2022-08-02 RX ORDER — MIDAZOLAM HYDROCHLORIDE 1 MG/ML
0.5 INJECTION INTRAMUSCULAR; INTRAVENOUS
Status: DISCONTINUED | OUTPATIENT
Start: 2022-08-02 | End: 2022-08-02 | Stop reason: HOSPADM

## 2022-08-02 RX ORDER — LIDOCAINE HYDROCHLORIDE 10 MG/ML
INJECTION, SOLUTION EPIDURAL; INFILTRATION; INTRACAUDAL; PERINEURAL AS NEEDED
Status: DISCONTINUED | OUTPATIENT
Start: 2022-08-02 | End: 2022-08-02 | Stop reason: SURG

## 2022-08-02 RX ORDER — FAMOTIDINE 20 MG/1
20 TABLET, FILM COATED ORAL ONCE
Status: COMPLETED | OUTPATIENT
Start: 2022-08-02 | End: 2022-08-02

## 2022-08-02 RX ORDER — SODIUM CHLORIDE, SODIUM LACTATE, POTASSIUM CHLORIDE, CALCIUM CHLORIDE 600; 310; 30; 20 MG/100ML; MG/100ML; MG/100ML; MG/100ML
75 INJECTION, SOLUTION INTRAVENOUS CONTINUOUS
Status: DISCONTINUED | OUTPATIENT
Start: 2022-08-02 | End: 2022-08-04

## 2022-08-02 RX ORDER — DROPERIDOL 2.5 MG/ML
0.62 INJECTION, SOLUTION INTRAMUSCULAR; INTRAVENOUS ONCE AS NEEDED
Status: DISCONTINUED | OUTPATIENT
Start: 2022-08-02 | End: 2022-08-02

## 2022-08-02 RX ORDER — CEFAZOLIN SODIUM IN 0.9 % NACL 2 G/100 ML
2 PLASTIC BAG, INJECTION (ML) INTRAVENOUS ONCE
Status: COMPLETED | OUTPATIENT
Start: 2022-08-02 | End: 2022-08-02

## 2022-08-02 RX ORDER — ONDANSETRON 4 MG/1
4 TABLET, FILM COATED ORAL EVERY 6 HOURS PRN
Status: DISCONTINUED | OUTPATIENT
Start: 2022-08-02 | End: 2022-08-10 | Stop reason: HOSPADM

## 2022-08-02 RX ORDER — MAGNESIUM HYDROXIDE 1200 MG/15ML
LIQUID ORAL AS NEEDED
Status: DISCONTINUED | OUTPATIENT
Start: 2022-08-02 | End: 2022-08-02 | Stop reason: HOSPADM

## 2022-08-02 RX ORDER — BUSPIRONE HYDROCHLORIDE 10 MG/1
10 TABLET ORAL 3 TIMES DAILY
Status: DISCONTINUED | OUTPATIENT
Start: 2022-08-02 | End: 2022-08-10 | Stop reason: HOSPADM

## 2022-08-02 RX ORDER — PANTOPRAZOLE SODIUM 40 MG/1
40 TABLET, DELAYED RELEASE ORAL EVERY MORNING
Status: DISCONTINUED | OUTPATIENT
Start: 2022-08-02 | End: 2022-08-10 | Stop reason: HOSPADM

## 2022-08-02 RX ORDER — TRANEXAMIC ACID 10 MG/ML
1000 INJECTION, SOLUTION INTRAVENOUS ONCE
Status: COMPLETED | OUTPATIENT
Start: 2022-08-02 | End: 2022-08-02

## 2022-08-02 RX ORDER — LEVOTHYROXINE SODIUM 0.05 MG/1
50 TABLET ORAL DAILY
COMMUNITY

## 2022-08-02 RX ORDER — HYDROMORPHONE HYDROCHLORIDE 1 MG/ML
0.5 INJECTION, SOLUTION INTRAMUSCULAR; INTRAVENOUS; SUBCUTANEOUS
Status: DISCONTINUED | OUTPATIENT
Start: 2022-08-02 | End: 2022-08-02

## 2022-08-02 RX ORDER — FOLIC ACID 1 MG/1
1 TABLET ORAL DAILY
Status: DISCONTINUED | OUTPATIENT
Start: 2022-08-02 | End: 2022-08-10 | Stop reason: HOSPADM

## 2022-08-02 RX ORDER — SODIUM CHLORIDE 0.9 % (FLUSH) 0.9 %
10 SYRINGE (ML) INJECTION AS NEEDED
Status: DISCONTINUED | OUTPATIENT
Start: 2022-08-02 | End: 2022-08-10 | Stop reason: HOSPADM

## 2022-08-02 RX ORDER — LIDOCAINE HYDROCHLORIDE 10 MG/ML
0.5 INJECTION, SOLUTION EPIDURAL; INFILTRATION; INTRACAUDAL; PERINEURAL ONCE AS NEEDED
Status: DISCONTINUED | OUTPATIENT
Start: 2022-08-02 | End: 2022-08-02 | Stop reason: HOSPADM

## 2022-08-02 RX ORDER — ATORVASTATIN CALCIUM 10 MG/1
10 TABLET, FILM COATED ORAL DAILY
Status: DISCONTINUED | OUTPATIENT
Start: 2022-08-02 | End: 2022-08-10 | Stop reason: HOSPADM

## 2022-08-02 RX ORDER — SODIUM CHLORIDE 0.9 % (FLUSH) 0.9 %
10 SYRINGE (ML) INJECTION EVERY 12 HOURS SCHEDULED
Status: DISCONTINUED | OUTPATIENT
Start: 2022-08-02 | End: 2022-08-02 | Stop reason: HOSPADM

## 2022-08-02 RX ORDER — MEMANTINE HYDROCHLORIDE 10 MG/1
10 TABLET ORAL DAILY
Status: DISCONTINUED | OUTPATIENT
Start: 2022-08-02 | End: 2022-08-10 | Stop reason: HOSPADM

## 2022-08-02 RX ORDER — QUETIAPINE FUMARATE 25 MG/1
25 TABLET, FILM COATED ORAL NIGHTLY
Status: DISCONTINUED | OUTPATIENT
Start: 2022-08-02 | End: 2022-08-10 | Stop reason: HOSPADM

## 2022-08-02 RX ORDER — GLYCOPYRROLATE 0.2 MG/ML
INJECTION INTRAMUSCULAR; INTRAVENOUS AS NEEDED
Status: DISCONTINUED | OUTPATIENT
Start: 2022-08-02 | End: 2022-08-02 | Stop reason: SURG

## 2022-08-02 RX ORDER — DONEPEZIL HYDROCHLORIDE 10 MG/1
10 TABLET, FILM COATED ORAL NIGHTLY
Status: DISCONTINUED | OUTPATIENT
Start: 2022-08-02 | End: 2022-08-10 | Stop reason: HOSPADM

## 2022-08-02 RX ORDER — POTASSIUM CHLORIDE 750 MG/1
40 CAPSULE, EXTENDED RELEASE ORAL AS NEEDED
Status: DISCONTINUED | OUTPATIENT
Start: 2022-08-02 | End: 2022-08-10 | Stop reason: HOSPADM

## 2022-08-02 RX ORDER — HYDROCODONE BITARTRATE AND ACETAMINOPHEN 5; 325 MG/1; MG/1
1 TABLET ORAL ONCE AS NEEDED
Status: DISCONTINUED | OUTPATIENT
Start: 2022-08-02 | End: 2022-08-02

## 2022-08-02 RX ORDER — FENTANYL CITRATE 50 UG/ML
INJECTION, SOLUTION INTRAMUSCULAR; INTRAVENOUS AS NEEDED
Status: DISCONTINUED | OUTPATIENT
Start: 2022-08-02 | End: 2022-08-02 | Stop reason: SURG

## 2022-08-02 RX ORDER — LABETALOL HYDROCHLORIDE 5 MG/ML
20 INJECTION, SOLUTION INTRAVENOUS
Status: DISCONTINUED | OUTPATIENT
Start: 2022-08-02 | End: 2022-08-10 | Stop reason: HOSPADM

## 2022-08-02 RX ORDER — NEOSTIGMINE METHYLSULFATE 1 MG/ML
INJECTION, SOLUTION INTRAVENOUS AS NEEDED
Status: DISCONTINUED | OUTPATIENT
Start: 2022-08-02 | End: 2022-08-02 | Stop reason: SURG

## 2022-08-02 RX ORDER — SODIUM CHLORIDE 0.9 % (FLUSH) 0.9 %
3 SYRINGE (ML) INJECTION EVERY 12 HOURS SCHEDULED
Status: DISCONTINUED | OUTPATIENT
Start: 2022-08-02 | End: 2022-08-02

## 2022-08-02 RX ORDER — SODIUM CHLORIDE, SODIUM LACTATE, POTASSIUM CHLORIDE, CALCIUM CHLORIDE 600; 310; 30; 20 MG/100ML; MG/100ML; MG/100ML; MG/100ML
9 INJECTION, SOLUTION INTRAVENOUS CONTINUOUS
Status: DISCONTINUED | OUTPATIENT
Start: 2022-08-02 | End: 2022-08-10 | Stop reason: HOSPADM

## 2022-08-02 RX ORDER — FENTANYL CITRATE 50 UG/ML
50 INJECTION, SOLUTION INTRAMUSCULAR; INTRAVENOUS
Status: DISCONTINUED | OUTPATIENT
Start: 2022-08-02 | End: 2022-08-02

## 2022-08-02 RX ORDER — FOLIC ACID 1 MG/1
1 TABLET ORAL DAILY
COMMUNITY

## 2022-08-02 RX ORDER — POLYETHYLENE GLYCOL 3350 17 G/17G
17 POWDER, FOR SOLUTION ORAL DAILY PRN
Status: DISCONTINUED | OUTPATIENT
Start: 2022-08-02 | End: 2022-08-04

## 2022-08-02 RX ORDER — PROMETHAZINE HYDROCHLORIDE 25 MG/1
25 TABLET ORAL ONCE AS NEEDED
Status: DISCONTINUED | OUTPATIENT
Start: 2022-08-02 | End: 2022-08-02

## 2022-08-02 RX ORDER — LEVOTHYROXINE SODIUM 0.05 MG/1
50 TABLET ORAL DAILY
Status: DISCONTINUED | OUTPATIENT
Start: 2022-08-02 | End: 2022-08-10 | Stop reason: HOSPADM

## 2022-08-02 RX ORDER — LABETALOL HYDROCHLORIDE 5 MG/ML
5 INJECTION, SOLUTION INTRAVENOUS
Status: DISCONTINUED | OUTPATIENT
Start: 2022-08-02 | End: 2022-08-02

## 2022-08-02 RX ORDER — ACETAMINOPHEN 500 MG
1000 TABLET ORAL 3 TIMES DAILY
Status: DISPENSED | OUTPATIENT
Start: 2022-08-02 | End: 2022-08-04

## 2022-08-02 RX ORDER — DROPERIDOL 2.5 MG/ML
0.62 INJECTION, SOLUTION INTRAMUSCULAR; INTRAVENOUS
Status: DISCONTINUED | OUTPATIENT
Start: 2022-08-02 | End: 2022-08-02

## 2022-08-02 RX ORDER — HYDRALAZINE HYDROCHLORIDE 20 MG/ML
5 INJECTION INTRAMUSCULAR; INTRAVENOUS
Status: DISCONTINUED | OUTPATIENT
Start: 2022-08-02 | End: 2022-08-02

## 2022-08-02 RX ORDER — ROPIVACAINE HYDROCHLORIDE 2 MG/ML
INJECTION, SOLUTION EPIDURAL; INFILTRATION; PERINEURAL
Status: COMPLETED | OUTPATIENT
Start: 2022-08-02 | End: 2022-08-02

## 2022-08-02 RX ORDER — FENTANYL CITRATE 50 UG/ML
INJECTION, SOLUTION INTRAMUSCULAR; INTRAVENOUS
Status: COMPLETED
Start: 2022-08-02 | End: 2022-08-02

## 2022-08-02 RX ORDER — MORPHINE SULFATE 2 MG/ML
2 INJECTION, SOLUTION INTRAMUSCULAR; INTRAVENOUS
Status: DISPENSED | OUTPATIENT
Start: 2022-08-02 | End: 2022-08-09

## 2022-08-02 RX ORDER — MELOXICAM 15 MG/1
15 TABLET ORAL DAILY
COMMUNITY
End: 2022-08-10 | Stop reason: HOSPADM

## 2022-08-02 RX ORDER — SODIUM CHLORIDE 0.9 % (FLUSH) 0.9 %
3-10 SYRINGE (ML) INJECTION AS NEEDED
Status: DISCONTINUED | OUTPATIENT
Start: 2022-08-02 | End: 2022-08-02

## 2022-08-02 RX ORDER — ONDANSETRON 2 MG/ML
4 INJECTION INTRAMUSCULAR; INTRAVENOUS EVERY 6 HOURS PRN
Status: DISCONTINUED | OUTPATIENT
Start: 2022-08-02 | End: 2022-08-10 | Stop reason: HOSPADM

## 2022-08-02 RX ORDER — PROMETHAZINE HYDROCHLORIDE 25 MG/1
25 SUPPOSITORY RECTAL ONCE AS NEEDED
Status: DISCONTINUED | OUTPATIENT
Start: 2022-08-02 | End: 2022-08-02

## 2022-08-02 RX ORDER — SODIUM CHLORIDE 0.9 % (FLUSH) 0.9 %
10 SYRINGE (ML) INJECTION AS NEEDED
Status: DISCONTINUED | OUTPATIENT
Start: 2022-08-02 | End: 2022-08-02 | Stop reason: HOSPADM

## 2022-08-02 RX ORDER — SODIUM CHLORIDE 0.9 % (FLUSH) 0.9 %
10 SYRINGE (ML) INJECTION EVERY 12 HOURS SCHEDULED
Status: DISCONTINUED | OUTPATIENT
Start: 2022-08-02 | End: 2022-08-10 | Stop reason: HOSPADM

## 2022-08-02 RX ORDER — OMEPRAZOLE 20 MG/1
40 CAPSULE, DELAYED RELEASE ORAL DAILY
COMMUNITY

## 2022-08-02 RX ORDER — EPHEDRINE SULFATE 50 MG/ML
INJECTION, SOLUTION INTRAVENOUS AS NEEDED
Status: DISCONTINUED | OUTPATIENT
Start: 2022-08-02 | End: 2022-08-02 | Stop reason: SURG

## 2022-08-02 RX ORDER — BISACODYL 5 MG/1
5 TABLET, DELAYED RELEASE ORAL DAILY PRN
Status: DISCONTINUED | OUTPATIENT
Start: 2022-08-02 | End: 2022-08-04

## 2022-08-02 RX ORDER — PROPOFOL 10 MG/ML
VIAL (ML) INTRAVENOUS AS NEEDED
Status: DISCONTINUED | OUTPATIENT
Start: 2022-08-02 | End: 2022-08-02 | Stop reason: SURG

## 2022-08-02 RX ORDER — LISINOPRIL 40 MG/1
40 TABLET ORAL DAILY
COMMUNITY
End: 2022-08-10 | Stop reason: HOSPADM

## 2022-08-02 RX ORDER — ONDANSETRON 2 MG/ML
4 INJECTION INTRAMUSCULAR; INTRAVENOUS ONCE AS NEEDED
Status: DISCONTINUED | OUTPATIENT
Start: 2022-08-02 | End: 2022-08-02

## 2022-08-02 RX ORDER — MEMANTINE HYDROCHLORIDE AND DONEPEZIL HYDROCHLORIDE 28; 10 MG/1; MG/1
CAPSULE ORAL DAILY
COMMUNITY

## 2022-08-02 RX ORDER — POTASSIUM CHLORIDE 1.5 G/1.77G
40 POWDER, FOR SOLUTION ORAL AS NEEDED
Status: DISCONTINUED | OUTPATIENT
Start: 2022-08-02 | End: 2022-08-10 | Stop reason: HOSPADM

## 2022-08-02 RX ORDER — TRAMADOL HYDROCHLORIDE 50 MG/1
50 TABLET ORAL EVERY 6 HOURS PRN
Status: DISPENSED | OUTPATIENT
Start: 2022-08-02 | End: 2022-08-09

## 2022-08-02 RX ORDER — BUSPIRONE HYDROCHLORIDE 10 MG/1
10 TABLET ORAL 3 TIMES DAILY
COMMUNITY

## 2022-08-02 RX ORDER — BUPIVACAINE HCL/0.9 % NACL/PF 0.1 %
2 PLASTIC BAG, INJECTION (ML) EPIDURAL EVERY 8 HOURS
Status: COMPLETED | OUTPATIENT
Start: 2022-08-02 | End: 2022-08-03

## 2022-08-02 RX ORDER — FAMOTIDINE 10 MG/ML
20 INJECTION, SOLUTION INTRAVENOUS ONCE
Status: DISCONTINUED | OUTPATIENT
Start: 2022-08-02 | End: 2022-08-02 | Stop reason: HOSPADM

## 2022-08-02 RX ORDER — BISACODYL 10 MG
10 SUPPOSITORY, RECTAL RECTAL DAILY PRN
Status: DISCONTINUED | OUTPATIENT
Start: 2022-08-02 | End: 2022-08-04

## 2022-08-02 RX ADMIN — ACETAMINOPHEN 1000 MG: 500 TABLET ORAL at 20:05

## 2022-08-02 RX ADMIN — Medication 10 ML: at 20:06

## 2022-08-02 RX ADMIN — EPHEDRINE SULFATE 5 MG: 50 INJECTION INTRAVENOUS at 14:49

## 2022-08-02 RX ADMIN — TRANEXAMIC ACID 1000 MG: 10 INJECTION, SOLUTION INTRAVENOUS at 13:55

## 2022-08-02 RX ADMIN — SODIUM CHLORIDE, POTASSIUM CHLORIDE, SODIUM LACTATE AND CALCIUM CHLORIDE: 600; 310; 30; 20 INJECTION, SOLUTION INTRAVENOUS at 15:15

## 2022-08-02 RX ADMIN — FENTANYL CITRATE 50 MCG: 50 INJECTION, SOLUTION INTRAMUSCULAR; INTRAVENOUS at 16:29

## 2022-08-02 RX ADMIN — SODIUM CHLORIDE, POTASSIUM CHLORIDE, SODIUM LACTATE AND CALCIUM CHLORIDE: 600; 310; 30; 20 INJECTION, SOLUTION INTRAVENOUS at 13:31

## 2022-08-02 RX ADMIN — BUSPIRONE HYDROCHLORIDE 10 MG: 10 TABLET ORAL at 20:05

## 2022-08-02 RX ADMIN — LIDOCAINE HYDROCHLORIDE 50 MG: 10 INJECTION, SOLUTION EPIDURAL; INFILTRATION; INTRACAUDAL; PERINEURAL at 13:35

## 2022-08-02 RX ADMIN — FAMOTIDINE 20 MG: 20 TABLET ORAL at 13:24

## 2022-08-02 RX ADMIN — EPHEDRINE SULFATE 10 MG: 50 INJECTION INTRAVENOUS at 14:32

## 2022-08-02 RX ADMIN — MORPHINE SULFATE 2 MG: 2 INJECTION, SOLUTION INTRAMUSCULAR; INTRAVENOUS at 11:25

## 2022-08-02 RX ADMIN — EPHEDRINE SULFATE 15 MG: 50 INJECTION INTRAVENOUS at 15:01

## 2022-08-02 RX ADMIN — MORPHINE SULFATE 2 MG: 2 INJECTION, SOLUTION INTRAMUSCULAR; INTRAVENOUS at 05:05

## 2022-08-02 RX ADMIN — TRANEXAMIC ACID 1000 MG: 10 INJECTION, SOLUTION INTRAVENOUS at 15:04

## 2022-08-02 RX ADMIN — HYDROMORPHONE HYDROCHLORIDE 0.5 MG: 1 INJECTION, SOLUTION INTRAMUSCULAR; INTRAVENOUS; SUBCUTANEOUS at 16:59

## 2022-08-02 RX ADMIN — ESMOLOL HYDROCHLORIDE 30 MG: 10 INJECTION, SOLUTION INTRAVENOUS at 15:25

## 2022-08-02 RX ADMIN — SODIUM CHLORIDE, POTASSIUM CHLORIDE, SODIUM LACTATE AND CALCIUM CHLORIDE 75 ML/HR: 600; 310; 30; 20 INJECTION, SOLUTION INTRAVENOUS at 04:09

## 2022-08-02 RX ADMIN — GLYCOPYRROLATE 0.4 MG: 0.2 INJECTION INTRAMUSCULAR; INTRAVENOUS at 15:08

## 2022-08-02 RX ADMIN — FENTANYL CITRATE 100 MCG: 50 INJECTION, SOLUTION INTRAMUSCULAR; INTRAVENOUS at 13:35

## 2022-08-02 RX ADMIN — EPHEDRINE SULFATE 5 MG: 50 INJECTION INTRAVENOUS at 15:03

## 2022-08-02 RX ADMIN — ROCURONIUM BROMIDE 40 MG: 10 INJECTION, SOLUTION INTRAVENOUS at 13:35

## 2022-08-02 RX ADMIN — QUETIAPINE FUMARATE 25 MG: 25 TABLET ORAL at 20:05

## 2022-08-02 RX ADMIN — DEXAMETHASONE SODIUM PHOSPHATE 8 MG: 4 INJECTION, SOLUTION INTRA-ARTICULAR; INTRALESIONAL; INTRAMUSCULAR; INTRAVENOUS; SOFT TISSUE at 14:01

## 2022-08-02 RX ADMIN — ROCURONIUM BROMIDE 10 MG: 10 INJECTION, SOLUTION INTRAVENOUS at 14:13

## 2022-08-02 RX ADMIN — CEFAZOLIN 2 G: 10 INJECTION, POWDER, FOR SOLUTION INTRAVENOUS at 13:31

## 2022-08-02 RX ADMIN — FENTANYL CITRATE 50 MCG: 50 INJECTION, SOLUTION INTRAMUSCULAR; INTRAVENOUS at 16:44

## 2022-08-02 RX ADMIN — PROPOFOL 100 MG: 10 INJECTION, EMULSION INTRAVENOUS at 13:35

## 2022-08-02 RX ADMIN — ROPIVACAINE HYDROCHLORIDE 50 ML: 2 INJECTION, SOLUTION EPIDURAL; INFILTRATION at 13:38

## 2022-08-02 RX ADMIN — DONEPEZIL HYDROCHLORIDE 10 MG: 10 TABLET, FILM COATED ORAL at 20:05

## 2022-08-02 RX ADMIN — NEOSTIGMINE METHYLSULFATE 3 MG: 0.5 INJECTION INTRAVENOUS at 15:08

## 2022-08-02 RX ADMIN — CEFAZOLIN 2 G: 10 INJECTION, POWDER, FOR SOLUTION INTRAVENOUS at 21:29

## 2022-08-02 RX ADMIN — SENNOSIDES AND DOCUSATE SODIUM 2 TABLET: 50; 8.6 TABLET ORAL at 20:05

## 2022-08-02 RX ADMIN — ONDANSETRON 4 MG: 2 INJECTION INTRAMUSCULAR; INTRAVENOUS at 15:04

## 2022-08-02 NOTE — CONSULTS
c     Orthopedic Consult      Patient: Patricia Obrien    Date of Admission: 8/2/2022  3:17 AM    YOB: 1935    Medical Record Number: [id number]    Attending Physician: Hemanth Brady DO    Consulting Physician: Jaspal Harrington Jr., MD      Chief Complaints: Hip fracture (HCC) [S72.009A]      History of Present Illness: 87 y.o. female admitted to [facility] with Hip fracture (HCC) [S72.009A]. I was consulted for further evaluation and treatment of right hip fracture. Onset of symptoms was abrupt starting 1 day ago.  Symptoms are associated with right hip pain.  Symptoms are aggravated by range of motion.   Symptoms improve with rest and elevation. This is an 88 yo female with dementia who ambulates occ with a cane who presents after mechanical fall with resultant R hip fracture. She denies any motor deficits/paresthesias. No other pain elsewhere.        Allergies   Allergen Reactions   • Sulfa Antibiotics Other (See Comments)     unknown        Home Medications:  Medications Prior to Admission   Medication Sig Dispense Refill Last Dose   • atorvastatin (LIPITOR) 10 MG tablet Take 10 mg by mouth Daily.      • busPIRone (BUSPAR) 10 MG tablet Take 10 mg by mouth 3 (Three) Times a Day.      • folic acid (FOLVITE) 1 MG tablet Take 1 mg by mouth Daily.      • levothyroxine (SYNTHROID, LEVOTHROID) 50 MCG tablet Take 50 mcg by mouth Daily.      • linaclotide (Linzess) 72 MCG capsule capsule Take 72 mcg by mouth As Needed.      • lisinopril (PRINIVIL,ZESTRIL) 40 MG tablet Take 40 mg by mouth Daily.      • meloxicam (MOBIC) 15 MG tablet Take 15 mg by mouth Daily.      • Memantine HCl-Donepezil HCl (Namzaric) 28-10 MG capsule sustained-release 24 hr Take  by mouth Daily.      • Mirabegron ER (MYRBETRIQ) 25 MG tablet sustained-release 24 hour 24 hr tablet Take 25 mg by mouth Every Night.      • omeprazole (priLOSEC) 20 MG capsule Take 40 mg by mouth Every Night.      • QUEtiapine (SEROquel) 25 MG tablet Take  "25 mg by mouth Every Night.      • sertraline (ZOLOFT) 50 MG tablet Take 50 mg by mouth Daily.            No past medical history on file.   No past surgical history on file.     Social History     Occupational History   • Not on file   Tobacco Use   • Smoking status: Not on file   • Smokeless tobacco: Not on file   Substance and Sexual Activity   • Alcohol use: Not on file   • Drug use: Not on file   • Sexual activity: Not on file      Social History     Social History Narrative   • Not on file      No family history on file.      Review of Systems: UTO due to dementia    Physical Exam: 87 y.o. female  General Appearance:    Alert, cooperative, in no acute distress                   Vitals:    08/02/22 0300 08/02/22 0727   BP: 143/95 175/88   BP Location: Left arm Left arm   Patient Position: Lying Lying   Pulse: 66 72   Resp: 16 16   Temp: 98.1 °F (36.7 °C) 98.9 °F (37.2 °C)   TempSrc: Oral Oral   Weight: 56.2 kg (124 lb)    Height: 149.9 cm (59\")         Head:    Normocephalic, without obvious abnormality, atraumatic      Right Upper Extremity:  No obvious deformity, painless ROM shoulder, elbow, wrist, no joint instability, normal distal strength and sensation to light touch, skin intact without cyanosis, clubbing, edema; +2 radial pulse  Left Upper Extremity:  No obvious deformity, painless ROM shoulder, elbow, wrist, no joint instability, normal distal strength and sensation to light touch, skin intact without cyanosis, clubbing, edema; +2 radial pulse  Right Lower Extremity:  Shortened RLE compared to L with R hip pain, normal distal strength and sensation to light touch, skin intact without cyanosis, clubbing, edema; +2 dorsalis pedis pulse  Left Lower Extremity:  No obvious deformity, painless ROM hip, knee, ankle, compartments soft, normal distal strength and sensation to light touch, skin intact without cyanosis, clubbing, edema; +2 dorsalis pedis pulse         Diagnostic Tests:    I have reviewed the " labs, radiology results and diagnostic studies:AP pelvis and lateral of R hip demonstrates displaced transcervical femoral neck fracture with no significant arthrosis of hip joint    Results from last 7 days   Lab Units 08/01/22  1835   WBC K/uL 8.5   HEMOGLOBIN g/dL 11.6   PLATELETS K/uL 146*     Results from last 7 days   Lab Units 08/02/22  0446   SODIUM mmol/L 143   POTASSIUM mmol/L 4.1   CO2 mmol/L 22.0   CREATININE mg/dL 1.00   GLUCOSE mg/dL 117*           Assessment: R displaced femoral neck fracture  Patient Active Problem List   Diagnosis   • Hip fracture (HCC)   • Dementia (HCC)   • HTN (hypertension)   • Hypothyroidism (acquired)           Plan:  The patient voiced understanding of the risks, benefits, and alternative forms of treatment that were discussed and the patient consents to proceed with right hip hemiarthroplasty. The risks and benefits were discussed with the patient to include, but not limited to: bleeding, infection, nerve injury, failure of fixation, need for further procedures, loss of limb or life.  She and her daughter understand these risks and wish to proceed with procedure. They understand the alternatives and do wish to proceed with surgical mgmt.     NPO  Posted/consented/marked  To OR today for R hip hemiarthroplasty            Jaspal Harrington Jr., MD  08/02/22  09:44 EDT

## 2022-08-02 NOTE — ED NOTES
Dispatch called for transport.  Pt and family aware and agreeable for transport     Padmini [facility]En[facility]  08/02/22 9557

## 2022-08-02 NOTE — ED NOTES
Pt resting, f/c patent and draining. pts family has given pt some food and sips.  They are aware pt to be npo after midnight     Nash Vleez RN  08/01/22 4828

## 2022-08-02 NOTE — ANESTHESIA POSTPROCEDURE EVALUATION
Patient: Patricia Obrien    Procedure Summary     Date: 08/02/22 Room / Location:  [facility] [address] [place] [address] [facility]    Anesthesia Start: 1331 Anesthesia Stop: 1535    Procedure: HIP HEMIARTHROPLASTY (Right Hip) Diagnosis:     Surgeons: Jaspal Harrington Jr., MD Provider: Swetha Watson MD    Anesthesia Type: general with block ASA Status: 3          Anesthesia Type: general with block    Vitals  No vitals data found for the desired time range.          Post Anesthesia Care and Evaluation    Patient location during evaluation: PACU  Patient participation: complete - patient participated  Level of consciousness: sleepy but conscious  Pain score: 0  Pain management: adequate    Airway patency: patent  Anesthetic complications: No anesthetic complications  PONV Status: none  Cardiovascular status: hemodynamically stable and acceptable  Respiratory status: nonlabored ventilation, acceptable and nasal cannula  Hydration status: acceptable

## 2022-08-02 NOTE — ED NOTES
[address] [facility] called to get results on pt's covid test.  [facility] states they are working on a bed at this time.       Donya Drivers  08/01/22 2052

## 2022-08-02 NOTE — ANESTHESIA PREPROCEDURE EVALUATION
Anesthesia Evaluation     Patient summary reviewed and Nursing notes reviewed   no history of anesthetic complications:  NPO Solid Status: > 8 hours  NPO Liquid Status: > 8 hours           Airway   Mallampati: II  TM distance: >3 FB  Neck ROM: full  Dental    (+) edentulous    Pulmonary - negative pulmonary ROS and normal exam   Cardiovascular - normal exam    ECG reviewed    (+) hypertension, hyperlipidemia,   (-) dysrhythmias, angina    ROS comment: Recent negative cath 2 weeks ago  Current heart monitors for 1 month secondary to vasovagal episodes    EKG IRBBB    Neuro/Psych  (+) syncope (vasovagal), dementia,    (-) seizures, TIA  GI/Hepatic/Renal/Endo    (+)   thyroid problem hypothyroidism  (-) GERD, hepatitis, liver disease, no renal disease, diabetes    Musculoskeletal         ROS comment: Femoral neck fracture right  Abdominal    Substance History      OB/GYN          Other                        Anesthesia Plan    ASA 3     general with block     (Single shot FICB)  intravenous induction     Anesthetic plan, risks, benefits, and alternatives have been provided, discussed and informed consent has been obtained with: patient and other.    Plan discussed with CRNA.        CODE STATUS:    Level Of Support Discussed With: Next of Kin (If No Surrogate)  Code Status (Patient has no pulse and is not breathing): CPR (Attempt to Resuscitate)  Medical Interventions (Patient has pulse or is breathing): Full Support

## 2022-08-02 NOTE — H&P
[facility]  HISTORY AND PHYSICAL    Patient Name: Patricia Obrien  : 1935  MRN: [id number]  Primary Care Physician: Benito, No Known  Date of admission: 2022      Subjective   Subjective     Chief Complaint:  Hip fracture    HPI:  Patricia Obrien is a 87 y.o. female with history of dementia, hypotension, hypothyroidism who presents from [facility] for hip fracture.  Patient unable to provide history due to her dementia.  History obtained from daughter at the bedside.  Patient's dog jumped up on her and she stepped backward and fell.  Patient is denying any pain currently.  She has no other complaints.  CBC from outside hospital was unremarkable other than mild anemia of 11.6.  BMP showed a creatinine of 1.2 but otherwise was unremarkable.  UA was unremarkable.  Vital signs were within normal limits.  X-ray of the right hip showed a femoral neck fracture.      Review of Systems   Constitutional: Negative for fever.   HENT: Negative for trouble swallowing.    Eyes: Negative for visual disturbance.   Respiratory: Negative for shortness of breath.    Cardiovascular: Negative for chest pain and leg swelling.   Gastrointestinal: Negative for diarrhea and nausea.   Genitourinary: Negative for dysuria.   Musculoskeletal: Negative.    Skin: Negative for rash.   Neurological: Negative for dizziness and weakness.          Personal History     No past medical history on file.          No past surgical history on file.    Family History: Family denies any significant medical history.  Otherwise pertinent FHx was reviewed and unremarkable.     Social History:  Patient does not drink alcohol or smoke.  Social History     Social History Narrative   • Not on file       Medications:  Available home medication information reviewed.  Medications Prior to Admission   Medication Sig Dispense Refill Last Dose   • atorvastatin (LIPITOR) 10 MG tablet Take 10 mg by mouth Daily.       • busPIRone (BUSPAR) 10 MG tablet Take 10 mg by mouth 3 (Three) Times a Day.      • folic acid (FOLVITE) 1 MG tablet Take 1 mg by mouth Daily.      • levothyroxine (SYNTHROID, LEVOTHROID) 50 MCG tablet Take 50 mcg by mouth Daily.      • linaclotide (Linzess) 72 MCG capsule capsule Take 72 mcg by mouth As Needed.      • lisinopril (PRINIVIL,ZESTRIL) 40 MG tablet Take 40 mg by mouth Daily.      • meloxicam (MOBIC) 15 MG tablet Take 15 mg by mouth Daily.      • Memantine HCl-Donepezil HCl (Namzaric) 28-10 MG capsule sustained-release 24 hr Take  by mouth Daily.      • Mirabegron ER (MYRBETRIQ) 25 MG tablet sustained-release 24 hour 24 hr tablet Take 25 mg by mouth Every Night.      • omeprazole (priLOSEC) 20 MG capsule Take 40 mg by mouth Every Night.      • QUEtiapine (SEROquel) 25 MG tablet Take 25 mg by mouth Every Night.      • sertraline (ZOLOFT) 50 MG tablet Take 50 mg by mouth Daily.          Allergies   Allergen Reactions   • Sulfa Antibiotics Other (See Comments)     unknown       Objective   Objective     Vital Signs:   Temp:  [98.1 °F (36.7 °C)] 98.1 °F (36.7 °C)  Heart Rate:  [66] 66  Resp:  [16] 16  BP: (143)/(95) 143/95       Physical Exam   Constitutional: Awake, alert  Eyes: PERRLA, sclerae anicteric, no conjunctival injection  HENT: NCAT, mucous membranes moist  Neck: Supple, trachea midline  Respiratory: Clear to auscultation bilaterally, nonlabored respirations   Cardiovascular: RRR, no murmurs, rubs, or gallops, palpable pulses in right lower extremity  Gastrointestinal: Positive bowel sounds, soft, nontender, nondistended  Musculoskeletal: 1+ lower extremity edema, right hip without any bruising noted  Psychiatric: Appropriate affect, cooperative  Neurologic: Oriented to person only, talking about her prior job at [employer], no gross focal neurological deficits  Skin: No rashes        Result Review:  I have personally reviewed the results from the time of this admission to 8/2/2022 04:05 EDT  and agree with these findings:  [x]  Laboratory list / accordion  []  Microbiology  []  Radiology  []  EKG/Telemetry   []  Cardiology/Vascular   []  Pathology  [x]  Old records  []  Other:         LAB RESULTS:      Lab 08/01/22  1835   WBC 8.5   HEMOGLOBIN 11.6   HEMATOCRIT 34.8*   PLATELETS 146*   NEUTROS ABS 7.1   IMMATURE GRANS (ABS) 0.1   LYMPHS ABS 0.9*   MONOS ABS 0.3   EOS ABS 0.0   MCV 93.5                             UA    Urinalysis 7/9/22 7/9/22 8/1/22 8/1/22    1400 1400 1920 1920   Specific Gravity, UA  1.020  >=1.030   Blood, UA  TRACE-INTACT (A)  Negative   Leukocytes, UA  LARGE (A)  Negative   Nitrite, UA  Negative  Negative   RBC, UA None seen  None seen    Bacteria, UA 3+ (A)  Rare (A)    (A) Abnormal value              Microbiology Results (last 10 days)     ** No results found for the last 240 hours. **          XR Outside Films    Result Date: 8/2/2022  This procedure was auto-finalized with no dictation required.    XR Outside Films    Result Date: 8/2/2022  This procedure was auto-finalized with no dictation required.    XR CHEST PORTABLE    Result Date: 8/1/2022  PORTABLE CHEST Indication: Preoperative Comparison: 7/3/22 Findings: The cardiomediastinal silhouette is within normal limits. No focal consolidation, pleural effusion or pneumothorax. Bones are unremarkable.    Impression: No acute cardiopulmonary findings.    XR HIP RIGHT (2-3 VIEWS)    Result Date: 8/1/2022  EXAM: PELVIS AND RIGHT HIP 3 VIEWS INDICATION: Fall. Right hip pain. COMPARISON: None FINDINGS: Right femoral neck basicervical fracture with displacement and varus impaction deformity. No definite intertrochanteric extension. No additional fractures identified. The right hip is congruent. Mild osteoarthritis is present.    Impression: Right femoral neck bases cervical fracture with displacement and varus impaction. No definite intertrochanteric extension but consider CT if needed for preoperative planning.          Assessment  & Plan   Assessment & Plan     Active Hospital Problems    Diagnosis  POA   • Hip fracture (HCC) [S72.009A]  Yes   • Dementia (HCC) [F03.90]  Yes   • HTN (hypertension) [I10]  Yes   • Hypothyroidism (acquired) [E03.9]  Yes       Patricia Obrien is a 87 y.o. female with history of dementia, hypotension, hypothyroidism who presents from [facility] for hip fracture.    Right femoral neck fracture  Mechanical fall  -History of mechanical nature  - Noted on x-ray at outside hospital  -Outside hospital labs unremarkable, we will repeat here.  -We will upload x-rays for review  - Check EKG  -Start scheduled Tylenol, as needed tramadol  -PT OT following surgery  -N.p.o.  -Ortho consult in the a.m.    Dementia  -At high risk for encephalopathy  - continue memantine, donepezil    Hyperlipidemia: Continue atorvastatin    Anxiety depression: Continue BuSpar, zoloft    Hypothyroidism: continue Synthroid    HTN:  - holding lisinpril for surgery, resume following surgery        DVT prophylaxis:  Holding for surgery      CODE STATUS:  Full code  There are no questions and answers to display.         Chelsey Campbell MD  08/02/22

## 2022-08-02 NOTE — ED NOTES
Elma Betancourt from [facility] called to advise they would call to get report at midnight.       Issac Tapia  08/01/22 1343

## 2022-08-02 NOTE — ANESTHESIA PROCEDURE NOTES
Airway  Urgency: elective    Date/Time: 8/2/2022 1:38 PM  Airway not difficult    General Information and Staff    Patient location during procedure: OR  CRNA/CAA: Paula Brooks CRNA    Indications and Patient Condition  Indications for airway management: airway protection    Preoxygenated: yes  MILS not maintained throughout  Mask difficulty assessment: 1 - vent by mask    Final Airway Details  Final airway type: endotracheal airway      Successful airway: ETT  Cuffed: yes   Successful intubation technique: direct laryngoscopy  Endotracheal tube insertion site: oral  Blade: Indira  Blade size: 3  ETT size (mm): 6.5  Cormack-Lehane Classification: grade I - full view of glottis  Placement verified by: chest auscultation and capnometry   Cuff volume (mL): 6  Measured from: lips  ETT/EBT  to lips (cm): 19  Number of attempts at approach: 1  Assessment: lips, teeth, and gum same as pre-op and atraumatic intubation    Additional Comments  Negative epigastric sounds, Breath sound equal bilaterally with symmetric chest rise and fall

## 2022-08-02 NOTE — CASE MANAGEMENT/SOCIAL WORK
Continued Stay Note  [facility]     Patient Name: Patricia Obrien  MRN: [id number]  Today's Date: 8/2/2022    Admit Date: 8/2/2022     Discharge Plan     Row Name 08/02/22 3450       Plan    Plan Rehab    Plan Comments Patient in OR. I spoke with her daughters Leslie and Roxane. Patient resides on a single level home with daughter and POA, Sandra. She has been independent with ADL's, use of cane. No other DME in use. Roxane had mentioned a referral to [facility], which I will make to Zaina with [facility].   Patient has [employer]. Her PCP is aKy Liao.    Final Discharge Disposition Code 03 - skilled nursing facility (SNF)               Discharge Codes    No documentation.               Expected Discharge Date and Time     Expected Discharge Date Expected Discharge Time    Aug 5, 2022             Teresa Newberry RN

## 2022-08-02 NOTE — PLAN OF CARE
Goal Outcome Evaluation:     Pt arrived via EMS around 3am, VSS, RA, Alert, Oriented to self, situation, [place] in place, NPO, 22G right wrist, daughter at bedside, pain controlled IV meds

## 2022-08-02 NOTE — ED NOTES
ems arrived to transport pt to [facility]. Carlita notified at [facility] per her request. Pt out of facility via [address]  08/02/22 7760

## 2022-08-02 NOTE — PAYOR COMM NOTE
"Amie Miguel RN   Phone [phone]  Fax [phone]    Yovana Hall (87 y.o. Female)             Date of Birth   1935    Social Security Number       Address   [address] [place] [address] [place] [address]    Home Phone   [phone]    MRN   [id number]       Congregation   Non-Episcopalian    Marital Status                               Admission Date   22    Admission Type   Elective    Admitting Provider   Hemanth Brady DO    Attending Provider   Hemanth Brady DO    Department, Room/Bed   [facility] 3G, S354/1       Discharge Date       Discharge Disposition       Discharge Destination                               Attending Provider: Hemanth Brady DO    Allergies: Sulfa Antibiotics    Isolation: None   Infection: None   Code Status: CPR   Advance Care Planning Activity    Ht: 149.9 cm (59\")   Wt: 56.2 kg (124 lb)    Admission Cmt: None   Principal Problem: None                Active Insurance as of 2022     Primary Coverage     Payor Plan Insurance Group Employer/Plan Group    ANTHEM MEDICARE REPLACEMENT ANTH MEDICARE ADVANTAGE [id number]     Payor Plan Address Payor Plan Phone Number Payor Plan Fax Number Effective Dates    [address] [phone]  2020 - None Entered    [facility] [id number]       Subscriber Name Subscriber Birth Date Member ID       YOVANA HALL 1935 [id number]                 Emergency Contacts      (Rel.) Home Phone Work Phone Mobile Phone    eric perkins (Daughter) -- -- [phone]    Aroldolito (Grandchild) -- -- [phone]               History & Physical      Chelsey Campbell MD at 22 0338              [facility] Medicine Services  HISTORY AND PHYSICAL    Patient Name: Yovana Hall  : 1935  MRN: [id number]  Primary Care Physician: Provider, No Known  Date of admission: 2022      Subjective   Subjective     Chief Complaint:  Hip fracture    HPI:  Yovana Hall is a " 87 y.o. female with history of dementia, hypotension, hypothyroidism who presents from [facility] for hip fracture.  Patient unable to provide history due to her dementia.  History obtained from daughter at the bedside.  Patient's dog jumped up on her and she stepped backward and fell.  Patient is denying any pain currently.  She has no other complaints.  CBC from outside hospital was unremarkable other than mild anemia of 11.6.  BMP showed a creatinine of 1.2 but otherwise was unremarkable.  UA was unremarkable.  Vital signs were within normal limits.  X-ray of the right hip showed a femoral neck fracture.      Review of Systems   Constitutional: Negative for fever.   HENT: Negative for trouble swallowing.    Eyes: Negative for visual disturbance.   Respiratory: Negative for shortness of breath.    Cardiovascular: Negative for chest pain and leg swelling.   Gastrointestinal: Negative for diarrhea and nausea.   Genitourinary: Negative for dysuria.   Musculoskeletal: Negative.    Skin: Negative for rash.   Neurological: Negative for dizziness and weakness.          Personal History     No past medical history on file.          No past surgical history on file.    Family History: Family denies any significant medical history.  Otherwise pertinent FHx was reviewed and unremarkable.     Social History:  Patient does not drink alcohol or smoke.  Social History     Social History Narrative   • Not on file       Medications:  Available home medication information reviewed.  Medications Prior to Admission   Medication Sig Dispense Refill Last Dose   • atorvastatin (LIPITOR) 10 MG tablet Take 10 mg by mouth Daily.      • busPIRone (BUSPAR) 10 MG tablet Take 10 mg by mouth 3 (Three) Times a Day.      • folic acid (FOLVITE) 1 MG tablet Take 1 mg by mouth Daily.      • levothyroxine (SYNTHROID, LEVOTHROID) 50 MCG tablet Take 50 mcg by mouth Daily.      • linaclotide (Linzess) 72 MCG capsule capsule Take 72 mcg by mouth  As Needed.      • lisinopril (PRINIVIL,ZESTRIL) 40 MG tablet Take 40 mg by mouth Daily.      • meloxicam (MOBIC) 15 MG tablet Take 15 mg by mouth Daily.      • Memantine HCl-Donepezil HCl (Namzaric) 28-10 MG capsule sustained-release 24 hr Take  by mouth Daily.      • Mirabegron ER (MYRBETRIQ) 25 MG tablet sustained-release 24 hour 24 hr tablet Take 25 mg by mouth Every Night.      • omeprazole (priLOSEC) 20 MG capsule Take 40 mg by mouth Every Night.      • QUEtiapine (SEROquel) 25 MG tablet Take 25 mg by mouth Every Night.      • sertraline (ZOLOFT) 50 MG tablet Take 50 mg by mouth Daily.          Allergies   Allergen Reactions   • Sulfa Antibiotics Other (See Comments)     unknown       Objective   Objective     Vital Signs:   Temp:  [98.1 °F (36.7 °C)] 98.1 °F (36.7 °C)  Heart Rate:  [66] 66  Resp:  [16] 16  BP: (143)/(95) 143/95       Physical Exam   Constitutional: Awake, alert  Eyes: PERRLA, sclerae anicteric, no conjunctival injection  HENT: NCAT, mucous membranes moist  Neck: Supple, trachea midline  Respiratory: Clear to auscultation bilaterally, nonlabored respirations   Cardiovascular: RRR, no murmurs, rubs, or gallops, palpable pulses in right lower extremity  Gastrointestinal: Positive bowel sounds, soft, nontender, nondistended  Musculoskeletal: 1+ lower extremity edema, right hip without any bruising noted  Psychiatric: Appropriate affect, cooperative  Neurologic: Oriented to person only, talking about her prior job at [employer], no gross focal neurological deficits  Skin: No rashes        Result Review:  I have personally reviewed the results from the time of this admission to 8/2/2022 04:05 EDT and agree with these findings:  [x]  Laboratory list / accordion  []  Microbiology  []  Radiology  []  EKG/Telemetry   []  Cardiology/Vascular   []  Pathology  [x]  Old records  []  Other:         LAB RESULTS:      Lab 08/01/22  1835   WBC 8.5   HEMOGLOBIN 11.6   HEMATOCRIT 34.8*   PLATELETS 146*    NEUTROS ABS 7.1   IMMATURE GRANS (ABS) 0.1   LYMPHS ABS 0.9*   MONOS ABS 0.3   EOS ABS 0.0   MCV 93.5                             UA    Urinalysis 7/9/22 7/9/22 8/1/22 8/1/22    1400 1400 1920 1920   Specific Gravity, UA  1.020  >=1.030   Blood, UA  TRACE-INTACT (A)  Negative   Leukocytes, UA  LARGE (A)  Negative   Nitrite, UA  Negative  Negative   RBC, UA None seen  None seen    Bacteria, UA 3+ (A)  Rare (A)    (A) Abnormal value              Microbiology Results (last 10 days)     ** No results found for the last 240 hours. **          XR Outside Films    Result Date: 8/2/2022  This procedure was auto-finalized with no dictation required.    XR Outside Films    Result Date: 8/2/2022  This procedure was auto-finalized with no dictation required.    XR CHEST PORTABLE    Result Date: 8/1/2022  PORTABLE CHEST Indication: Preoperative Comparison: 7/3/22 Findings: The cardiomediastinal silhouette is within normal limits. No focal consolidation, pleural effusion or pneumothorax. Bones are unremarkable.    Impression: No acute cardiopulmonary findings.    XR HIP RIGHT (2-3 VIEWS)    Result Date: 8/1/2022  EXAM: PELVIS AND RIGHT HIP 3 VIEWS INDICATION: Fall. Right hip pain. COMPARISON: None FINDINGS: Right femoral neck basicervical fracture with displacement and varus impaction deformity. No definite intertrochanteric extension. No additional fractures identified. The right hip is congruent. Mild osteoarthritis is present.    Impression: Right femoral neck bases cervical fracture with displacement and varus impaction. No definite intertrochanteric extension but consider CT if needed for preoperative planning.          Assessment & Plan   Assessment & Plan     Active Hospital Problems    Diagnosis  POA   • Hip fracture (HCC) [S72.009A]  Yes   • Dementia (HCC) [F03.90]  Yes   • HTN (hypertension) [I10]  Yes   • Hypothyroidism (acquired) [E03.9]  Yes       Patricia Obrien is a 87 y.o. female with history of dementia,  hypotension, hypothyroidism who presents from [facility] for hip fracture.    Right femoral neck fracture  Mechanical fall  -History of mechanical nature  - Noted on x-ray at outside hospital  -Outside hospital labs unremarkable, we will repeat here.  -We will upload x-rays for review  - Check EKG  -Start scheduled Tylenol, as needed tramadol  -PT OT following surgery  -N.p.o.  -Ortho consult in the a.m.    Dementia  -At high risk for encephalopathy  - continue memantine, donepezil    Hyperlipidemia: Continue atorvastatin    Anxiety depression: Continue BuSpar, zoloft    Hypothyroidism: continue Synthroid    HTN:  - holding lisinpril for surgery, resume following surgery        DVT prophylaxis:  Holding for surgery      CODE STATUS:  Full code  There are no questions and answers to display.         Chelsey Campbell MD  08/02/22      Electronically signed by Chelsey Campbell MD at 08/02/22 3740

## 2022-08-02 NOTE — ED NOTES
Report to curly at [facility]. Pt to go to [address], [address] [place] [address]  08/02/22 8519

## 2022-08-02 NOTE — OP NOTE
DATE OF OPERATION: 08/02/22  PREOPERATIVE DIAGNOSIS: right displaced femoral neck fracture.      POSTOPERATIVE DIAGNOSIS: right displaced femoral neck fracture.      PROCEDURE PERFORMED: Open treatment of displaced femoral neck fracture with hemiarthroplasty.      SURGEON: Jaspal Harrington Jr., MD     Circulator: Sana Molina RN  Scrub Person: Brandon Parker; Cl Ramos       Surgical assistant: Shala Gardner PA-C. Assistant: Shala Gardner PA-C was responsible for performing the following activities: Retraction, Suction, Irrigation, Suturing, Closing and Placing Dressing and their skilled assistance was necessary for the success of this case.       ANESTHESIA: General.      ESTIMATED BLOOD LOSS: 150 mL.      COMPLICATIONS: None.      DISPOSITION: To the recovery room in a stable condition.     Implants:    Implant Name Type Inv. Item Serial No.  Lot No. LRB No. Used Action   DEV CONTRL TISS STRATAFIX SPIRAL PDO BIDIR MO4 [id number] - [id number] Implant DEV CONTRL TISS STRATAFIX SPIRAL PDO BIDIR MO4 [id number]  [employer]  DIV [employer] [id number] Right 1 Implanted   SUT FW 5 W .5 CIR CUT NDL 48M [id number] - [id number] Implant SUT FW 5 W .5 CIR CUT NDL 48M [id number]  [employer] 0 Right 3 Implanted   CMT BONE REFOBACIN R W/GENT 1X40 - [id number] Implant CMT BONE REFOBACIN R W/GENT 1X40  [employer] [id number] Right 2 Implanted   STEM FEM/HIP AVENIR CMT STD SZ4 - [id number] Implant STEM FEM/HIP AVENIR CMT STD SZ4  [employer] [id number] Right 1 Implanted   SHLL VERSYS M/BIPOL MTL OD 44MM - [id number] Implant SHLL VERSYS M/BIPOL MTL OD 44MM  [employer] [id number] Right 1 Implanted   LINER VERSYS ASMBL POLY 44/45/46MM OD X28MM - [id number] Implant LINER VERSYS ASMBL POLY 44/45/46MM OD X28MM  [employer] [id number] Right 1 Implanted   HD FEM/HIP UNIV COCR 12/14TPR 28MM MIN3.5 - [id number] Implant HD FEM/HIP UNIV COCR 12/14TPR 28MM MIN3.5  [employer] [id number] Right 1 Implanted        INDICATIONS  FOR PROCEDURE: This is a 87-year-old who sustained a displaced femoral neck fracture. After discussion of risks, benefits, and alternatives, surgery was recommended as a necessity. The patient understood and wished to proceed.      DESCRIPTION OF PROCEDURE: In the holding area, the patient identified the right hip as the correct operative extremity. This was initialed by the surgeon with the patient's acknowledgment. The patient was then taken to the operating room and placed in the supine position. Upon induction of adequate anesthesia, the patient was rolled to the lateral decubitus position with all bony prominences padded. The operative hip and lower extremity were prepped and draped in the usual sterile fashion. Timeout confirmed the correct patient and operative extremity and that antibiotics were on board. A standard posterolateral approach to the hip was carried out and was carried sharply through the skin and subcutaneous tissue. The fascia was opened in line with the incision and the gluteus lisa was split bluntly and the trochanteric bursa was opened. Next, the short external rotators were exposed. The piriformis was tagged and released as were the remaining short external rotators along with the capsule. After capsulotomy was performed, the fracture was readily identified. The head was removed. The remainder of the neck was resected to approximately 1 cm above the lesser trochanter. The acetabulum was inspected and intact. The canal was entered, reamed and broached, and trialing was carried out. The appropriate size and position were chosen that allowed restoration of near approximate leg length as well as provided stability in the anterior as well as posteriorly to the position of sleep and 90° of flexion, adduction, and internal rotation. The final components were chosen and assembled. We then placed a cement restrictor. The canal was then cleaned of debris and pressurized cement was placed. The  stem was then impacted and excess cement was removed. Our head was then impacted. The hip was reduced carefully. The joint was Pulsavac irrigated. The capsule and external rotators were repaired through transosseous tunnels through the posterior greater trochanter. The fascia was closed with 0 Stratafix, subcutaneous tissue with 2-0 Vicryl, and skin with Prineo dressing. Anesthesia was reversed. The patient was taken to the Recovery Room in stable condition. All instrument, needle, and sponge counts were correct.         Jaspal Harrington Jr., MD  08/02/22  15:25 EDT

## 2022-08-02 NOTE — ED NOTES
Isabella from [facility] called for update- updated patient was accepted at [facility] and is waiting on a bed      Javon Pedraza RN  08/01/22 2003

## 2022-08-02 NOTE — PROGRESS NOTES
[facility] Medicine Services  PROGRESS NOTE    Patient Name: Patricia Obrien  : 1935  MRN: [id number]    Date of Admission: 2022  Primary Care Physician: Provider, No Known    Subjective   Subjective     CC:  Fall with hip fracture    HPI:  Basic metabolic panel reviewed 2022 INR review 2022 urinalysis reviewed 22 COVID swab 2022.  X-rays reviewed 22 showing right femoral neck fracture.  Spoke to the patient as well as her daughter Sandra at the bedside.  They reports she has a history of syncopal events.  Currently she has a cardiac event monitor on her left chest.  She recently had a heart cath 2 weeks ago at [facility] which is reported to be normal by the daughter Sandra.  Patient's dementia is rather advanced.  But she does know her daughter and she does know her date of birth usually.  Today she only knows the month of her birth.    ROS:  Gen- No fevers, chills  CV- No chest pain, palpitations  Resp- No cough, dyspnea  GI- No N/V/D, abd pain      Objective   Objective     Vital Signs:   Temp:  [98.1 °F (36.7 °C)-98.9 °F (37.2 °C)] 98.9 °F (37.2 °C)  Heart Rate:  [66-72] 72  Resp:  [16] 16  BP: (143-175)/(88-95) 175/88     Physical Exam:  Constitutional: No acute distress, awake, alert  HENT: NCAT, mucous membranes moist  Respiratory: Clear to auscultation bilaterally, respiratory effort normal   Cardiovascular: RRR, no murmurs, rubs, or gallops  Gastrointestinal: Positive bowel sounds, soft, nontender, nondistended  Musculoskeletal: No bilateral ankle edema  Psychiatric: Appropriate affect, cooperative  Neurologic: Oriented to self only, speech clear  Skin: No rashes    Results Reviewed:  LAB RESULTS:      Lab 22  0446 22  1835   WBC  --  8.5   HEMOGLOBIN  --  11.6   HEMATOCRIT  --  34.8*   PLATELETS  --  146*   NEUTROS ABS  --  7.1   IMMATURE GRANS (ABS)  --  0.1   LYMPHS ABS  --  0.9*   MONOS ABS  --  0.3   EOS ABS  --  0.0   MCV  --  93.5    PROTIME 15.3*  --          Lab 08/02/22  0446   SODIUM 143   POTASSIUM 4.1   CHLORIDE 109*   CO2 22.0   ANION GAP 12.0   BUN 20   CREATININE 1.00   EGFR 54.6*   GLUCOSE 117*   CALCIUM 8.8             Lab 08/02/22  0446   PROTIME 15.3*   INR 1.22*                 Brief Urine Lab Results  (Last result in the past 365 days)      Color   Clarity   Blood   Leuk Est   Nitrite   Protein   CREAT   Urine HCG        08/01/22 1920 Yellow   Clear   Negative   Negative   Negative                   Microbiology Results Abnormal     Procedure Component Value - Date/Time    COVID PRE-OP / PRE-PROCEDURE SCREENING ORDER (NO ISOLATION) - Swab, Nasopharynx [[id number]]  (Normal) Collected: 08/02/22 0630    Lab Status: Final result Specimen: Swab from Nasopharynx Updated: 08/02/22 0701    Narrative:      The following orders were created for panel order COVID PRE-OP / PRE-PROCEDURE SCREENING ORDER (NO ISOLATION) - Swab, Nasopharynx.  Procedure                               Abnormality         Status                     ---------                               -----------         ------                     COVID-19 and FLU A/B PCR...[[id number]]  Normal              Final result                 Please view results for these tests on the individual orders.    COVID-19 and FLU A/B PCR - Swab, Nasopharynx [[id number]]  (Normal) Collected: 08/02/22 0630    Lab Status: Final result Specimen: Swab from Nasopharynx Updated: 08/02/22 0701     COVID19 Not Detected     Influenza A PCR Not Detected     Influenza B PCR Not Detected    Narrative:      Fact sheet for providers: https://www.fda.gov/media/854338/download    Fact sheet for patients: https://www.fda.gov/media/401355/download    Test performed by PCR.          XR Outside Films    Result Date: 8/2/2022  This procedure was auto-finalized with no dictation required.    XR Outside Films    Result Date: 8/2/2022  This procedure was auto-finalized with no dictation required.    XR CHEST  PORTABLE    Result Date: 8/1/2022  PORTABLE CHEST Indication: Preoperative Comparison: 7/3/22 Findings: The cardiomediastinal silhouette is within normal limits. No focal consolidation, pleural effusion or pneumothorax. Bones are unremarkable.    Impression: No acute cardiopulmonary findings.    XR HIP RIGHT (2-3 VIEWS)    Result Date: 8/1/2022  EXAM: PELVIS AND RIGHT HIP 3 VIEWS INDICATION: Fall. Right hip pain. COMPARISON: None FINDINGS: Right femoral neck basicervical fracture with displacement and varus impaction deformity. No definite intertrochanteric extension. No additional fractures identified. The right hip is congruent. Mild osteoarthritis is present.    Impression: Right femoral neck bases cervical fracture with displacement and varus impaction. No definite intertrochanteric extension but consider CT if needed for preoperative planning.    XR Hip With or Without Pelvis 2 - 3 View Right    Result Date: 8/2/2022  DATE OF EXAM: 8/2/2022 7:10 AM  PROCEDURE: XR HIP W OR WO PELVIS 2-3 VIEW RIGHT-  INDICATIONS: need dedicated AP/lateral of R hip for operative planning  COMPARISON: No comparisons available.  TECHNIQUE: AP and Lateral views of the right hip with Pelvis were obtained.  FINDINGS: There is deformity of the femoral neck with foreshortening on the AP view. This is suggestive of underlying fracture that could be subcapital or transcervical. The right hemipelvis appears to be intact. The intertrochanteric area reveals no definite fracture. The visualized shaft of the femur also grossly unremarkable.      Impression: 1.  Findings suggestive of a right femoral neck fracture. Additional imaging with CT could be obtained as thought clinically appropriate.  This report was finalized on 8/2/2022 8:01 AM by Lane Nelson MD.            I have reviewed the medications:  Scheduled Meds:acetaminophen, 1,000 mg, Oral, TID  atorvastatin, 10 mg, Oral, Daily  busPIRone, 10 mg, Oral, TID  donepezil, 10 mg, Oral,  Nightly  folic acid, 1 mg, Oral, Daily  levothyroxine, 50 mcg, Oral, Daily  memantine, 10 mg, Oral, Daily  pantoprazole, 40 mg, Oral, QAM  QUEtiapine, 25 mg, Oral, Nightly  senna-docusate sodium, 2 tablet, Oral, BID  sertraline, 50 mg, Oral, Daily  sodium chloride, 10 mL, Intravenous, Q12H      Continuous Infusions:lactated ringers, 75 mL/hr, Last Rate: 75 mL/hr (08/02/22 0409)      PRN Meds:.senna-docusate sodium **AND** polyethylene glycol **AND** bisacodyl **AND** bisacodyl  •  Morphine  •  ondansetron **OR** ondansetron  •  potassium chloride  •  potassium chloride  •  sodium chloride  •  traMADol    Assessment & Plan   Assessment & Plan     Active Hospital Problems    Diagnosis  POA   • Hip fracture (HCC) [S72.009A]  Yes   • Dementia (HCC) [F03.90]  Yes   • HTN (hypertension) [I10]  Yes   • Hypothyroidism (acquired) [E03.9]  Yes      Resolved Hospital Problems   No resolved problems to display.        Brief Hospital Course to date:  Patricia Obrien is a 87 y.o. female with dementia hypotension hypothyroidism presenting from [facility] with a right hip fracture following mechanical fall.    Right hip fracture  -PT OT Case management orthopedic surgery  -Has as needed's for pain control and for bowel management    Dementia  -Receiving Namenda and Aricept    Hyperlipidemia  -Receiving a statin    Anxiety  -Receiving buspirone    Depression  -Receiving Zoloft    Hypothyroidism  -Receiving Synthroid 50 mcg    Hypertension  -Holding lisinopril blood pressure is currently uncontrolled.  We will add a as needed labetalol    Expected Discharge Location and Transportation: Skilled nursing facility by ambulance  Expected Discharge Date: 8/6/2022    DVT prophylaxis:  Mechanical DVT prophylaxis orders are present.     AM-PAC 6 Clicks Score (PT): 8 (08/02/22 0300)    CODE STATUS:   There are no questions and answers to display.       Hemanth Brady,   08/02/22

## 2022-08-02 NOTE — ANESTHESIA PROCEDURE NOTES
Peripheral Block      Patient reassessed immediately prior to procedure    Patient location during procedure: OR  Reason for block: procedure for pain, at surgeon's request and post-op pain management  Performed by  CRNA/CAA: Leah Richardson CRNA  Preanesthetic Checklist  Completed: patient identified, IV checked, site marked, risks and benefits discussed, surgical consent, monitors and equipment checked, pre-op evaluation and timeout performed  Prep:  Sterile barriers:gloves, cap, sterile barriers and mask  Prep: ChloraPrep  Patient monitoring: blood pressure monitoring, continuous pulse oximetry and EKG  Procedure  Performed under: local infiltration  Guidance:ultrasound guided, nerve stimulator and landmark technique  Images:still images not obtained    Laterality:right  Block Type:fascia iliaca compartment  Injection Technique:single-shot  Needle Type:short-bevel  Needle Gauge:20 G  Resistance on Injection: none  Catheter Size:20 G (20g)    Medications Used: ropivacaine (NAROPIN) 0.2 % injection, 50 mL  Med administered at 8/2/2022 1:38 PM      Medications  Preservative Free Saline:10ml    Post Assessment  Injection Assessment: negative aspiration for heme, no paresthesia on injection and incremental injection  Patient Tolerance:comfortable throughout block  Complications:no  Additional Notes  The BBRaun 360 degree echogenic needle was introduced in plane, in a lateral to medial direction at the level of the inguinal crease.  Under ultrasound guidance, the femoral artery and vein where located.  The needle was then directed below Fascia Iliacus towards the Femoral nerve.  NS was utilized to hydro dissect and galina needle advancement towards the target structure.   LA was injected incrementally in 3-5 ml aliquots with negative aspirate.  LA spread was visualized around the nerve, negative intraneural injection, low injection pressures.  Thank you

## 2022-08-02 NOTE — NURSING NOTE
ACC REVIEW REPORT: [facility]        PATIENT NAME: Patricia Obrien    PATIENT ID: [id number]        COVID-19 ACC SCREENING       DOES THE PATIENT HAVE A FEVER GREATER THAN OR EQUAL .4: NO    IS THE PATIENT EXPERIENCING SHORTNESS OF BREATH: NO    DOES THE PATIENT HAVE A COUGH: NO  DOES THE PATIENT HAVE ANY OF THE FOLLOWING RISK FACTORS:    EXPOSURE TO SUSPECTED OR KNOWN COVID-19: NO    RECENT TRAVEL HISTORY TO ENDEMIC AREA (DOMESTIC/LOCAL): NO    IS THE PATIENT A HEALTHCARE WORKER: NO    HAS THE PATIENT EXPERIENCED A LOSS OF SENSE OF TASTE OR SMELL: NO    HAS THE PATIENT BEEN TESTED FOR COVID-19: YES    DATE TESTED: 8/1/22    LAB TESTING SENT TO: [facility]          BED: S354    BED TYPE: TELE/ORTHO    BED GIVEN TO: DEJAN QUEZADA    TIME BED GIVEN: 0010    TODAY'S DATE: 8/2/2022    TRANSFER DATE: 8/2/22    ETA: WILL CALL WHEN EMS ARRANGED    TRANSFERRING FACILITY: [facility]    TRANSFERRING FACILITY PHONE # : [phone]    TRANSFERRING MD: DR. RICKETTS    DATE/TIME REQUEST RECEIVED: 8/1/22 1850    [facility] RN: FIDEL MOHAMUD RN    REPORT FROM: DEJAN QUEZADA    TIME REPORT TAKEN: 0004    DIAGNOSIS: RT HIP FX    REASON FOR TRANSFER TO [facility]: HIGHER LEVEL OF CARE    TRANSPORTATION: EMS GROUND    CLINICAL REASON FOR TRANSFER TO [facility]: SURGERY      CLINICAL INFORMATION    HEIGHT: 4'11    WEIGHT: 123 LB    ALLERGIES: SULFA    INFECTIOUS DISEASE: NONE    ISOLATION: NONE    VITAL SIGNS:   TIME: 0000  TEMP: 97.9  PULSE: 57  B/P: 117/77  RESP: 16-18      LAB INFORMATION:   RBC 3.72        MEDS/IV FLUIDS:   20 LAC-SL      CARDIAC SYSTEM:    RHYTHM: NOT ON MONITOR. HR HIGH 50'S-LOW 60'S.    Is patient taking or has patient been given any drugs that could increase bleeding? NO      RESPIRATORY SYSTEM:    OXYGEN: ROOM AIR    O2 SAT: 93%      CNS/MUSCULOSKELETAL    ALERT AND ORIENTED:  PT IS ALERT WITH CONFUSION.     CNS/MUSCULOSKELETAL NOTES:   RIGHT HIP FRACTURE. PT FELL AT HOME AND BROUGHT TO [facility] BY POV.  PER SCAN HIP FRACTURE.      GI//GY      ABDOMINAL PAIN: NONE    VOMITING: NONE    DIARRHEA: NONE    NAUSEA: NONE    NEUMANN: NEUMANN WAS INSERTED, RN GIVING REPORT WAS UNSURE OF SIZE.    OTHER SYMPTOM NOTES:   1900 MORPHINE 2 MG GIVEN  1900 ZOFRAN 4 MG GIVEN  2249 MORPHINE 2 MG GIVEN    ADDITIONAL NOTES:   2200 HAD A FEW DRINKS AND FEW BITES OF FOOD    HAS BEEN NPO SINCE MIDNIGHT          Effie Werner RN  8/2/2022  00:05 EDT

## 2022-08-03 PROCEDURE — 99232 SBSQ HOSP IP/OBS MODERATE 35: CPT | Performed by: INTERNAL MEDICINE

## 2022-08-03 PROCEDURE — 25010000002 CEFAZOLIN PER 500 MG: Performed by: ORTHOPAEDIC SURGERY

## 2022-08-03 PROCEDURE — 97161 PT EVAL LOW COMPLEX 20 MIN: CPT

## 2022-08-03 PROCEDURE — 97166 OT EVAL MOD COMPLEX 45 MIN: CPT

## 2022-08-03 PROCEDURE — 97110 THERAPEUTIC EXERCISES: CPT

## 2022-08-03 RX ADMIN — ACETAMINOPHEN 1000 MG: 500 TABLET ORAL at 07:41

## 2022-08-03 RX ADMIN — ACETAMINOPHEN 1000 MG: 500 TABLET ORAL at 16:14

## 2022-08-03 RX ADMIN — TRAMADOL HYDROCHLORIDE 50 MG: 50 TABLET, COATED ORAL at 13:29

## 2022-08-03 RX ADMIN — QUETIAPINE FUMARATE 25 MG: 25 TABLET ORAL at 21:08

## 2022-08-03 RX ADMIN — PANTOPRAZOLE SODIUM 40 MG: 40 TABLET, DELAYED RELEASE ORAL at 04:41

## 2022-08-03 RX ADMIN — DONEPEZIL HYDROCHLORIDE 10 MG: 10 TABLET, FILM COATED ORAL at 21:09

## 2022-08-03 RX ADMIN — SODIUM CHLORIDE, POTASSIUM CHLORIDE, SODIUM LACTATE AND CALCIUM CHLORIDE 75 ML/HR: 600; 310; 30; 20 INJECTION, SOLUTION INTRAVENOUS at 04:41

## 2022-08-03 RX ADMIN — CEFAZOLIN 2 G: 10 INJECTION, POWDER, FOR SOLUTION INTRAVENOUS at 04:41

## 2022-08-03 RX ADMIN — BUSPIRONE HYDROCHLORIDE 10 MG: 10 TABLET ORAL at 16:14

## 2022-08-03 RX ADMIN — TRAMADOL HYDROCHLORIDE 50 MG: 50 TABLET, COATED ORAL at 00:47

## 2022-08-03 RX ADMIN — ACETAMINOPHEN 1000 MG: 500 TABLET ORAL at 21:09

## 2022-08-03 RX ADMIN — ATORVASTATIN CALCIUM 10 MG: 10 TABLET, FILM COATED ORAL at 07:40

## 2022-08-03 RX ADMIN — SERTRALINE HYDROCHLORIDE 50 MG: 50 TABLET ORAL at 07:40

## 2022-08-03 RX ADMIN — TRAMADOL HYDROCHLORIDE 50 MG: 50 TABLET, COATED ORAL at 07:40

## 2022-08-03 RX ADMIN — MEMANTINE 10 MG: 10 TABLET ORAL at 07:40

## 2022-08-03 RX ADMIN — SENNOSIDES AND DOCUSATE SODIUM 2 TABLET: 50; 8.6 TABLET ORAL at 21:08

## 2022-08-03 RX ADMIN — BUSPIRONE HYDROCHLORIDE 10 MG: 10 TABLET ORAL at 21:09

## 2022-08-03 RX ADMIN — LEVOTHYROXINE SODIUM 50 MCG: 50 TABLET ORAL at 04:41

## 2022-08-03 RX ADMIN — FOLIC ACID 1 MG: 1 TABLET ORAL at 07:40

## 2022-08-03 RX ADMIN — BUSPIRONE HYDROCHLORIDE 10 MG: 10 TABLET ORAL at 07:41

## 2022-08-03 RX ADMIN — POLYETHYLENE GLYCOL 3350 17 G: 17 POWDER, FOR SOLUTION ORAL at 07:42

## 2022-08-03 RX ADMIN — TRAMADOL HYDROCHLORIDE 50 MG: 50 TABLET, COATED ORAL at 23:34

## 2022-08-03 RX ADMIN — SENNOSIDES AND DOCUSATE SODIUM 2 TABLET: 50; 8.6 TABLET ORAL at 07:41

## 2022-08-03 NOTE — CASE MANAGEMENT/SOCIAL WORK
Continued Stay Note  [facility]     Patient Name: Patricia Obrien  MRN: [id number]  Today's Date: 8/3/2022    Admit Date: 8/2/2022     Discharge Plan     Row Name 08/03/22 1406       Plan    Plan [facility]    Plan Comments Zaina with [facility] has the referral for subacute. I also discussed other subacute options and will given referrals to [facility] and the [facility]. Plans are to return home from rehab with continued 24 hour care by her daughter/AMOS Flores.    Final Discharge Disposition Code 03 - skilled nursing facility (SNF)               Discharge Codes    No documentation.               Expected Discharge Date and Time     Expected Discharge Date Expected Discharge Time    Aug 5, 2022             Teresa Newberry RN

## 2022-08-03 NOTE — PLAN OF CARE
Goal Outcome Evaluation:      Pt is due to void.  BS x 2, final BS was 285 without patient discomfort or feeling to void.  Pt has slept comfortably most of shift with daughter at bedside.  VSS, will continue to monitor.

## 2022-08-03 NOTE — PROGRESS NOTES
Orthopedic Daily Progress Note      CC: AARON overnight.    Pain well controlled  General: no fevers, chills  Abdomen: no nausea, vomiting, or diarrhea    No other complaints    Physical Exam:  I have reviewed the vital signs.  Temp:  [97 °F (36.1 °C)-98.6 °F (37 °C)] 98.3 °F (36.8 °C)  Heart Rate:  [59-89] 75  Resp:  [16] 16  BP: (112-143)/(55-79) 130/55    Objective  General Appearance:    Alert, cooperative, no distress  Extremities: No clubbing, cyanosis, or edema to lower extremities  Pulses:  2+ in distal surgical extremity  Skin: Incision Clean/dry/intact      Results Review:    I have reviewed the labs, radiology results and diagnostic studies:no new labs    Results from last 7 days   Lab Units 08/01/22  1835   WBC K/uL 8.5   HEMOGLOBIN g/dL 11.6   PLATELETS K/uL 146*     Results from last 7 days   Lab Units 08/02/22  0446   SODIUM mmol/L 143   POTASSIUM mmol/L 4.1   CO2 mmol/L 22.0   CREATININE mg/dL 1.00   GLUCOSE mg/dL 117*       I have reviewed the medications.    Assessment/Problem List  POD# 1 Day Post-Op   S/p R hip abdiaziz for femoral neck fx    Plan  WBAT RLE with post hip precautions x 6 weeks. Limit active abduction.  PT/OT  Ok with DVT ppx with  mg x 6 weeks and mechanical while in house      Discharge Planning: I expect patient to be discharged to rehab in TBD days.    Jaspal Harrington Jr., MD  08/03/22  08:07 EDT

## 2022-08-03 NOTE — PLAN OF CARE
"  Problem: Adult Inpatient Plan of Care  Goal: Plan of Care Review  Recent Flowsheet Documentation  Taken 8/3/2022 1328 by Emma Johnson OT  Plan of Care Reviewed With: (x2)   patient   daughter  Outcome Evaluation: OT IE completed. Pt is A/Ox1 and cooperative with therapy. Pt presents with strength, balance and endurance deficits limiting mobility and self-care. Mod A x2 STS. Max A stand pvt transfer to chair. Cognition/Memory deficits limit ADL retraining. Pt's daughter's report that she is \"never home alone\" and that family will help with all pt care needs. OT will follow IP. Recommend SNF at d/c for best outcome. Recommend TTB and [facility] for home.     "

## 2022-08-03 NOTE — PROGRESS NOTES
[facility] Medicine Services  PROGRESS NOTE    Patient Name: Patricia Obrien  : 1935  MRN: [id number]    Date of Admission: 2022  Primary Care Physician: Provider, No Known    Subjective   Subjective     CC:  Fall with hip fracture    HPI:  Dr. Harrington's note reviewed.  Status post right hip hemiarthroplasty 2022.  No labs from this morning to review.  Vital signs reviewed.  Spoke with the patient as well as her daughters Sandra and Roxane at the bedside.  Reports is doing well postop.  Not too much pain.  Is working with physical therapy while I am there and they are recommending rehab for her.    ROS:  Gen- No fevers, chills  CV- No chest pain, palpitations  Resp- No cough, dyspnea  GI- No N/V/D, abd pain      Objective   Objective     Vital Signs:   Temp:  [97 °F (36.1 °C)-98.6 °F (37 °C)] 98.3 °F (36.8 °C)  Heart Rate:  [59-89] 75  Resp:  [16] 16  BP: (112-143)/(55-79) 130/55  Flow (L/min):  [2-3] 2     Physical Exam:  Constitutional: No acute distress, awake, alert  HENT: NCAT, mucous membranes moist  Respiratory: Clear to auscultation bilaterally, respiratory effort normal   Cardiovascular: RRR, no murmurs, rubs, or gallops  Gastrointestinal: Positive bowel sounds, soft, nontender, nondistended  Musculoskeletal: No bilateral ankle edema  Psychiatric: Appropriate affect, cooperative  Neurologic: Oriented to self only, speech clear  Skin: No rashes    Results Reviewed:  LAB RESULTS:      Lab 22  0446 22  1835   WBC  --  8.5   HEMOGLOBIN  --  11.6   HEMATOCRIT  --  34.8*   PLATELETS  --  146*   NEUTROS ABS  --  7.1   IMMATURE GRANS (ABS)  --  0.1   LYMPHS ABS  --  0.9*   MONOS ABS  --  0.3   EOS ABS  --  0.0   MCV  --  93.5   PROTIME 15.3*  --          Lab 22  0446   SODIUM 143   POTASSIUM 4.1   CHLORIDE 109*   CO2 22.0   ANION GAP 12.0   BUN 20   CREATININE 1.00   EGFR 54.6*   GLUCOSE 117*   CALCIUM 8.8             Lab 22  0446   PROTIME 15.3*   INR  1.22*                 Brief Urine Lab Results  (Last result in the past 365 days)      Color   Clarity   Blood   Leuk Est   Nitrite   Protein   CREAT   Urine HCG        08/01/22 1920 Yellow   Clear   Negative   Negative   Negative                   Microbiology Results Abnormal     Procedure Component Value - Date/Time    COVID PRE-OP / PRE-PROCEDURE SCREENING ORDER (NO ISOLATION) - Swab, Nasopharynx [[id number]]  (Normal) Collected: 08/02/22 0630    Lab Status: Final result Specimen: Swab from Nasopharynx Updated: 08/02/22 0701    Narrative:      The following orders were created for panel order COVID PRE-OP / PRE-PROCEDURE SCREENING ORDER (NO ISOLATION) - Swab, Nasopharynx.  Procedure                               Abnormality         Status                     ---------                               -----------         ------                     COVID-19 and FLU A/B PCR...[[id number]]  Normal              Final result                 Please view results for these tests on the individual orders.    COVID-19 and FLU A/B PCR - Swab, Nasopharynx [[id number]]  (Normal) Collected: 08/02/22 0630    Lab Status: Final result Specimen: Swab from Nasopharynx Updated: 08/02/22 0701     COVID19 Not Detected     Influenza A PCR Not Detected     Influenza B PCR Not Detected    Narrative:      Fact sheet for providers: https://www.fda.gov/media/494326/download    Fact sheet for patients: https://www.fda.gov/media/018987/download    Test performed by PCR.          Peripheral Block    Result Date: 8/2/2022  Paula Broosk CRNA     8/2/2022  1:55 PM Peripheral Block Patient reassessed immediately prior to procedure Patient location during procedure: OR Reason for block: procedure for pain, at surgeon's request and post-op pain management Performed by CRNA/CAA: Leah Richardson CRNA Preanesthetic Checklist Completed: patient identified, IV checked, site marked, risks and benefits discussed, surgical consent, monitors and  equipment checked, pre-op evaluation and timeout performed Prep: Sterile barriers:gloves, cap, sterile barriers and mask Prep: ChloraPrep Patient monitoring: blood pressure monitoring, continuous pulse oximetry and EKG Procedure Performed under: local infiltration Guidance:ultrasound guided, nerve stimulator and landmark technique Images:still images not obtained Laterality:right Block Type:fascia iliaca compartment Injection Technique:single-shot Needle Type:short-bevel Needle Gauge:20 G Resistance on Injection: none Catheter Size:20 G (20g) Medications Used: ropivacaine (NAROPIN) 0.2 % injection, 50 mL Med administered at 8/2/2022 1:38 PM Medications Preservative Free Saline:10ml Post Assessment Injection Assessment: negative aspiration for heme, no paresthesia on injection and incremental injection Patient Tolerance:comfortable throughout block Complications:no Additional Notes The BBRaun 360 degree echogenic needle was introduced in plane, in a lateral to medial direction at the level of the inguinal crease.  Under ultrasound guidance, the femoral artery and vein where located.  The needle was then directed below Fascia Iliacus towards the Femoral nerve.  NS was utilized to hydro dissect and galina needle advancement towards the target structure.   LA was injected incrementally in 3-5 ml aliquots with negative aspirate.  LA spread was visualized around the nerve, negative intraneural injection, low injection pressures.  Thank you     XR Outside Films    Result Date: 8/2/2022  This procedure was auto-finalized with no dictation required.    XR Outside Films    Result Date: 8/2/2022  This procedure was auto-finalized with no dictation required.    XR CHEST PORTABLE    Result Date: 8/1/2022  PORTABLE CHEST Indication: Preoperative Comparison: 7/3/22 Findings: The cardiomediastinal silhouette is within normal limits. No focal consolidation, pleural effusion or pneumothorax. Bones are unremarkable.    Impression: No  acute cardiopulmonary findings.    XR HIP RIGHT (2-3 VIEWS)    Result Date: 8/1/2022  EXAM: PELVIS AND RIGHT HIP 3 VIEWS INDICATION: Fall. Right hip pain. COMPARISON: None FINDINGS: Right femoral neck basicervical fracture with displacement and varus impaction deformity. No definite intertrochanteric extension. No additional fractures identified. The right hip is congruent. Mild osteoarthritis is present.    Impression: Right femoral neck bases cervical fracture with displacement and varus impaction. No definite intertrochanteric extension but consider CT if needed for preoperative planning.    XR Hip With or Without Pelvis 2 - 3 View Right    Result Date: 8/2/2022  DATE OF EXAM: 8/2/2022 3:40 PM  PROCEDURE: XR HIP W OR WO PELVIS 2-3 VIEW RIGHT-  INDICATIONS: s/p R hip abdiaziz  COMPARISON: 8/2/2022  TECHNIQUE: AP and Lateral views of the right hip with Pelvis were obtained.  FINDINGS: The patient has undergone right total hip arthroplasty since the last study. The prosthesis appears in appropriate position and alignment. There is a fracture identified through the greater trochanter which is better seen on the current radiograph.      Impression:  1. Status post right total hip arthroplasty. 2. Fracture through the greater trochanter.  This report was finalized on 8/2/2022 3:59 PM by Dario Toussaint MD.      XR Hip With or Without Pelvis 2 - 3 View Right    Result Date: 8/2/2022  DATE OF EXAM: 8/2/2022 7:10 AM  PROCEDURE: XR HIP W OR WO PELVIS 2-3 VIEW RIGHT-  INDICATIONS: need dedicated AP/lateral of R hip for operative planning  COMPARISON: No comparisons available.  TECHNIQUE: AP and Lateral views of the right hip with Pelvis were obtained.  FINDINGS: There is deformity of the femoral neck with foreshortening on the AP view. This is suggestive of underlying fracture that could be subcapital or transcervical. The right hemipelvis appears to be intact. The intertrochanteric area reveals no definite fracture. The visualized  shaft of the femur also grossly unremarkable.      Impression: 1.  Findings suggestive of a right femoral neck fracture. Additional imaging with CT could be obtained as thought clinically appropriate.  This report was finalized on 8/2/2022 8:01 AM by Lane Nelson MD.            I have reviewed the medications:  Scheduled Meds:acetaminophen, 1,000 mg, Oral, TID  atorvastatin, 10 mg, Oral, Daily  busPIRone, 10 mg, Oral, TID  donepezil, 10 mg, Oral, Nightly  folic acid, 1 mg, Oral, Daily  levothyroxine, 50 mcg, Oral, Daily  memantine, 10 mg, Oral, Daily  pantoprazole, 40 mg, Oral, QAM  QUEtiapine, 25 mg, Oral, Nightly  senna-docusate sodium, 2 tablet, Oral, BID  sertraline, 50 mg, Oral, Daily  sodium chloride, 10 mL, Intravenous, Q12H      Continuous Infusions:lactated ringers, 75 mL/hr, Last Rate: 75 mL/hr (08/03/22 0441)  lactated ringers, 9 mL/hr      PRN Meds:.senna-docusate sodium **AND** polyethylene glycol **AND** bisacodyl **AND** bisacodyl  •  labetalol  •  Morphine  •  ondansetron **OR** ondansetron  •  potassium chloride  •  potassium chloride  •  sodium chloride  •  traMADol    Assessment & Plan   Assessment & Plan     Active Hospital Problems    Diagnosis  POA   • Hip fracture (HCC) [S72.009A]  Yes   • Dementia (HCC) [F03.90]  Yes   • HTN (hypertension) [I10]  Yes   • Hypothyroidism (acquired) [E03.9]  Yes      Resolved Hospital Problems   No resolved problems to display.        Brief Hospital Course to date:  Patricia Obrien is a 87 y.o. female with dementia hypotension hypothyroidism presenting from [facility] with a right hip fracture following mechanical fall. X-rays reviewed 8-2-22 showing right femoral neck fracture.  Spoke to the patient as well as her daughter Sandra at the bedside.  They reports she has a history of syncopal events.  Currently she has a cardiac event monitor on her left chest.  She recently had a heart cath 2 weeks ago at [facility] which is reported to be normal by the  "daughter Sandra.  Patient's dementia is rather advanced.  But she does know her daughter and she does know her date of birth usually.  Today she only knows the month of her birth.    Right hip fracture  -PT OT Case management  -Has as needed's for pain control and for bowel management  -Status post right hip hemiarthroplasty 8/2/2022  -\"WBAT RLE with post hip precautions x 6 weeks. Limit active abduction.  PT/OT  Ok with DVT ppx with  mg x 6 weeks and mechanical while in house\" per Dr. Harrington 8-3-22    Dementia  -Receiving Namenda and Aricept    Hyperlipidemia  -Receiving a statin    Anxiety  -Receiving buspirone    Depression  -Receiving Zoloft    Hypothyroidism  -Receiving Synthroid 50 mcg    Hypertension  -Continuing to hold lisinopril 8/3/2022.  Normotensive currently has labetalol as needed.    Expected Discharge Location and Transportation: Skilled nursing facility by ambulance  Expected Discharge Date: 8/6/2022    DVT prophylaxis:  Mechanical DVT prophylaxis orders are present.     AM-PAC 6 Clicks Score (PT): 8 (08/03/22 4762)    CODE STATUS:   Code Status and Medical Interventions:   Ordered at: 08/02/22 0910     Level Of Support Discussed With:    Next of Kin (If No Surrogate)     Code Status (Patient has no pulse and is not breathing):    CPR (Attempt to Resuscitate)     Medical Interventions (Patient has pulse or is breathing):    Full Support       Hemanth Brady, DO  08/03/22            "

## 2022-08-03 NOTE — THERAPY EVALUATION
Patient Name: Patricia Obrien  : 1935    MRN: [id number]                              Today's Date: 8/3/2022       Admit Date: 2022    Visit Dx: No diagnosis found.  Patient Active Problem List   Diagnosis   • Hip fracture (HCC)   • Dementia (HCC)   • HTN (hypertension)   • Hypothyroidism (acquired)     Past Medical History:   Diagnosis Date   • Anxiety    • Dementia (HCC)    • Depression    • Disease of thyroid gland    • Elevated cholesterol    • Hypertension    • Osteoarthritis      Past Surgical History:   Procedure Laterality Date   • BLADDER SUSPENSION     • CHOLECYSTECTOMY     • HIP HEMIARTHROPLASTY Right 2022    Procedure: HIP HEMIARTHROPLASTY RIGHT;  Surgeon: Jaspal Harrington Jr., MD;  Location: [facility];  Service: Orthopedics;  Laterality: Right;   • HYSTERECTOMY     • LAPAROSCOPIC TUBAL LIGATION        General Information     Row Name 22 1131          Physical Therapy Time and Intention    Document Type evaluation  -FW     Mode of Treatment physical therapy  -FW     Row Name 22 1134 22 1131       General Information    Patient Profile Reviewed -- yes  -FW    Prior Level of Function --  pt daughter reports pt uses a SPC intermittently  -FW independent:;all household mobility;community mobility;gait;transfer  pt daughter reports pt uses a SPC intermittently  -FW    Existing Precautions/Restrictions -- fall;hip, posterior  dementia, limit hip active hip abduction per MD post-op note  -FW    Barriers to Rehab -- medically complex;previous functional deficit;cognitive status  -FW    Row Name 22 1131          Living Environment    People in Home child(angelica), adult  -FW     Row Name 22 1131          Home Main Entrance    Number of Stairs, Main Entrance one  -FW     Stair Railings, Main Entrance none  -FW     Row Name 22 1131          Stairs Within Home, Primary    Number of Stairs, Within Home, Primary none  -FW     Row Name 22 1131          Cognition     Orientation Status (Cognition) oriented to;person;disoriented to;place;situation;time  -FW     Row Name 08/03/22 1131          Safety Issues, Functional Mobility    Safety Issues Affecting Function (Mobility) ability to follow commands;awareness of need for assistance;friction/shear risk;at risk behavior observed;insight into deficits/self-awareness;judgment;problem-solving;safety precaution awareness;safety precautions follow-through/compliance;sequencing abilities  -FW     Impairments Affecting Function (Mobility) balance;cognition;range of motion (ROM);pain;strength  -FW     Cognitive Impairments, Mobility Safety/Performance attention;insight into deficits/self-awareness;judgment;problem-solving/reasoning;safety precaution awareness;safety precaution follow-through;sequencing abilities  -FW           User Key  (r) = Recorded By, (t) = Taken By, (c) = Cosigned By    Initials Name Provider Type    FW Kayden Chawla PT Physical Therapist               Mobility     Row Name 08/03/22 1136          Bed Mobility    Bed Mobility scooting/bridging;supine-sit  -FW     Scooting/Bridging [place] (Bed Mobility) dependent (less than 25% patient effort);2 person assist  -FW     Supine-Sit [place] (Bed Mobility) dependent (less than 25% patient effort);2 person assist  -FW     Assistive Device (Bed Mobility) bed rails;draw sheet;head of bed elevated  -     Comment, (Bed Mobility) pain limited  -     Row Name 08/03/22 1136          Bed-Chair Transfer    Bed-Chair [place] (Transfers) maximum assist (25% patient effort);1 person assist  -FW     Assistive Device (Bed-Chair Transfers) other (see comments)  BUE support on PT  -FW     Comment, (Bed-Chair Transfer) stand pivot  -     Row Name 08/03/22 1136          Sit-Stand Transfer    Sit-Stand [place] (Transfers) moderate assist (50% patient effort);2 person assist  -FW     Assistive Device (Sit-Stand Transfers) walker, front-wheeled  -      Comment, (Sit-Stand Transfer) vc for hand placement and sequencing. Pt with difficult time achieving full hip and knee extension with mild buckling of the R knee  -     Row Name 08/03/22 1136          Gait/Stairs (Locomotion)    [place] Level (Gait) unable to assess  -     Comment, (Gait/Stairs) pt with increased pain, mild buckling during stance, and aberrant foot placement when attempting to advance RLE. Not safe to progress. Pt leg length likely too short to benefit from use of a KI  -           User Key  (r) = Recorded By, (t) = Taken By, (c) = Cosigned By    Initials Name Provider Type    FW Kayden Chawla, PT Physical Therapist               Obj/Interventions     [facility] Name 08/03/22 1139          Range of Motion Comprehensive    General Range of Motion other (see comments)  -     Comment, General Range of Motion difficult to assess due to pt ability to follow commands; Bilateral knee ROM WFL  -     Row Name 08/03/22 1139          Strength Comprehensive (MMT)    General Manual Muscle Testing (MMT) Assessment other (see comments)  -     Comment, General Manual Muscle Testing (MMT) Assessment unable to follow commands well enough to assess accurately  -     Row Name 08/03/22 1139          Motor Skills    Therapeutic Exercise hip;knee;ankle  -[facility] Name 08/03/22 1139          Hip (Therapeutic Exercise)    Hip (Therapeutic Exercise) isometric exercises  -     Hip Isometrics (Therapeutic Exercise) gluteal sets;10 repetitions  -[facility] Name 08/03/22 1139          Knee (Therapeutic Exercise)    Knee (Therapeutic Exercise) isometric exercises;strengthening exercise  -     Knee Isometrics (Therapeutic Exercise) bilateral;quad sets;10 repetitions  -     Knee Strengthening (Therapeutic Exercise) right;SLR (straight leg raise);heel slides;10 repetitions;LAQ (long arc quad)  -     Row Name 08/03/22 1139          Ankle (Therapeutic Exercise)    Ankle (Therapeutic Exercise) AROM (active  range of motion)  -FW     Ankle AROM (Therapeutic Exercise) bilateral;dorsiflexion;plantarflexion;15 repititions  -     Row Name 08/03/22 1139          Balance    Balance Assessment sitting static balance;sitting dynamic balance;standing static balance;standing dynamic balance  -FW     Static Sitting Balance standby assist  -FW     Dynamic Sitting Balance contact guard  -FW     Position, Sitting Balance sitting in chair;sitting edge of bed  -FW     Static Standing Balance moderate assist;2-person assist  -FW     Dynamic Standing Balance moderate assist;2-person assist  -FW     Position/Device Used, Standing Balance supported;walker, front-wheeled  -FW           User Key  (r) = Recorded By, (t) = Taken By, (c) = Cosigned By    Initials Name Provider Type    FW Kayden Chawla, PT Physical Therapist               Goals/Plan     Row Name 08/03/22 1145          Bed Mobility Goal 1 (PT)    Activity/Assistive Device (Bed Mobility Goal 1, PT) rolling to left;rolling to right;sit to supine/supine to sit  -FW     [place] Level/Cues Needed (Bed Mobility Goal 1, PT) moderate assist (50-74% patient effort)  -FW     Time Frame (Bed Mobility Goal 1, PT) long term goal (LTG);10 days  -     Row Name 08/03/22 6813          Transfer Goal 1 (PT)    Activity/Assistive Device (Transfer Goal 1, PT) sit-to-stand/stand-to-sit;bed-to-chair/chair-to-bed  -FW     [place] Level/Cues Needed (Transfer Goal 1, PT) minimum assist (75% or more patient effort)  -FW     Time Frame (Transfer Goal 1, PT) long term goal (LTG);10 days  -     Row Name 08/03/22 1141          Gait Training Goal 1 (PT)    Activity/Assistive Device (Gait Training Goal 1, PT) gait (walking locomotion);assistive device use;backward stepping;decrease asymmetrical patterns;forward stepping;improve balance and speed;walker, rolling  -FW     [place] Level (Gait Training Goal 1, PT) minimum assist (75% or more patient effort)  -FW     Distance (Gait Training  Goal 1, PT) 25  -FW     Time Frame (Gait Training Goal 1, PT) long term goal (LTG);10 days  -FW     Row Name 08/03/22 1144          Stairs Goal 1 (PT)    Activity/Assistive Device (Stairs Goal 1, PT) stairs, all skills;ascending stairs;descending stairs;using handrail, left;using handrail, right;step-to-step  -FW     [place] Level/Cues Needed (Stairs Goal 1, PT) moderate assist (50-74% patient effort)  -FW     Number of Stairs (Stairs Goal 1, PT) 1  -FW     Time Frame (Stairs Goal 1, PT) long term goal (LTG);10 days  -FW     Row Name 08/03/22 114          Therapy Assessment/Plan (PT)    Planned Therapy Interventions (PT) balance training;bed mobility training;gait training;home exercise program;joint mobilization;orthotic fitting/training;patient/family education;manual therapy techniques;ROM (range of motion);stair training;strengthening;stretching  -FW           User Key  (r) = Recorded By, (t) = Taken By, (c) = Cosigned By    Initials Name Provider Type    FW Kayden Chawla, PT Physical Therapist               Clinical Impression     Row Name 08/03/22 1143          Pain    Pain Intervention(s) Cold applied;Repositioned;Cold pack;Ambulation/increased activity  -FW     Additional Documentation Pain Scale: FACES Pre/Post-Treatment (Group)  -FW     Row Name 08/03/22 7452          Pain Scale: FACES Pre/Post-Treatment    Pain: FACES Scale, Pretreatment 0-->no hurt  -FW     Posttreatment Pain Rating 2-->hurts little bit  -FW     Pain Location - Side/Orientation Right  -FW     Pain Location anterior  -FW     Pain Location - extremity  right anterior thigh  -FW     Row Name 08/03/22 114          Plan of Care Review    Outcome Evaluation Pt presents with decreased functional strength, balance impairments, pain, and cognitive deficits limiting her mobility. Pt depedent x2 for bed mobility, STS mod Ax2 from an elevated bed height, and Max Ax1 for bed to chair transfer. Pt unable to safely advance her RLE to assess  gait at this time as she shana and demonstrates aberrant placement of the extremity. Pt with difficult time following directions throughout evaluation. IPPT warranted. Recommend dc SNF.  -     Row Name 08/03/22 1142          Therapy Assessment/Plan (PT)    Patient/Family Therapy Goals Statement (PT) to return home with family  -FW     Rehab Potential (PT) good, to achieve stated therapy goals  -FW     Criteria for Skilled Interventions Met (PT) yes;skilled treatment is necessary  -FW     Therapy Frequency (PT) daily  -FW     Row Name 08/03/22 1142          Vital Signs    Pre Systolic BP Rehab --  vss  -FW     O2 Delivery Pre Treatment room air  -FW     O2 Delivery Intra Treatment room air  -FW     O2 Delivery Post Treatment room air  -FW     Pre Patient Position Supine  -FW     Intra Patient Position Standing  -FW     Post Patient Position Sitting  -FW     Row Name 08/03/22 1142          Positioning and Restraints    Pre-Treatment Position in bed  -FW     Post Treatment Position chair  -FW     In Chair notified nsg;reclined;sitting;call light within reach;encouraged to call for assist;exit alarm on;with OT;with family/caregiver;with other staff;legs elevated  -FW           User Key  (r) = Recorded By, (t) = Taken By, (c) = Cosigned By    Initials Name Provider Type    FW Kayden Chawla, PT Physical Therapist               Outcome Measures     Row Name 08/03/22 1146 08/03/22 0741       How much help from another person do you currently need...    Turning from your back to your side while in flat bed without using bedrails? 2  -FW 2  -SC    Moving from lying on back to sitting on the side of a flat bed without bedrails? 1  -FW 2  -SC    Moving to and from a bed to a chair (including a wheelchair)? 1  -FW 1  -SC    Standing up from a chair using your arms (e.g., wheelchair, bedside chair)? 2  -FW 1  -SC    Climbing 3-5 steps with a railing? 1  -FW 1  -SC    To walk in hospital room? 1  -FW 1  -SC    AM-PAC 6  Clicks Score (PT) 8  -FW 8  -SC    Highest level of mobility 3 --> Sat at edge of bed  - 3 --> Sat at edge of bed  -SC    Row Name 08/03/22 1146          Functional Assessment    Outcome Measure Options AM-PAC 6 Clicks Basic Mobility (PT)  -           User Key  (r) = Recorded By, (t) = Taken By, (c) = Cosigned By    Initials Name Provider Type    SC Duyen Pineda, RN Registered Nurse    Kayden Clark PT Physical Therapist                             Physical Therapy Education                 Title: PT OT SLP Therapies (In Progress)     Topic: Physical Therapy (Done)     Point: Mobility training (Done)     Learning Progress Summary           Patient Acceptance, E, VU,NR by  at 8/3/2022 1147    Comment: pt requires reinforcement for post hip precautions   Family Acceptance, E, VU,NR by  at 8/3/2022 1147    Comment: pt requires reinforcement for post hip precautions                   Point: Home exercise program (Done)     Learning Progress Summary           Patient Acceptance, E, VU,NR by  at 8/3/2022 1147    Comment: pt requires reinforcement for post hip precautions   Family Acceptance, E, VU,NR by  at 8/3/2022 1147    Comment: pt requires reinforcement for post hip precautions                   Point: Body mechanics (Done)     Learning Progress Summary           Patient Acceptance, E, VU,NR by  at 8/3/2022 1147    Comment: pt requires reinforcement for post hip precautions   Family Acceptance, E, VU,NR by  at 8/3/2022 1147    Comment: pt requires reinforcement for post hip precautions                   Point: Precautions (Done)     Learning Progress Summary           Patient Acceptance, E, VU,NR by  at 8/3/2022 1147    Comment: pt requires reinforcement for post hip precautions   Family Acceptance, E, VU,NR by  at 8/3/2022 1147    Comment: pt requires reinforcement for post hip precautions                               User Key     Initials Effective Dates Name Provider Type  Discipline     05/05/22 -  Kayden Chawla PT Physical Therapist PT              PT Recommendation and Plan  Planned Therapy Interventions (PT): balance training, bed mobility training, gait training, home exercise program, joint mobilization, orthotic fitting/training, patient/family education, manual therapy techniques, ROM (range of motion), stair training, strengthening, stretching  Outcome Evaluation: Pt presents with decreased functional strength, balance impairments, pain, and cognitive deficits limiting her mobility. Pt depedent x2 for bed mobility, STS mod Ax2 from an elevated bed height, and Max Ax1 for bed to chair transfer. Pt unable to safely advance her RLE to assess gait at this time as she shana and demonstrates aberrant placement of the extremity. Pt with difficult time following directions throughout evaluation. IPPT warranted. Recommend dc SNF.     Time Calculation:    PT Charges     Row Name 08/03/22 1154             Time Calculation    Start Time 1054  -FW      PT Received On 08/03/22  -FW      PT Goal Re-Cert Due Date 08/13/22  -FW              Timed Charges    96756 - PT Therapeutic Exercise Minutes 8  -FW              Untimed Charges    PT Eval/Re-eval Minutes 46  -FW              Total Minutes    Timed Charges Total Minutes 8  -FW      Untimed Charges Total Minutes 46  -FW       Total Minutes 54  -FW            User Key  (r) = Recorded By, (t) = Taken By, (c) = Cosigned By    Initials Name Provider Type     Kayden Chawla PT Physical Therapist              Therapy Charges for Today     Code Description Service Date Service Provider Modifiers Qty    30296152462 HC PT THER PROC EA 15 MIN 8/3/2022 Kayden Chawla, PT GP 1    52652241112 HC PT EVAL LOW COMPLEXITY 4 8/3/2022 Kayden Chawla, PT GP 1          PT G-Codes  Outcome Measure Options: AM-PAC 6 Clicks Basic Mobility (PT)  AM-PAC 6 Clicks Score (PT): 8    Kayden Chawla PT  8/3/2022

## 2022-08-03 NOTE — PLAN OF CARE
Goal Outcome Evaluation:              Outcome Evaluation: Pt presents with decreased functional strength, balance impairments, pain, and cognitive deficits limiting her mobility. Pt and pt family educated on posterior hip precautions and hip abduction limitations prior to mobility.Pt dependent x2 for bed mobility, STS mod Ax2 from an elevated bed height, and Max Ax1 for bed to chair transfer. Pt unable to safely advance her RLE to assess gait at this time as she shana and demonstrates aberrant placement of the extremity. Pt with difficult time following directions throughout evaluation. IPPT warranted. Recommend dc SNF.

## 2022-08-03 NOTE — THERAPY EVALUATION
"Patient Name: Patricia Obrien  : 1935    MRN: [id number]                              Today's Date: 8/3/2022       Admit Date: 2022    Visit Dx: No diagnosis found.  Patient Active Problem List   Diagnosis   • Hip fracture (HCC)   • Dementia (HCC)   • HTN (hypertension)   • Hypothyroidism (acquired)     Past Medical History:   Diagnosis Date   • Anxiety    • Dementia (HCC)    • Depression    • Disease of thyroid gland    • Elevated cholesterol    • Hypertension    • Osteoarthritis      Past Surgical History:   Procedure Laterality Date   • BLADDER SUSPENSION     • CHOLECYSTECTOMY     • HIP HEMIARTHROPLASTY Right 2022    Procedure: HIP HEMIARTHROPLASTY RIGHT;  Surgeon: Jaspal Harrington Jr., MD;  Location: [facility];  Service: Orthopedics;  Laterality: Right;   • HYSTERECTOMY     • LAPAROSCOPIC TUBAL LIGATION        General Information     Row Name 22 1314          OT Time and Intention    Document Type evaluation  -TB     Mode of Treatment occupational therapy  -TB     Row Name 22 1314          General Information    Patient Profile Reviewed yes  -TB     Prior Level of Function all household mobility;transfer;bed mobility;ADL's;independent:  Per daughters, pt is independent for mobility and self-care. She is \"never home alone.\" Uses [facility] prn.  -TB     Existing Precautions/Restrictions fall;right;hip, posterior;other (see comments)  s/p mechanical fall. s/p R hemiarthroplasty with PHP and instructions to \"limit active ABDcution.\"  PMH: Dementia  -TB     Barriers to Rehab previous functional deficit;cognitive status  -TB     Row Name 22 1314          Occupational Profile    Reason for Services/Referral (Occupational Profile) Occupational decline  -TB     Environmental Supports and Barriers (Occupational Profile) Pt lives with her daughter in a trailer with 1 step to enter. Tub/shower with grab bars. Standard commode. Independent with in-home mobility and self-care at baseline. \"Never " "home alone\" per daughters.  -TB     Row Name 08/03/22 1314          Living Environment    People in Home child(angelica), adult  -TB     Row Name 08/03/22 1314          Home Main Entrance    Number of Stairs, Main Entrance one  -TB     Stair Railings, Main Entrance none  -TB     Row Name 08/03/22 1314          Stairs Within Home, Primary    Number of Stairs, Within Home, Primary none  -TB     Row Name 08/03/22 1314          Cognition    Orientation Status (Cognition) oriented to;person;disoriented to;place;situation;time  -TB     Row Name 08/03/22 1314          Safety Issues, Functional Mobility    Safety Issues Affecting Function (Mobility) ability to follow commands;awareness of need for assistance;insight into deficits/self-awareness;positioning of assistive device;safety precaution awareness;safety precautions follow-through/compliance;sequencing abilities  -TB     Impairments Affecting Function (Mobility) cognition;balance;endurance/activity tolerance;pain;strength;range of motion (ROM);postural/trunk control  -TB     Cognitive Impairments, Mobility Safety/Performance attention;awareness, need for assistance;insight into deficits/self-awareness;safety precaution awareness;safety precaution follow-through;sequencing abilities  -TB     Comment, Safety Issues/Impairments (Mobility) Dependent bed mobility. Mod Ax2 STS. Pt unable to advance RLE to take steps this session.  -TB           User Key  (r) = Recorded By, (t) = Taken By, (c) = Cosigned By    Initials Name Provider Type    TB Emma Johnson OT Occupational Therapist                 Mobility/ADL's     Row Name 08/03/22 1320          Bed Mobility    Bed Mobility supine-sit;scooting/bridging  -TB     Scooting/Bridging [place] (Bed Mobility) dependent (less than 25% patient effort);2 person assist;verbal cues  -TB     Supine-Sit [place] (Bed Mobility) dependent (less than 25% patient effort);2 person assist;verbal cues  -TB     Bed Mobility, " Safety Issues decreased use of legs for bridging/pushing;decreased use of arms for pushing/pulling;impaired trunk control for bed mobility;cognitive deficits limit understanding  -     Assistive Device (Bed Mobility) head of bed elevated;draw sheet;bed rails  -     Comment, (Bed Mobility) Pain and confusion limit participation.  -     Row Name 08/03/22 1320          Transfers    Transfers sit-stand transfer;bed-chair transfer;stand-sit transfer  -TB     Bed-Chair [place] (Transfers) maximum assist (25% patient effort);1 person assist;verbal cues  -TB     Sit-Stand [place] (Transfers) moderate assist (50% patient effort);2 person assist;verbal cues  -TB     Stand-Sit [place] (Transfers) moderate assist (50% patient effort);2 person assist;verbal cues  -     Row Name 08/03/22 1320          Bed-Chair Transfer    Comment, (Bed-Chair Transfer) Stand pivot  -     Row Name 08/03/22 1320          Sit-Stand Transfer    Assistive Device (Sit-Stand Transfers) walker, front-wheeled  -TB     Comment, (Sit-Stand Transfer) Education, cues, and assist for hand placement, sequencing, and safety. Cues for upright posture.  -     Row Name 08/03/22 1320          Stand-Sit Transfer    Assistive Device (Stand-Sit Transfers) walker, front-wheeled  -     Row Name 08/03/22 1320          Functional Mobility    Functional Mobility- Ind. Level unable to perform  -     Functional Mobility- Comment Dependent bed mobility. Mod Ax2 STS. Pt unable to advance RLE to take steps this session.  -     Row Name 08/03/22 1320          Activities of Daily Living    BADL Assessment/Intervention upper body dressing;lower body dressing;bathing;grooming;feeding  -     Row Name 08/03/22 1320          Mobility    Extremity Weight-bearing Status right lower extremity  -TB     Right Lower Extremity (Weight-bearing Status) weight-bearing as tolerated (WBAT)  -     Row Name 08/03/22 1320          Upper Body Dressing  Assessment/Training    [place] Level (Upper Body Dressing) don;pajama/robe;moderate assist (50% patient effort);verbal cues  -TB     Position (Upper Body Dressing) edge of bed sitting  -TB     Row Name 08/03/22 1320          Lower Body Dressing Assessment/Training    [place] Level (Lower Body Dressing) don;socks;dependent (less than 25% patient effort)  -TB     Position (Lower Body Dressing) sitting up in bed  -TB     Comment, (Lower Body Dressing) Cognition/confusion limits ADL retraining. Daughters report that pt is never alone and will have assist for all ADLs.  -TB     Row Name 08/03/22 1320          Bathing Assessment/Intervention    [place] Level (Bathing) minimum assist (75% patient effort);distal lower extremities/feet  -TB     Assistive Devices (Bathing) long-handled sponge  -TB     Comment, (Bathing) AE issued and teaching initiated for use. Daughters report pt is very modest and AE will allow her to have more control over bathing care.  -TB     Row Name 08/03/22 1320          Grooming Assessment/Training    [place] Level (Grooming) set up;wash face, hands  -TB     Position (Grooming) supported sitting  -TB     Row Name 08/03/22 1320          Self-Feeding Assessment/Training    [place] Level (Feeding) set up;prepare tray/open items;independent;scoop food and bring to mouth;liquids to mouth;verbal cues  -TB     Position (Self-Feeding) supported sitting  -TB     Comment, (Feeding) Daughter setting up tray and encouraging pt intake  -TB           User Key  (r) = Recorded By, (t) = Taken By, (c) = Cosigned By    Initials Name Provider Type    TB Emma Johnson OT Occupational Therapist               Obj/Interventions     Row Name 08/03/22 1326          Sensory Assessment (Somatosensory)    Sensory Assessment (Somatosensory) UE sensation intact  -TB     Row Name 08/03/22 1326          Vision Assessment/Intervention    Visual Impairment/Limitations corrective lenses full-time   -TB     Row Name 08/03/22 1326          Range of Motion Comprehensive    General Range of Motion bilateral upper extremity ROM WFL  -TB     Comment, General Range of Motion BUE AROM WFL for self-care  -TB     Row Name 08/03/22 1326          Strength Comprehensive (MMT)    Comment, General Manual Muscle Testing (MMT) Assessment Moderate generalized weakness/deconditioned  -TB     Row Name 08/03/22 1326          Balance    Balance Assessment sitting dynamic balance;sit to stand dynamic balance;standing dynamic balance  -TB     Dynamic Sitting Balance contact guard;verbal cues  -TB     Position, Sitting Balance supported;sitting in chair;sitting edge of bed  -TB     Sit to Stand Dynamic Balance moderate assist;2-person assist;verbal cues  -TB     Dynamic Standing Balance moderate assist;2-person assist;verbal cues  -TB     Position/Device Used, Standing Balance supported;walker, front-wheeled  BUE support for transfer to chair  -TB     Balance Interventions sitting;standing;sit to stand;supported;dynamic;dynamic reaching;occupation based/functional task;UE activity with balance activity  -TB           User Key  (r) = Recorded By, (t) = Taken By, (c) = Cosigned By    Initials Name Provider Type    TB Emma Johnson OT Occupational Therapist               Goals/Plan     Row Name 08/03/22 1334          Transfer Goal 1 (OT)    Activity/Assistive Device (Transfer Goal 1, OT) toilet  -TB     [place] Level/Cues Needed (Transfer Goal 1, OT) minimum assist (75% or more patient effort);verbal cues required  -TB     Time Frame (Transfer Goal 1, OT) by discharge  -TB     Progress/Outcome (Transfer Goal 1, OT) goal ongoing  -TB     Row Name 08/03/22 1334          Toileting Goal 1 (OT)    Activity/Device (Toileting Goal 1, OT) perform perineal hygiene  -TB     [place] Level/Cues Needed (Toileting Goal 1, OT) minimum assist (75% or more patient effort);verbal cues required  -TB     Time Frame (Toileting Goal 1, OT)  "by discharge  -TB     Progress/Outcome (Toileting Goal 1, OT) goal ongoing  -TB     Row Name 08/03/22 1334          Strength Goal 1 (OT)    Strength Goal 1 (OT) Pt completes daily HEP to support self-care and transfer training with 1:1 direction/assist. BUE AROM x10-15 reps. 3x3 reps STS.  -TB     Time Frame (Strength Goal 1, OT) by discharge  -TB     Progress/Outcome (Strength Goal 1, OT) goal ongoing  -TB           User Key  (r) = Recorded By, (t) = Taken By, (c) = Cosigned By    Initials Name Provider Type    TB Emma Johnson, OT Occupational Therapist               Clinical Impression     Row Name 08/03/22 1328          Pain Assessment    Pain Intervention(s) Ambulation/increased activity;Repositioned;Cold applied  -TB     Row Name 08/03/22 1328          Pain Scale: FACES Pre/Post-Treatment    Pain: FACES Scale, Pretreatment 0-->no hurt  -TB     Posttreatment Pain Rating 2-->hurts little bit  -TB     Pain Location - Side/Orientation Right  -TB     Pain Location incisional  -TB     Pain Location - hip  -TB     Row Name 08/03/22 1328          Plan of Care Review    Plan of Care Reviewed With patient;daughter  x2  -TB     Outcome Evaluation OT IE completed. Pt is A/Ox1 and cooperative with therapy. Pt presents with strength, balance and endurance deficits limiting mobility and self-care. Mod A x2 STS. Max A stand pvt transfer to chair. Cognition/Memory deficits will limit ADL retraining. Pt's daughter's report that she is \"never home alone\" and that family will help with all pt care needs. OT will follow IP. Recommend SNF at d/c for best outcome. Recommend TTB and BSC for home.  -TB     Row Name 08/03/22 1328          Therapy Assessment/Plan (OT)    Rehab Potential (OT) fair, will monitor progress closely  -TB     Criteria for Skilled Therapeutic Interventions Met (OT) yes;meets criteria;skilled treatment is necessary  -TB     Therapy Frequency (OT) daily  -TB     Row Name 08/03/22 1328          Therapy " Plan Review/Discharge Plan (OT)    Anticipated Discharge Disposition (OT) skilled nursing facility  -TB     Row Name 08/03/22 1328          Vital Signs    Pre Systolic BP Rehab --  RN cleared OT  -TB     O2 Delivery Pre Treatment room air  -TB     O2 Delivery Intra Treatment room air  -TB     O2 Delivery Post Treatment room air  -TB     Pre Patient Position Supine  -TB     Intra Patient Position Standing  -TB     Post Patient Position Sitting  -TB     Row Name 08/03/22 1328          Positioning and Restraints    Pre-Treatment Position in bed  -TB     Post Treatment Position chair  -TB     In Chair notified nsg;reclined;call light within reach;encouraged to call for assist;exit alarm on;with family/caregiver;legs elevated;on mechanical lift sling;waffle cushion;heels elevated  -TB           User Key  (r) = Recorded By, (t) = Taken By, (c) = Cosigned By    Initials Name Provider Type    TB Emma Johnson, OT Occupational Therapist               Outcome Measures     Row Name 08/03/22 1336          How much help from another is currently needed...    Putting on and taking off regular lower body clothing? 1  -TB     Bathing (including washing, rinsing, and drying) 2  -TB     Toileting (which includes using toilet bed pan or urinal) 1  -TB     Putting on and taking off regular upper body clothing 2  -TB     Taking care of personal grooming (such as brushing teeth) 3  -TB     Eating meals 3  -TB     AM-PAC 6 Clicks Score (OT) 12  -TB     Row Name 08/03/22 1146 08/03/22 0741       How much help from another person do you currently need...    Turning from your back to your side while in flat bed without using bedrails? 2  -FW 2  -SC    Moving from lying on back to sitting on the side of a flat bed without bedrails? 1  -FW 2  -SC    Moving to and from a bed to a chair (including a wheelchair)? 1  -FW 1  -SC    Standing up from a chair using your arms (e.g., wheelchair, bedside chair)? 2  -FW 1  -SC    Climbing 3-5  steps with a railing? 1  -FW 1  -SC    To walk in hospital room? 1  -FW 1  -SC    AM-PAC 6 Clicks Score (PT) 8  -FW 8  -SC    Highest level of mobility 3 --> Sat at edge of bed  -FW 3 --> Sat at edge of bed  -SC    Row Name 08/03/22 1336 08/03/22 1146       Functional Assessment    Outcome Measure Options AM-PAC 6 Clicks Daily Activity (OT)  -TB AM-PAC 6 Clicks Basic Mobility (PT)  -          User Key  (r) = Recorded By, (t) = Taken By, (c) = Cosigned By    Initials Name Provider Type    TB Emma Johnson, OT Occupational Therapist    Duyen Pena RN Registered Nurse    FW Kayden Chawla, PT Physical Therapist                Occupational Therapy Education                 Title: PT OT SLP Therapies (In Progress)     Topic: Occupational Therapy (In Progress)     Point: ADL training (Done)     Description:   Instruct learner(s) on proper safety adaptation and remediation techniques during self care or transfers.   Instruct in proper use of assistive devices.              Learning Progress Summary           Patient Acceptance, E,D, VU,NR by TB at 8/3/2022 1336   Family Acceptance, E,D, VU,NR by TB at 8/3/2022 1336                   Point: Home exercise program (Not Started)     Description:   Instruct learner(s) on appropriate technique for monitoring, assisting and/or progressing therapeutic exercises/activities.              Learner Progress:  Not documented in this visit.          Point: Precautions (Done)     Description:   Instruct learner(s) on prescribed precautions during self-care and functional transfers.              Learning Progress Summary           Patient Acceptance, E,D, VU,NR by TB at 8/3/2022 1336   Family Acceptance, E,D, VU,NR by TB at 8/3/2022 1336                   Point: Body mechanics (Not Started)     Description:   Instruct learner(s) on proper positioning and spine alignment during self-care, functional mobility activities and/or exercises.              Learner  "Progress:  Not documented in this visit.                      User Key     Initials Effective Dates Name Provider Type Discipline     06/16/21 -  Emma Johnson OT Occupational Therapist OT              OT Recommendation and Plan  Therapy Frequency (OT): daily  Plan of Care Review  Plan of Care Reviewed With: patient, daughter (x2)  Outcome Evaluation: OT IE completed. Pt is A/Ox1 and cooperative with therapy. Pt presents with strength, balance and endurance deficits limiting mobility and self-care. Mod A x2 STS. Max A stand pvt transfer to chair. Cognition/Memory deficits will limit ADL retraining. Pt's daughter's report that she is \"never home alone\" and that family will help with all pt care needs. OT will follow IP. Recommend SNF at d/c for best outcome. Recommend TTB and BSC for home.     Time Calculation:    Time Calculation- OT     Row Name 08/03/22 1105             Time Calculation- OT    OT Start Time 1105  -TB      OT Received On 08/03/22  -TB      OT Goal Re-Cert Due Date 08/13/22  -TB              Untimed Charges    OT Eval/Re-eval Minutes 60  -TB              Total Minutes    Untimed Charges Total Minutes 60  -TB       Total Minutes 60  -TB            User Key  (r) = Recorded By, (t) = Taken By, (c) = Cosigned By    Initials Name Provider Type    TB Emma Johnson OT Occupational Therapist              Therapy Charges for Today     Code Description Service Date Service Provider Modifiers Qty    [id number] HC OT EVAL MOD COMPLEXITY 4 8/3/2022 Emma Johnson OT GO 1               Emma Johnson OT  8/3/2022  "

## 2022-08-04 PROCEDURE — 99232 SBSQ HOSP IP/OBS MODERATE 35: CPT | Performed by: INTERNAL MEDICINE

## 2022-08-04 PROCEDURE — 97530 THERAPEUTIC ACTIVITIES: CPT

## 2022-08-04 PROCEDURE — 97110 THERAPEUTIC EXERCISES: CPT

## 2022-08-04 RX ORDER — HYDROCODONE BITARTRATE AND ACETAMINOPHEN 5; 325 MG/1; MG/1
1 TABLET ORAL EVERY 4 HOURS PRN
Status: DISCONTINUED | OUTPATIENT
Start: 2022-08-04 | End: 2022-08-10

## 2022-08-04 RX ORDER — BISACODYL 10 MG
10 SUPPOSITORY, RECTAL RECTAL DAILY PRN
Status: DISCONTINUED | OUTPATIENT
Start: 2022-08-04 | End: 2022-08-10 | Stop reason: HOSPADM

## 2022-08-04 RX ORDER — AMOXICILLIN 250 MG
2 CAPSULE ORAL 2 TIMES DAILY
Status: DISCONTINUED | OUTPATIENT
Start: 2022-08-04 | End: 2022-08-10 | Stop reason: HOSPADM

## 2022-08-04 RX ORDER — BISACODYL 5 MG/1
5 TABLET, DELAYED RELEASE ORAL DAILY PRN
Status: DISCONTINUED | OUTPATIENT
Start: 2022-08-04 | End: 2022-08-10 | Stop reason: HOSPADM

## 2022-08-04 RX ORDER — POLYETHYLENE GLYCOL 3350 17 G/17G
17 POWDER, FOR SOLUTION ORAL DAILY
Status: DISCONTINUED | OUTPATIENT
Start: 2022-08-05 | End: 2022-08-10 | Stop reason: HOSPADM

## 2022-08-04 RX ADMIN — SENNOSIDES AND DOCUSATE SODIUM 2 TABLET: 50; 8.6 TABLET ORAL at 20:22

## 2022-08-04 RX ADMIN — MEMANTINE 10 MG: 10 TABLET ORAL at 08:23

## 2022-08-04 RX ADMIN — HYDROCODONE BITARTRATE AND ACETAMINOPHEN 1 TABLET: 5; 325 TABLET ORAL at 12:15

## 2022-08-04 RX ADMIN — HYDROCODONE BITARTRATE AND ACETAMINOPHEN 1 TABLET: 5; 325 TABLET ORAL at 18:18

## 2022-08-04 RX ADMIN — SENNOSIDES AND DOCUSATE SODIUM 2 TABLET: 50; 8.6 TABLET ORAL at 08:24

## 2022-08-04 RX ADMIN — ATORVASTATIN CALCIUM 10 MG: 10 TABLET, FILM COATED ORAL at 08:23

## 2022-08-04 RX ADMIN — BUSPIRONE HYDROCHLORIDE 10 MG: 10 TABLET ORAL at 08:24

## 2022-08-04 RX ADMIN — Medication 10 ML: at 08:25

## 2022-08-04 RX ADMIN — LEVOTHYROXINE SODIUM 50 MCG: 50 TABLET ORAL at 06:08

## 2022-08-04 RX ADMIN — HYDROCODONE BITARTRATE AND ACETAMINOPHEN 1 TABLET: 5; 325 TABLET ORAL at 23:31

## 2022-08-04 RX ADMIN — BUSPIRONE HYDROCHLORIDE 10 MG: 10 TABLET ORAL at 16:22

## 2022-08-04 RX ADMIN — BUSPIRONE HYDROCHLORIDE 10 MG: 10 TABLET ORAL at 20:22

## 2022-08-04 RX ADMIN — SODIUM CHLORIDE, POTASSIUM CHLORIDE, SODIUM LACTATE AND CALCIUM CHLORIDE 9 ML/HR: 600; 310; 30; 20 INJECTION, SOLUTION INTRAVENOUS at 16:28

## 2022-08-04 RX ADMIN — PANTOPRAZOLE SODIUM 40 MG: 40 TABLET, DELAYED RELEASE ORAL at 06:08

## 2022-08-04 RX ADMIN — BISACODYL 5 MG: 5 TABLET, COATED ORAL at 20:23

## 2022-08-04 RX ADMIN — QUETIAPINE FUMARATE 25 MG: 25 TABLET ORAL at 20:22

## 2022-08-04 RX ADMIN — DONEPEZIL HYDROCHLORIDE 10 MG: 10 TABLET, FILM COATED ORAL at 20:22

## 2022-08-04 RX ADMIN — TRAMADOL HYDROCHLORIDE 50 MG: 50 TABLET, COATED ORAL at 06:10

## 2022-08-04 RX ADMIN — SODIUM CHLORIDE, POTASSIUM CHLORIDE, SODIUM LACTATE AND CALCIUM CHLORIDE 75 ML/HR: 600; 310; 30; 20 INJECTION, SOLUTION INTRAVENOUS at 04:20

## 2022-08-04 RX ADMIN — FOLIC ACID 1 MG: 1 TABLET ORAL at 08:24

## 2022-08-04 RX ADMIN — HYDROCODONE BITARTRATE AND ACETAMINOPHEN 1 TABLET: 5; 325 TABLET ORAL at 08:31

## 2022-08-04 RX ADMIN — SERTRALINE HYDROCHLORIDE 50 MG: 50 TABLET ORAL at 08:25

## 2022-08-04 NOTE — PLAN OF CARE
Goal Outcome Evaluation:  Plan of Care Reviewed With: daughter, patient pt continues to be confused daughter at bedside.        Progress: improving

## 2022-08-04 NOTE — PROGRESS NOTES
[facility] Medicine Services  PROGRESS NOTE    Patient Name: Patricia Obrien  : 1935  MRN: [id number]    Date of Admission: 2022  Primary Care Physician: Provider, No Known    Subjective   Subjective     CC:  Fall with hip fracture    HPI:  Referrals to [facility] per case management.  Orthopedic surgery note reviewed.  No labs to results to review this morning.  Vital signs reviewed and stable afebrile on room air.  Spoke with the daughter Sandra at the bedside as well as the patient.  They report no bowel movements as yet.  Reports this is a chronic problem for her and that she has been on Linzess at home.  Does not like to use the bedpan.  Reviewing her medication list it seems she has Shae-Colace already scheduled and also as needed MiraLAX which I changed to scheduled today.  Notified them that a new provider would be seeing them tomorrow.  Reports is drinking very well including numerous pops.  Eating her dinner and numerous sweets overnight.  Did not eat very good for breakfast because the pain.    ROS:  Gen- No fevers, chills  CV- No chest pain, palpitations  Resp- No cough, dyspnea  GI- No N/V/D, abd pain      Objective   Objective     Vital Signs:   Temp:  [97.5 °F (36.4 °C)-98.7 °F (37.1 °C)] 98.2 °F (36.8 °C)  Heart Rate:  [69-75] 75  Resp:  [16-20] 20  BP: ()/(48-63) 140/63 slightly hypertensive we will allow this.     Physical Exam:  Constitutional: No acute distress, awake, alert  HENT: NCAT, mucous membranes moist  Respiratory: Clear to auscultation bilaterally, respiratory effort normal   Cardiovascular: RRR, no murmurs, rubs, or gallops  Gastrointestinal: Positive bowel sounds, soft, nontender, nondistended  Musculoskeletal: No bilateral ankle edema  Psychiatric: Appropriate affect, cooperative  Neurologic: Oriented to self only, speech clear  Skin: No rashes    Results Reviewed:  LAB RESULTS:      Lab 22  0446 22  1835   WBC  --  8.5    HEMOGLOBIN  --  11.6   HEMATOCRIT  --  34.8*   PLATELETS  --  146*   NEUTROS ABS  --  7.1   IMMATURE GRANS (ABS)  --  0.1   LYMPHS ABS  --  0.9*   MONOS ABS  --  0.3   EOS ABS  --  0.0   MCV  --  93.5   PROTIME 15.3*  --          Lab 08/02/22 0446   SODIUM 143   POTASSIUM 4.1   CHLORIDE 109*   CO2 22.0   ANION GAP 12.0   BUN 20   CREATININE 1.00   EGFR 54.6*   GLUCOSE 117*   CALCIUM 8.8             Lab 08/02/22 0446   PROTIME 15.3*   INR 1.22*                 Brief Urine Lab Results  (Last result in the past 365 days)      Color   Clarity   Blood   Leuk Est   Nitrite   Protein   CREAT   Urine HCG        08/01/22 1920 Yellow   Clear   Negative   Negative   Negative                   Microbiology Results Abnormal     Procedure Component Value - Date/Time    COVID PRE-OP / PRE-PROCEDURE SCREENING ORDER (NO ISOLATION) - Swab, Nasopharynx [[id number]]  (Normal) Collected: 08/02/22 0630    Lab Status: Final result Specimen: Swab from Nasopharynx Updated: 08/02/22 0701    Narrative:      The following orders were created for panel order COVID PRE-OP / PRE-PROCEDURE SCREENING ORDER (NO ISOLATION) - Swab, Nasopharynx.  Procedure                               Abnormality         Status                     ---------                               -----------         ------                     COVID-19 and FLU A/B PCR...[[id number]]  Normal              Final result                 Please view results for these tests on the individual orders.    COVID-19 and FLU A/B PCR - Swab, Nasopharynx [[id number]]  (Normal) Collected: 08/02/22 0630    Lab Status: Final result Specimen: Swab from Nasopharynx Updated: 08/02/22 0701     COVID19 Not Detected     Influenza A PCR Not Detected     Influenza B PCR Not Detected    Narrative:      Fact sheet for providers: https://www.fda.gov/media/051030/download    Fact sheet for patients: https://www.fda.gov/media/212506/download    Test performed by PCR.          Peripheral Block    Result Date:  8/2/2022  Paula Brooks CRNA     8/2/2022  1:55 PM Peripheral Block Patient reassessed immediately prior to procedure Patient location during procedure: OR Reason for block: procedure for pain, at surgeon's request and post-op pain management Performed by CRNA/CAA: Leah Richardson CRNA Preanesthetic Checklist Completed: patient identified, IV checked, site marked, risks and benefits discussed, surgical consent, monitors and equipment checked, pre-op evaluation and timeout performed Prep: Sterile barriers:gloves, cap, sterile barriers and mask Prep: ChloraPrep Patient monitoring: blood pressure monitoring, continuous pulse oximetry and EKG Procedure Performed under: local infiltration Guidance:ultrasound guided, nerve stimulator and landmark technique Images:still images not obtained Laterality:right Block Type:fascia iliaca compartment Injection Technique:single-shot Needle Type:short-bevel Needle Gauge:20 G Resistance on Injection: none Catheter Size:20 G (20g) Medications Used: ropivacaine (NAROPIN) 0.2 % injection, 50 mL Med administered at 8/2/2022 1:38 PM Medications Preservative Free Saline:10ml Post Assessment Injection Assessment: negative aspiration for heme, no paresthesia on injection and incremental injection Patient Tolerance:comfortable throughout block Complications:no Additional Notes The BBRaun 360 degree echogenic needle was introduced in plane, in a lateral to medial direction at the level of the inguinal crease.  Under ultrasound guidance, the femoral artery and vein where located.  The needle was then directed below Fascia Iliacus towards the Femoral nerve.  NS was utilized to hydro dissect and galina needle advancement towards the target structure.   LA was injected incrementally in 3-5 ml aliquots with negative aspirate.  LA spread was visualized around the nerve, negative intraneural injection, low injection pressures.  Thank you     XR Hip With or Without Pelvis 2 - 3 View  Right    Result Date: 8/2/2022  DATE OF EXAM: 8/2/2022 3:40 PM  PROCEDURE: XR HIP W OR WO PELVIS 2-3 VIEW RIGHT-  INDICATIONS: s/p R hip abdiaziz  COMPARISON: 8/2/2022  TECHNIQUE: AP and Lateral views of the right hip with Pelvis were obtained.  FINDINGS: The patient has undergone right total hip arthroplasty since the last study. The prosthesis appears in appropriate position and alignment. There is a fracture identified through the greater trochanter which is better seen on the current radiograph.      Impression:  1. Status post right total hip arthroplasty. 2. Fracture through the greater trochanter.  This report was finalized on 8/2/2022 3:59 PM by Dario Toussaint MD.            I have reviewed the medications:  Scheduled Meds:atorvastatin, 10 mg, Oral, Daily  busPIRone, 10 mg, Oral, TID  donepezil, 10 mg, Oral, Nightly  folic acid, 1 mg, Oral, Daily  levothyroxine, 50 mcg, Oral, Daily  memantine, 10 mg, Oral, Daily  pantoprazole, 40 mg, Oral, QAM  QUEtiapine, 25 mg, Oral, Nightly  senna-docusate sodium, 2 tablet, Oral, BID  sertraline, 50 mg, Oral, Daily  sodium chloride, 10 mL, Intravenous, Q12H      Continuous Infusions:lactated ringers, 75 mL/hr, Last Rate: 75 mL/hr (08/04/22 0420)  lactated ringers, 9 mL/hr      PRN Meds:.senna-docusate sodium **AND** polyethylene glycol **AND** bisacodyl **AND** bisacodyl  •  HYDROcodone-acetaminophen  •  labetalol  •  Morphine  •  ondansetron **OR** ondansetron  •  potassium chloride  •  potassium chloride  •  sodium chloride  •  traMADol    Assessment & Plan   Assessment & Plan     Active Hospital Problems    Diagnosis  POA   • Hip fracture (HCC) [S72.009A]  Yes   • Dementia (HCC) [F03.90]  Yes   • HTN (hypertension) [I10]  Yes   • Hypothyroidism (acquired) [E03.9]  Yes      Resolved Hospital Problems   No resolved problems to display.        Brief Hospital Course to date:  Patricia Obrien is a 87 y.o. female with dementia hypotension hypothyroidism presenting [facility]  [facility] with a right hip fracture following mechanical fall. X-rays reviewed 8-2-22 showing right femoral neck fracture.  Spoke to the patient as well as her daughter Sandra at the bedside.  They reports she has a history of syncopal events.  Currently she has a cardiac event monitor on her left chest.  She recently had a heart cath 2 weeks ago at [facility] which is reported to be normal by the daughter Sandra.  Patient's dementia is rather advanced.  But she does know her daughter and she does know her date of birth usually.  Today she only knows the month of her birth.    Right hip fracture  -PT OT Case management  -Has as needed's for pain control and for bowel management  -Status post right hip hemiarthroplasty 8/2/2022  -\"WBAT RLE with post hip precautions x 6 weeks. Limit active abduction.  PT/OT  Ok with DVT ppx with  mg x 6 weeks and mechanical while in house\" per Dr. Harrington 8-3-22    Dementia  -Receiving Namenda and Aricept    Hyperlipidemia  -Receiving a statin    Anxiety  -Receiving buspirone    Depression  -Receiving Zoloft    Hypothyroidism  -Receiving Synthroid 50 mcg    Hypertension  -Continuing to hold lisinopril 8/4/2022.  Slightly hypertensive will DC IV fluids.  Normotensive currently has labetalol as needed.    Expected Discharge Location and Transportation: Skilled nursing facility by ambulance  Expected Discharge Date: 8/6/2022    DVT prophylaxis:  Mechanical DVT prophylaxis orders are present.     AM-PAC 6 Clicks Score (PT): 8 (08/03/22 1146)    CODE STATUS:   Code Status and Medical Interventions:   Ordered at: 08/02/22 0910     Level Of Support Discussed With:    Next of Kin (If No Surrogate)     Code Status (Patient has no pulse and is not breathing):    CPR (Attempt to Resuscitate)     Medical Interventions (Patient has pulse or is breathing):    Full Support       Hemanth Brady, DO  08/04/22            "

## 2022-08-04 NOTE — CASE MANAGEMENT/SOCIAL WORK
Continued Stay Note  [facility]     Patient Name: Patricia Obrien  MRN: [id number]  Today's Date: 8/4/2022    Admit Date: 8/2/2022     Discharge Plan     Row Name 08/04/22 1555       Plan    Plan Rehab    Plan Comments In addition to [facility], referrals made to local SNF where patient is from per her daughter/POA Sandra request. Awaiting to hear from [facility] and will update patient's daughter Sandra.    Final Discharge Disposition Code 03 - skilled nursing facility (SNF)               Discharge Codes    No documentation.               Expected Discharge Date and Time     Expected Discharge Date Expected Discharge Time    Aug 5, 2022             Teresa Newberry RN

## 2022-08-04 NOTE — PLAN OF CARE
Goal Outcome Evaluation:  Plan of Care Reviewed With: patient Alert to self and daughter. VSS. SR on tele. Outpt heart monitor on left chest. Hip dressing C/D/I. Pain med given  once tonight Voiding via purewick. Awaiting placement.

## 2022-08-04 NOTE — PLAN OF CARE
Goal Outcome Evaluation:  Plan of Care Reviewed With: patient, daughter        Progress: improving  Outcome Evaluation: Pt. performed bed mobility with max assist of 2. She performed sit to stand transfer w/mod assist of 2 and bed to chair transfer w/max assist of 2. She attempted to ambulate for 2 trials w/limited weight acceptance into RLE and inability to clear either foot for forward progression. She tolerated ther-ex well but requires assist. Reviewed posterior hip precautions with daughter. Will continue to progress as able. Recommend SNF upon discharge.

## 2022-08-04 NOTE — THERAPY TREATMENT NOTE
Patient Name: Patricia Obrien  : 1935    MRN: [id number]                              Today's Date: 2022       Admit Date: 2022    Visit Dx: No diagnosis found.  Patient Active Problem List   Diagnosis   • Hip fracture (HCC)   • Dementia (HCC)   • HTN (hypertension)   • Hypothyroidism (acquired)     Past Medical History:   Diagnosis Date   • Anxiety    • Dementia (HCC)    • Depression    • Disease of thyroid gland    • Elevated cholesterol    • Hypertension    • Osteoarthritis      Past Surgical History:   Procedure Laterality Date   • BLADDER SUSPENSION     • CHOLECYSTECTOMY     • HIP HEMIARTHROPLASTY Right 2022    Procedure: HIP HEMIARTHROPLASTY RIGHT;  Surgeon: Jaspal Harrington Jr., MD;  Location: [facility];  Service: Orthopedics;  Laterality: Right;   • HYSTERECTOMY     • LAPAROSCOPIC TUBAL LIGATION        General Information     Row Name 22          Physical Therapy Time and Intention    Document Type therapy note (daily note)  -     Mode of Treatment physical therapy  -     Row Name 22 162          General Information    Patient Profile Reviewed yes  -SS     Existing Precautions/Restrictions fall;right;hip, posterior;other (see comments)  limit active abd, dementia, KI per quad weakness  -     Barriers to Rehab previous functional deficit;cognitive status  -     Row Name 22 162          Cognition    Orientation Status (Cognition) oriented to;person  oriented to first name only, difficutly w/birthday  -     Row Name 22          Safety Issues, Functional Mobility    Safety Issues Affecting Function (Mobility) ability to follow commands;awareness of need for assistance;insight into deficits/self-awareness;judgment;positioning of assistive device;problem-solving;safety precaution awareness;safety precautions follow-through/compliance;sequencing abilities  -     Impairments Affecting Function (Mobility) cognition;balance;endurance/activity  tolerance;pain;strength;range of motion (ROM);postural/trunk control  -     Cognitive Impairments, Mobility Safety/Performance awareness, need for assistance;insight into deficits/self-awareness;judgment;problem-solving/reasoning;safety precaution awareness;safety precaution follow-through;sequencing abilities  -           User Key  (r) = Recorded By, (t) = Taken By, (c) = Cosigned By    Initials Name Provider Type     Teresa Hurtado, PT Physical Therapist               Mobility     Row Name 08/04/22 1631          Bed Mobility    Bed Mobility supine-sit;scooting/bridging  -     Scooting/Bridging [place] (Bed Mobility) maximum assist (25% patient effort);2 person assist;verbal cues;nonverbal cues (demo/gesture)  -     Supine-Sit [place] (Bed Mobility) maximum assist (25% patient effort);2 person assist;verbal cues;nonverbal cues (demo/gesture)  -     Assistive Device (Bed Mobility) head of bed elevated;draw sheet;bed rails  -     Comment, (Bed Mobility) V/TC for hand placement, sequencing of LE advancement, trunk control  -     Row Name 08/04/22 1631          Bed-Chair Transfer    Bed-Chair [place] (Transfers) verbal cues;maximum assist (25% patient effort);2 person assist;nonverbal cues (demo/gesture)  -     Assistive Device (Bed-Chair Transfers) other (see comments)  BUE support  -     Comment, (Bed-Chair Transfer) V/TC for sequencing; pt. having difficutly w/advancing LE secondary to limited weight acceptance onto RLE  -     Row Name 08/04/22 1631          Sit-Stand Transfer    Sit-Stand [place] (Transfers) moderate assist (50% patient effort);2 person assist;verbal cues  -     Assistive Device (Sit-Stand Transfers) walker, front-wheeled;other (see comments)  BUE support  -     Comment, (Sit-Stand Transfer) V/TC for LE set up, hand placement, sequencing, upright posture; performed several trials  -     Row Name 08/04/22 1631          Gait/Stairs (Locomotion)     Comment, (Gait/Stairs) attempted 2 trials of ambulation w/decreased weight acceptance onto RLE and no foot clearance on either side; KI donned  -     Row Name 08/04/22 1631          Mobility    Extremity Weight-bearing Status right lower extremity  -     Right Lower Extremity (Weight-bearing Status) weight-bearing as tolerated (WBAT)  -           User Key  (r) = Recorded By, (t) = Taken By, (c) = Cosigned By    Initials Name Provider Type     Teresa Hurtado PT Physical Therapist               Obj/Interventions     Row Name 08/04/22 1635          Motor Skills    Therapeutic Exercise hip;knee;ankle;other (see comments)  difficulty w/command following for isometric exercises  -     Row Name 08/04/22 1635          Hip (Therapeutic Exercise)    Hip (Therapeutic Exercise) strengthening exercise  -     Hip Strengthening (Therapeutic Exercise) right;heel slides;10 repetitions  mod assist  -     Row Name 08/04/22 1635          Knee (Therapeutic Exercise)    Knee (Therapeutic Exercise) strengthening exercise  -     Knee Strengthening (Therapeutic Exercise) right;SLR (straight leg raise);SAQ (short arc quad);10 repetitions  mod assist  -     Row Name 08/04/22 1635          Ankle (Therapeutic Exercise)    Ankle (Therapeutic Exercise) AAROM (active assistive range of motion)  -     Ankle AAROM (Therapeutic Exercise) bilateral;dorsiflexion;plantarflexion;10 repetitions  -     Row Name 08/04/22 1635          Balance    Balance Assessment sitting static balance;sitting dynamic balance;sit to stand dynamic balance;standing static balance;standing dynamic balance  -     Static Sitting Balance contact guard  -     Dynamic Sitting Balance contact guard  -     Position, Sitting Balance unsupported;sitting edge of bed  -     Sit to Stand Dynamic Balance moderate assist;2-person assist  -     Static Standing Balance 2-person assist;maximum assist  -     Dynamic Standing Balance maximum assist;2-person  assist  -     Position/Device Used, Standing Balance supported;walker, front-wheeled  -     Balance Interventions sitting;standing;sit to stand;supported;static;dynamic  -           User Key  (r) = Recorded By, (t) = Taken By, (c) = Cosigned By    Initials Name Provider Type     Teresa Hurtado, PT Physical Therapist               Goals/Plan    No documentation.                Clinical Impression     [facility] Name 08/04/22 1637          Pain    Pain Intervention(s) Cold applied;Repositioned;Ambulation/increased activity;Elevated  -     Additional Documentation Pain Scale: FACES Pre/Post-Treatment (Group)  -[facility] Name 08/04/22 1637          Pain Scale: FACES Pre/Post-Treatment    Pain: FACES Scale, Pretreatment 0-->no hurt  -     Posttreatment Pain Rating 4-->hurts little more  -     Pain Location - Side/Orientation Right  -     Pain Location incisional  -     Pain Location - hip  -[facility] Name 08/04/22 1637          Plan of Care Review    Plan of Care Reviewed With patient;daughter  -     Progress improving  -     Outcome Evaluation Pt. performed bed mobility with max assist of 2. She performed sit to stand transfer w/mod assist of 2 and bed to chair transfer w/max assist of 2. She attempted to ambulate for 2 trials w/limited weight acceptance into RLE and inability to clear either foot for forward progression. She tolerated ther-ex well but requires assist. Reviewed posterior hip precautions with daughter. Will continue to progress as able. Recommend SNF upon discharge.  -     Row Name 08/04/22 1637          Therapy Assessment/Plan (PT)    Rehab Potential (PT) good, to achieve stated therapy goals  -     Criteria for Skilled Interventions Met (PT) yes;meets criteria;skilled treatment is necessary  -     Therapy Frequency (PT) daily  -     Row Name 08/04/22 1637          Vital Signs    Pre Systolic BP Rehab --  VSS  -[facility] Name 08/04/22 1637          Positioning and Restraints     Pre-Treatment Position in bed  -SS     Post Treatment Position chair  -SS     In Chair notified nsg;reclined;call light within reach;encouraged to call for assist;exit alarm on;with family/caregiver;waffle cushion;ABD pillow  administered ABD pillow - RN aware and will remove if not indicated per MD  -SS           User Key  (r) = Recorded By, (t) = Taken By, (c) = Cosigned By    Initials Name Provider Type     Teresa Hurtado PT Physical Therapist               Outcome Measures     Row Name 08/04/22 1644          How much help from another person do you currently need...    Turning from your back to your side while in flat bed without using bedrails? 2  -SS     Moving from lying on back to sitting on the side of a flat bed without bedrails? 2  -SS     Moving to and from a bed to a chair (including a wheelchair)? 2  -SS     Standing up from a chair using your arms (e.g., wheelchair, bedside chair)? 2  -SS     Climbing 3-5 steps with a railing? 1  -SS     To walk in hospital room? 1  -SS     AM-PAC 6 Clicks Score (PT) 10  -SS     Highest level of mobility 4 --> Transferred to chair/commode  -SS     Row Name 08/04/22 1644          Functional Assessment    Outcome Measure Options AM-PAC 6 Clicks Basic Mobility (PT)  -           User Key  (r) = Recorded By, (t) = Taken By, (c) = Cosigned By    Initials Name Provider Type    Teresa Coates PT Physical Therapist                             Physical Therapy Education                 Title: PT OT SLP Therapies (In Progress)     Topic: Physical Therapy (Done)     Point: Mobility training (Done)     Learning Progress Summary           Patient CONSUELO Rodriguez,H, VU,NR by  at 8/4/2022 1644    Comment: Reviewed safety/technique with bed mobility, transfers, ambulation, HEP, posterior hip precautions, PT POC    Acceptance, CONSUELO, VU,NR by  at 8/3/2022 1147    Comment: pt requires reinforcement for post hip precautions   Family CONSUELO Rodriguez,H, VU,NR by  at 8/4/2022 1644    Comment:  Reviewed safety/technique with bed mobility, transfers, ambulation, HEP, posterior hip precautions, PT POC    Acceptance, E, VU,NR by FW at 8/3/2022 1147    Comment: pt requires reinforcement for post hip precautions                   Point: Home exercise program (Done)     Learning Progress Summary           Patient Eager, E,H, VU,NR by SS at 8/4/2022 1644    Comment: Reviewed safety/technique with bed mobility, transfers, ambulation, HEP, posterior hip precautions, PT POC    Acceptance, E, VU,NR by FW at 8/3/2022 1147    Comment: pt requires reinforcement for post hip precautions   Family Eager, E,H, VU,NR by  at 8/4/2022 1644    Comment: Reviewed safety/technique with bed mobility, transfers, ambulation, HEP, posterior hip precautions, PT POC    Acceptance, E, VU,NR by  at 8/3/2022 1147    Comment: pt requires reinforcement for post hip precautions                   Point: Body mechanics (Done)     Learning Progress Summary           Patient Eager, E,H, VU,NR by  at 8/4/2022 1644    Comment: Reviewed safety/technique with bed mobility, transfers, ambulation, HEP, posterior hip precautions, PT POC    Acceptance, E, VU,NR by  at 8/3/2022 1147    Comment: pt requires reinforcement for post hip precautions   Family Eager, E,H, VU,NR by  at 8/4/2022 1644    Comment: Reviewed safety/technique with bed mobility, transfers, ambulation, HEP, posterior hip precautions, PT POC    Acceptance, E, VU,NR by  at 8/3/2022 1147    Comment: pt requires reinforcement for post hip precautions                   Point: Precautions (Done)     Learning Progress Summary           Patient Eager, E,H, VU,NR by  at 8/4/2022 1644    Comment: Reviewed safety/technique with bed mobility, transfers, ambulation, HEP, posterior hip precautions, PT POC    Acceptance, E, VU,NR by  at 8/3/2022 1147    Comment: pt requires reinforcement for post hip precautions   Family Eager, E,H, VU,NR by  at 8/4/2022 1644    Comment: Reviewed  safety/technique with bed mobility, transfers, ambulation, HEP, posterior hip precautions, PT POC    Acceptance, E, VU,NR by  at 8/3/2022 1147    Comment: pt requires reinforcement for post hip precautions                               User Key     Initials Effective Dates Name Provider Type Discipline     05/05/22 -  Kayden Chawla, PT Physical Therapist PT     06/01/21 -  Teresa Hurtado PT Physical Therapist PT              PT Recommendation and Plan     Plan of Care Reviewed With: patient, daughter  Progress: improving  Outcome Evaluation: Pt. performed bed mobility with max assist of 2. She performed sit to stand transfer w/mod assist of 2 and bed to chair transfer w/max assist of 2. She attempted to ambulate for 2 trials w/limited weight acceptance into RLE and inability to clear either foot for forward progression. She tolerated ther-ex well but requires assist. Reviewed posterior hip precautions with daughter. Will continue to progress as able. Recommend SNF upon discharge.     Time Calculation:    PT Charges     Row Name 08/04/22 1645             Time Calculation    Start Time 1500  -SS      Stop Time 1538  -SS      Time Calculation (min) 38 min  -SS      PT Received On 08/04/22  -              Time Calculation- PT    Total Timed Code Minutes- PT 38 minute(s)  -SS              Timed Charges    98490 - PT Therapeutic Exercise Minutes 15  -SS      06969 - PT Therapeutic Activity Minutes 23  -SS              Total Minutes    Timed Charges Total Minutes 38  -SS       Total Minutes 38  -SS            User Key  (r) = Recorded By, (t) = Taken By, (c) = Cosigned By    Initials Name Provider Type     Teresa Hurtado PT Physical Therapist              Therapy Charges for Today     Code Description Service Date Service Provider Modifiers Qty    33822220250 HC PT THER PROC EA 15 MIN 8/4/2022 Teresa Hurtado PT GP 1    47433999414 HC PT THERAPEUTIC ACT EA 15 MIN 8/4/2022 Teresa Hurtado PT GP 2     [id number]  PT THER SUPP EA 15 MIN 8/4/2022 Teresa Hurtado, PT GP 3          PT G-Codes  Outcome Measure Options: AM-PAC 6 Clicks Basic Mobility (PT)  AM-PAC 6 Clicks Score (PT): 10  AM-PAC 6 Clicks Score (OT): 12    Teresa Hurtado PT  8/4/2022

## 2022-08-04 NOTE — PROGRESS NOTES
Orthopedic Daily Progress Note      CC: AARON overnight, more pain this morning    Pain well controlled  General: no fevers, chills  Abdomen: no nausea, vomiting, or diarrhea    No other complaints    Physical Exam:  I have reviewed the vital signs.  Temp:  [97.5 °F (36.4 °C)-98.7 °F (37.1 °C)] 98.2 °F (36.8 °C)  Heart Rate:  [69-75] 75  Resp:  [16-20] 20  BP: ()/(48-63) 140/63    Objective  General Appearance:    Alert, cooperative, no distress  Extremities: No clubbing, cyanosis, or edema to lower extremities  Pulses:  2+ in distal surgical extremity  Skin: Incision Clean/dry/intact      Results Review:    I have reviewed the labs, radiology results and diagnostic studies:no new labs    Results from last 7 days   Lab Units 08/01/22  1835   WBC K/uL 8.5   HEMOGLOBIN g/dL 11.6   PLATELETS K/uL 146*     Results from last 7 days   Lab Units 08/02/22  0446   SODIUM mmol/L 143   POTASSIUM mmol/L 4.1   CO2 mmol/L 22.0   CREATININE mg/dL 1.00   GLUCOSE mg/dL 117*       I have reviewed the medications.    Assessment/Problem List  POD# 2 Day Post-Op   S/p R hip abdiaziz for femoral neck fx    Plan  WBAT RLE with post hip precautions x 6 weeks. Limit active abduction.  PT/OT  Ok with DVT ppx with  mg x 6 weeks and mechanical while in house      Discharge Planning: I expect patient to be discharged to rehab in TBD days.    Shala Gardner PA-C  08/04/22  08:05 EDT

## 2022-08-05 LAB
ANION GAP SERPL CALCULATED.3IONS-SCNC: 6 MMOL/L (ref 5–15)
BUN SERPL-MCNC: 15 MG/DL (ref 8–23)
BUN/CREAT SERPL: 16.7 (ref 7–25)
CALCIUM SPEC-SCNC: 7.9 MG/DL (ref 8.6–10.5)
CHLORIDE SERPL-SCNC: 105 MMOL/L (ref 98–107)
CO2 SERPL-SCNC: 25 MMOL/L (ref 22–29)
CREAT SERPL-MCNC: 0.9 MG/DL (ref 0.57–1)
DEPRECATED RDW RBC AUTO: 45.1 FL (ref 37–54)
EGFRCR SERPLBLD CKD-EPI 2021: 62 ML/MIN/1.73
ERYTHROCYTE [DISTWIDTH] IN BLOOD BY AUTOMATED COUNT: 13.4 % (ref 12.3–15.4)
GLUCOSE SERPL-MCNC: 95 MG/DL (ref 65–99)
HCT VFR BLD AUTO: 23.5 % (ref 34–46.6)
HGB BLD-MCNC: 8.1 G/DL (ref 12–15.9)
MCH RBC QN AUTO: 31.8 PG (ref 26.6–33)
MCHC RBC AUTO-ENTMCNC: 34.5 G/DL (ref 31.5–35.7)
MCV RBC AUTO: 92.2 FL (ref 79–97)
PLATELET # BLD AUTO: 107 10*3/MM3 (ref 140–450)
PMV BLD AUTO: 11.6 FL (ref 6–12)
POTASSIUM SERPL-SCNC: 4.1 MMOL/L (ref 3.5–5.2)
RBC # BLD AUTO: 2.55 10*6/MM3 (ref 3.77–5.28)
SODIUM SERPL-SCNC: 136 MMOL/L (ref 136–145)
WBC NRBC COR # BLD: 6.04 10*3/MM3 (ref 3.4–10.8)

## 2022-08-05 PROCEDURE — 97530 THERAPEUTIC ACTIVITIES: CPT

## 2022-08-05 PROCEDURE — 85027 COMPLETE CBC AUTOMATED: CPT | Performed by: INTERNAL MEDICINE

## 2022-08-05 PROCEDURE — 80048 BASIC METABOLIC PNL TOTAL CA: CPT | Performed by: INTERNAL MEDICINE

## 2022-08-05 PROCEDURE — 99232 SBSQ HOSP IP/OBS MODERATE 35: CPT | Performed by: INTERNAL MEDICINE

## 2022-08-05 PROCEDURE — 97110 THERAPEUTIC EXERCISES: CPT

## 2022-08-05 RX ADMIN — QUETIAPINE FUMARATE 25 MG: 25 TABLET ORAL at 20:41

## 2022-08-05 RX ADMIN — Medication 10 ML: at 08:42

## 2022-08-05 RX ADMIN — SENNOSIDES AND DOCUSATE SODIUM 2 TABLET: 50; 8.6 TABLET ORAL at 08:42

## 2022-08-05 RX ADMIN — POLYETHYLENE GLYCOL 3350 17 G: 17 POWDER, FOR SOLUTION ORAL at 08:43

## 2022-08-05 RX ADMIN — BUSPIRONE HYDROCHLORIDE 10 MG: 10 TABLET ORAL at 20:41

## 2022-08-05 RX ADMIN — Medication 10 ML: at 20:43

## 2022-08-05 RX ADMIN — HYDROCODONE BITARTRATE AND ACETAMINOPHEN 1 TABLET: 5; 325 TABLET ORAL at 11:23

## 2022-08-05 RX ADMIN — TRAMADOL HYDROCHLORIDE 50 MG: 50 TABLET, COATED ORAL at 08:42

## 2022-08-05 RX ADMIN — FOLIC ACID 1 MG: 1 TABLET ORAL at 08:42

## 2022-08-05 RX ADMIN — LEVOTHYROXINE SODIUM 50 MCG: 50 TABLET ORAL at 05:24

## 2022-08-05 RX ADMIN — DONEPEZIL HYDROCHLORIDE 10 MG: 10 TABLET, FILM COATED ORAL at 20:40

## 2022-08-05 RX ADMIN — ATORVASTATIN CALCIUM 10 MG: 10 TABLET, FILM COATED ORAL at 08:42

## 2022-08-05 RX ADMIN — SERTRALINE HYDROCHLORIDE 50 MG: 50 TABLET ORAL at 08:42

## 2022-08-05 RX ADMIN — HYDROCODONE BITARTRATE AND ACETAMINOPHEN 1 TABLET: 5; 325 TABLET ORAL at 20:40

## 2022-08-05 RX ADMIN — BUSPIRONE HYDROCHLORIDE 10 MG: 10 TABLET ORAL at 08:42

## 2022-08-05 RX ADMIN — PANTOPRAZOLE SODIUM 40 MG: 40 TABLET, DELAYED RELEASE ORAL at 05:24

## 2022-08-05 RX ADMIN — TRAMADOL HYDROCHLORIDE 50 MG: 50 TABLET, COATED ORAL at 15:37

## 2022-08-05 RX ADMIN — BUSPIRONE HYDROCHLORIDE 10 MG: 10 TABLET ORAL at 15:38

## 2022-08-05 RX ADMIN — MEMANTINE 10 MG: 10 TABLET ORAL at 08:42

## 2022-08-05 NOTE — PLAN OF CARE
Problem: Adult Inpatient Plan of Care  Goal: Plan of Care Review  Flowsheets  Taken 8/5/2022 1855 by Brianna Moya RN  Plan of Care Reviewed With:   patient   daughter  Taken 8/5/2022 1656 by Teresa Hurtado PT  Outcome Evaluation: Pt. performed bed mobility with max assist of 2. She performed sit to stand transfer w/mod assist of 2 and bed to chair transfer w/max assist of 2. Pt. able to progress ambulation with 2 steps forward. RLE attempts to buckle (with knee immobilizer donned) with weight acceptance limiting further progression. She tolerated ther-ex well but requires assist. Will continue to progress as able. Recommend SNF upon discharge.   Goal Outcome Evaluation:  Plan of Care Reviewed With: patient, daughter

## 2022-08-05 NOTE — DISCHARGE PLACEMENT REQUEST
"Yovana Hall (87 y.o. Female)     Patient lives with daughter Sandra in [facility]. Looking for short term rehab.    I can send updated rehab notes on Monday.    Thank you  Teresa BERMUDEZ, RN, [facility]   [phone]        Date of Birth   1935    Social Security Number       Address   [address] [place] [address]    Home Phone   [phone]    MRN   [id number]       [employer]   Non-Buddhism    Marital Status                               Admission Date   8/2/22    Admission Type   Urgent    Admitting Provider   Colleen Noe DO    Attending Provider   Colleen Noe DO    Department, Room/Bed   [facility] 3G, S354/1       Discharge Date       Discharge Disposition       Discharge Destination                               Attending Provider: Colleen Noe DO    Allergies: Sulfa Antibiotics    Isolation: None   Infection: None   Code Status: CPR   Advance Care Planning Activity    Ht: 149.9 cm (59\")   Wt: 56.2 kg (124 lb)    Admission Cmt: None   Principal Problem: None                Active Insurance as of 8/2/2022     Primary Coverage     Payor Plan Insurance Group Employer/Plan Group    ANTHEM MEDICARE REPLACEMENT ANTHEM MEDICARE ADVANTAGE [id number]     Payor Plan Address Payor Plan Phone Number Payor Plan Fax Number Effective Dates    [address] [phone]  1/1/2020 - None Entered    [facility] [address]       Subscriber Name Subscriber Birth Date Member ID       YOVANA HALL 1935 [id number]                 Emergency Contacts      (Rel.) Home Phone Work Phone Mobile Phone    sandra perkins (Daughter) [phone] -- [phone]    lito Kendrick (Grandchild) -- -- [phone]            Insurance Information                ANTHEM MEDICARE REPLACEMENT/ANTHEM MEDICARE ADVANTAGE Phone: [phone]    Subscriber: Yovana Hall Subscriber#: [id number]    Group#: [id number] Precert#: [id number]             History & Physical    "   Chelsey Campbell MD at 22 0338              [facility] Medicine Services  HISTORY AND PHYSICAL    Patient Name: Patricia Obrien  : 1935  MRN: [id number]  Primary Care Physician: Provider, No Known  Date of admission: 2022      Subjective   Subjective     Chief Complaint:  Hip fracture    HPI:  Patricia Obrien is a 87 y.o. female with history of dementia, hypotension, hypothyroidism who presents from [facility] for hip fracture.  Patient unable to provide history due to her dementia.  History obtained from daughter at the bedside.  Patient's dog jumped up on her and she stepped backward and fell.  Patient is denying any pain currently.  She has no other complaints.  CBC from outside hospital was unremarkable other than mild anemia of 11.6.  BMP showed a creatinine of 1.2 but otherwise was unremarkable.  UA was unremarkable.  Vital signs were within normal limits.  X-ray of the right hip showed a femoral neck fracture.      Review of Systems   Constitutional: Negative for fever.   HENT: Negative for trouble swallowing.    Eyes: Negative for visual disturbance.   Respiratory: Negative for shortness of breath.    Cardiovascular: Negative for chest pain and leg swelling.   Gastrointestinal: Negative for diarrhea and nausea.   Genitourinary: Negative for dysuria.   Musculoskeletal: Negative.    Skin: Negative for rash.   Neurological: Negative for dizziness and weakness.          Personal History     No past medical history on file.          No past surgical history on file.    Family History: Family denies any significant medical history.  Otherwise pertinent FHx was reviewed and unremarkable.     Social History:  Patient does not drink alcohol or smoke.  Social History     Social History Narrative   • Not on file       Medications:  Available home medication information reviewed.  Medications Prior to Admission   Medication Sig Dispense Refill Last Dose   • atorvastatin  (LIPITOR) 10 MG tablet Take 10 mg by mouth Daily.      • busPIRone (BUSPAR) 10 MG tablet Take 10 mg by mouth 3 (Three) Times a Day.      • folic acid (FOLVITE) 1 MG tablet Take 1 mg by mouth Daily.      • levothyroxine (SYNTHROID, LEVOTHROID) 50 MCG tablet Take 50 mcg by mouth Daily.      • linaclotide (Linzess) 72 MCG capsule capsule Take 72 mcg by mouth As Needed.      • lisinopril (PRINIVIL,ZESTRIL) 40 MG tablet Take 40 mg by mouth Daily.      • meloxicam (MOBIC) 15 MG tablet Take 15 mg by mouth Daily.      • Memantine HCl-Donepezil HCl (Namzaric) 28-10 MG capsule sustained-release 24 hr Take  by mouth Daily.      • Mirabegron ER (MYRBETRIQ) 25 MG tablet sustained-release 24 hour 24 hr tablet Take 25 mg by mouth Every Night.      • omeprazole (priLOSEC) 20 MG capsule Take 40 mg by mouth Every Night.      • QUEtiapine (SEROquel) 25 MG tablet Take 25 mg by mouth Every Night.      • sertraline (ZOLOFT) 50 MG tablet Take 50 mg by mouth Daily.          Allergies   Allergen Reactions   • Sulfa Antibiotics Other (See Comments)     unknown       Objective   Objective     Vital Signs:   Temp:  [98.1 °F (36.7 °C)] 98.1 °F (36.7 °C)  Heart Rate:  [66] 66  Resp:  [16] 16  BP: (143)/(95) 143/95       Physical Exam   Constitutional: Awake, alert  Eyes: PERRLA, sclerae anicteric, no conjunctival injection  HENT: NCAT, mucous membranes moist  Neck: Supple, trachea midline  Respiratory: Clear to auscultation bilaterally, nonlabored respirations   Cardiovascular: RRR, no murmurs, rubs, or gallops, palpable pulses in right lower extremity  Gastrointestinal: Positive bowel sounds, soft, nontender, nondistended  Musculoskeletal: 1+ lower extremity edema, right hip without any bruising noted  Psychiatric: Appropriate affect, cooperative  Neurologic: Oriented to person only, talking about her prior job at [employer], no gross focal neurological deficits  Skin: No rashes        Result Review:  I have personally reviewed the results  from the time of this admission to 8/2/2022 04:05 EDT and agree with these findings:  [x]  Laboratory list / accordion  []  Microbiology  []  Radiology  []  EKG/Telemetry   []  Cardiology/Vascular   []  Pathology  [x]  Old records  []  Other:         LAB RESULTS:      Lab 08/01/22  1835   WBC 8.5   HEMOGLOBIN 11.6   HEMATOCRIT 34.8*   PLATELETS 146*   NEUTROS ABS 7.1   IMMATURE GRANS (ABS) 0.1   LYMPHS ABS 0.9*   MONOS ABS 0.3   EOS ABS 0.0   MCV 93.5                             UA    Urinalysis 7/9/22 7/9/22 8/1/22 8/1/22    1400 1400 1920 1920   Specific Gravity, UA  1.020  >=1.030   Blood, UA  TRACE-INTACT (A)  Negative   Leukocytes, UA  LARGE (A)  Negative   Nitrite, UA  Negative  Negative   RBC, UA None seen  None seen    Bacteria, UA 3+ (A)  Rare (A)    (A) Abnormal value              Microbiology Results (last 10 days)     ** No results found for the last 240 hours. **          XR Outside Films    Result Date: 8/2/2022  This procedure was auto-finalized with no dictation required.    XR Outside Films    Result Date: 8/2/2022  This procedure was auto-finalized with no dictation required.    XR CHEST PORTABLE    Result Date: 8/1/2022  PORTABLE CHEST Indication: Preoperative Comparison: 7/3/22 Findings: The cardiomediastinal silhouette is within normal limits. No focal consolidation, pleural effusion or pneumothorax. Bones are unremarkable.    Impression: No acute cardiopulmonary findings.    XR HIP RIGHT (2-3 VIEWS)    Result Date: 8/1/2022  EXAM: PELVIS AND RIGHT HIP 3 VIEWS INDICATION: Fall. Right hip pain. COMPARISON: None FINDINGS: Right femoral neck basicervical fracture with displacement and varus impaction deformity. No definite intertrochanteric extension. No additional fractures identified. The right hip is congruent. Mild osteoarthritis is present.    Impression: Right femoral neck bases cervical fracture with displacement and varus impaction. No definite intertrochanteric extension but consider CT if  needed for preoperative planning.          Assessment & Plan   Assessment & Plan     Active Hospital Problems    Diagnosis  POA   • Hip fracture (HCC) [S72.009A]  Yes   • Dementia (HCC) [F03.90]  Yes   • HTN (hypertension) [I10]  Yes   • Hypothyroidism (acquired) [E03.9]  Yes       Patricia Obrien is a 87 y.o. female with history of dementia, hypotension, hypothyroidism who presents from [facility] for hip fracture.    Right femoral neck fracture  Mechanical fall  -History of mechanical nature  - Noted on x-ray at outside hospital  -Outside hospital labs unremarkable, we will repeat here.  -We will upload x-rays for review  - Check EKG  -Start scheduled Tylenol, as needed tramadol  -PT OT following surgery  -N.p.o.  -Ortho consult in the a.m.    Dementia  -At high risk for encephalopathy  - continue memantine, donepezil    Hyperlipidemia: Continue atorvastatin    Anxiety depression: Continue BuSpar, zoloft    Hypothyroidism: continue Synthroid    HTN:  - holding lisinpril for surgery, resume following surgery        DVT prophylaxis:  Holding for surgery      CODE STATUS:  Full code  There are no questions and answers to display.         Chelsey Campbell MD  08/02/22      Electronically signed by Chelsey Campbell MD at 08/02/22 0406         Current Facility-Administered Medications   Medication Dose Route Frequency Provider Last Rate Last Admin   • atorvastatin (LIPITOR) tablet 10 mg  10 mg Oral Daily Jaspal Harrington Jr., MD   10 mg at 08/05/22 0842   • polyethylene glycol (MIRALAX) packet 17 g  17 g Oral Daily Hemanth Brady DO   17 g at 08/05/22 0843    And   • sennosides-docusate (PERICOLACE) 8.6-50 MG per tablet 2 tablet  2 tablet Oral BID Hemanth Brady DO   2 tablet at 08/05/22 0842    And   • bisacodyl (DULCOLAX) EC tablet 5 mg  5 mg Oral Daily PRN Hemanth Brady DO   5 mg at 08/04/22 2023    And   • bisacodyl (DULCOLAX) suppository 10 mg  10 mg Rectal Daily PRN Hemanth Brady DO       • busPIRone  (BUSPAR) tablet 10 mg  10 mg Oral TID Jaspal Harrington Jr., MD   10 mg at 08/05/22 0842   • donepezil (ARICEPT) tablet 10 mg  10 mg Oral Nightly Jaspal Harrington Jr., MD   10 mg at 08/04/22 2022   • folic acid (FOLVITE) tablet 1 mg  1 mg Oral Daily Jaspal Harrington Jr., MD   1 mg at 08/05/22 0842   • HYDROcodone-acetaminophen (NORCO) 5-325 MG per tablet 1 tablet  1 tablet Oral Q4H PRN Hemanth Brady DO   1 tablet at 08/05/22 1123   • labetalol (NORMODYNE,TRANDATE) injection 20 mg  20 mg Intravenous Q10 Min PRN Jaspal Harrington Jr., MD       • lactated ringers infusion  9 mL/hr Intravenous Continuous Jaspal Harrington Jr., MD 9 mL/hr at 08/04/22 1628 9 mL/hr at 08/04/22 1628   • levothyroxine (SYNTHROID, LEVOTHROID) tablet 50 mcg  50 mcg Oral Daily Jaspal Harrington Jr., MD   50 mcg at 08/05/22 0524   • memantine (NAMENDA) tablet 10 mg  10 mg Oral Daily Jaspal Harrington Jr., MD   10 mg at 08/05/22 0842   • morphine injection 2 mg  2 mg Intravenous Q2H PRN Jaspal Harrington Jr., MD   2 mg at 08/02/22 1125   • ondansetron (ZOFRAN) tablet 4 mg  4 mg Oral Q6H PRN Jaspal Harrington Jr., MD        Or   • ondansetron (ZOFRAN) injection 4 mg  4 mg Intravenous Q6H PRN Jaspal Harrington Jr., MD       • pantoprazole (PROTONIX) EC tablet 40 mg  40 mg Oral QAM Jaspal Harrington Jr., MD   40 mg at 08/05/22 0524   • potassium chloride (KLOR-CON) packet 40 mEq  40 mEq Oral PRN Jaspal Harrington Jr., MD       • potassium chloride (MICRO-K) CR capsule 40 mEq  40 mEq Oral PRN Jaspal Harrington Jr., MD       • QUEtiapine (SEROquel) tablet 25 mg  25 mg Oral Nightly Jaspal Harrington Jr., MD   25 mg at 08/04/22 2022   • sertraline (ZOLOFT) tablet 50 mg  50 mg Oral Daily Jaspal Harrington Jr., MD   50 mg at 08/05/22 0842   • sodium chloride 0.9 % flush 10 mL  10 mL Intravenous Q12H Jaspal Harrington Jr., MD   10 mL at 08/05/22 0842   • sodium chloride 0.9 % flush 10 mL  10 mL Intravenous PRN Jaspal Harrington Jr., MD       • traMADol  (ULTRAM) tablet 50 mg  50 mg Oral Q6H PRN Jaspal Harrington Jr., MD   50 mg at 22 0842        Physical Therapy Notes (most recent note)      Teresa Hurtado, PT at 22 1500  Version 1 of 1         Patient Name: Patricia Obrien  : 1935    MRN: [id number]                              Today's Date: 2022       Admit Date: 2022    Visit Dx: No diagnosis found.  Patient Active Problem List   Diagnosis   • Hip fracture (HCC)   • Dementia (HCC)   • HTN (hypertension)   • Hypothyroidism (acquired)     Past Medical History:   Diagnosis Date   • Anxiety    • Dementia (HCC)    • Depression    • Disease of thyroid gland    • Elevated cholesterol    • Hypertension    • Osteoarthritis      Past Surgical History:   Procedure Laterality Date   • BLADDER SUSPENSION     • CHOLECYSTECTOMY     • HIP HEMIARTHROPLASTY Right 2022    Procedure: HIP HEMIARTHROPLASTY RIGHT;  Surgeon: Jaspal Harrington Jr., MD;  Location: [facility];  Service: Orthopedics;  Laterality: Right;   • HYSTERECTOMY     • LAPAROSCOPIC TUBAL LIGATION        General Information     Row Name 22          Physical Therapy Time and Intention    Document Type therapy note (daily note)  -SS     Mode of Treatment physical therapy  -     Row Name 22 162          General Information    Patient Profile Reviewed yes  -SS     Existing Precautions/Restrictions fall;right;hip, posterior;other (see comments)  limit active abd, dementia, KI per quad weakness  -SS     Barriers to Rehab previous functional deficit;cognitive status  -SS     Row Name 22          Cognition    Orientation Status (Cognition) oriented to;person  oriented to first name only, difficutly w/birthday  -SS     Row Name 22          Safety Issues, Functional Mobility    Safety Issues Affecting Function (Mobility) ability to follow commands;awareness of need for assistance;insight into deficits/self-awareness;judgment;positioning of assistive  device;problem-solving;safety precaution awareness;safety precautions follow-through/compliance;sequencing abilities  -     Impairments Affecting Function (Mobility) cognition;balance;endurance/activity tolerance;pain;strength;range of motion (ROM);postural/trunk control  -     Cognitive Impairments, Mobility Safety/Performance awareness, need for assistance;insight into deficits/self-awareness;judgment;problem-solving/reasoning;safety precaution awareness;safety precaution follow-through;sequencing abilities  -           User Key  (r) = Recorded By, (t) = Taken By, (c) = Cosigned By    Initials Name Provider Type     Teresa Hurtado, PT Physical Therapist               Mobility     Row Name 08/04/22 1631          Bed Mobility    Bed Mobility supine-sit;scooting/bridging  -     Scooting/Bridging Blunt (Bed Mobility) maximum assist (25% patient effort);2 person assist;verbal cues;nonverbal cues (demo/gesture)  -     Supine-Sit Blunt (Bed Mobility) maximum assist (25% patient effort);2 person assist;verbal cues;nonverbal cues (demo/gesture)  -     Assistive Device (Bed Mobility) head of bed elevated;draw sheet;bed rails  -     Comment, (Bed Mobility) V/TC for hand placement, sequencing of LE advancement, trunk control  -     Row Name 08/04/22 1631          Bed-Chair Transfer    Bed-Chair Blunt (Transfers) verbal cues;maximum assist (25% patient effort);2 person assist;nonverbal cues (demo/gesture)  -     Assistive Device (Bed-Chair Transfers) other (see comments)  BUE support  -     Comment, (Bed-Chair Transfer) V/TC for sequencing; pt. having difficutly w/advancing LE secondary to limited weight acceptance onto RLE  -     Row Name 08/04/22 1631          Sit-Stand Transfer    Sit-Stand Blunt (Transfers) moderate assist (50% patient effort);2 person assist;verbal cues  -     Assistive Device (Sit-Stand Transfers) walker, front-wheeled;other (see comments)  BUE  support  -     Comment, (Sit-Stand Transfer) V/TC for LE set up, hand placement, sequencing, upright posture; performed several trials  -     Row Name 08/04/22 1631          Gait/Stairs (Locomotion)    Comment, (Gait/Stairs) attempted 2 trials of ambulation w/decreased weight acceptance onto RLE and no foot clearance on either side; KI donned  -     Row Name 08/04/22 1631          Mobility    Extremity Weight-bearing Status right lower extremity  -     Right Lower Extremity (Weight-bearing Status) weight-bearing as tolerated (WBAT)  -           User Key  (r) = Recorded By, (t) = Taken By, (c) = Cosigned By    Initials Name Provider Type     Teresa Hurtado PT Physical Therapist               Obj/Interventions     Row Name 08/04/22 1635          Motor Skills    Therapeutic Exercise hip;knee;ankle;other (see comments)  difficulty w/command following for isometric exercises  -     Row Name 08/04/22 1635          Hip (Therapeutic Exercise)    Hip (Therapeutic Exercise) strengthening exercise  -     Hip Strengthening (Therapeutic Exercise) right;heel slides;10 repetitions  mod assist  -     Row Name 08/04/22 1635          Knee (Therapeutic Exercise)    Knee (Therapeutic Exercise) strengthening exercise  -     Knee Strengthening (Therapeutic Exercise) right;SLR (straight leg raise);SAQ (short arc quad);10 repetitions  mod assist  -     Row Name 08/04/22 1635          Ankle (Therapeutic Exercise)    Ankle (Therapeutic Exercise) AAROM (active assistive range of motion)  -     Ankle AAROM (Therapeutic Exercise) bilateral;dorsiflexion;plantarflexion;10 repetitions  -     Row Name 08/04/22 1635          Balance    Balance Assessment sitting static balance;sitting dynamic balance;sit to stand dynamic balance;standing static balance;standing dynamic balance  -     Static Sitting Balance contact guard  -     Dynamic Sitting Balance contact guard  -     Position, Sitting Balance unsupported;sitting  edge of bed  -     Sit to Stand Dynamic Balance moderate assist;2-person assist  -SS     Static Standing Balance 2-person assist;maximum assist  -SS     Dynamic Standing Balance maximum assist;2-person assist  -     Position/Device Used, Standing Balance supported;walker, front-wheeled  -     Balance Interventions sitting;standing;sit to stand;supported;static;dynamic  -           User Key  (r) = Recorded By, (t) = Taken By, (c) = Cosigned By    Initials Name Provider Type     Teresa Hurtado, PT Physical Therapist               Goals/Plan    No documentation.                Clinical Impression     Row Name 08/04/22 1637          Pain    Pain Intervention(s) Cold applied;Repositioned;Ambulation/increased activity;Elevated  -     Additional Documentation Pain Scale: FACES Pre/Post-Treatment (Group)  -     Row Name 08/04/22 1637          Pain Scale: FACES Pre/Post-Treatment    Pain: FACES Scale, Pretreatment 0-->no hurt  -     Posttreatment Pain Rating 4-->hurts little more  -     Pain Location - Side/Orientation Right  -     Pain Location incisional  -     Pain Location - hip  -     Row Name 08/04/22 1637          Plan of Care Review    Plan of Care Reviewed With patient;daughter  -     Progress improving  -     Outcome Evaluation Pt. performed bed mobility with max assist of 2. She performed sit to stand transfer w/mod assist of 2 and bed to chair transfer w/max assist of 2. She attempted to ambulate for 2 trials w/limited weight acceptance into RLE and inability to clear either foot for forward progression. She tolerated ther-ex well but requires assist. Reviewed posterior hip precautions with daughter. Will continue to progress as able. Recommend SNF upon discharge.  -     Row Name 08/04/22 6835          Therapy Assessment/Plan (PT)    Rehab Potential (PT) good, to achieve stated therapy goals  -     Criteria for Skilled Interventions Met (PT) yes;meets criteria;skilled treatment is  necessary  -SS     Therapy Frequency (PT) daily  -SS     Row Name 08/04/22 1637          Vital Signs    Pre Systolic BP Rehab --  VSS  -     Row Name 08/04/22 1637          Positioning and Restraints    Pre-Treatment Position in bed  -SS     Post Treatment Position chair  -SS     In Chair notified nsg;reclined;call light within reach;encouraged to call for assist;exit alarm on;with family/caregiver;waffle cushion;ABD pillow  administered ABD pillow - RN aware and will remove if not indicated per MD  -SS           User Key  (r) = Recorded By, (t) = Taken By, (c) = Cosigned By    Initials Name Provider Type     Teresa Hurtado PT Physical Therapist               Outcome Measures     Row Name 08/04/22 1644          How much help from another person do you currently need...    Turning from your back to your side while in flat bed without using bedrails? 2  -SS     Moving from lying on back to sitting on the side of a flat bed without bedrails? 2  -SS     Moving to and from a bed to a chair (including a wheelchair)? 2  -SS     Standing up from a chair using your arms (e.g., wheelchair, bedside chair)? 2  -SS     Climbing 3-5 steps with a railing? 1  -SS     To walk in hospital room? 1  -SS     AM-PAC 6 Clicks Score (PT) 10  -SS     Highest level of mobility 4 --> Transferred to chair/commode  -     Row Name 08/04/22 1644          Functional Assessment    Outcome Measure Options AM-PAC 6 Clicks Basic Mobility (PT)  -           User Key  (r) = Recorded By, (t) = Taken By, (c) = Cosigned By    Initials Name Provider Type    Teresa Coates PT Physical Therapist                             Physical Therapy Education                 Title: PT OT SLP Therapies (In Progress)     Topic: Physical Therapy (Done)     Point: Mobility training (Done)     Learning Progress Summary           Patient Michael, E,H, VU,NR by  at 8/4/2022 1644    Comment: Reviewed safety/technique with bed mobility, transfers, ambulation, HEP,  posterior hip precautions, PT POC    Acceptance, E, VU,NR by FW at 8/3/2022 1147    Comment: pt requires reinforcement for post hip precautions   Family Eager, E,H, VU,NR by SS at 8/4/2022 1644    Comment: Reviewed safety/technique with bed mobility, transfers, ambulation, HEP, posterior hip precautions, PT POC    Acceptance, E, VU,NR by FW at 8/3/2022 1147    Comment: pt requires reinforcement for post hip precautions                   Point: Home exercise program (Done)     Learning Progress Summary           Patient Eager, E,H, VU,NR by SS at 8/4/2022 1644    Comment: Reviewed safety/technique with bed mobility, transfers, ambulation, HEP, posterior hip precautions, PT POC    Acceptance, E, VU,NR by FW at 8/3/2022 1147    Comment: pt requires reinforcement for post hip precautions   Family Eager, E,H, VU,NR by  at 8/4/2022 1644    Comment: Reviewed safety/technique with bed mobility, transfers, ambulation, HEP, posterior hip precautions, PT POC    Acceptance, E, VU,NR by  at 8/3/2022 1147    Comment: pt requires reinforcement for post hip precautions                   Point: Body mechanics (Done)     Learning Progress Summary           Patient Eager, E,H, VU,NR by  at 8/4/2022 1644    Comment: Reviewed safety/technique with bed mobility, transfers, ambulation, HEP, posterior hip precautions, PT POC    Acceptance, E, VU,NR by  at 8/3/2022 1147    Comment: pt requires reinforcement for post hip precautions   Family Eager, E,H, VU,NR by  at 8/4/2022 1644    Comment: Reviewed safety/technique with bed mobility, transfers, ambulation, HEP, posterior hip precautions, PT POC    Acceptance, E, VU,NR by  at 8/3/2022 1147    Comment: pt requires reinforcement for post hip precautions                   Point: Precautions (Done)     Learning Progress Summary           Patient Eager, E,H, VU,NR by  at 8/4/2022 1644    Comment: Reviewed safety/technique with bed mobility, transfers, ambulation, HEP, posterior hip  precautions, PT POC    Acceptance, E, VU,NR by  at 8/3/2022 1147    Comment: pt requires reinforcement for post hip precautions   Family Eager, E,H, VU,NR by  at 8/4/2022 1644    Comment: Reviewed safety/technique with bed mobility, transfers, ambulation, HEP, posterior hip precautions, PT POC    Acceptance, E, VU,NR by  at 8/3/2022 1147    Comment: pt requires reinforcement for post hip precautions                               User Key     Initials Effective Dates Name Provider Type Discipline     05/05/22 -  Kayden Chawla, PT Physical Therapist PT     06/01/21 -  Teresa Hurtado, PT Physical Therapist PT              PT Recommendation and Plan     Plan of Care Reviewed With: patient, daughter  Progress: improving  Outcome Evaluation: Pt. performed bed mobility with max assist of 2. She performed sit to stand transfer w/mod assist of 2 and bed to chair transfer w/max assist of 2. She attempted to ambulate for 2 trials w/limited weight acceptance into RLE and inability to clear either foot for forward progression. She tolerated ther-ex well but requires assist. Reviewed posterior hip precautions with daughter. Will continue to progress as able. Recommend SNF upon discharge.     Time Calculation:    PT Charges     Row Name 08/04/22 1645             Time Calculation    Start Time 1500  -SS      Stop Time 1538  -SS      Time Calculation (min) 38 min  -SS      PT Received On 08/04/22  -              Time Calculation- PT    Total Timed Code Minutes- PT 38 minute(s)  -SS              Timed Charges    88577 - PT Therapeutic Exercise Minutes 15  -SS      50767 - PT Therapeutic Activity Minutes 23  -SS              Total Minutes    Timed Charges Total Minutes 38  -SS       Total Minutes 38  -SS            User Key  (r) = Recorded By, (t) = Taken By, (c) = Cosigned By    Initials Name Provider Type     Teresa Hurtado, PT Physical Therapist              Therapy Charges for Today     Code Description Service  "Date Service Provider Modifiers Qty    81719712791 HC PT THER PROC EA 15 MIN 2022 Teresa Hurtado, PT GP 1    92831387875 HC PT THERAPEUTIC ACT EA 15 MIN 2022 Teresa Hurtado, PT GP 2    18704339806 HC PT THER SUPP EA 15 MIN 2022 Teresa Hurtado, PT GP 3          PT G-Codes  Outcome Measure Options: AM-PAC 6 Clicks Basic Mobility (PT)  AM-PAC 6 Clicks Score (PT): 10  AM-PAC 6 Clicks Score (OT): 12    Teresa Hurtado PT  2022      Electronically signed by Teresa Hurtado, PT at 22 1646          Occupational Therapy Notes (most recent note)      Emma Johnson, OT at 22 1105          Patient Name: Patricia Obrien  : 1935    MRN: [id number]                              Today's Date: 8/3/2022       Admit Date: 2022    Visit Dx: No diagnosis found.  Patient Active Problem List   Diagnosis   • Hip fracture (HCC)   • Dementia (HCC)   • HTN (hypertension)   • Hypothyroidism (acquired)     Past Medical History:   Diagnosis Date   • Anxiety    • Dementia (HCC)    • Depression    • Disease of thyroid gland    • Elevated cholesterol    • Hypertension    • Osteoarthritis      Past Surgical History:   Procedure Laterality Date   • BLADDER SUSPENSION     • CHOLECYSTECTOMY     • HIP HEMIARTHROPLASTY Right 2022    Procedure: HIP HEMIARTHROPLASTY RIGHT;  Surgeon: Jaspal Harrington Jr., MD;  [facility]  Service: Orthopedics;  Laterality: Right;   • HYSTERECTOMY     • LAPAROSCOPIC TUBAL LIGATION        General Information     Row Name 22 1314          OT Time and Intention    Document Type evaluation  -TB     Mode of Treatment occupational therapy  -TB     Row Name 22 1314          General Information    Patient Profile Reviewed yes  -TB     Prior Level of Function all household mobility;transfer;bed mobility;ADL's;independent:  Per daughters, pt is independent for mobility and self-care. She is \"never home alone.\" Uses [facility] prn.  -TB     Existing " "Precautions/Restrictions fall;right;hip, posterior;other (see comments)  s/p mechanical fall. s/p R hemiarthroplasty with PHP and instructions to \"limit active ABDcution.\"  PMH: Dementia  -TB     Barriers to Rehab previous functional deficit;cognitive status  -     Row Name 08/03/22 1314          Occupational Profile    Reason for Services/Referral (Occupational Profile) Occupational decline  -TB     Environmental Supports and Barriers (Occupational Profile) Pt lives with her daughter in a trailer with 1 step to enter. Tub/shower with grab bars. Standard commode. Independent with in-home mobility and self-care at baseline. \"Never home alone\" per daughters.  -     Row Name 08/03/22 1314          Living Environment    People in Home child(angelica), adult  -     Row Name 08/03/22 1314          Home Main Entrance    Number of Stairs, Main Entrance one  -TB     Stair Railings, Main Entrance none  -     Row Name 08/03/22 1314          Stairs Within Home, Primary    Number of Stairs, Within Home, Primary none  -TB     Row Name 08/03/22 1314          Cognition    Orientation Status (Cognition) oriented to;person;disoriented to;place;situation;time  -     Row Name 08/03/22 1314          Safety Issues, Functional Mobility    Safety Issues Affecting Function (Mobility) ability to follow commands;awareness of need for assistance;insight into deficits/self-awareness;positioning of assistive device;safety precaution awareness;safety precautions follow-through/compliance;sequencing abilities  -TB     Impairments Affecting Function (Mobility) cognition;balance;endurance/activity tolerance;pain;strength;range of motion (ROM);postural/trunk control  -TB     Cognitive Impairments, Mobility Safety/Performance attention;awareness, need for assistance;insight into deficits/self-awareness;safety precaution awareness;safety precaution follow-through;sequencing abilities  -TB     Comment, Safety Issues/Impairments (Mobility) Dependent " bed mobility. Mod Ax2 STS. Pt unable to advance RLE to take steps this session.  -TB           User Key  (r) = Recorded By, (t) = Taken By, (c) = Cosigned By    Initials Name Provider Type    TB Emma Johnson, OT Occupational Therapist                 Mobility/ADL's     Row Name 08/03/22 1320          Bed Mobility    Bed Mobility supine-sit;scooting/bridging  -TB     Scooting/Bridging [place] (Bed Mobility) dependent (less than 25% patient effort);2 person assist;verbal cues  -TB     Supine-Sit [place] (Bed Mobility) dependent (less than 25% patient effort);2 person assist;verbal cues  -TB     Bed Mobility, Safety Issues decreased use of legs for bridging/pushing;decreased use of arms for pushing/pulling;impaired trunk control for bed mobility;cognitive deficits limit understanding  -TB     Assistive Device (Bed Mobility) head of bed elevated;draw sheet;bed rails  -TB     Comment, (Bed Mobility) Pain and confusion limit participation.  -TB     Row Name 08/03/22 1320          Transfers    Transfers sit-stand transfer;bed-chair transfer;stand-sit transfer  -TB     Bed-Chair [place] (Transfers) maximum assist (25% patient effort);1 person assist;verbal cues  -TB     Sit-Stand [place] (Transfers) moderate assist (50% patient effort);2 person assist;verbal cues  -TB     Stand-Sit [place] (Transfers) moderate assist (50% patient effort);2 person assist;verbal cues  -TB     Row Name 08/03/22 1320          Bed-Chair Transfer    Comment, (Bed-Chair Transfer) Stand pivot  -TB     Row Name 08/03/22 1320          Sit-Stand Transfer    Assistive Device (Sit-Stand Transfers) walker, front-wheeled  -TB     Comment, (Sit-Stand Transfer) Education, cues, and assist for hand placement, sequencing, and safety. Cues for upright posture.  -TB     Row Name 08/03/22 1320          Stand-Sit Transfer    Assistive Device (Stand-Sit Transfers) walker, front-wheeled  -TB     Row Name 08/03/22 1320           Functional Mobility    Functional Mobility- Ind. Level unable to perform  -TB     Functional Mobility- Comment Dependent bed mobility. Mod Ax2 STS. Pt unable to advance RLE to take steps this session.  -     Row Name 08/03/22 1320          Activities of Daily Living    BADL Assessment/Intervention upper body dressing;lower body dressing;bathing;grooming;feeding  -     Row Name 08/03/22 1320          Mobility    Extremity Weight-bearing Status right lower extremity  -TB     Right Lower Extremity (Weight-bearing Status) weight-bearing as tolerated (WBAT)  -     Row Name 08/03/22 1320          Upper Body Dressing Assessment/Training    [place] Level (Upper Body Dressing) don;pajama/robe;moderate assist (50% patient effort);verbal cues  -TB     Position (Upper Body Dressing) edge of bed sitting  -TB     Row Name 08/03/22 1320          Lower Body Dressing Assessment/Training    [place] Level (Lower Body Dressing) don;socks;dependent (less than 25% patient effort)  -TB     Position (Lower Body Dressing) sitting up in bed  -TB     Comment, (Lower Body Dressing) Cognition/confusion limits ADL retraining. Daughters report that pt is never alone and will have assist for all ADLs.  -     Row Name 08/03/22 1320          Bathing Assessment/Intervention    [place] Level (Bathing) minimum assist (75% patient effort);distal lower extremities/feet  -TB     Assistive Devices (Bathing) long-handled sponge  -TB     Comment, (Bathing) AE issued and teaching initiated for use. Daughters report pt is very modest and AE will allow her to have more control over bathing care.  -     Row Name 08/03/22 1320          Grooming Assessment/Training    [place] Level (Grooming) set up;wash face, hands  -TB     Position (Grooming) supported sitting  -     Row Name 08/03/22 1320          Self-Feeding Assessment/Training    [place] Level (Feeding) set up;prepare tray/open items;independent;scoop food and bring to  mouth;liquids to mouth;verbal cues  -TB     Position (Self-Feeding) supported sitting  -TB     Comment, (Feeding) Daughter setting up tray and encouraging pt intake  -TB           User Key  (r) = Recorded By, (t) = Taken By, (c) = Cosigned By    Initials Name Provider Type    TB Emma Johnson OT Occupational Therapist               Obj/Interventions     Row Name 08/03/22 1326          Sensory Assessment (Somatosensory)    Sensory Assessment (Somatosensory) UE sensation intact  -TB     Row Name 08/03/22 1326          Vision Assessment/Intervention    Visual Impairment/Limitations corrective lenses full-time  -TB     Row Name 08/03/22 1326          Range of Motion Comprehensive    General Range of Motion bilateral upper extremity ROM WFL  -TB     Comment, General Range of Motion BUE AROM WFL for self-care  -TB     Row Name 08/03/22 1326          Strength Comprehensive (MMT)    Comment, General Manual Muscle Testing (MMT) Assessment Moderate generalized weakness/deconditioned  -TB     Row Name 08/03/22 1326          Balance    Balance Assessment sitting dynamic balance;sit to stand dynamic balance;standing dynamic balance  -TB     Dynamic Sitting Balance contact guard;verbal cues  -TB     Position, Sitting Balance supported;sitting in chair;sitting edge of bed  -TB     Sit to Stand Dynamic Balance moderate assist;2-person assist;verbal cues  -TB     Dynamic Standing Balance moderate assist;2-person assist;verbal cues  -TB     Position/Device Used, Standing Balance supported;walker, front-wheeled  BUE support for transfer to chair  -TB     Balance Interventions sitting;standing;sit to stand;supported;dynamic;dynamic reaching;occupation based/functional task;UE activity with balance activity  -TB           User Key  (r) = Recorded By, (t) = Taken By, (c) = Cosigned By    Initials Name Provider Type    TB Emma Johnson OT Occupational Therapist               Goals/Plan     Row Name 08/03/22 7592           Transfer Goal 1 (OT)    Activity/Assistive Device (Transfer Goal 1, OT) toilet  -TB     [place] Level/Cues Needed (Transfer Goal 1, OT) minimum assist (75% or more patient effort);verbal cues required  -TB     Time Frame (Transfer Goal 1, OT) by discharge  -TB     Progress/Outcome (Transfer Goal 1, OT) goal ongoing  -TB     Row Name 08/03/22 1334          Toileting Goal 1 (OT)    Activity/Device (Toileting Goal 1, OT) perform perineal hygiene  -TB     [place] Level/Cues Needed (Toileting Goal 1, OT) minimum assist (75% or more patient effort);verbal cues required  -TB     Time Frame (Toileting Goal 1, OT) by discharge  -TB     Progress/Outcome (Toileting Goal 1, OT) goal ongoing  -TB     Row Name 08/03/22 3474          Strength Goal 1 (OT)    Strength Goal 1 (OT) Pt completes daily HEP to support self-care and transfer training with 1:1 direction/assist. BUE AROM x10-15 reps. 3x3 reps STS.  -TB     Time Frame (Strength Goal 1, OT) by discharge  -TB     Progress/Outcome (Strength Goal 1, OT) goal ongoing  -TB           User Key  (r) = Recorded By, (t) = Taken By, (c) = Cosigned By    Initials Name Provider Type    Emma Alvarenga OT Occupational Therapist               Clinical Impression     Row Name 08/03/22 1325          Pain Assessment    Pain Intervention(s) Ambulation/increased activity;Repositioned;Cold applied  -TB     Row Name 08/03/22 1328          Pain Scale: FACES Pre/Post-Treatment    Pain: FACES Scale, Pretreatment 0-->no hurt  -TB     Posttreatment Pain Rating 2-->hurts little bit  -TB     Pain Location - Side/Orientation Right  -TB     Pain Location incisional  -TB     Pain Location - hip  -TB     Row Name 08/03/22 1328          Plan of Care Review    Plan of Care Reviewed With patient;daughter  x2  -TB     Outcome Evaluation OT IE completed. Pt is A/Ox1 and cooperative with therapy. Pt presents with strength, balance and endurance deficits limiting mobility and self-care. Mod  "A x2 STS. Max A stand pvt transfer to chair. Cognition/Memory deficits will limit ADL retraining. Pt's daughter's report that she is \"never home alone\" and that family will help with all pt care needs. OT will follow IP. Recommend SNF at d/c for best outcome. Recommend TTB and BSC for home.  -TB     Row Name 08/03/22 1328          Therapy Assessment/Plan (OT)    Rehab Potential (OT) fair, will monitor progress closely  -TB     Criteria for Skilled Therapeutic Interventions Met (OT) yes;meets criteria;skilled treatment is necessary  -TB     Therapy Frequency (OT) daily  -TB     Row Name 08/03/22 1328          Therapy Plan Review/Discharge Plan (OT)    Anticipated Discharge Disposition (OT) skilled nursing facility  -TB     Row Name 08/03/22 1328          Vital Signs    Pre Systolic BP Rehab --  RN cleared OT  -TB     O2 Delivery Pre Treatment room air  -TB     O2 Delivery Intra Treatment room air  -TB     O2 Delivery Post Treatment room air  -TB     Pre Patient Position Supine  -TB     Intra Patient Position Standing  -TB     Post Patient Position Sitting  -TB     Row Name 08/03/22 1328          Positioning and Restraints    Pre-Treatment Position in bed  -TB     Post Treatment Position chair  -TB     In Chair notified nsg;reclined;call light within reach;encouraged to call for assist;exit alarm on;with family/caregiver;legs elevated;on mechanical lift sling;waffle cushion;heels elevated  -TB           User Key  (r) = Recorded By, (t) = Taken By, (c) = Cosigned By    Initials Name Provider Type    TB Emma Johnson, OT Occupational Therapist               Outcome Measures     Row Name 08/03/22 8906          How much help from another is currently needed...    Putting on and taking off regular lower body clothing? 1  -TB     Bathing (including washing, rinsing, and drying) 2  -TB     Toileting (which includes using toilet bed pan or urinal) 1  -TB     Putting on and taking off regular upper body clothing 2  " -TB     Taking care of personal grooming (such as brushing teeth) 3  -TB     Eating meals 3  -TB     AM-PAC 6 Clicks Score (OT) 12  -TB     Row Name 08/03/22 1146 08/03/22 0741       How much help from another person do you currently need...    Turning from your back to your side while in flat bed without using bedrails? 2  -FW 2  -SC    Moving from lying on back to sitting on the side of a flat bed without bedrails? 1  -FW 2  -SC    Moving to and from a bed to a chair (including a wheelchair)? 1  -FW 1  -SC    Standing up from a chair using your arms (e.g., wheelchair, bedside chair)? 2  -FW 1  -SC    Climbing 3-5 steps with a railing? 1  -FW 1  -SC    To walk in hospital room? 1  -FW 1  -SC    AM-PAC 6 Clicks Score (PT) 8  -FW 8  -SC    Highest level of mobility 3 --> Sat at edge of bed  -FW 3 --> Sat at edge of bed  -SC    Row Name 08/03/22 1336 08/03/22 1146       Functional Assessment    Outcome Measure Options AM-PAC 6 Clicks Daily Activity (OT)  -TB AM-PAC 6 Clicks Basic Mobility (PT)  -FW          User Key  (r) = Recorded By, (t) = Taken By, (c) = Cosigned By    Initials Name Provider Type    Emma Alvarenga, OT Occupational Therapist    Duyen Pena, RN Registered Nurse    Kayden Clark, PT Physical Therapist                Occupational Therapy Education                 Title: PT OT SLP Therapies (In Progress)     Topic: Occupational Therapy (In Progress)     Point: ADL training (Done)     Description:   Instruct learner(s) on proper safety adaptation and remediation techniques during self care or transfers.   Instruct in proper use of assistive devices.              Learning Progress Summary           Patient Acceptance, E,D, VU,NR by TB at 8/3/2022 1336   Family Acceptance, E,D, VU,NR by TB at 8/3/2022 1336                   Point: Home exercise program (Not Started)     Description:   Instruct learner(s) on appropriate technique for monitoring, assisting and/or progressing  "therapeutic exercises/activities.              Learner Progress:  Not documented in this visit.          Point: Precautions (Done)     Description:   Instruct learner(s) on prescribed precautions during self-care and functional transfers.              Learning Progress Summary           Patient Acceptance, E,D, VU,NR by TB at 8/3/2022 1336   Family Acceptance, E,D, VU,NR by TB at 8/3/2022 1336                   Point: Body mechanics (Not Started)     Description:   Instruct learner(s) on proper positioning and spine alignment during self-care, functional mobility activities and/or exercises.              Learner Progress:  Not documented in this visit.                      User Key     Initials Effective Dates Name Provider Type Discipline     06/16/21 -  Emma Johnson OT Occupational Therapist OT              OT Recommendation and Plan  Therapy Frequency (OT): daily  Plan of Care Review  Plan of Care Reviewed With: patient, daughter (x2)  Outcome Evaluation: OT IE completed. Pt is A/Ox1 and cooperative with therapy. Pt presents with strength, balance and endurance deficits limiting mobility and self-care. Mod A x2 STS. Max A stand pvt transfer to chair. Cognition/Memory deficits will limit ADL retraining. Pt's daughter's report that she is \"never home alone\" and that family will help with all pt care needs. OT will follow IP. Recommend SNF at d/c for best outcome. Recommend TTB and BSC for home.     Time Calculation:    Time Calculation- OT     Row Name 08/03/22 1105             Time Calculation- OT    OT Start Time 1105  -TB      OT Received On 08/03/22  -TB      OT Goal Re-Cert Due Date 08/13/22  -TB              Untimed Charges    OT Eval/Re-eval Minutes 60  -TB              Total Minutes    Untimed Charges Total Minutes 60  -TB       Total Minutes 60  -TB            User Key  (r) = Recorded By, (t) = Taken By, (c) = Cosigned By    Initials Name Provider Type     Emma Johnson OT " Occupational Therapist              Therapy Charges for Today     Code Description Service Date Service Provider Modifiers Qty    28572960217 HC OT EVAL MOD COMPLEXITY 4 8/3/2022 Emma Johnson OT GO 1               Emma Johnson OT  8/3/2022    Electronically signed by Emma Johnson OT at 08/03/22 7260

## 2022-08-05 NOTE — CASE MANAGEMENT/SOCIAL WORK
Continued Stay Note  [facility]     Patient Name: Patricia Obrien  MRN: [id number]  Today's Date: 8/5/2022    Admit Date: 8/2/2022     Discharge Plan     Row Name 08/05/22 1311       Plan    Plan SNF    Plan Comments I updated patient's daughters in regards to information that I obtained about the facilities I contacted yesterday. They have requested additional referral to [facility] transitional care unit. I will make a referral there. I will see again on Monday and send facilities updated rehab notes.     Final Discharge Disposition Code 03 - skilled nursing facility (SNF)               Discharge Codes    No documentation.               Expected Discharge Date and Time     Expected Discharge Date Expected Discharge Time    Aug 5, 2022             Teresa Newberry RN

## 2022-08-05 NOTE — PLAN OF CARE
Goal Outcome Evaluation:  Plan of Care Reviewed With: patient, family        Progress: improving  Outcome Evaluation: Pt. performed bed mobility with max assist of 2. She performed sit to stand transfer w/mod assist of 2 and bed to chair transfer w/max assist of 2. Pt. able to progress ambulation with 2 steps forward. RLE attempts to buckle (with knee immobilizer donned) with weight acceptance limiting further progression. She tolerated ther-ex well but requires assist. Will continue to progress as able. Recommend SNF upon discharge.

## 2022-08-05 NOTE — PROGRESS NOTES
"Orthopedic Daily Progress Note      CC: AARON overnight, pain much better today and was able to take a step with PT    Pain well controlled  General: no fevers, chills  Abdomen: no nausea, vomiting, or diarrhea    No other complaints    Physical Exam:  I have reviewed the vital signs.  Temp:  [97.9 °F (36.6 °C)-99 °F (37.2 °C)] 98.4 °F (36.9 °C)  Heart Rate:  [65-79] 70  Resp:  [16-17] 17  BP: (122-139)/(58-69) 133/66    Objective:  Vital signs: (most recent): Blood pressure 133/66, pulse 70, temperature 98.4 °F (36.9 °C), temperature source Oral, resp. rate 17, height 149.9 cm (59\"), weight 56.2 kg (124 lb), SpO2 94 %.            General Appearance:    Alert, cooperative, no distress  Extremities: No clubbing, cyanosis, or edema to lower extremities  Pulses:  2+ in distal surgical extremity  Skin: Incision Clean/dry/intact      Results Review:    I have reviewed the labs, radiology results and diagnostic studies: hgb 8.1    Results from last 7 days   Lab Units 08/05/22  0755   WBC 10*3/mm3 6.04   HEMOGLOBIN g/dL 8.1*   PLATELETS 10*3/mm3 107*     Results from last 7 days   Lab Units 08/05/22  0755   SODIUM mmol/L 136   POTASSIUM mmol/L 4.1   CO2 mmol/L 25.0   CREATININE mg/dL 0.90   GLUCOSE mg/dL 95       I have reviewed the medications.    Assessment/Problem List  POD# 3 Day Post-Op   S/p R hip abdiaziz for femoral neck fx    Plan  WBAT RLE with post hip precautions x 6 weeks. Limit active abduction.  PT/OT  Ok with DVT ppx with  mg x 6 weeks and mechanical while in house      Discharge Planning: I expect patient to be discharged to rehab in TBD days.    Shala Gardner PA-C  08/05/22  15:23 EDT            "

## 2022-08-05 NOTE — THERAPY TREATMENT NOTE
Patient Name: Patricia Obrien  : 1935    MRN: [id number]                              Today's Date: 2022       Admit Date: 2022    Visit Dx: No diagnosis found.  Patient Active Problem List   Diagnosis   • Hip fracture (HCC)   • Dementia (HCC)   • HTN (hypertension)   • Hypothyroidism (acquired)     Past Medical History:   Diagnosis Date   • Anxiety    • Dementia (HCC)    • Depression    • Disease of thyroid gland    • Elevated cholesterol    • Hypertension    • Osteoarthritis      Past Surgical History:   Procedure Laterality Date   • BLADDER SUSPENSION     • CHOLECYSTECTOMY     • HIP HEMIARTHROPLASTY Right 2022    Procedure: HIP HEMIARTHROPLASTY RIGHT;  Surgeon: Jaspal Harrington Jr., MD;  Location: [facility];  Service: Orthopedics;  Laterality: Right;   • HYSTERECTOMY     • LAPAROSCOPIC TUBAL LIGATION        General Information     Row Name 22          Physical Therapy Time and Intention    Document Type therapy note (daily note)  -     Mode of Treatment physical therapy  -     Row Name 22          General Information    Patient Profile Reviewed yes  -SS     Existing Precautions/Restrictions fall;right;hip, posterior;other (see comments)  limit active abd, dementia, KI per quad weakness  -SS     Barriers to Rehab previous functional deficit;cognitive status  -SS     Row Name 22          Cognition    Orientation Status (Cognition) oriented to;person  -     Row Name 22          Safety Issues, Functional Mobility    Safety Issues Affecting Function (Mobility) ability to follow commands;insight into deficits/self-awareness;judgment;positioning of assistive device;problem-solving;safety precaution awareness;safety precautions follow-through/compliance;sequencing abilities  -     Impairments Affecting Function (Mobility) cognition;balance;endurance/activity tolerance;pain;strength;range of motion (ROM);postural/trunk control  -     Cognitive  Impairments, Mobility Safety/Performance insight into deficits/self-awareness;judgment;problem-solving/reasoning;safety precaution awareness;safety precaution follow-through;sequencing abilities  -           User Key  (r) = Recorded By, (t) = Taken By, (c) = Cosigned By    Initials Name Provider Type     Teresa Hurtado, PT Physical Therapist               Mobility     Row Name 08/05/22 1651          Bed Mobility    Bed Mobility supine-sit;scooting/bridging  -     Scooting/Bridging [place] (Bed Mobility) maximum assist (25% patient effort);2 person assist;verbal cues;nonverbal cues (demo/gesture)  -     Supine-Sit [place] (Bed Mobility) maximum assist (25% patient effort);2 person assist;verbal cues;nonverbal cues (demo/gesture)  -     Assistive Device (Bed Mobility) head of bed elevated;draw sheet;bed rails  -     Comment, (Bed Mobility) V/TC for hand placement, LE advancement, trunk control  -     Row Name 08/05/22 1651          Bed-Chair Transfer    Bed-Chair [place] (Transfers) verbal cues;maximum assist (25% patient effort);2 person assist;nonverbal cues (demo/gesture)  -     Assistive Device (Bed-Chair Transfers) other (see comments)  BUE support  -     Comment, (Bed-Chair Transfer) V/TC for sequencing of LE advancement  -     Row Name 08/05/22 1651          Sit-Stand Transfer    Sit-Stand [place] (Transfers) moderate assist (50% patient effort);2 person assist;verbal cues  -     Assistive Device (Sit-Stand Transfers) other (see comments)  BUE support  -     Comment, (Sit-Stand Transfer) V/TC for LE set up, hand placement, sequencing; pt. able to demonstrate upright posture this date  -     Row Name 08/05/22 1651          Gait/Stairs (Locomotion)    [place] Level (Gait) maximum assist (25% patient effort);2 person assist;verbal cues;nonverbal cues (demo/gesture)  -     Assistive Device (Gait) other (see comments)  BUE support  -     Distance in Feet (Gait)  2  -     Deviations/Abnormal Patterns (Gait) bilateral deviations;antalgic;liliana decreased;stride length decreased;weight shifting decreased;right sided deviations  -     Bilateral Gait Deviations forward flexed posture;heel strike decreased  -     Right Sided Gait Deviations knee buckling, right side  -     Comment, (Gait/Stairs) Pt. able to progress ambulation this date with 2 steps forward. V/TC for weight shifting to advance LE. RLE attempts to buckle (with KI donned) with weight acceptance limiting further progression.  -     Row Name 08/05/22 1651          Mobility    Extremity Weight-bearing Status right lower extremity  -     Right Lower Extremity (Weight-bearing Status) weight-bearing as tolerated (WBAT)  -           User Key  (r) = Recorded By, (t) = Taken By, (c) = Cosigned By    Initials Name Provider Type     Teresa Hurtado, PT Physical Therapist               Obj/Interventions     Row Name 08/05/22 1654          Motor Skills    Therapeutic Exercise hip;knee;ankle  difficulty w/command following for isometric exercises  -[facility] Name 08/05/22 1654          Hip (Therapeutic Exercise)    Hip (Therapeutic Exercise) strengthening exercise  -     Hip Strengthening (Therapeutic Exercise) right;heel slides;15 repititions  mod assist  -     Row Name 08/05/22 1654          Knee (Therapeutic Exercise)    Knee (Therapeutic Exercise) strengthening exercise  -     Knee Strengthening (Therapeutic Exercise) right;SLR (straight leg raise);LAQ (long arc quad);15 repititions  -     Row Name 08/05/22 1654          Ankle (Therapeutic Exercise)    Ankle (Therapeutic Exercise) AROM (active range of motion)  -     Ankle AROM (Therapeutic Exercise) bilateral;dorsiflexion;plantarflexion;15 repititions  -[facility] Name 08/05/22 1654          Balance    Balance Assessment sitting static balance;sitting dynamic balance;sit to stand dynamic balance;standing static balance;standing dynamic balance   -SS     Static Sitting Balance contact guard  -SS     Dynamic Sitting Balance contact guard  -SS     Position, Sitting Balance unsupported;sitting edge of bed  -SS     Sit to Stand Dynamic Balance moderate assist;2-person assist  -SS     Static Standing Balance maximum assist;2-person assist  -SS     Dynamic Standing Balance maximum assist;2-person assist  -SS     Position/Device Used, Standing Balance supported;walker, front-wheeled  -SS     Balance Interventions sitting;standing;sit to stand;supported;static;dynamic  -           User Key  (r) = Recorded By, (t) = Taken By, (c) = Cosigned By    Initials Name Provider Type     Teresa Hurtado, PT Physical Therapist               Goals/Plan    No documentation.                Clinical Impression     Row Name 08/05/22 1656          Pain    Pain Intervention(s) Cold applied;Repositioned;Ambulation/increased activity;Elevated  -     Row Name 08/05/22 1656          Pain Scale: FACES Pre/Post-Treatment    Pain: FACES Scale, Pretreatment 0-->no hurt  -     Posttreatment Pain Rating 4-->hurts little more  -     Pain Location - Side/Orientation Right  -     Pain Location incisional  -     Pain Location - hip  -     Row Name 08/05/22 1656          Plan of Care Review    Plan of Care Reviewed With patient;family  -     Progress improving  -     Outcome Evaluation Pt. performed bed mobility with max assist of 2. She performed sit to stand transfer w/mod assist of 2 and bed to chair transfer w/max assist of 2. Pt. able to progress ambulation with 2 steps forward. RLE attempts to buckle (with knee immobilizer donned) with weight acceptance limiting further progression. She tolerated ther-ex well but requires assist. Will continue to progress as able. Recommend SNF upon discharge.  -     Row Name 08/05/22 1656          Therapy Assessment/Plan (PT)    Rehab Potential (PT) fair, will monitor progress closely  -     Criteria for Skilled Interventions Met (PT)  yes;meets criteria;skilled treatment is necessary  -     Therapy Frequency (PT) daily  -SS     Row Name 08/05/22 1656          Vital Signs    Pre Systolic BP Rehab --  VSS  -     Row Name 08/05/22 1656          Positioning and Restraints    Pre-Treatment Position in bed  -SS     Post Treatment Position chair  -SS     In Chair notified nsg;reclined;call light within reach;encouraged to call for assist;exit alarm on;with family/caregiver;on mechanical lift sling;ABD pillow;legs elevated  -           User Key  (r) = Recorded By, (t) = Taken By, (c) = Cosigned By    Initials Name Provider Type    Teresa Coates PT Physical Therapist               Outcome Measures     Row Name 08/05/22 1658 08/05/22 0845       How much help from another person do you currently need...    Turning from your back to your side while in flat bed without using bedrails? 2  -SS 2  -MM    Moving from lying on back to sitting on the side of a flat bed without bedrails? 2  -SS 2  -MM    Moving to and from a bed to a chair (including a wheelchair)? 2  -SS 2  -MM    Standing up from a chair using your arms (e.g., wheelchair, bedside chair)? 2  -SS 2  -MM    Climbing 3-5 steps with a railing? 1  -SS 1  -MM    To walk in hospital room? 2  -SS 1  -MM    AM-PAC 6 Clicks Score (PT) 11  -SS 10  -MM    Highest level of mobility 4 --> Transferred to chair/commode  -SS 4 --> Transferred to chair/commode  -MM    Row Name 08/05/22 1658          Functional Assessment    Outcome Measure Options AM-PAC 6 Clicks Basic Mobility (PT)  -           User Key  (r) = Recorded By, (t) = Taken By, (c) = Cosigned By    Initials Name Provider Type    Brianna Zarate RN Registered Nurse    Teresa Coates PT Physical Therapist                             Physical Therapy Education                 Title: PT OT SLP Therapies (In Progress)     Topic: Physical Therapy (Done)     Point: Mobility training (Done)     Learning Progress Summary           Patient  Eager, E, VU,DU,NR by  at 8/5/2022 1658    Comment: Reviewed safety/technique with bed mobility, transfers, ambulation, HEP, PT POC    Eager, E,H, VU,NR by  at 8/4/2022 1644    Comment: Reviewed safety/technique with bed mobility, transfers, ambulation, HEP, posterior hip precautions, PT POC    Acceptance, E, VU,NR by  at 8/3/2022 1147    Comment: pt requires reinforcement for post hip precautions   Family Eager, E, VU,DU,NR by  at 8/5/2022 1658    Comment: Reviewed safety/technique with bed mobility, transfers, ambulation, HEP, PT POC    Eager, E,H, VU,NR by  at 8/4/2022 1644    Comment: Reviewed safety/technique with bed mobility, transfers, ambulation, HEP, posterior hip precautions, PT POC    Acceptance, E, VU,NR by  at 8/3/2022 1147    Comment: pt requires reinforcement for post hip precautions                   Point: Home exercise program (Done)     Learning Progress Summary           Patient Eager, E, VU,DU,NR by  at 8/5/2022 1658    Comment: Reviewed safety/technique with bed mobility, transfers, ambulation, HEP, PT POC    Eager, E,H, VU,NR by  at 8/4/2022 1644    Comment: Reviewed safety/technique with bed mobility, transfers, ambulation, HEP, posterior hip precautions, PT POC    Acceptance, E, VU,NR by  at 8/3/2022 1147    Comment: pt requires reinforcement for post hip precautions   Family Eager, E, VU,DU,NR by  at 8/5/2022 1658    Comment: Reviewed safety/technique with bed mobility, transfers, ambulation, HEP, PT POC    Eager, E,H, VU,NR by  at 8/4/2022 1644    Comment: Reviewed safety/technique with bed mobility, transfers, ambulation, HEP, posterior hip precautions, PT POC    Acceptance, E, VU,NR by  at 8/3/2022 1147    Comment: pt requires reinforcement for post hip precautions                   Point: Body mechanics (Done)     Learning Progress Summary           Patient Eager, E, VU,DU,NR by  at 8/5/2022 1658    Comment: Reviewed safety/technique with bed mobility,  transfers, ambulation, HEP, PT POC    Eager, E,H, VU,NR by  at 8/4/2022 1644    Comment: Reviewed safety/technique with bed mobility, transfers, ambulation, HEP, posterior hip precautions, PT POC    Acceptance, E, VU,NR by  at 8/3/2022 1147    Comment: pt requires reinforcement for post hip precautions   Family Eager, E, VU,DU,NR by  at 8/5/2022 1658    Comment: Reviewed safety/technique with bed mobility, transfers, ambulation, HEP, PT POC    Eager, E,H, VU,NR by  at 8/4/2022 1644    Comment: Reviewed safety/technique with bed mobility, transfers, ambulation, HEP, posterior hip precautions, PT POC    Acceptance, E, VU,NR by  at 8/3/2022 1147    Comment: pt requires reinforcement for post hip precautions                   Point: Precautions (Done)     Learning Progress Summary           Patient Eager, E, VU,DU,NR by  at 8/5/2022 1658    Comment: Reviewed safety/technique with bed mobility, transfers, ambulation, HEP, PT POC    Eager, E,H, VU,NR by  at 8/4/2022 1644    Comment: Reviewed safety/technique with bed mobility, transfers, ambulation, HEP, posterior hip precautions, PT POC    Acceptance, E, VU,NR by  at 8/3/2022 1147    Comment: pt requires reinforcement for post hip precautions   Family Eager, E, VU,DU,NR by  at 8/5/2022 1658    Comment: Reviewed safety/technique with bed mobility, transfers, ambulation, HEP, PT POC    Eager, E,H, VU,NR by  at 8/4/2022 1644    Comment: Reviewed safety/technique with bed mobility, transfers, ambulation, HEP, posterior hip precautions, PT POC    Acceptance, E, VU,NR by  at 8/3/2022 1147    Comment: pt requires reinforcement for post hip precautions                               User Key     Initials Effective Dates Name Provider Type Discipline     05/05/22 -  Kayden Chawla, PT Physical Therapist PT     06/01/21 -  Teresa Hurtado, PT Physical Therapist PT              PT Recommendation and Plan     Plan of Care Reviewed With: patient,  family  Progress: improving  Outcome Evaluation: Pt. performed bed mobility with max assist of 2. She performed sit to stand transfer w/mod assist of 2 and bed to chair transfer w/max assist of 2. Pt. able to progress ambulation with 2 steps forward. RLE attempts to buckle (with knee immobilizer donned) with weight acceptance limiting further progression. She tolerated ther-ex well but requires assist. Will continue to progress as able. Recommend SNF upon discharge.     Time Calculation:    PT Charges     Row Name 08/05/22 1659             Time Calculation    Start Time 1400  -SS      Stop Time 1426  -SS      Time Calculation (min) 26 min  -SS      PT Received On 08/05/22  -SS              Time Calculation- PT    Total Timed Code Minutes- PT 26 minute(s)  -SS              Timed Charges    91872 - PT Therapeutic Exercise Minutes 10  -SS      09923 - Gait Training Minutes  5  -SS      73039 - PT Therapeutic Activity Minutes 11  -SS              Total Minutes    Timed Charges Total Minutes 26  -SS       Total Minutes 26  -SS            User Key  (r) = Recorded By, (t) = Taken By, (c) = Cosigned By    Initials Name Provider Type     Teresa Hurtado, PT Physical Therapist              Therapy Charges for Today     Code Description Service Date Service Provider Modifiers Qty    13033861089 HC PT THER PROC EA 15 MIN 8/4/2022 Teresa Hurtado, PT GP 1    62289607584 HC PT THERAPEUTIC ACT EA 15 MIN 8/4/2022 Teresa Hurtado, PT GP 2    25832721452 HC PT THER SUPP EA 15 MIN 8/4/2022 Teresa Hurtado, PT GP 3    90362253256 HC PT THER PROC EA 15 MIN 8/5/2022 Teresa Hurtado, PT GP 1    76810321821 HC PT THERAPEUTIC ACT EA 15 MIN 8/5/2022 Teresa Hurtado, PT GP 1    99453268409 HC PT THER SUPP EA 15 MIN 8/5/2022 Teresa Hurtado, PT GP 2          PT G-Codes  Outcome Measure Options: AM-PAC 6 Clicks Basic Mobility (PT)  AM-PAC 6 Clicks Score (PT): 11  AM-PAC 6 Clicks Score (OT): 12    Teresa Hurtado PT  8/5/2022

## 2022-08-05 NOTE — PROGRESS NOTES
[facility] Medicine Services  PROGRESS NOTE    Patient Name: Patricia Obrien  : 1935  MRN: [id number]    Date of Admission: 2022  Primary Care Physician: Provider, No Known    Subjective   Subjective     CC:  Hip fracture    HPI:  Patient resting in bed. Not done much with PT yet. Daughters at bedside. Awaiting rehab.    ROS:  Gen- No fevers, chills  CV- No chest pain, palpitations  Resp- No cough, dyspnea  GI- No N/V/D, abd pain    Objective   Objective     Vital Signs:   Temp:  [97.9 °F (36.6 °C)-99 °F (37.2 °C)] 99 °F (37.2 °C)  Heart Rate:  [65-79] 79  Resp:  [16-18] 17  BP: (115-139)/(58-75) 139/69     Physical Exam:  Constitutional: No acute distress, awake, alert  HENT: NCAT, mucous membranes moist  Respiratory: Clear to auscultation bilaterally, respiratory effort normal   Cardiovascular: RRR, no murmurs, rubs, or gallops  Gastrointestinal soft, nontender, nondistended  Musculoskeletal: No bilateral ankle edema  Psychiatric: Appropriate affect, cooperative  Neurologic: Oriented to person, speech clear, moves extremities  Skin: No rashes      Results Reviewed:  LAB RESULTS:      Lab 22  1835   WBC 6.04  --  8.5   HEMOGLOBIN 8.1*  --  11.6   HEMATOCRIT 23.5*  --  34.8*   PLATELETS 107*  --  146*   NEUTROS ABS  --   --  7.1   IMMATURE GRANS (ABS)  --   --  0.1   LYMPHS ABS  --   --  0.9*   MONOS ABS  --   --  0.3   EOS ABS  --   --  0.0   MCV 92.2  --  93.5   PROTIME  --  15.3*  --          Lab 22   SODIUM 136 143   POTASSIUM 4.1 4.1   CHLORIDE 105 109*   CO2 25.0 22.0   ANION GAP 6.0 12.0   BUN 15 20   CREATININE 0.90 1.00   EGFR 62.0 54.6*   GLUCOSE 95 117*   CALCIUM 7.9* 8.8             Lab 22   PROTIME 15.3*   INR 1.22*                 Brief Urine Lab Results  (Last result in the past 365 days)      Color   Clarity   Blood   Leuk Est   Nitrite   Protein   CREAT   Urine HCG        22  Yellow   Clear   Negative   Negative   Negative                   Microbiology Results Abnormal     Procedure Component Value - Date/Time    COVID PRE-OP / PRE-PROCEDURE SCREENING ORDER (NO ISOLATION) - Swab, Nasopharynx [[id number]]  (Normal) Collected: 08/02/22 0630    Lab Status: Final result Specimen: Swab from Nasopharynx Updated: 08/02/22 0701    Narrative:      The following orders were created for panel order COVID PRE-OP / PRE-PROCEDURE SCREENING ORDER (NO ISOLATION) - Swab, Nasopharynx.  Procedure                               Abnormality         Status                     ---------                               -----------         ------                     COVID-19 and FLU A/B PCR...[[id number]]  Normal              Final result                 Please view results for these tests on the individual orders.    COVID-19 and FLU A/B PCR - Swab, Nasopharynx [[id number]]  (Normal) Collected: 08/02/22 0630    Lab Status: Final result Specimen: Swab from Nasopharynx Updated: 08/02/22 0701     COVID19 Not Detected     Influenza A PCR Not Detected     Influenza B PCR Not Detected    Narrative:      Fact sheet for providers: https://www.fda.gov/media/089031/download    Fact sheet for patients: https://www.fda.gov/media/884198/download    Test performed by PCR.          No radiology results from the last 24 hrs        I have reviewed the medications:  Scheduled Meds:atorvastatin, 10 mg, Oral, Daily  busPIRone, 10 mg, Oral, TID  donepezil, 10 mg, Oral, Nightly  folic acid, 1 mg, Oral, Daily  levothyroxine, 50 mcg, Oral, Daily  memantine, 10 mg, Oral, Daily  pantoprazole, 40 mg, Oral, QAM  polyethylene glycol, 17 g, Oral, Daily   And  senna-docusate sodium, 2 tablet, Oral, BID  QUEtiapine, 25 mg, Oral, Nightly  sertraline, 50 mg, Oral, Daily  sodium chloride, 10 mL, Intravenous, Q12H      Continuous Infusions:lactated ringers, 9 mL/hr, Last Rate: 9 mL/hr (08/04/22 4119)      PRN Meds:.senna-docusate sodium **AND**  "polyethylene glycol **AND** bisacodyl **AND** bisacodyl  •  HYDROcodone-acetaminophen  •  labetalol  •  Morphine  •  ondansetron **OR** ondansetron  •  potassium chloride  •  potassium chloride  •  sodium chloride  •  traMADol    Assessment & Plan   Assessment & Plan     Active Hospital Problems    Diagnosis  POA   • Hip fracture (HCC) [S72.009A]  Yes   • Dementia (HCC) [F03.90]  Yes   • HTN (hypertension) [I10]  Yes   • Hypothyroidism (acquired) [E03.9]  Yes      Resolved Hospital Problems   No resolved problems to display.        Brief Hospital Course to date:  Patricia Obrien is a 87 y.o. female with dementia hypotension hypothyroidism presenting from [facility] with a right hip fracture following mechanical fall. X-rays reviewed 8-2-22 showing right femoral neck fracture    Right hip fracture   - s/p right hemiarthroplasty on 8/2/22 w/Dr. Harrington  -PT/OT Case management  -PRN pain control, bowel regimen  -\"WBAT RLE with post hip precautions x 6 weeks. Limit active abduction.  - DVT ppx with  mg x 6 weeks and mechanical while in house     Dementia  -Receiving Namenda and Aricept     Hyperlipidemia  -Receiving a statin     Anxiety  -Receiving buspirone     Depression  -Receiving Zoloft     Hypothyroidism  -Receiving Synthroid 50 mcg     Hypertension  -holding lisinopril due to some low blood pressures, monitor and resume as needed     Expected Discharge Location and Transportation: Skilled nursing facility by ambulance  Expected Discharge Date: 8/6/2022    DVT prophylaxis:  Mechanical DVT prophylaxis orders are present.     AM-PAC 6 Clicks Score (PT): 10 (08/05/22 4902)    CODE STATUS:   Code Status and Medical Interventions:   Ordered at: 08/02/22 0910     Level Of Support Discussed With:    Next of Kin (If No Surrogate)     Code Status (Patient has no pulse and is not breathing):    CPR (Attempt to Resuscitate)     Medical Interventions (Patient has pulse or is breathing):    Full Support "       Colleen Noe,   08/05/22

## 2022-08-06 LAB
BASOPHILS # BLD AUTO: 0.04 10*3/MM3 (ref 0–0.2)
BASOPHILS NFR BLD AUTO: 0.7 % (ref 0–1.5)
DEPRECATED RDW RBC AUTO: 44.9 FL (ref 37–54)
EOSINOPHIL # BLD AUTO: 0.23 10*3/MM3 (ref 0–0.4)
EOSINOPHIL NFR BLD AUTO: 4.2 % (ref 0.3–6.2)
ERYTHROCYTE [DISTWIDTH] IN BLOOD BY AUTOMATED COUNT: 13.4 % (ref 12.3–15.4)
HCT VFR BLD AUTO: 24.6 % (ref 34–46.6)
HGB BLD-MCNC: 8.3 G/DL (ref 12–15.9)
IMM GRANULOCYTES # BLD AUTO: 0.03 10*3/MM3 (ref 0–0.05)
IMM GRANULOCYTES NFR BLD AUTO: 0.6 % (ref 0–0.5)
LYMPHOCYTES # BLD AUTO: 1.08 10*3/MM3 (ref 0.7–3.1)
LYMPHOCYTES NFR BLD AUTO: 19.8 % (ref 19.6–45.3)
MCH RBC QN AUTO: 31.1 PG (ref 26.6–33)
MCHC RBC AUTO-ENTMCNC: 33.7 G/DL (ref 31.5–35.7)
MCV RBC AUTO: 92.1 FL (ref 79–97)
MONOCYTES # BLD AUTO: 0.41 10*3/MM3 (ref 0.1–0.9)
MONOCYTES NFR BLD AUTO: 7.5 % (ref 5–12)
NEUTROPHILS NFR BLD AUTO: 3.66 10*3/MM3 (ref 1.7–7)
NEUTROPHILS NFR BLD AUTO: 67.2 % (ref 42.7–76)
NRBC BLD AUTO-RTO: 0 /100 WBC (ref 0–0.2)
PLATELET # BLD AUTO: 143 10*3/MM3 (ref 140–450)
PMV BLD AUTO: 10.9 FL (ref 6–12)
RBC # BLD AUTO: 2.67 10*6/MM3 (ref 3.77–5.28)
WBC NRBC COR # BLD: 5.45 10*3/MM3 (ref 3.4–10.8)

## 2022-08-06 PROCEDURE — 85025 COMPLETE CBC W/AUTO DIFF WBC: CPT | Performed by: INTERNAL MEDICINE

## 2022-08-06 PROCEDURE — 97110 THERAPEUTIC EXERCISES: CPT

## 2022-08-06 PROCEDURE — 97530 THERAPEUTIC ACTIVITIES: CPT

## 2022-08-06 PROCEDURE — 99232 SBSQ HOSP IP/OBS MODERATE 35: CPT | Performed by: INTERNAL MEDICINE

## 2022-08-06 RX ADMIN — DONEPEZIL HYDROCHLORIDE 10 MG: 10 TABLET, FILM COATED ORAL at 20:35

## 2022-08-06 RX ADMIN — LEVOTHYROXINE SODIUM 50 MCG: 50 TABLET ORAL at 05:24

## 2022-08-06 RX ADMIN — SENNOSIDES AND DOCUSATE SODIUM 2 TABLET: 50; 8.6 TABLET ORAL at 20:35

## 2022-08-06 RX ADMIN — HYDROCODONE BITARTRATE AND ACETAMINOPHEN 1 TABLET: 5; 325 TABLET ORAL at 20:36

## 2022-08-06 RX ADMIN — BUSPIRONE HYDROCHLORIDE 10 MG: 10 TABLET ORAL at 08:52

## 2022-08-06 RX ADMIN — TRAMADOL HYDROCHLORIDE 50 MG: 50 TABLET, COATED ORAL at 05:24

## 2022-08-06 RX ADMIN — Medication 10 ML: at 20:36

## 2022-08-06 RX ADMIN — MEMANTINE 10 MG: 10 TABLET ORAL at 08:52

## 2022-08-06 RX ADMIN — HYDROCODONE BITARTRATE AND ACETAMINOPHEN 1 TABLET: 5; 325 TABLET ORAL at 09:15

## 2022-08-06 RX ADMIN — TRAMADOL HYDROCHLORIDE 50 MG: 50 TABLET, COATED ORAL at 17:41

## 2022-08-06 RX ADMIN — BUSPIRONE HYDROCHLORIDE 10 MG: 10 TABLET ORAL at 16:07

## 2022-08-06 RX ADMIN — Medication 10 ML: at 08:52

## 2022-08-06 RX ADMIN — SENNOSIDES AND DOCUSATE SODIUM 2 TABLET: 50; 8.6 TABLET ORAL at 08:52

## 2022-08-06 RX ADMIN — QUETIAPINE FUMARATE 25 MG: 25 TABLET ORAL at 20:36

## 2022-08-06 RX ADMIN — ATORVASTATIN CALCIUM 10 MG: 10 TABLET, FILM COATED ORAL at 08:52

## 2022-08-06 RX ADMIN — POLYETHYLENE GLYCOL 3350 17 G: 17 POWDER, FOR SOLUTION ORAL at 08:52

## 2022-08-06 RX ADMIN — PANTOPRAZOLE SODIUM 40 MG: 40 TABLET, DELAYED RELEASE ORAL at 05:24

## 2022-08-06 RX ADMIN — TRAMADOL HYDROCHLORIDE 50 MG: 50 TABLET, COATED ORAL at 23:59

## 2022-08-06 RX ADMIN — SERTRALINE HYDROCHLORIDE 50 MG: 50 TABLET ORAL at 08:52

## 2022-08-06 RX ADMIN — BUSPIRONE HYDROCHLORIDE 10 MG: 10 TABLET ORAL at 20:35

## 2022-08-06 RX ADMIN — FOLIC ACID 1 MG: 1 TABLET ORAL at 08:52

## 2022-08-06 NOTE — THERAPY TREATMENT NOTE
Patient Name: Patricia Obrien  : 1935    MRN: [id number]                              Today's Date: 2022       Admit Date: 2022    Visit Dx: No diagnosis found.  Patient Active Problem List   Diagnosis   • Hip fracture (HCC)   • Dementia (HCC)   • HTN (hypertension)   • Hypothyroidism (acquired)     Past Medical History:   Diagnosis Date   • Anxiety    • Dementia (HCC)    • Depression    • Disease of thyroid gland    • Elevated cholesterol    • Hypertension    • Osteoarthritis      Past Surgical History:   Procedure Laterality Date   • BLADDER SUSPENSION     • CHOLECYSTECTOMY     • HIP HEMIARTHROPLASTY Right 2022    Procedure: HIP HEMIARTHROPLASTY RIGHT;  Surgeon: Jaspal Harrington Jr., MD;  Location: [facility] [place];  Service: Orthopedics;  Laterality: Right;   • HYSTERECTOMY     • LAPAROSCOPIC TUBAL LIGATION        General Information     Row Name 22          Physical Therapy Time and Intention    Document Type therapy note (daily note)  -SS     Mode of Treatment physical therapy  -SS     Row Name 22          General Information    Patient Profile Reviewed yes  -SS     Existing Precautions/Restrictions fall;right;hip, posterior;other (see comments)  limit active abd, dementia, KI per quad weakness  -SS     Barriers to Rehab previous functional deficit;cognitive status;ineffective coping  -SS     Row Name 22          Cognition    Orientation Status (Cognition) oriented to;person  -SS     Row Name 22          Safety Issues, Functional Mobility    Safety Issues Affecting Function (Mobility) ability to follow commands;awareness of need for assistance;insight into deficits/self-awareness;judgment;positioning of assistive device;problem-solving;safety precaution awareness;safety precautions follow-through/compliance;sequencing abilities  -SS     Impairments Affecting Function (Mobility) cognition;balance;endurance/activity tolerance;pain;strength;range of motion  (ROM);postural/trunk control  -     Cognitive Impairments, Mobility Safety/Performance awareness, need for assistance;insight into deficits/self-awareness;judgment;safety precaution awareness;problem-solving/reasoning;safety precaution follow-through;sequencing abilities  -           User Key  (r) = Recorded By, (t) = Taken By, (c) = Cosigned By    Initials Name Provider Type     Teresa Hurtado PT Physical Therapist               Mobility     Row Name 08/06/22 1652          Bed Mobility    Bed Mobility supine-sit;scooting/bridging  -     Scooting/Bridging [place] (Bed Mobility) maximum assist (25% patient effort);2 person assist;verbal cues;nonverbal cues (demo/gesture)  -     Supine-Sit [place] (Bed Mobility) maximum assist (25% patient effort);2 person assist;verbal cues;nonverbal cues (demo/gesture)  -     Assistive Device (Bed Mobility) head of bed elevated;draw sheet;bed rails  -     Comment, (Bed Mobility) V/TC for hand placement, LE advancement, trunk control  -     Row Name 08/06/22 1652          Bed-Chair Transfer    Bed-Chair [place] (Transfers) verbal cues;maximum assist (25% patient effort);2 person assist;nonverbal cues (demo/gesture)  -     Assistive Device (Bed-Chair Transfers) other (see comments)  BUE support  -     Comment, (Bed-Chair Transfer) V/TC for sequencing of LE advancement  -     Row Name 08/06/22 1652          Sit-Stand Transfer    Sit-Stand [place] (Transfers) moderate assist (50% patient effort);2 person assist;verbal cues  -     Assistive Device (Sit-Stand Transfers) walker, front-wheeled  -     Comment, (Sit-Stand Transfer) V/TC for LE set up, hand placement, sequencing, requires TC at posterior hips to encourage upright posture  -     Row Name 08/06/22 1652          Gait/Stairs (Locomotion)    [place] Level (Gait) maximum assist (25% patient effort);2 person assist;verbal cues;nonverbal cues (demo/gesture)  -     Assistive  Device (Gait) walker, front-wheeled  -     Distance in Feet (Gait) 2  -     Deviations/Abnormal Patterns (Gait) bilateral deviations;antalgic;liliana decreased;stride length decreased;weight shifting decreased;right sided deviations  -     Bilateral Gait Deviations forward flexed posture;heel strike decreased  -     Comment, (Gait/Stairs) Pt. ambulated 2 steps forward. V/TC for weight shifting to advance BLE. RLE did not attempt to buckle this date. Pt. seems to be improving in weight acceptance but still limited with forward progression.  -     Row Name 08/06/22 1652          Mobility    Extremity Weight-bearing Status right lower extremity  -     Right Lower Extremity (Weight-bearing Status) weight-bearing as tolerated (WBAT)  -           User Key  (r) = Recorded By, (t) = Taken By, (c) = Cosigned By    Initials Name Provider Type     Teresa Hurtado PT Physical Therapist               Obj/Interventions     Row Name 08/06/22 1654          Motor Skills    Therapeutic Exercise hip;knee;ankle  -[facility] Name 08/06/22 1654          Hip (Therapeutic Exercise)    Hip (Therapeutic Exercise) strengthening exercise  -     Hip Strengthening (Therapeutic Exercise) right;heel slides;15 repititions  min assist  -     Row Name 08/06/22 1654          Knee (Therapeutic Exercise)    Knee (Therapeutic Exercise) strengthening exercise  -     Knee Strengthening (Therapeutic Exercise) right;SLR (straight leg raise);SAQ (short arc quad);LAQ (long arc quad);15 repititions  -     Row Name 08/06/22 1654          Ankle (Therapeutic Exercise)    Ankle (Therapeutic Exercise) AROM (active range of motion)  -     Ankle AROM (Therapeutic Exercise) bilateral;dorsiflexion;plantarflexion;15 repititions  -     Row Name 08/06/22 1654          Balance    Balance Assessment sitting static balance;sitting dynamic balance;sit to stand dynamic balance;standing static balance;standing dynamic balance  -     Static Sitting  Balance contact guard  -SS     Dynamic Sitting Balance contact guard  -SS     Position, Sitting Balance unsupported;sitting edge of bed  -     Sit to Stand Dynamic Balance moderate assist;2-person assist  -SS     Static Standing Balance maximum assist;2-person assist  -SS     Dynamic Standing Balance maximum assist;2-person assist  -SS     Position/Device Used, Standing Balance supported;walker, front-wheeled  -     Balance Interventions sitting;standing;sit to stand;supported;static;dynamic  -           User Key  (r) = Recorded By, (t) = Taken By, (c) = Cosigned By    Initials Name Provider Type     Teresa Hurtado, PT Physical Therapist               Goals/Plan    No documentation.                Clinical Impression     Row Name 08/06/22 1656          Pain    Pain Intervention(s) Cold applied;Repositioned;Ambulation/increased activity;Elevated  -     Row Name 08/06/22 1656          Pain Scale: FACES Pre/Post-Treatment    Pain: FACES Scale, Pretreatment 0-->no hurt  -     Posttreatment Pain Rating 2-->hurts little bit  -     Pain Location - Side/Orientation Right  -     Pain Location incisional  -     Pain Location - hip  -     Row Name 08/06/22 1656          Plan of Care Review    Plan of Care Reviewed With patient;daughter  -     Progress improving  -     Outcome Evaluation Pt. performed bed mobility with max assist of 2. She performed sit to stand transfer w/mod assist of 2 and bed to chair transfer w/max assist of 2. Pt. able to progress ambulation with 2 steps forward. Pt. demonstrates limited weight acceptance inhibiting further progression. She tolerated ther-ex well but requires assist. Will continue to progress as able. Recommend SNF upon discharge.  -     Row Name 08/06/22 1656          Therapy Assessment/Plan (PT)    Rehab Potential (PT) fair, will monitor progress closely  -     Criteria for Skilled Interventions Met (PT) yes;meets criteria;skilled treatment is necessary  -      Therapy Frequency (PT) daily  -SS     Row Name 08/06/22 1656          Vital Signs    Pre Systolic BP Rehab --  VSS  -SS     Row Name 08/06/22 1656          Positioning and Restraints    Pre-Treatment Position in bed  -SS     Post Treatment Position chair  -SS     In Chair notified nsg;reclined;call light within reach;exit alarm on;encouraged to call for assist;with family/caregiver;on mechanical lift sling;waffle cushion;legs elevated  pt to get cleaned up by staff - deferred ABD pillow at this time  -           User Key  (r) = Recorded By, (t) = Taken By, (c) = Cosigned By    Initials Name Provider Type    Teresa Coates PT Physical Therapist               Outcome Measures     Row Name 08/06/22 1658 08/06/22 0800       How much help from another person do you currently need...    Turning from your back to your side while in flat bed without using bedrails? 2  -SS 2  -MM    Moving from lying on back to sitting on the side of a flat bed without bedrails? 2  -SS 2  -MM    Moving to and from a bed to a chair (including a wheelchair)? 2  -SS 2  -MM    Standing up from a chair using your arms (e.g., wheelchair, bedside chair)? 2  -SS 2  -MM    Climbing 3-5 steps with a railing? 1  -SS 1  -MM    To walk in hospital room? 2  -SS 2  -MM    AM-PAC 6 Clicks Score (PT) 11  -SS 11  -MM    Highest level of mobility 4 --> Transferred to chair/commode  -SS 4 --> Transferred to chair/commode  -MM    Row Name 08/06/22 1658          Functional Assessment    Outcome Measure Options AM-PAC 6 Clicks Basic Mobility (PT)  -           User Key  (r) = Recorded By, (t) = Taken By, (c) = Cosigned By    Initials Name Provider Type    Brianna Zarate, RN Registered Nurse    Teresa Coates PT Physical Therapist                             Physical Therapy Education                 Title: PT OT SLP Therapies (In Progress)     Topic: Physical Therapy (Done)     Point: Mobility training (Done)     Learning Progress Summary            Patient Eager, E, VU,NR by SS at 8/6/2022 1658    Comment: Reviewed safety/technique with bed mobility, transfers, ambulation, HEP, PT POC    Eager, E, VU,DU,NR by  at 8/5/2022 1658    Comment: Reviewed safety/technique with bed mobility, transfers, ambulation, HEP, PT POC    Eager, E,H, VU,NR by  at 8/4/2022 1644    Comment: Reviewed safety/technique with bed mobility, transfers, ambulation, HEP, posterior hip precautions, PT POC    Acceptance, E, VU,NR by FW at 8/3/2022 1147    Comment: pt requires reinforcement for post hip precautions   Family Eager, E, VU,NR by  at 8/6/2022 1658    Comment: Reviewed safety/technique with bed mobility, transfers, ambulation, HEP, PT POC    Eager, E, VU,DU,NR by  at 8/5/2022 1658    Comment: Reviewed safety/technique with bed mobility, transfers, ambulation, HEP, PT POC    Eager, E,H, VU,NR by  at 8/4/2022 1644    Comment: Reviewed safety/technique with bed mobility, transfers, ambulation, HEP, posterior hip precautions, PT POC    Acceptance, E, VU,NR by  at 8/3/2022 1147    Comment: pt requires reinforcement for post hip precautions                   Point: Home exercise program (Done)     Learning Progress Summary           Patient Eager, E, VU,NR by  at 8/6/2022 1658    Comment: Reviewed safety/technique with bed mobility, transfers, ambulation, HEP, PT POC    Eager, E, VU,DU,NR by  at 8/5/2022 1658    Comment: Reviewed safety/technique with bed mobility, transfers, ambulation, HEP, PT POC    Eager, E,H, VU,NR by  at 8/4/2022 1644    Comment: Reviewed safety/technique with bed mobility, transfers, ambulation, HEP, posterior hip precautions, PT POC    Acceptance, E, VU,NR by  at 8/3/2022 1147    Comment: pt requires reinforcement for post hip precautions   Family Eager, E, VU,NR by  at 8/6/2022 1658    Comment: Reviewed safety/technique with bed mobility, transfers, ambulation, HEP, PT POC    Eager, E, VU,DU,NR by  at 8/5/2022 7259    Comment: Reviewed  safety/technique with bed mobility, transfers, ambulation, HEP, PT POC    Eager, E,H, VU,NR by  at 8/4/2022 1644    Comment: Reviewed safety/technique with bed mobility, transfers, ambulation, HEP, posterior hip precautions, PT POC    Acceptance, E, VU,NR by  at 8/3/2022 1147    Comment: pt requires reinforcement for post hip precautions                   Point: Body mechanics (Done)     Learning Progress Summary           Patient Eager, E, VU,NR by  at 8/6/2022 1658    Comment: Reviewed safety/technique with bed mobility, transfers, ambulation, HEP, PT POC    Eager, E, VU,DU,NR by  at 8/5/2022 1658    Comment: Reviewed safety/technique with bed mobility, transfers, ambulation, HEP, PT POC    Eager, E,H, VU,NR by  at 8/4/2022 1644    Comment: Reviewed safety/technique with bed mobility, transfers, ambulation, HEP, posterior hip precautions, PT POC    Acceptance, E, VU,NR by  at 8/3/2022 1147    Comment: pt requires reinforcement for post hip precautions   Family Eager, E, VU,NR by  at 8/6/2022 1658    Comment: Reviewed safety/technique with bed mobility, transfers, ambulation, HEP, PT POC    Eager, E, VU,DU,NR by  at 8/5/2022 1658    Comment: Reviewed safety/technique with bed mobility, transfers, ambulation, HEP, PT POC    Eager, E,H, VU,NR by  at 8/4/2022 1644    Comment: Reviewed safety/technique with bed mobility, transfers, ambulation, HEP, posterior hip precautions, PT POC    Acceptance, E, VU,NR by  at 8/3/2022 1147    Comment: pt requires reinforcement for post hip precautions                   Point: Precautions (Done)     Learning Progress Summary           Patient Eager, E, VU,NR by  at 8/6/2022 1658    Comment: Reviewed safety/technique with bed mobility, transfers, ambulation, HEP, PT POC    Eager, E, VU,DU,NR by  at 8/5/2022 1658    Comment: Reviewed safety/technique with bed mobility, transfers, ambulation, HEP, PT POC    Eager, E,H, VU,NR by  at 8/4/2022 1644    Comment:  Reviewed safety/technique with bed mobility, transfers, ambulation, HEP, posterior hip precautions, PT POC    Acceptance, E, VU,NR by  at 8/3/2022 1147    Comment: pt requires reinforcement for post hip precautions   Family Eager, E, VU,NR by  at 8/6/2022 1658    Comment: Reviewed safety/technique with bed mobility, transfers, ambulation, HEP, PT POC    Eager, E, VU,DU,NR by  at 8/5/2022 1658    Comment: Reviewed safety/technique with bed mobility, transfers, ambulation, HEP, PT POC    Eager, E,H, VU,NR by  at 8/4/2022 1644    Comment: Reviewed safety/technique with bed mobility, transfers, ambulation, HEP, posterior hip precautions, PT POC    Acceptance, E, VU,NR by  at 8/3/2022 1147    Comment: pt requires reinforcement for post hip precautions                               User Key     Initials Effective Dates Name Provider Type Discipline     05/05/22 -  Kayden Chawla, PT Physical Therapist PT     06/01/21 -  Teresa Hurtado, NIKOLAY Physical Therapist PT              PT Recommendation and Plan     Plan of Care Reviewed With: patient, daughter  Progress: improving  Outcome Evaluation: Pt. performed bed mobility with max assist of 2. She performed sit to stand transfer w/mod assist of 2 and bed to chair transfer w/max assist of 2. Pt. able to progress ambulation with 2 steps forward. Pt. demonstrates limited weight acceptance inhibiting further progression. She tolerated ther-ex well but requires assist. Will continue to progress as able. Recommend SNF upon discharge.     Time Calculation:    PT Charges     Row Name 08/06/22 1658             Time Calculation    Start Time 1508  -      Stop Time 1533  -      Time Calculation (min) 25 min  -      PT Received On 08/06/22  -              Time Calculation- PT    Total Timed Code Minutes- PT 25 minute(s)  -              Timed Charges    33035 - PT Therapeutic Exercise Minutes 10  -      59497 - Gait Training Minutes  5  -SS      69165 - PT  Therapeutic Activity Minutes 10  -SS              Total Minutes    Timed Charges Total Minutes 25  -SS       Total Minutes 25  -SS            User Key  (r) = Recorded By, (t) = Taken By, (c) = Cosigned By    Initials Name Provider Type     Teresa Hurtado, PT Physical Therapist              Therapy Charges for Today     Code Description Service Date Service Provider Modifiers Qty    59281456379  PT THER PROC EA 15 MIN 8/5/2022 Teresa Hurtado, PT GP 1    56986032118 HC PT THERAPEUTIC ACT EA 15 MIN 8/5/2022 Teresa Hurtado, PT GP 1    68700579208 HC PT THER SUPP EA 15 MIN 8/5/2022 Teresa Hurtado, PT GP 2    45614107956 HC PT THER PROC EA 15 MIN 8/6/2022 Teresa Hurtado, PT GP 1    30711127064  PT THERAPEUTIC ACT EA 15 MIN 8/6/2022 Teresa Hurtado, PT GP 1    48063857158 HC PT THER SUPP EA 15 MIN 8/6/2022 Teresa Hurtado, PT GP 2          PT G-Codes  Outcome Measure Options: AM-PAC 6 Clicks Basic Mobility (PT)  AM-PAC 6 Clicks Score (PT): 11  AM-PAC 6 Clicks Score (OT): 12    Teresa Hurtado PT  8/6/2022

## 2022-08-06 NOTE — PLAN OF CARE
Goal Outcome Evaluation:  Plan of Care Reviewed With: patient, daughter        Progress: improving  Outcome Evaluation: Pt. performed bed mobility with max assist of 2. She performed sit to stand transfer w/mod assist of 2 and bed to chair transfer w/max assist of 2. Pt. able to progress ambulation with 2 steps forward. Pt. demonstrates limited weight acceptance inhibiting further progression. She tolerated ther-ex well but requires assist. Will continue to progress as able. Recommend SNF upon discharge.

## 2022-08-06 NOTE — PROGRESS NOTES
[facility]  PROGRESS NOTE    Patient Name: Patricia Obrien  : 1935  MRN: [id number]    Date of Admission: 2022  Primary Care Physician: Provider, No Known    Subjective   Subjective     CC:  Hip fracture    HPI:  Patient resting in bed. Remains pleasant and confused. No family at bedside.    ROS:  Gen- No fevers, chills  CV- No chest pain, palpitations  Resp- No cough, dyspnea  GI- No N/V/D, abd pain    Objective   Objective     Vital Signs:   Temp:  [98 °F (36.7 °C)-98.6 °F (37 °C)] 98.2 °F (36.8 °C)  Heart Rate:  [67-70] 69  Resp:  [16-17] 17  BP: (133-159)/(63-72) 150/72     Physical Exam:  Constitutional: No acute distress, awake, alert  HENT: NCAT, mucous membranes moist  Respiratory: Clear to auscultation bilaterally, respiratory effort normal   Cardiovascular: RRR, no murmurs, rubs, or gallops  Gastrointestinal soft, nontender, nondistended  Musculoskeletal: No bilateral ankle edema  Psychiatric: Appropriate affect, cooperative  Neurologic: Oriented to person, speech clear, moves extremities  Skin: No rashes    Exam unchanged from     Results Reviewed:  LAB RESULTS:      Lab 22  1835   WBC 6.04  --  8.5   HEMOGLOBIN 8.1*  --  11.6   HEMATOCRIT 23.5*  --  34.8*   PLATELETS 107*  --  146*   NEUTROS ABS  --   --  7.1   IMMATURE GRANS (ABS)  --   --  0.1   LYMPHS ABS  --   --  0.9*   MONOS ABS  --   --  0.3   EOS ABS  --   --  0.0   MCV 92.2  --  93.5   PROTIME  --  15.3*  --          Lab 22   SODIUM 136 143   POTASSIUM 4.1 4.1   CHLORIDE 105 109*   CO2 25.0 22.0   ANION GAP 6.0 12.0   BUN 15 20   CREATININE 0.90 1.00   EGFR 62.0 54.6*   GLUCOSE 95 117*   CALCIUM 7.9* 8.8             Lab 22   PROTIME 15.3*   INR 1.22*                 Brief Urine Lab Results  (Last result in the past 365 days)      Color   Clarity   Blood   Leuk Est   Nitrite   Protein   CREAT   Urine HCG         08/01/22 1920 Yellow   Clear   Negative   Negative   Negative                   Microbiology Results Abnormal     Procedure Component Value - Date/Time    COVID PRE-OP / PRE-PROCEDURE SCREENING ORDER (NO ISOLATION) - Swab, Nasopharynx [[id number]]  (Normal) Collected: 08/02/22 0630    Lab Status: Final result Specimen: Swab from Nasopharynx Updated: 08/02/22 0701    Narrative:      The following orders were created for panel order COVID PRE-OP / PRE-PROCEDURE SCREENING ORDER (NO ISOLATION) - Swab, Nasopharynx.  Procedure                               Abnormality         Status                     ---------                               -----------         ------                     COVID-19 and FLU A/B PCR...[[id number]]  Normal              Final result                 Please view results for these tests on the individual orders.    COVID-19 and FLU A/B PCR - Swab, Nasopharynx [[id number]]  (Normal) Collected: 08/02/22 0630    Lab Status: Final result Specimen: Swab from Nasopharynx Updated: 08/02/22 0701     COVID19 Not Detected     Influenza A PCR Not Detected     Influenza B PCR Not Detected    Narrative:      Fact sheet for providers: https://www.fda.gov/media/857885/download    Fact sheet for patients: https://www.fda.gov/media/620420/download    Test performed by PCR.          No radiology results from the last 24 hrs        I have reviewed the medications:  Scheduled Meds:atorvastatin, 10 mg, Oral, Daily  busPIRone, 10 mg, Oral, TID  donepezil, 10 mg, Oral, Nightly  folic acid, 1 mg, Oral, Daily  levothyroxine, 50 mcg, Oral, Daily  memantine, 10 mg, Oral, Daily  pantoprazole, 40 mg, Oral, QAM  polyethylene glycol, 17 g, Oral, Daily   And  senna-docusate sodium, 2 tablet, Oral, BID  QUEtiapine, 25 mg, Oral, Nightly  sertraline, 50 mg, Oral, Daily  sodium chloride, 10 mL, Intravenous, Q12H      Continuous Infusions:lactated ringers, 9 mL/hr, Last Rate: 9 mL/hr (08/04/22 1628)      PRN Meds:.senna-docusate sodium  **AND** polyethylene glycol **AND** bisacodyl **AND** bisacodyl  •  HYDROcodone-acetaminophen  •  labetalol  •  Morphine  •  ondansetron **OR** ondansetron  •  potassium chloride  •  potassium chloride  •  sodium chloride  •  traMADol    Assessment & Plan   Assessment & Plan     Active Hospital Problems    Diagnosis  POA   • Hip fracture (HCC) [S72.009A]  Yes   • Dementia (HCC) [F03.90]  Yes   • HTN (hypertension) [I10]  Yes   • Hypothyroidism (acquired) [E03.9]  Yes      Resolved Hospital Problems   No resolved problems to display.        Brief Hospital Course to date:  Patricia Obrien is a 87 y.o. female with dementia hypotension hypothyroidism presenting from [facility] with a right hip fracture following mechanical fall. X-rays reviewed 8-2-22 showing right femoral neck fracture    Right hip fracture   - s/p right hemiarthroplasty on 8/2/22 w/Dr. Harrington  -PT/OT Case management  -PRN pain control, bowel regimen  -WBAT RLE with post hip precautions x 6 weeks. Limit active abduction.  - DVT ppx with  mg x 6 weeks and mechanical while in house     Dementia  -Receiving Namenda and Aricept     Hyperlipidemia  -Receiving a statin     Anxiety  -Receiving buspirone     Depression  -Receiving Zoloft     Hypothyroidism  -Receiving Synthroid 50 mcg     Hypertension  -holding lisinopril due to some low blood pressures, monitor and resume as needed     Expected Discharge Location and Transportation: Skilled nursing facility by ambulance  Expected Discharge Date: 8/7/2022    DVT prophylaxis:  Mechanical DVT prophylaxis orders are present.     AM-PAC 6 Clicks Score (PT): 11 (08/06/22 0800)    CODE STATUS:   Code Status and Medical Interventions:   Ordered at: 08/02/22 0910     Level Of Support Discussed With:    Next of Kin (If No Surrogate)     Code Status (Patient has no pulse and is not breathing):    CPR (Attempt to Resuscitate)     Medical Interventions (Patient has pulse or is breathing):    Full Support        Colleen Noe,   08/06/22

## 2022-08-06 NOTE — PLAN OF CARE
Goal Outcome Evaluation:  Plan of Care Reviewed With: patient, family        Progress: improving  Problem: Adult Inpatient Plan of Care  Goal: Plan of Care Review  Flowsheets  Taken 8/6/2022 1840 by Brianna Moya RN  Progress: improving  Plan of Care Reviewed With:   patient   family  Taken 8/6/2022 1656 by Teresa Hurtado PT  Outcome Evaluation: Pt. performed bed mobility with max assist of 2. She performed sit to stand transfer w/mod assist of 2 and bed to chair transfer w/max assist of 2. Pt. able to progress ambulation with 2 steps forward. Pt. demonstrates limited weight acceptance inhibiting further progression. She tolerated ther-ex well but requires assist. Will continue to progress as able. Recommend SNF upon discharge.

## 2022-08-06 NOTE — PROGRESS NOTES
"Orthopedic Daily Progress Note      CC: AARON overnight    Pain well controlled  General: no fevers, chills  Abdomen: no nausea, vomiting, or diarrhea    No other complaints    Physical Exam:  I have reviewed the vital signs.  Temp:  [97.7 °F (36.5 °C)-98.6 °F (37 °C)] 97.7 °F (36.5 °C)  Heart Rate:  [67-69] 68  Resp:  [16-18] 18  BP: (138-159)/(63-84) 138/84    Objective:  Vital signs: (most recent): Blood pressure 138/84, pulse 68, temperature 97.7 °F (36.5 °C), temperature source Oral, resp. rate 18, height 149.9 cm (59\"), weight 56.2 kg (124 lb), SpO2 95 %.            General Appearance:    Alert, cooperative, no distress  Extremities: No clubbing, cyanosis, or edema to lower extremities  Pulses:  2+ in distal surgical extremity  Skin: Incision Clean/dry/intact      Results Review:    I have reviewed the labs, radiology results and diagnostic studies: hgb 8.3    Results from last 7 days   Lab Units 08/06/22  1209   WBC 10*3/mm3 5.45   HEMOGLOBIN g/dL 8.3*   PLATELETS 10*3/mm3 143     Results from last 7 days   Lab Units 08/05/22  0755   SODIUM mmol/L 136   POTASSIUM mmol/L 4.1   CO2 mmol/L 25.0   CREATININE mg/dL 0.90   GLUCOSE mg/dL 95       I have reviewed the medications.    Assessment/Problem List  POD# 4 Day Post-Op   S/p R hip abdiaziz for femoral neck fx    Plan  WBAT RLE with post hip precautions x 6 weeks. Limit active abduction.  PT/OT  Ok with DVT ppx with  mg x 6 weeks and mechanical while in house      Discharge Planning: I expect patient to be discharged to rehab in TBD days.    Shala Gardner PA-C  08/06/22  14:35 EDT            "

## 2022-08-06 NOTE — PLAN OF CARE
Goal Outcome Evaluation:  Plan of Care Reviewed With: patient        Progress: no change     Pt is alert to self only. VSS. Pain controlled with prn pain med. Daughter at bedside.

## 2022-08-07 PROCEDURE — 99232 SBSQ HOSP IP/OBS MODERATE 35: CPT | Performed by: INTERNAL MEDICINE

## 2022-08-07 PROCEDURE — 97530 THERAPEUTIC ACTIVITIES: CPT

## 2022-08-07 PROCEDURE — 97110 THERAPEUTIC EXERCISES: CPT

## 2022-08-07 RX ADMIN — HYDROCODONE BITARTRATE AND ACETAMINOPHEN 1 TABLET: 5; 325 TABLET ORAL at 13:12

## 2022-08-07 RX ADMIN — PANTOPRAZOLE SODIUM 40 MG: 40 TABLET, DELAYED RELEASE ORAL at 05:15

## 2022-08-07 RX ADMIN — Medication 10 ML: at 20:19

## 2022-08-07 RX ADMIN — POLYETHYLENE GLYCOL 3350 17 G: 17 POWDER, FOR SOLUTION ORAL at 08:54

## 2022-08-07 RX ADMIN — SENNOSIDES AND DOCUSATE SODIUM 2 TABLET: 50; 8.6 TABLET ORAL at 20:19

## 2022-08-07 RX ADMIN — ATORVASTATIN CALCIUM 10 MG: 10 TABLET, FILM COATED ORAL at 08:54

## 2022-08-07 RX ADMIN — FOLIC ACID 1 MG: 1 TABLET ORAL at 08:53

## 2022-08-07 RX ADMIN — TRAMADOL HYDROCHLORIDE 50 MG: 50 TABLET, COATED ORAL at 20:19

## 2022-08-07 RX ADMIN — BUSPIRONE HYDROCHLORIDE 10 MG: 10 TABLET ORAL at 08:54

## 2022-08-07 RX ADMIN — SERTRALINE HYDROCHLORIDE 50 MG: 50 TABLET ORAL at 08:53

## 2022-08-07 RX ADMIN — DONEPEZIL HYDROCHLORIDE 10 MG: 10 TABLET, FILM COATED ORAL at 20:19

## 2022-08-07 RX ADMIN — QUETIAPINE FUMARATE 25 MG: 25 TABLET ORAL at 20:19

## 2022-08-07 RX ADMIN — BUSPIRONE HYDROCHLORIDE 10 MG: 10 TABLET ORAL at 16:24

## 2022-08-07 RX ADMIN — BUSPIRONE HYDROCHLORIDE 10 MG: 10 TABLET ORAL at 20:19

## 2022-08-07 RX ADMIN — Medication 10 ML: at 08:54

## 2022-08-07 RX ADMIN — SENNOSIDES AND DOCUSATE SODIUM 2 TABLET: 50; 8.6 TABLET ORAL at 08:53

## 2022-08-07 RX ADMIN — MEMANTINE 10 MG: 10 TABLET ORAL at 08:54

## 2022-08-07 RX ADMIN — LEVOTHYROXINE SODIUM 50 MCG: 50 TABLET ORAL at 05:15

## 2022-08-07 NOTE — PROGRESS NOTES
"Orthopedic Daily Progress Note      CC: AARON overnight    Pain well controlled  General: no fevers, chills  Abdomen: no nausea, vomiting, or diarrhea    No other complaints    Physical Exam:  I have reviewed the vital signs.  Temp:  [97.5 °F (36.4 °C)-98.9 °F (37.2 °C)] 98.9 °F (37.2 °C)  Heart Rate:  [60-70] 70  Resp:  [16-18] 18  BP: (120-149)/(65-88) 134/70    Objective:  Vital signs: (most recent): Blood pressure 134/70, pulse 70, temperature 98.9 °F (37.2 °C), temperature source Axillary, resp. rate 18, height 149.9 cm (59\"), weight 56.2 kg (124 lb), SpO2 94 %.            General Appearance:    Alert, cooperative, no distress  Extremities: No clubbing, cyanosis, or edema to lower extremities  Pulses:  2+ in distal surgical extremity  Skin: Incision Clean/dry/intact      Results Review:    I have reviewed the labs, radiology results and diagnostic studies: no new labs    Results from last 7 days   Lab Units 08/06/22  1209   WBC 10*3/mm3 5.45   HEMOGLOBIN g/dL 8.3*   PLATELETS 10*3/mm3 143     Results from last 7 days   Lab Units 08/05/22  0755   SODIUM mmol/L 136   POTASSIUM mmol/L 4.1   CO2 mmol/L 25.0   CREATININE mg/dL 0.90   GLUCOSE mg/dL 95       I have reviewed the medications.    Assessment/Problem List  POD# 5 Day Post-Op   S/p R hip abdiaziz for femoral neck fx    Plan  WBAT RLE with post hip precautions x 6 weeks. Limit active abduction.  PT/OT  Ok with DVT ppx with  mg x 6 weeks and mechanical while in house      Discharge Planning: I expect patient to be discharged to rehab in TBD days.    Jaspal Harrington Jr., MD  08/07/22  09:28 EDT            "

## 2022-08-07 NOTE — PLAN OF CARE
Problem: Adult Inpatient Plan of Care  Goal: Plan of Care Review  Recent Flowsheet Documentation  Taken 8/7/2022 1523 by Emma Johnson OT  Plan of Care Reviewed With:   patient   family  Outcome Evaluation: Pt participates in therapy with encouragement and increased time. Education reinforced for PHP with all mobility and self-care. Pt requires Max Ax2 for bed mobility and transfers this session.  BUE ther ex completed with AAROM to support ADLs. Pt able to complete simple grooming and self-feeding with set-up. OT will continue to follow IP. Continue to recommend SNF at d/c for best outcome.      Urinary Tract Infection, Adult  A urinary tract infection (UTI) is an infection of any part of the urinary tract, which includes the kidneys, ureters, bladder, and urethra. These organs make, store, and get rid of urine in the body. UTI can be a bladder infection (cystitis) or kidney infection (pyelonephritis).  CAUSES  This infection may be caused by fungi, viruses, or bacteria. Bacteria are the most common cause of UTIs. This condition can also be caused by repeated incomplete emptying of the bladder during urination.   RISK FACTORS  This condition is more likely to develop if:   · You ignore your need to urinate or hold urine for long periods of time.  · You do not empty your bladder completely during urination.  · You wipe back to front after urinating or having a bowel movement, if you are female.  · You are uncircumcised, if you are male.    · You are constipated.  · You have a urinary catheter that stays in place (indwelling).  · You have a weak defense (immune) system.  · You have a medical condition that affects your bowels, kidneys, or bladder.  · You have diabetes.  · You take antibiotic medicines frequently or for long periods of time, and the antibiotics no longer work well against certain types of infections (antibiotic resistance).  · You take medicines that irritate your urinary tract.  · You are exposed to chemicals that irritate your urinary tract.  · You are female.  SYMPTOMS   Symptoms of this condition include:   · Fever.    · Frequent urination or passing small amounts of urine frequently.    · Needing to urinate urgently.    · Pain or burning with urination.    · Urine that smells bad or unusual.    · Cloudy urine.    · Pain in the lower abdomen or back.    · Trouble urinating.    · Blood in the urine.    · Vomiting or being less hungry than normal.     · Diarrhea or abdominal pain.    · Vaginal discharge, if you are female.  DIAGNOSIS  This condition is diagnosed with a medical history and  physical exam. You will also need to provide a urine sample to test your urine. Other tests may be done, including:    · Blood tests.    · Sexually transmitted disease (STD) testing.     If you have had more than one UTI, a cystoscopy or imaging studies may be done to determine the cause of the infections.   TREATMENT   Treatment for this condition often includes a combination of two or more of the following:   · Antibiotic medicine.    · Other medicines to treat less common causes of UTI.    · Over-the-counter medicines to treat pain.    · Drinking enough water to stay hydrated.  HOME CARE INSTRUCTIONS  · Take over-the-counter and prescription medicines only as told by your health care provider.  · If you were prescribed an antibiotic, take it as told by your health care provider. Do not stop taking the antibiotic even if you start to feel better.  · Avoid alcohol, caffeine, tea, and carbonated beverages. They can irritate your bladder.  · Drink enough fluid to keep your urine clear or pale yellow.  · Keep all follow-up visits as told by your health care provider. This is important.  · Make sure to:    Empty your bladder often and completely. Do not hold urine for long periods of time.    Empty your bladder before and after sex.    Wipe from front to back after a bowel movement if you are female. Use each tissue one time when you wipe.  SEEK MEDICAL CARE IF:   · You have back pain.    · You have a fever.  · You feel nauseous or vomit.    · Your symptoms do not get better after 3 days.     · Your symptoms go away and then return.  SEEK IMMEDIATE MEDICAL CARE IF:   · You have severe back pain or lower abdominal pain.    · You are vomiting and cannot keep down any medicines or water.     This information is not intended to replace advice given to you by your health care provider. Make sure you discuss any questions you have with your health care provider.    Follow up if symptoms worsen or if no improvement      Document Released: 09/27/2006 Document Revised: 01/13/2017 Document Reviewed: 11/07/2016  Elsevier Interactive Patient Education ©2016 [employer]

## 2022-08-07 NOTE — PLAN OF CARE
Goal Outcome Evaluation:  Plan of Care Reviewed With: patient        Progress: no change  Outcome Evaluation: Patient continues to be limited by weakness, decreased activity tolerance, and balance impairments. She required Max A x2 for transfers, demonstrating difficulty coming to full upright standing. She gave good effort towards LE ther ex, with increased fatigue following. Will continue per PT POC.

## 2022-08-07 NOTE — PROGRESS NOTES
[facility] Medicine Services  PROGRESS NOTE    Patient Name: Patricia Obrien  : 1935  MRN: [id number]    Date of Admission: 2022  Primary Care Physician: Provider, No Known    Subjective   Subjective     CC:  Hip fracture    HPI:  Patient resting in bed. No issues overnight. Sleeping this morning. Daughter at bedside.    ROS:  UTO 2/ patient sleeping    Objective   Objective     Vital Signs:   Temp:  [97.5 °F (36.4 °C)-98.9 °F (37.2 °C)] 98.9 °F (37.2 °C)  Heart Rate:  [60-70] 70  Resp:  [16-18] 18  BP: (120-149)/(65-88) 134/70     Physical Exam:  Constitutional: No acute distress, sleeping  HENT: NCAT, mucous membranes moist  Respiratory: Clear to auscultation bilaterally, respiratory effort normal   Cardiovascular: RRR, no murmurs, rubs, or gallops  Gastrointestinal soft, nondistended  Musculoskeletal: No bilateral ankle edema  Neurologic: asleep  Skin: No rashes      Results Reviewed:  LAB RESULTS:      Lab 22  1209 22  0755 226 22  1835   WBC 5.45 6.04  --  8.5   HEMOGLOBIN 8.3* 8.1*  --  11.6   HEMATOCRIT 24.6* 23.5*  --  34.8*   PLATELETS 143 107*  --  146*   NEUTROS ABS 3.66  --   --  7.1   IMMATURE GRANS (ABS) 0.03  --   --  0.1   LYMPHS ABS 1.08  --   --  0.9*   MONOS ABS 0.41  --   --  0.3   EOS ABS 0.23  --   --  0.0   MCV 92.1 92.2  --  93.5   PROTIME  --   --  15.3*  --          Lab 22  0755 22   SODIUM 136 143   POTASSIUM 4.1 4.1   CHLORIDE 105 109*   CO2 25.0 22.0   ANION GAP 6.0 12.0   BUN 15 20   CREATININE 0.90 1.00   EGFR 62.0 54.6*   GLUCOSE 95 117*   CALCIUM 7.9* 8.8             Lab 22  044   PROTIME 15.3*   INR 1.22*                 Brief Urine Lab Results  (Last result in the past 365 days)      Color   Clarity   Blood   Leuk Est   Nitrite   Protein   CREAT   Urine HCG        22 1920 Yellow   Clear   Negative   Negative   Negative                   Microbiology Results Abnormal     Procedure  Component Value - Date/Time    COVID PRE-OP / PRE-PROCEDURE SCREENING ORDER (NO ISOLATION) - Swab, Nasopharynx [[id number]]  (Normal) Collected: 08/02/22 0630    Lab Status: Final result Specimen: Swab from Nasopharynx Updated: 08/02/22 0701    Narrative:      The following orders were created for panel order COVID PRE-OP / PRE-PROCEDURE SCREENING ORDER (NO ISOLATION) - Swab, Nasopharynx.  Procedure                               Abnormality         Status                     ---------                               -----------         ------                     COVID-19 and FLU A/B PCR...[[id number]]  Normal              Final result                 Please view results for these tests on the individual orders.    COVID-19 and FLU A/B PCR - Swab, Nasopharynx [[id number]]  (Normal) Collected: 08/02/22 0630    Lab Status: Final result Specimen: Swab from Nasopharynx Updated: 08/02/22 0701     COVID19 Not Detected     Influenza A PCR Not Detected     Influenza B PCR Not Detected    Narrative:      Fact sheet for providers: https://www.fda.gov/media/459146/download    Fact sheet for patients: https://www.fda.gov/media/990982/download    Test performed by PCR.          No radiology results from the last 24 hrs        I have reviewed the medications:  Scheduled Meds:atorvastatin, 10 mg, Oral, Daily  busPIRone, 10 mg, Oral, TID  donepezil, 10 mg, Oral, Nightly  folic acid, 1 mg, Oral, Daily  levothyroxine, 50 mcg, Oral, Daily  memantine, 10 mg, Oral, Daily  pantoprazole, 40 mg, Oral, QAM  polyethylene glycol, 17 g, Oral, Daily   And  senna-docusate sodium, 2 tablet, Oral, BID  QUEtiapine, 25 mg, Oral, Nightly  sertraline, 50 mg, Oral, Daily  sodium chloride, 10 mL, Intravenous, Q12H      Continuous Infusions:lactated ringers, 9 mL/hr, Last Rate: 9 mL/hr (08/04/22 1908)      PRN Meds:.senna-docusate sodium **AND** polyethylene glycol **AND** bisacodyl **AND** bisacodyl  •  HYDROcodone-acetaminophen  •  labetalol  •  Morphine  •   ondansetron **OR** ondansetron  •  potassium chloride  •  potassium chloride  •  sodium chloride  •  traMADol    Assessment & Plan   Assessment & Plan     Active Hospital Problems    Diagnosis  POA   • Hip fracture (HCC) [S72.009A]  Yes   • Dementia (HCC) [F03.90]  Yes   • HTN (hypertension) [I10]  Yes   • Hypothyroidism (acquired) [E03.9]  Yes      Resolved Hospital Problems   No resolved problems to display.        Brief Hospital Course to date:  Patricia Obrien is a 87 y.o. female with dementia hypotension hypothyroidism presenting from [facility] with a right hip fracture following mechanical fall. X-rays reviewed 8-2-22 showing right femoral neck fracture    Right hip fracture   - s/p right hemiarthroplasty on 8/2/22 w/Dr. Harrington  -PT/OT Case management  -PRN pain control, bowel regimen  -WBAT RLE with post hip precautions x 6 weeks. Limit active abduction.  - DVT ppx with  mg x 6 weeks and mechanical while in house     Dementia  -continue Namenda and Aricept     Hyperlipidemia  -continue statin     Anxiety  -continue buspirone     Depression  - continue Zoloft     Hypothyroidism  - continue Synthroid 50 mcg     Hypertension  -holding lisinopril due to some low blood pressures/normotension, monitor and resume as needed     Expected Discharge Location and Transportation: Skilled nursing facility by ambulance  Expected Discharge Date: 8/7/2022    DVT prophylaxis:  Mechanical DVT prophylaxis orders are present.     AM-PAC 6 Clicks Score (PT): 11 (08/06/22 8102)    CODE STATUS:   Code Status and Medical Interventions:   Ordered at: 08/02/22 0910     Level Of Support Discussed With:    Next of Kin (If No Surrogate)     Code Status (Patient has no pulse and is not breathing):    CPR (Attempt to Resuscitate)     Medical Interventions (Patient has pulse or is breathing):    Full Support       Colleen Noe, DO  08/07/22

## 2022-08-07 NOTE — PLAN OF CARE
Goal Outcome Evaluation:  Plan of Care Reviewed With: patient        Progress: improving   Pt is alert to self. VSS. RA. Po prn norco and tramadol for pain. Daughter at bedside.

## 2022-08-07 NOTE — THERAPY TREATMENT NOTE
Patient Name: Patricia Obrien  : 1935    MRN: [id number]                              Today's Date: 2022       Admit Date: 2022    Visit Dx: No diagnosis found.  Patient Active Problem List   Diagnosis   • Hip fracture (HCC)   • Dementia (HCC)   • HTN (hypertension)   • Hypothyroidism (acquired)     Past Medical History:   Diagnosis Date   • Anxiety    • Dementia (HCC)    • Depression    • Disease of thyroid gland    • Elevated cholesterol    • Hypertension    • Osteoarthritis      Past Surgical History:   Procedure Laterality Date   • BLADDER SUSPENSION     • CHOLECYSTECTOMY     • HIP HEMIARTHROPLASTY Right 2022    Procedure: HIP HEMIARTHROPLASTY RIGHT;  Surgeon: Jaspal Harrington Jr., MD;  Location: [facility];  Service: Orthopedics;  Laterality: Right;   • HYSTERECTOMY     • LAPAROSCOPIC TUBAL LIGATION        General Information     Row Name 22 1513          OT Time and Intention    Document Type therapy note (daily note)  -TB     Mode of Treatment occupational therapy  -TB     Row Name 22 1513          General Information    Patient Profile Reviewed yes  -TB     Existing Precautions/Restrictions fall;right;hip, posterior;other (see comments)  Limit active ABDuction, KI for quad weakness, dementia  -TB     Barriers to Rehab previous functional deficit;cognitive status  -TB     Row Name 22 1513          Cognition    Orientation Status (Cognition) oriented to;person  -TB     Row Name 22 1513          Safety Issues, Functional Mobility    Safety Issues Affecting Function (Mobility) insight into deficits/self-awareness;awareness of need for assistance;safety precaution awareness;safety precautions follow-through/compliance;sequencing abilities;judgment;problem-solving  -TB     Impairments Affecting Function (Mobility) cognition;balance;endurance/activity tolerance;pain;strength;range of motion (ROM);postural/trunk control  -TB     Cognitive Impairments, Mobility  Safety/Performance attention;awareness, need for assistance;insight into deficits/self-awareness;judgment;problem-solving/reasoning;safety precaution awareness;safety precaution follow-through;sequencing abilities  -TB           User Key  (r) = Recorded By, (t) = Taken By, (c) = Cosigned By    Initials Name Provider Type    TB Emma Johnson, OT Occupational Therapist                 Mobility/ADL's     Row Name 08/07/22 1514          Bed Mobility    Bed Mobility supine-sit;scooting/bridging  -TB     Scooting/Bridging [place] (Bed Mobility) maximum assist (25% patient effort);2 person assist;verbal cues  -TB     Supine-Sit [place] (Bed Mobility) maximum assist (25% patient effort);2 person assist;verbal cues  -TB     Bed Mobility, Safety Issues impaired trunk control for bed mobility;decreased use of arms for pushing/pulling;decreased use of legs for bridging/pushing  -TB     Assistive Device (Bed Mobility) head of bed elevated;draw sheet;bed rails  -TB     Row Name 08/07/22 1514          Transfers    Transfers sit-stand transfer;bed-chair transfer;stand-sit transfer  -TB     Comment, (Transfers) Education reinforced for RLE PHP. Cues and assist for sequencing and safety  -TB     Bed-Chair [place] (Transfers) maximum assist (25% patient effort);2 person assist;verbal cues  -TB     Sit-Stand [place] (Transfers) maximum assist (25% patient effort);2 person assist;verbal cues  -TB     Stand-Sit [place] (Transfers) maximum assist (25% patient effort);2 person assist;verbal cues  -TB     Row Name 08/07/22 1514          Bed-Chair Transfer    Comment, (Bed-Chair Transfer) Stand pvt with BUE support  -TB     Row Name 08/07/22 1514          Sit-Stand Transfer    Assistive Device (Sit-Stand Transfers) walker, front-wheeled  -TB     Row Name 08/07/22 1514          Stand-Sit Transfer    Assistive Device (Stand-Sit Transfers) walker, front-wheeled  -TB     Comment, (Stand-Sit Transfer) Good effort  x2 reps  -TB     Row Name 08/07/22 1514          Activities of Daily Living    BADL Assessment/Intervention grooming;lower body dressing;toileting;feeding  -     Row Name 08/07/22 1514          Mobility    Extremity Weight-bearing Status right lower extremity  -TB     Right Lower Extremity (Weight-bearing Status) weight-bearing as tolerated (WBAT);other (see comments)  KI for R quad weakness  -     Row Name 08/07/22 1514          Lower Body Dressing Assessment/Training    [place] Level (Lower Body Dressing) don;socks;dependent (less than 25% patient effort)  -TB     Position (Lower Body Dressing) edge of bed sitting  -TB     Row Name 08/07/22 1514          Grooming Assessment/Training    [place] Level (Grooming) set up;wash face, hands;verbal cues  -TB     Position (Grooming) supported sitting  -TB     Row Name 08/07/22 1514          Self-Feeding Assessment/Training    [place] Level (Feeding) minimum assist (75% patient effort);liquids to mouth  -     Position (Self-Feeding) supported sitting  -     Row Name 08/07/22 1514          Toileting Assessment/Training    [place] Level (Toileting) dependent (less than 25% patient effort);toileting skills  -TB     Comment, (Toileting) incontinent/purwick  -TB           User Key  (r) = Recorded By, (t) = Taken By, (c) = Cosigned By    Initials Name Provider Type    TB Emma Johnson, OT Occupational Therapist               Obj/Interventions     Row Name 08/07/22 1521          Shoulder (Therapeutic Exercise)    Shoulder (Therapeutic Exercise) AAROM (active assistive range of motion)  -     Shoulder AAROM (Therapeutic Exercise) bilateral;flexion;extension;aBduction;aDduction;10 repetitions;sitting  -TB     Row Name 08/07/22 1521          Motor Skills    Therapeutic Exercise shoulder  Education reinforced for benefits of OOB activity/therapy, role of OT and ther ex to support self-care  -     Row Name 08/07/22 1521          Balance     Balance Assessment sitting dynamic balance;sit to stand dynamic balance;standing dynamic balance  -TB     Dynamic Sitting Balance contact guard;verbal cues  -TB     Position, Sitting Balance sitting in chair;sitting edge of bed  -TB     Sit to Stand Dynamic Balance maximum assist;2-person assist;verbal cues  -TB     Dynamic Standing Balance maximum assist;2-person assist;verbal cues  -TB     Position/Device Used, Standing Balance supported;walker, front-wheeled  -TB     Balance Interventions sitting;standing;sit to stand;supported;dynamic;occupation based/functional task  -TB           User Key  (r) = Recorded By, (t) = Taken By, (c) = Cosigned By    Initials Name Provider Type    TB Emma Johnson, OT Occupational Therapist               Goals/Plan    No documentation.                Clinical Impression     Row Name 08/07/22 1523          Pain Assessment    Pain Intervention(s) Ambulation/increased activity;Repositioned;Cold applied  -TB     Additional Documentation Pain Scale: FACES Pre/Post-Treatment (Group)  -TB     Row Name 08/07/22 1523          Pain Scale: FACES Pre/Post-Treatment    Pain: FACES Scale, Pretreatment 2-->hurts little bit  -TB     Posttreatment Pain Rating 4-->hurts little more  -TB     Pain Location - Side/Orientation Right  -TB     Pain Location incisional  -TB     Pain Location - hip  -TB     Pre/Posttreatment Pain Comment Pt tolerates therapy with encouragement  -TB     Row Name 08/07/22 1523          Plan of Care Review    Plan of Care Reviewed With patient;family  -TB     Outcome Evaluation Pt participates in therapy with encouragement and increased time. Education reinforced for PHP with all mobility and self-care. Pt requires Max Ax2 for bed mobility and transfers this session.  BUE ther ex completed with AAROM to support ADLs. Pt able to complete simple grooming and self-feeding with set-up. OT will continue to follow IP. Continue to recommend SNF at d/c for best outcome.  -TB      Row Name 08/07/22 1523          Therapy Plan Review/Discharge Plan (OT)    Anticipated Discharge Disposition (OT) skilled nursing facility  -TB     Row Name 08/07/22 1523          Vital Signs    Pre Systolic BP Rehab --  RN cleared OT  -TB     O2 Delivery Pre Treatment room air  -TB     O2 Delivery Intra Treatment room air  -TB     O2 Delivery Post Treatment room air  -TB     Pre Patient Position Supine  -TB     Intra Patient Position Standing  -TB     Post Patient Position Sitting  -TB     Row Name 08/07/22 1523          Positioning and Restraints    Pre-Treatment Position in bed  -TB     Post Treatment Position chair  -TB     In Chair notified nsg;reclined;call light within reach;encouraged to call for assist;exit alarm on;with family/caregiver;legs elevated;on mechanical lift sling;waffle cushion  -TB           User Key  (r) = Recorded By, (t) = Taken By, (c) = Cosigned By    Initials Name Provider Type    Emma Alvarenga OT Occupational Therapist               Outcome Measures     Row Name 08/07/22 1527          How much help from another is currently needed...    Putting on and taking off regular lower body clothing? 1  -TB     Bathing (including washing, rinsing, and drying) 2  -TB     Toileting (which includes using toilet bed pan or urinal) 1  -TB     Putting on and taking off regular upper body clothing 2  -TB     Taking care of personal grooming (such as brushing teeth) 3  -TB     Eating meals 3  -TB     AM-PAC 6 Clicks Score (OT) 12  -TB     Row Name 08/07/22 1527          Functional Assessment    Outcome Measure Options AM-PAC 6 Clicks Daily Activity (OT)  -TB           User Key  (r) = Recorded By, (t) = Taken By, (c) = Cosigned By    Initials Name Provider Type    Emma Alvarenga OT Occupational Therapist                Occupational Therapy Education                 Title: PT OT SLP Therapies (Done)     Topic: Occupational Therapy (Done)     Point: ADL training (Done)      Description:   Instruct learner(s) on proper safety adaptation and remediation techniques during self care or transfers.   Instruct in proper use of assistive devices.              Learning Progress Summary           Patient Acceptance, E,D, VU,NR by TB at 8/7/2022 1528    Acceptance, E, VU by MA at 8/7/2022 0334    Acceptance, E,D, VU,NR by TB at 8/3/2022 1336   Family Acceptance, E,D, VU,NR by TB at 8/7/2022 1528    Acceptance, E,D, VU,NR by TB at 8/3/2022 1336                   Point: Home exercise program (Done)     Description:   Instruct learner(s) on appropriate technique for monitoring, assisting and/or progressing therapeutic exercises/activities.              Learning Progress Summary           Patient Acceptance, E,D, VU,NR by TB at 8/7/2022 1528    Acceptance, E, VU by MA at 8/7/2022 0334   Family Acceptance, E,D, VU,NR by TB at 8/7/2022 1528                   Point: Precautions (Done)     Description:   Instruct learner(s) on prescribed precautions during self-care and functional transfers.              Learning Progress Summary           Patient Acceptance, E,D, VU,NR by TB at 8/7/2022 1528    Acceptance, E, VU by MA at 8/7/2022 0334    Acceptance, E,D, VU,NR by TB at 8/3/2022 1336   Family Acceptance, E,D, VU,NR by TB at 8/7/2022 1528    Acceptance, E,D, VU,NR by TB at 8/3/2022 1336                   Point: Body mechanics (Done)     Description:   Instruct learner(s) on proper positioning and spine alignment during self-care, functional mobility activities and/or exercises.              Learning Progress Summary           Patient Acceptance, E, VU by MA at 8/7/2022 0334                               User Key     Initials Effective Dates Name Provider Type Discipline    TB 06/16/21 -  Emma Johnson OT Occupational Therapist OT    MA 10/22/20 -  Monica Walton, RN Registered Nurse Nurse              OT Recommendation and Plan  Therapy Frequency (OT): daily  Plan of Care Review  Plan of  Care Reviewed With: patient, family  Outcome Evaluation: Pt participates in therapy with encouragement and increased time. Education reinforced for PHP with all mobility and self-care. Pt requires Max Ax2 for bed mobility and transfers this session.  BUE ther ex completed with AAROM to support ADLs. Pt able to complete simple grooming and self-feeding with set-up. OT will continue to follow IP. Continue to recommend SNF at d/c for best outcome.     Time Calculation:    Time Calculation- OT     Row Name 08/07/22 1455             Time Calculation- OT    OT Start Time 1455  -TB      OT Received On 08/07/22  -TB      OT Goal Re-Cert Due Date 08/13/22  -TB              Timed Charges    80110 - OT Therapeutic Activity Minutes 16  -TB              Total Minutes    Timed Charges Total Minutes 16  -TB       Total Minutes 16  -TB            User Key  (r) = Recorded By, (t) = Taken By, (c) = Cosigned By    Initials Name Provider Type    TB Emma Johnson OT Occupational Therapist              Therapy Charges for Today     Code Description Service Date Service Provider Modifiers Qty    [id number]  OT THERAPEUTIC ACT EA 15 MIN 8/7/2022 Emma Johnson OT GO 1               Emma Johnson OT  8/7/2022

## 2022-08-07 NOTE — PLAN OF CARE
Goal Outcome Evaluation:  Plan of Care Reviewed With: patient, family        Progress: no change  Outcome Evaluation: Patient remains confused, which is her baseline. VSS. Patient pain managed with oral pain medication. Family remains at bedside throught the day. Incision intact without signs of infection. Patient waiting for placement for IR.

## 2022-08-07 NOTE — THERAPY TREATMENT NOTE
Patient Name: Patricia Obrien  : 1935    MRN: [id number]                              Today's Date: 2022       Admit Date: 2022    Visit Dx: No diagnosis found.  Patient Active Problem List   Diagnosis   • Hip fracture (HCC)   • Dementia (HCC)   • HTN (hypertension)   • Hypothyroidism (acquired)     Past Medical History:   Diagnosis Date   • Anxiety    • Dementia (HCC)    • Depression    • Disease of thyroid gland    • Elevated cholesterol    • Hypertension    • Osteoarthritis      Past Surgical History:   Procedure Laterality Date   • BLADDER SUSPENSION     • CHOLECYSTECTOMY     • HIP HEMIARTHROPLASTY Right 2022    Procedure: HIP HEMIARTHROPLASTY RIGHT;  Surgeon: Jaspal Harrington Jr., MD;  Location: [facility];  Service: Orthopedics;  Laterality: Right;   • HYSTERECTOMY     • LAPAROSCOPIC TUBAL LIGATION        General Information     Row Name 22 1437          Physical Therapy Time and Intention    Document Type therapy note (daily note)  -NS     Mode of Treatment physical therapy  -NS     Row Name 22 1437          General Information    Patient Profile Reviewed yes  -NS     Existing Precautions/Restrictions fall;right;hip, posterior;other (see comments)  Limit active ABDuction, KI for quad weakness, dementia  -NS     Row Name 22 1437          Cognition    Orientation Status (Cognition) oriented to;person  -NS     Row Name 22 1437          Safety Issues, Functional Mobility    Safety Issues Affecting Function (Mobility) insight into deficits/self-awareness;judgment;problem-solving;safety precaution awareness;safety precautions follow-through/compliance;sequencing abilities  -NS     Impairments Affecting Function (Mobility) balance;cognition;coordination;endurance/activity tolerance;range of motion (ROM);postural/trunk control;motor planning;strength;pain  -NS           User Key  (r) = Recorded By, (t) = Taken By, (c) = Cosigned By    Initials Name Provider Type    NS  Lori Kiser, PT Physical Therapist               Mobility     Row Name 08/07/22 1437          Bed Mobility    Bed Mobility supine-sit  -NS     Supine-Sit [place] (Bed Mobility) maximum assist (25% patient effort);2 person assist;verbal cues  -NS     Assistive Device (Bed Mobility) head of bed elevated;draw sheet;bed rails  -NS     Comment, (Bed Mobility) VCs for sequencing. Assist at BLEs and trunk.  -NS     Row Name 08/07/22 1437          Transfers    Comment, (Transfers) VCs for hand and foot placement. Pt practiced standing with RW and with BUE support, demonstrating difficulty coming to full standing with B knee flexion. R knee blocked during transfer.  -NS     Row Name 08/07/22 1437          Bed-Chair Transfer    Bed-Chair [place] (Transfers) maximum assist (25% patient effort);2 person assist;verbal cues  -NS     Assistive Device (Bed-Chair Transfers) other (see comments)  BUE support  -[facility] Name 08/07/22 1437          Sit-Stand Transfer    Sit-Stand [place] (Transfers) maximum assist (25% patient effort);2 person assist;verbal cues  -NS     Assistive Device (Sit-Stand Transfers) walker, front-wheeled  -NS     [facility] Name 08/07/22 1437          Mobility    Extremity Weight-bearing Status right lower extremity  -NS     Right Lower Extremity (Weight-bearing Status) weight-bearing as tolerated (WBAT)  -NS           User Key  (r) = Recorded By, (t) = Taken By, (c) = Cosigned By    Initials Name Provider Type    Lori Anderson, PT Physical Therapist               Obj/Interventions     Row Name 08/07/22 1437          Motor Skills    Therapeutic Exercise hip;knee;ankle  -NS     [facility] Name 08/07/22 1437          Hip (Therapeutic Exercise)    Hip (Therapeutic Exercise) strengthening exercise  -NS     Hip Strengthening (Therapeutic Exercise) right;heel slides;15 repititions  AAROM  -NS     Row Name 08/07/22 1437          Knee (Therapeutic Exercise)    Knee (Therapeutic Exercise) isometric exercises   -NS     Knee Isometrics (Therapeutic Exercise) bilateral;quad sets;10 repetitions  -NS     Knee Strengthening (Therapeutic Exercise) right;SLR (straight leg raise);LAQ (long arc quad);15 repititions  AAROM  -NS     Row Name 08/07/22 1437          Ankle (Therapeutic Exercise)    Ankle (Therapeutic Exercise) AROM (active range of motion)  -NS     Ankle AROM (Therapeutic Exercise) bilateral;dorsiflexion;plantarflexion;10 repetitions  -NS     Row Name 08/07/22 1437          Balance    Balance Assessment sitting static balance;sitting dynamic balance;standing static balance;standing dynamic balance  -NS     Static Sitting Balance contact guard  -NS     Dynamic Sitting Balance contact guard  -NS     Position, Sitting Balance unsupported;sitting edge of bed  -NS     Static Standing Balance maximum assist;2-person assist  -NS     Dynamic Standing Balance maximum assist;2-person assist  -NS     Position/Device Used, Standing Balance supported;walker, front-wheeled  -NS           User Key  (r) = Recorded By, (t) = Taken By, (c) = Cosigned By    Initials Name Provider Type    Lori Anderson PT Physical Therapist               Goals/Plan    No documentation.                Clinical Impression     [facility] Name 08/07/22 1437          Pain    Pain Intervention(s) Medication (See MAR);Repositioned;Cold applied  -NS     [facility] Name 08/07/22 1437          Pain Scale: FACES Pre/Post-Treatment    Pain: FACES Scale, Pretreatment 2-->hurts little bit  -NS     Posttreatment Pain Rating 4-->hurts little more  -NS     Pain Location - Side/Orientation Right  -NS     Pain Location generalized  -NS     Pain Location - knee  -NS     Pre/Posttreatment Pain Comment Pt's family reports pt was recently given pain medicine.  -NS     Row Name 08/07/22 1437          Plan of Care Review    Plan of Care Reviewed With patient  -NS     Progress no change  -NS     Outcome Evaluation Patient continues to be limited by weakness, decreased activity tolerance, and  balance impairments. She required Max A x2 for transfers, demonstrating difficulty coming to full upright standing. She gave good effort towards LE ther ex, with increased fatigue following. Will continue per PT POC.  -NS     Row Name 08/07/22 1437          Vital Signs    Pre Systolic BP Rehab --  VSS- RN cleared for PT treatment  -NS     Pre Patient Position Supine  -NS     Intra Patient Position Standing  -NS     Post Patient Position Sitting  -NS     Row Name 08/07/22 1437          Positioning and Restraints    Pre-Treatment Position in bed  -NS     Post Treatment Position chair  -NS     In Chair notified nsg;reclined;call light within reach;encouraged to call for assist;exit alarm on;with family/caregiver;legs elevated;waffle cushion;on mechanical lift sling;heels elevated;RLE elevated;with OT  -NS           User Key  (r) = Recorded By, (t) = Taken By, (c) = Cosigned By    Initials Name Provider Type    Lori Anderson, PT Physical Therapist               Outcome Measures     Row Name 08/07/22 1437          How much help from another person do you currently need...    Turning from your back to your side while in flat bed without using bedrails? 2  -NS     Moving from lying on back to sitting on the side of a flat bed without bedrails? 2  -NS     Moving to and from a bed to a chair (including a wheelchair)? 2  -NS     Standing up from a chair using your arms (e.g., wheelchair, bedside chair)? 2  -NS     Climbing 3-5 steps with a railing? 1  -NS     To walk in hospital room? 2  -NS     AM-PAC 6 Clicks Score (PT) 11  -NS     Highest level of mobility 4 --> Transferred to chair/commode  -NS     Row Name 08/07/22 1527 08/07/22 1437       Functional Assessment    Outcome Measure Options AM-PAC 6 Clicks Daily Activity (OT)  -TB AM-PAC 6 Clicks Basic Mobility (PT)  -NS          User Key  (r) = Recorded By, (t) = Taken By, (c) = Cosigned By    Initials Name Provider Type    Emma Alvarenga, OT Occupational  Therapist    Lori Anderson PT Physical Therapist                             Physical Therapy Education                 Title: PT OT SLP Therapies (Done)     Topic: Physical Therapy (Done)     Point: Mobility training (Done)     Learning Progress Summary           Patient Acceptance, E, VU,NR by NS at 8/7/2022 1601    Comment: reinforced posterior hip precautions, sequencing bed mobility and transfers, and safety with activity.    Acceptance, E, VU by MA at 8/7/2022 0334    Eager, E, VU,NR by  at 8/6/2022 1658    Comment: Reviewed safety/technique with bed mobility, transfers, ambulation, HEP, PT POC    Eager, E, VU,DU,NR by  at 8/5/2022 1658    Comment: Reviewed safety/technique with bed mobility, transfers, ambulation, HEP, PT POC    Eager, E,H, VU,NR by  at 8/4/2022 1644    Comment: Reviewed safety/technique with bed mobility, transfers, ambulation, HEP, posterior hip precautions, PT POC    Acceptance, E, VU,NR by  at 8/3/2022 1147    Comment: pt requires reinforcement for post hip precautions   Family Acceptance, E, VU,NR by NS at 8/7/2022 1601    Comment: reinforced posterior hip precautions, sequencing bed mobility and transfers, and safety with activity.    Eager, E, VU,NR by  at 8/6/2022 1658    Comment: Reviewed safety/technique with bed mobility, transfers, ambulation, HEP, PT POC    Eager, E, VU,DU,NR by  at 8/5/2022 1658    Comment: Reviewed safety/technique with bed mobility, transfers, ambulation, HEP, PT POC    Eager, E,H, VU,NR by  at 8/4/2022 1644    Comment: Reviewed safety/technique with bed mobility, transfers, ambulation, HEP, posterior hip precautions, PT POC    Acceptance, E, VU,NR by  at 8/3/2022 1147    Comment: pt requires reinforcement for post hip precautions                   Point: Home exercise program (Done)     Learning Progress Summary           Patient Acceptance, E, VU,NR by NS at 8/7/2022 1601    Comment: reinforced posterior hip precautions, sequencing bed  mobility and transfers, and safety with activity.    Acceptance, E, VU by MA at 8/7/2022 0334    Eager, E, VU,NR by SS at 8/6/2022 1658    Comment: Reviewed safety/technique with bed mobility, transfers, ambulation, HEP, PT POC    Eager, E, VU,DU,NR by SS at 8/5/2022 1658    Comment: Reviewed safety/technique with bed mobility, transfers, ambulation, HEP, PT POC    Eager, E,H, VU,NR by SS at 8/4/2022 1644    Comment: Reviewed safety/technique with bed mobility, transfers, ambulation, HEP, posterior hip precautions, PT POC    Acceptance, E, VU,NR by FW at 8/3/2022 1147    Comment: pt requires reinforcement for post hip precautions   Family Acceptance, E, VU,NR by NS at 8/7/2022 1601    Comment: reinforced posterior hip precautions, sequencing bed mobility and transfers, and safety with activity.    Eager, E, VU,NR by  at 8/6/2022 1658    Comment: Reviewed safety/technique with bed mobility, transfers, ambulation, HEP, PT POC    Eager, E, VU,DU,NR by SS at 8/5/2022 1658    Comment: Reviewed safety/technique with bed mobility, transfers, ambulation, HEP, PT POC    Eager, E,H, VU,NR by SS at 8/4/2022 1644    Comment: Reviewed safety/technique with bed mobility, transfers, ambulation, HEP, posterior hip precautions, PT POC    Acceptance, E, VU,NR by  at 8/3/2022 1147    Comment: pt requires reinforcement for post hip precautions                   Point: Body mechanics (Done)     Learning Progress Summary           Patient Acceptance, E, VU,NR by NS at 8/7/2022 1601    Comment: reinforced posterior hip precautions, sequencing bed mobility and transfers, and safety with activity.    Acceptance, E, VU by MA at 8/7/2022 0334    Eager, E, VU,NR by SS at 8/6/2022 1658    Comment: Reviewed safety/technique with bed mobility, transfers, ambulation, HEP, PT POC    Eager, E, VU,DU,NR by  at 8/5/2022 1658    Comment: Reviewed safety/technique with bed mobility, transfers, ambulation, HEP, PT POC    Eager, E,H, VU,NR by SS at  8/4/2022 1644    Comment: Reviewed safety/technique with bed mobility, transfers, ambulation, HEP, posterior hip precautions, PT POC    Acceptance, E, VU,NR by FW at 8/3/2022 1147    Comment: pt requires reinforcement for post hip precautions   Family Acceptance, E, VU,NR by NS at 8/7/2022 1601    Comment: reinforced posterior hip precautions, sequencing bed mobility and transfers, and safety with activity.    Eager, E, VU,NR by SS at 8/6/2022 1658    Comment: Reviewed safety/technique with bed mobility, transfers, ambulation, HEP, PT POC    Eager, E, VU,DU,NR by SS at 8/5/2022 1658    Comment: Reviewed safety/technique with bed mobility, transfers, ambulation, HEP, PT POC    Eager, E,H, VU,NR by  at 8/4/2022 1644    Comment: Reviewed safety/technique with bed mobility, transfers, ambulation, HEP, posterior hip precautions, PT POC    Acceptance, E, VU,NR by FW at 8/3/2022 1147    Comment: pt requires reinforcement for post hip precautions                   Point: Precautions (Done)     Learning Progress Summary           Patient Acceptance, E, VU,NR by NS at 8/7/2022 1601    Comment: reinforced posterior hip precautions, sequencing bed mobility and transfers, and safety with activity.    Acceptance, E, VU by MA at 8/7/2022 0334    Eager, E, VU,NR by  at 8/6/2022 1658    Comment: Reviewed safety/technique with bed mobility, transfers, ambulation, HEP, PT POC    Eager, E, VU,DU,NR by  at 8/5/2022 1658    Comment: Reviewed safety/technique with bed mobility, transfers, ambulation, HEP, PT POC    Eager, E,H, VU,NR by  at 8/4/2022 1644    Comment: Reviewed safety/technique with bed mobility, transfers, ambulation, HEP, posterior hip precautions, PT POC    Acceptance, E, VU,NR by  at 8/3/2022 1147    Comment: pt requires reinforcement for post hip precautions   Family Acceptance, E, VU,NR by NS at 8/7/2022 1601    Comment: reinforced posterior hip precautions, sequencing bed mobility and transfers, and safety  with activity.    Eager, E, VU,NR by  at 8/6/2022 1658    Comment: Reviewed safety/technique with bed mobility, transfers, ambulation, HEP, PT POC    Eager, E, VU,DU,NR by  at 8/5/2022 1658    Comment: Reviewed safety/technique with bed mobility, transfers, ambulation, HEP, PT POC    Eager, E,H, VU,NR by  at 8/4/2022 1644    Comment: Reviewed safety/technique with bed mobility, transfers, ambulation, HEP, posterior hip precautions, PT POC    Acceptance, E, VU,NR by  at 8/3/2022 1147    Comment: pt requires reinforcement for post hip precautions                               User Key     Initials Effective Dates Name Provider Type Discipline    NS 06/16/21 -  Lori Kiser, PT Physical Therapist PT    FW 05/05/22 -  Kayden Chawla, PT Physical Therapist PT    MA 10/22/20 -  Monica Walton, RN Registered Nurse Nurse     06/01/21 -  Teresa Hurtado, PT Physical Therapist PT              PT Recommendation and Plan     Plan of Care Reviewed With: patient  Progress: no change  Outcome Evaluation: Patient continues to be limited by weakness, decreased activity tolerance, and balance impairments. She required Max A x2 for transfers, demonstrating difficulty coming to full upright standing. She gave good effort towards LE ther ex, with increased fatigue following. Will continue per PT POC.     Time Calculation:    PT Charges     Row Name 08/07/22 1437             Time Calculation    Start Time 1437  -NS      PT Received On 08/07/22  -NS      PT Goal Re-Cert Due Date 08/13/22  -NS              Timed Charges    51855 - PT Therapeutic Exercise Minutes 10  -NS      41295 - PT Therapeutic Activity Minutes 8  -NS              Total Minutes    Timed Charges Total Minutes 18  -NS       Total Minutes 18  -NS            User Key  (r) = Recorded By, (t) = Taken By, (c) = Cosigned By    Initials Name Provider Type    NS Lori Kiser, PT Physical Therapist              Therapy Charges for Today     Code Description  Service Date Service Provider Modifiers Qty    77791100384 HC PT THER PROC EA 15 MIN 8/7/2022 Lori Kiser, PT GP 1          PT G-Codes  Outcome Measure Options: AM-PAC 6 Clicks Daily Activity (OT)  AM-PAC 6 Clicks Score (PT): 11  AM-PAC 6 Clicks Score (OT): 12    Lori Kiser, NIKOLAY  8/7/2022

## 2022-08-08 PROCEDURE — 97110 THERAPEUTIC EXERCISES: CPT

## 2022-08-08 PROCEDURE — 99232 SBSQ HOSP IP/OBS MODERATE 35: CPT | Performed by: INTERNAL MEDICINE

## 2022-08-08 PROCEDURE — 97530 THERAPEUTIC ACTIVITIES: CPT

## 2022-08-08 RX ADMIN — Medication 10 ML: at 20:31

## 2022-08-08 RX ADMIN — PANTOPRAZOLE SODIUM 40 MG: 40 TABLET, DELAYED RELEASE ORAL at 05:31

## 2022-08-08 RX ADMIN — Medication 10 ML: at 08:58

## 2022-08-08 RX ADMIN — HYDROCODONE BITARTRATE AND ACETAMINOPHEN 1 TABLET: 5; 325 TABLET ORAL at 05:30

## 2022-08-08 RX ADMIN — QUETIAPINE FUMARATE 25 MG: 25 TABLET ORAL at 20:31

## 2022-08-08 RX ADMIN — POLYETHYLENE GLYCOL 3350 17 G: 17 POWDER, FOR SOLUTION ORAL at 08:57

## 2022-08-08 RX ADMIN — SENNOSIDES AND DOCUSATE SODIUM 2 TABLET: 50; 8.6 TABLET ORAL at 08:58

## 2022-08-08 RX ADMIN — DONEPEZIL HYDROCHLORIDE 10 MG: 10 TABLET, FILM COATED ORAL at 20:31

## 2022-08-08 RX ADMIN — HYDROCODONE BITARTRATE AND ACETAMINOPHEN 1 TABLET: 5; 325 TABLET ORAL at 20:31

## 2022-08-08 RX ADMIN — SENNOSIDES AND DOCUSATE SODIUM 2 TABLET: 50; 8.6 TABLET ORAL at 20:31

## 2022-08-08 RX ADMIN — BUSPIRONE HYDROCHLORIDE 10 MG: 10 TABLET ORAL at 16:00

## 2022-08-08 RX ADMIN — BUSPIRONE HYDROCHLORIDE 10 MG: 10 TABLET ORAL at 20:31

## 2022-08-08 RX ADMIN — SERTRALINE HYDROCHLORIDE 50 MG: 50 TABLET ORAL at 08:57

## 2022-08-08 RX ADMIN — MEMANTINE 10 MG: 10 TABLET ORAL at 08:57

## 2022-08-08 RX ADMIN — BUSPIRONE HYDROCHLORIDE 10 MG: 10 TABLET ORAL at 08:58

## 2022-08-08 RX ADMIN — FOLIC ACID 1 MG: 1 TABLET ORAL at 08:57

## 2022-08-08 RX ADMIN — LEVOTHYROXINE SODIUM 50 MCG: 50 TABLET ORAL at 05:31

## 2022-08-08 RX ADMIN — ATORVASTATIN CALCIUM 10 MG: 10 TABLET, FILM COATED ORAL at 08:57

## 2022-08-08 NOTE — PROGRESS NOTES
"Orthopedic Daily Progress Note      CC: AARON overnight    Pain well controlled  General: no fevers, chills  Abdomen: no nausea, vomiting, or diarrhea    No other complaints    Physical Exam:  I have reviewed the vital signs.  Temp:  [97.4 °F (36.3 °C)-98.9 °F (37.2 °C)] 97.7 °F (36.5 °C)  Heart Rate:  [60-73] 60  Resp:  [18] 18  BP: (116-153)/(59-80) 123/60    Objective:  Vital signs: (most recent): Blood pressure 123/60, pulse 60, temperature 97.7 °F (36.5 °C), temperature source Axillary, resp. rate 18, height 149.9 cm (59\"), weight 56.2 kg (124 lb), SpO2 90 %.            General Appearance:    Alert, cooperative, no distress  Extremities: No clubbing, cyanosis, or edema to lower extremities  Pulses:  2+ in distal surgical extremity  Skin: Incision Clean/dry/intact      Results Review:    I have reviewed the labs, radiology results and diagnostic studies: no new labs    Results from last 7 days   Lab Units 08/06/22  1209   WBC 10*3/mm3 5.45   HEMOGLOBIN g/dL 8.3*   PLATELETS 10*3/mm3 143     Results from last 7 days   Lab Units 08/05/22  0755   SODIUM mmol/L 136   POTASSIUM mmol/L 4.1   CO2 mmol/L 25.0   CREATININE mg/dL 0.90   GLUCOSE mg/dL 95       I have reviewed the medications.    Assessment/Problem List  POD# 6 Day Post-Op   S/p R hip abdiaziz for femoral neck fx    Plan  WBAT RLE with post hip precautions x 6 weeks. Limit active abduction.  PT/OT  Ok with DVT ppx with  mg x 6 weeks and mechanical while in house      Discharge Planning: I expect patient to be discharged to rehab in TBD days.    Shala Gardner PA-C  08/08/22  08:07 EDT            "

## 2022-08-08 NOTE — DISCHARGE PLACEMENT REQUEST
"Yovana Hall (87 y.o. Female)         Lives with daughter Sandra, who will be present for her care when she leaves short term rehab.  Thank you  Teresa YUN, RN, [facility]  [phone]        Date of Birth   1935    Social Security Number       Address   [facility] [place] [address]    Home Phone   [phone]    MRN   [id number]       Tenriism   Non-Latter-day    Marital Status                               Admission Date   8/2/22    Admission Type   Urgent    Admitting Provider   Colleen Noe DO    Attending Provider   Colleen Noe DO    Department, Room/Bed   [facility] 3G, S354/1       Discharge Date       Discharge Disposition       Discharge Destination                               Attending Provider: Colleen Noe DO    Allergies: Sulfa Antibiotics    Isolation: None   Infection: None   Code Status: CPR   Advance Care Planning Activity    Ht: 149.9 cm (59\")   Wt: 56.2 kg (124 lb)    Admission Cmt: None   Principal Problem: None                Active Insurance as of 8/2/2022     Primary Coverage     Payor Plan Insurance Group Employer/Plan Group    ANTHEM MEDICARE REPLACEMENT ANTHEM MEDICARE ADVANTAGE [id number]     Payor Plan Address Payor Plan Phone Number Payor Plan Fax Number Effective Dates    [address] [phone]  1/1/2020 - None Entered    [facility] [address]       Subscriber Name Subscriber Birth Date Member ID       YOVANA HALL 1935 [id number]                 Emergency Contacts      (Rel.) Home Phone Work Phone Mobile Phone    sandra perkins (Daughter) [phone] -- [phone]    lito Kendrick (Grandchild) -- -- [phone]            Insurance Information                ANTHEM MEDICARE REPLACEMENT/ANTHEM MEDICARE ADVANTAGE Phone: [phone]    Subscriber: Yovana Hall Subscriber#: [id number]    Group#: [id number] Precert#: [id number]        Lori Kiser PT    Physical Therapist   Specialty:  Physical " Therapy   Plan of Care       Signed   Date of Service:  08/07/22 1437   Creation Time:  08/07/22 1601              Signed              Show:Clear all  []Manual[x]Template[]Copied    Added by:  [x]Lori Kiser, PT      []Twan for details    Goal Outcome Evaluation:  Plan of Care Reviewed With: patient  Progress: no change  Outcome Evaluation: Patient continues to be limited by weakness, decreased activity tolerance, and balance impairments. She required Max A x2 for transfers, demonstrating difficulty coming to full upright standing. She gave good effort towards LE ther ex, with increased fatigue following. Will continue per PT POC.                  Emma Johnson OT    Occupational Therapist   Specialty:  Occupational Therapy   Plan of Care       Signed   Date of Service:  08/07/22 1455   Creation Time:  08/07/22 1528              Signed              Show:Clear all  []Manual[x]Template[]Copied    Added by:  [x]Emma Johnson OT      []Twan for details       Problem: Adult Inpatient Plan of Care  Goal: Plan of Care Review  Recent Flowsheet Documentation  Taken 8/7/2022 1523 by Emma Johnson OT  Plan of Care Reviewed With:  • patient  • family  Outcome Evaluation: Pt participates in therapy with encouragement and increased time. Education reinforced for PHP with all mobility and self-care. Pt requires Max Ax2 for bed mobility and transfers this session.  BUE ther ex completed with AAROM to support ADLs. Pt able to complete simple grooming and self-feeding with set-up. OT will continue to follow IP. Continue to recommend SNF at d/c for best outcome.                     Jaspal Harrington Jr., MD    Physician   Orthopedics   Progress Notes       Signed   Date of Service:  08/07/22 0928   Creation Time:  08/07/22 0928              Signed              Show:Clear all  [x]Manual[x]Template[x]Copied    Added by:  [x]Jaspal Harrington Jr., MD      []Twan for details    Orthopedic Daily Progress  "Note        CC: AARON overnight     Pain well controlled  General: no fevers, chills  Abdomen: no nausea, vomiting, or diarrhea     No other complaints     Physical Exam:  I have reviewed the vital signs.  Temp:  [97.5 °F (36.4 °C)-98.9 °F (37.2 °C)] 98.9 °F (37.2 °C)  Heart Rate:  [60-70] 70  Resp:  [16-18] 18  BP: (120-149)/(65-88) 134/70     Objective:  Vital signs: (most recent): Blood pressure 134/70, pulse 70, temperature 98.9 °F (37.2 °C), temperature source Axillary, resp. rate 18, height 149.9 cm (59\"), weight 56.2 kg (124 lb), SpO2 94 %.                General Appearance:    Alert, cooperative, no distress  Extremities: No clubbing, cyanosis, or edema to lower extremities  Pulses:  2+ in distal surgical extremity  Skin: Incision Clean/dry/intact        Results Review:    I have reviewed the labs, radiology results and diagnostic studies: no new labs          Results from last 7 days   Lab Units 22  1209   WBC 10*3/mm3 5.45   HEMOGLOBIN g/dL 8.3*   PLATELETS 10*3/mm3 143           Results from last 7 days   Lab Units 22  0755   SODIUM mmol/L 136   POTASSIUM mmol/L 4.1   CO2 mmol/L 25.0   CREATININE mg/dL 0.90   GLUCOSE mg/dL 95         I have reviewed the medications.     Assessment/Problem List  POD# 5 Day Post-Op   S/p R hip abdiaziz for femoral neck fx     Plan  WBAT RLE with post hip precautions x 6 weeks. Limit active abduction.  PT/OT  Ok with DVT ppx with  mg x 6 weeks and mechanical while in house        Discharge Planning: I expect patient to be discharged to rehab in TBD days.     Jaspal Harrington Jr., MD  22  09:28 EDT                                            History & Physical      Chelsey Campbell MD at 22 0338              [facility] Medicine Services  HISTORY AND PHYSICAL    Patient Name: Patricia Obrien  : 1935  MRN: [id number]  Primary Care Physician: Provider, No Known  Date of admission: 2022      Subjective   Subjective "     Chief Complaint:  Hip fracture    HPI:  Patricia Obrien is a 87 y.o. female with history of dementia, hypotension, hypothyroidism who presents from [facility] for hip fracture.  Patient unable to provide history due to her dementia.  History obtained from daughter at the bedside.  Patient's dog jumped up on her and she stepped backward and fell.  Patient is denying any pain currently.  She has no other complaints.  CBC from outside hospital was unremarkable other than mild anemia of 11.6.  BMP showed a creatinine of 1.2 but otherwise was unremarkable.  UA was unremarkable.  Vital signs were within normal limits.  X-ray of the right hip showed a femoral neck fracture.      Review of Systems   Constitutional: Negative for fever.   HENT: Negative for trouble swallowing.    Eyes: Negative for visual disturbance.   Respiratory: Negative for shortness of breath.    Cardiovascular: Negative for chest pain and leg swelling.   Gastrointestinal: Negative for diarrhea and nausea.   Genitourinary: Negative for dysuria.   Musculoskeletal: Negative.    Skin: Negative for rash.   Neurological: Negative for dizziness and weakness.          Personal History     No past medical history on file.          No past surgical history on file.    Family History: Family denies any significant medical history.  Otherwise pertinent FHx was reviewed and unremarkable.     Social History:  Patient does not drink alcohol or smoke.  Social History     Social History Narrative   • Not on file       Medications:  Available home medication information reviewed.  Medications Prior to Admission   Medication Sig Dispense Refill Last Dose   • atorvastatin (LIPITOR) 10 MG tablet Take 10 mg by mouth Daily.      • busPIRone (BUSPAR) 10 MG tablet Take 10 mg by mouth 3 (Three) Times a Day.      • folic acid (FOLVITE) 1 MG tablet Take 1 mg by mouth Daily.      • levothyroxine (SYNTHROID, LEVOTHROID) 50 MCG tablet Take 50 mcg by mouth Daily.      •  linaclotide (Linzess) 72 MCG capsule capsule Take 72 mcg by mouth As Needed.      • lisinopril (PRINIVIL,ZESTRIL) 40 MG tablet Take 40 mg by mouth Daily.      • meloxicam (MOBIC) 15 MG tablet Take 15 mg by mouth Daily.      • Memantine HCl-Donepezil HCl (Namzaric) 28-10 MG capsule sustained-release 24 hr Take  by mouth Daily.      • Mirabegron ER (MYRBETRIQ) 25 MG tablet sustained-release 24 hour 24 hr tablet Take 25 mg by mouth Every Night.      • omeprazole (priLOSEC) 20 MG capsule Take 40 mg by mouth Every Night.      • QUEtiapine (SEROquel) 25 MG tablet Take 25 mg by mouth Every Night.      • sertraline (ZOLOFT) 50 MG tablet Take 50 mg by mouth Daily.          Allergies   Allergen Reactions   • Sulfa Antibiotics Other (See Comments)     unknown       Objective   Objective     Vital Signs:   Temp:  [98.1 °F (36.7 °C)] 98.1 °F (36.7 °C)  Heart Rate:  [66] 66  Resp:  [16] 16  BP: (143)/(95) 143/95       Physical Exam   Constitutional: Awake, alert  Eyes: PERRLA, sclerae anicteric, no conjunctival injection  HENT: NCAT, mucous membranes moist  Neck: Supple, trachea midline  Respiratory: Clear to auscultation bilaterally, nonlabored respirations   Cardiovascular: RRR, no murmurs, rubs, or gallops, palpable pulses in right lower extremity  Gastrointestinal: Positive bowel sounds, soft, nontender, nondistended  Musculoskeletal: 1+ lower extremity edema, right hip without any bruising noted  Psychiatric: Appropriate affect, cooperative  Neurologic: Oriented to person only, talking about her prior job at [email or web address], no gross focal neurological deficits  Skin: No rashes        Result Review:  I have personally reviewed the results from the time of this admission to 8/2/2022 04:05 EDT and agree with these findings:  [x]  Laboratory list / accordion  []  Microbiology  []  Radiology  []  EKG/Telemetry   []  Cardiology/Vascular   []  Pathology  [x]  Old records  []  Other:         LAB RESULTS:      Lab 08/01/22  0484    WBC 8.5   HEMOGLOBIN 11.6   HEMATOCRIT 34.8*   PLATELETS 146*   NEUTROS ABS 7.1   IMMATURE GRANS (ABS) 0.1   LYMPHS ABS 0.9*   MONOS ABS 0.3   EOS ABS 0.0   MCV 93.5                             UA    Urinalysis 7/9/22 7/9/22 8/1/22 8/1/22    1400 1400 1920 1920   Specific Gravity, UA  1.020  >=1.030   Blood, UA  TRACE-INTACT (A)  Negative   Leukocytes, UA  LARGE (A)  Negative   Nitrite, UA  Negative  Negative   RBC, UA None seen  None seen    Bacteria, UA 3+ (A)  Rare (A)    (A) Abnormal value              Microbiology Results (last 10 days)     ** No results found for the last 240 hours. **          XR Outside Films    Result Date: 8/2/2022  This procedure was auto-finalized with no dictation required.    XR Outside Films    Result Date: 8/2/2022  This procedure was auto-finalized with no dictation required.    XR CHEST PORTABLE    Result Date: 8/1/2022  PORTABLE CHEST Indication: Preoperative Comparison: 7/3/22 Findings: The cardiomediastinal silhouette is within normal limits. No focal consolidation, pleural effusion or pneumothorax. Bones are unremarkable.    Impression: No acute cardiopulmonary findings.    XR HIP RIGHT (2-3 VIEWS)    Result Date: 8/1/2022  EXAM: PELVIS AND RIGHT HIP 3 VIEWS INDICATION: Fall. Right hip pain. COMPARISON: None FINDINGS: Right femoral neck basicervical fracture with displacement and varus impaction deformity. No definite intertrochanteric extension. No additional fractures identified. The right hip is congruent. Mild osteoarthritis is present.    Impression: Right femoral neck bases cervical fracture with displacement and varus impaction. No definite intertrochanteric extension but consider CT if needed for preoperative planning.          Assessment & Plan   Assessment & Plan     Active Hospital Problems    Diagnosis  POA   • Hip fracture (HCC) [S72.009A]  Yes   • Dementia (HCC) [F03.90]  Yes   • HTN (hypertension) [I10]  Yes   • Hypothyroidism (acquired) [E03.9]  Yes        Patricia Obrien is a 87 y.o. female with history of dementia, hypotension, hypothyroidism who presents from [facility] for hip fracture.    Right femoral neck fracture  Mechanical fall  -History of mechanical nature  - Noted on x-ray at outside hospital  -Outside hospital labs unremarkable, we will repeat here.  -We will upload x-rays for review  - Check EKG  -Start scheduled Tylenol, as needed tramadol  -PT OT following surgery  -N.p.o.  -Ortho consult in the a.m.    Dementia  -At high risk for encephalopathy  - continue memantine, donepezil    Hyperlipidemia: Continue atorvastatin    Anxiety depression: Continue BuSpar, zoloft    Hypothyroidism: continue Synthroid    HTN:  - holding lisinpril for surgery, resume following surgery        DVT prophylaxis:  Holding for surgery      CODE STATUS:  Full code  There are no questions and answers to display.         Chelsey Campbell MD  08/02/22      Electronically signed by Chelsey Campbell MD at 08/02/22 0406         Current Facility-Administered Medications   Medication Dose Route Frequency Provider Last Rate Last Admin   • atorvastatin (LIPITOR) tablet 10 mg  10 mg Oral Daily Jaspal Harrington Jr., MD   10 mg at 08/08/22 0857   • polyethylene glycol (MIRALAX) packet 17 g  17 g Oral Daily Hemanth Brady DO   17 g at 08/08/22 0857    And   • sennosides-docusate (PERICOLACE) 8.6-50 MG per tablet 2 tablet  2 tablet Oral BID Hemanth Brady DO   2 tablet at 08/08/22 0858    And   • bisacodyl (DULCOLAX) EC tablet 5 mg  5 mg Oral Daily PRN Hemanth Brady DO   5 mg at 08/04/22 2023    And   • bisacodyl (DULCOLAX) suppository 10 mg  10 mg Rectal Daily PRN Hemanth Brady DO       • busPIRone (BUSPAR) tablet 10 mg  10 mg Oral TID Jaspal Harrington Jr., MD   10 mg at 08/08/22 0858   • donepezil (ARICEPT) tablet 10 mg  10 mg Oral Nightly Jaspal Harrington Jr., MD   10 mg at 08/07/22 2019   • folic acid (FOLVITE) tablet 1 mg  1 mg Oral Daily Jaspal Harrington Jr., MD    1 mg at 08/08/22 0857   • HYDROcodone-acetaminophen (NORCO) 5-325 MG per tablet 1 tablet  1 tablet Oral Q4H PRN Hemanth Brady DO   1 tablet at 08/08/22 0530   • labetalol (NORMODYNE,TRANDATE) injection 20 mg  20 mg Intravenous Q10 Min PRN Jaspal Harrington Jr., MD       • lactated ringers infusion  9 mL/hr Intravenous Continuous Jaspal Harrington Jr., MD 9 mL/hr at 08/04/22 1628 9 mL/hr at 08/04/22 1628   • levothyroxine (SYNTHROID, LEVOTHROID) tablet 50 mcg  50 mcg Oral Daily Jaspal Harrington Jr., MD   50 mcg at 08/08/22 0531   • memantine (NAMENDA) tablet 10 mg  10 mg Oral Daily Jaspal Harrington Jr., MD   10 mg at 08/08/22 0857   • morphine injection 2 mg  2 mg Intravenous Q2H PRN Jaspal Harrington Jr., MD   2 mg at 08/02/22 1125   • ondansetron (ZOFRAN) tablet 4 mg  4 mg Oral Q6H PRN Jaspal Harrington Jr., MD        Or   • ondansetron (ZOFRAN) injection 4 mg  4 mg Intravenous Q6H PRN Jaspal Harrington Jr., MD       • pantoprazole (PROTONIX) EC tablet 40 mg  40 mg Oral QAM Jaspal Harrington Jr., MD   40 mg at 08/08/22 0531   • potassium chloride (KLOR-CON) packet 40 mEq  40 mEq Oral PRN Jaspal Harrington Jr., MD       • potassium chloride (MICRO-K) CR capsule 40 mEq  40 mEq Oral PRN Jaspal Harrington Jr., MD       • QUEtiapine (SEROquel) tablet 25 mg  25 mg Oral Nightly Jaspal Harrington Jr., MD   25 mg at 08/07/22 2019   • sertraline (ZOLOFT) tablet 50 mg  50 mg Oral Daily Jaspal Harrington Jr., MD   50 mg at 08/08/22 0857   • sodium chloride 0.9 % flush 10 mL  10 mL Intravenous Q12H Jaspal Harrington Jr., MD   10 mL at 08/08/22 0858   • sodium chloride 0.9 % flush 10 mL  10 mL Intravenous PRN Jaspal Harrington Jr., MD       • traMADol (ULTRAM) tablet 50 mg  50 mg Oral Q6H PRN Jaspal Harrington Jr., MD   50 mg at 08/07/22 2019

## 2022-08-08 NOTE — PLAN OF CARE
Goal Outcome Evaluation:  Plan of Care Reviewed With: patient, daughter        Progress: no change  Outcome Evaluation: Pt. performed bed mobility with mod assist of 2 for sling placement. She transferred to the chair w/mechanical lift device, dependent of 2. She ambulated 3' w/front wheeled walker, max assist of 2. Gait limited by decreased weight acceptance, pain, weakness. She tolerated ther-ex well. Will continue to progress as able.

## 2022-08-08 NOTE — THERAPY TREATMENT NOTE
Patient Name: Patricia Obrien  : 1935    MRN: [id number]                              Today's Date: 2022       Admit Date: 2022    Visit Dx: No diagnosis found.  Patient Active Problem List   Diagnosis   • Hip fracture (HCC)   • Dementia (HCC)   • HTN (hypertension)   • Hypothyroidism (acquired)     Past Medical History:   Diagnosis Date   • Anxiety    • Dementia (HCC)    • Depression    • Disease of thyroid gland    • Elevated cholesterol    • Hypertension    • Osteoarthritis      Past Surgical History:   Procedure Laterality Date   • BLADDER SUSPENSION     • CHOLECYSTECTOMY     • HIP HEMIARTHROPLASTY Right 2022    Procedure: HIP HEMIARTHROPLASTY RIGHT;  Surgeon: Jaspal Harrington Jr., MD;  Location: [facility];  Service: Orthopedics;  Laterality: Right;   • HYSTERECTOMY     • LAPAROSCOPIC TUBAL LIGATION        General Information     Row Name 22          Physical Therapy Time and Intention    Document Type therapy note (daily note)  -     Mode of Treatment physical therapy  -SS     Row Name 22          General Information    Patient Profile Reviewed yes  -SS     Existing Precautions/Restrictions fall;right;hip, posterior;other (see comments)  Limit active ABD, KI for quad weakness, dementia  -SS     Barriers to Rehab previous functional deficit;cognitive status  -SS     Row Name 22          Cognition    Orientation Status (Cognition) oriented to;person  -     Row Name 22          Safety Issues, Functional Mobility    Safety Issues Affecting Function (Mobility) insight into deficits/self-awareness;judgment;positioning of assistive device;safety precaution awareness;problem-solving;safety precautions follow-through/compliance;sequencing abilities;awareness of need for assistance  -     Impairments Affecting Function (Mobility) cognition;balance;endurance/activity tolerance;pain;strength;range of motion (ROM);postural/trunk control  -SS      Cognitive Impairments, Mobility Safety/Performance awareness, need for assistance;insight into deficits/self-awareness;judgment;problem-solving/reasoning;safety precaution awareness;safety precaution follow-through;sequencing abilities  -           User Key  (r) = Recorded By, (t) = Taken By, (c) = Cosigned By    Initials Name Provider Type     Teresa Hurtado, PT Physical Therapist               Mobility     Row Name 08/08/22 1313          Bed Mobility    Bed Mobility rolling left;rolling right  -     Rolling Left [place] (Bed Mobility) moderate assist (50% patient effort);2 person assist;verbal cues;nonverbal cues (demo/gesture)  -     Rolling Right [place] (Bed Mobility) moderate assist (50% patient effort);2 person assist;verbal cues;nonverbal cues (demo/gesture)  -     Assistive Device (Bed Mobility) bed rails;head of bed elevated;draw sheet  -     Comment, (Bed Mobility) V/TC for sequencing; rolling for sling placement  -     Row Name 08/08/22 1313          Bed-Chair Transfer    Bed-Chair [place] (Transfers) dependent (less than 25% patient effort);2 person assist  -     Assistive Device (Bed-Chair Transfers) lift device  -     Row Name 08/08/22 1313          Sit-Stand Transfer    Sit-Stand [place] (Transfers) 2 person assist;verbal cues;moderate assist (50% patient effort)  -     Assistive Device (Sit-Stand Transfers) walker, front-wheeled  -SS     Comment, (Sit-Stand Transfer) V/TC for LE set up, hand placement, sequencing, requires TC at posterior hips to encourage upright posture  -     Row Name 08/08/22 1313          Gait/Stairs (Locomotion)    [place] Level (Gait) maximum assist (25% patient effort);2 person assist;verbal cues;nonverbal cues (demo/gesture)  -     Assistive Device (Gait) walker, front-wheeled  -SS     Distance in Feet (Gait) 3  -SS     Deviations/Abnormal Patterns (Gait) bilateral deviations;antalgic;liliana decreased;stride length  decreased;weight shifting decreased;right sided deviations  -     Bilateral Gait Deviations forward flexed posture;heel strike decreased  -     Right Sided Gait Deviations knee buckling, right side  -     Comment, (Gait/Stairs) Pt. ambulated 3 steps forward. V/TC for weight shifting and physical assist required to advance BLE. RLE attempted to buckle but KI donned.  -     Row Name 08/08/22 1313          Mobility    Extremity Weight-bearing Status right lower extremity  -     Right Lower Extremity (Weight-bearing Status) weight-bearing as tolerated (WBAT)  -           User Key  (r) = Recorded By, (t) = Taken By, (c) = Cosigned By    Initials Name Provider Type     Teresa Hurtado, NIKOLAY Physical Therapist               Obj/Interventions     Row Name 08/08/22 1317          Motor Skills    Therapeutic Exercise hip;knee;ankle;other (see comments)  unable to follow commands for isometric exercises  -     Row Name 08/08/22 1317          Hip (Therapeutic Exercise)    Hip (Therapeutic Exercise) strengthening exercise  -     Hip Strengthening (Therapeutic Exercise) right;heel slides;15 repititions  min assist  -     Row Name 08/08/22 1317          Knee (Therapeutic Exercise)    Knee (Therapeutic Exercise) strengthening exercise  -     Knee Strengthening (Therapeutic Exercise) right;SLR (straight leg raise);LAQ (long arc quad);15 repititions  -     Row Name 08/08/22 1317          Ankle (Therapeutic Exercise)    Ankle (Therapeutic Exercise) AROM (active range of motion)  -     Ankle AROM (Therapeutic Exercise) bilateral;dorsiflexion;plantarflexion;15 repititions  -     Row Name 08/08/22 1317          Balance    Balance Assessment sitting static balance;sitting dynamic balance;sit to stand dynamic balance;standing static balance;standing dynamic balance  -     Static Sitting Balance contact guard  -     Dynamic Sitting Balance contact guard  -     Position, Sitting Balance unsupported;sitting in  chair  -SS     Sit to Stand Dynamic Balance maximum assist;2-person assist  -SS     Static Standing Balance maximum assist;2-person assist  -SS     Dynamic Standing Balance maximum assist;2-person assist  -SS     Position/Device Used, Standing Balance supported;walker, front-wheeled  -     Balance Interventions sitting;standing;sit to stand;supported;static;dynamic  -           User Key  (r) = Recorded By, (t) = Taken By, (c) = Cosigned By    Initials Name Provider Type     Teresa Hurtado, PT Physical Therapist               Goals/Plan    No documentation.                Clinical Impression     [facility] Name 08/08/22 1323          Pain    Pain Intervention(s) Cold applied;Repositioned;Ambulation/increased activity;Elevated  -     Additional Documentation Pain Scale: FACES Pre/Post-Treatment (Group)  -[facility] Name 08/08/22 1323          Pain Scale: FACES Pre/Post-Treatment    Pain: FACES Scale, Pretreatment 2-->hurts little bit  -     Posttreatment Pain Rating 4-->hurts little more  -     Pain Location - Side/Orientation Right  -     Pain Location incisional  -     Pain Location - hip  -[facility] 08/08/22 1323          Plan of Care Review    Plan of Care Reviewed With patient;daughter  -     Progress no change  -     Outcome Evaluation Pt. performed bed mobility with mod assist of 2 for sling placement. She transferred to the chair w/mechanical lift device, dependent of 2. She ambulated 3' w/front wheeled walker, max assist of 2. Gait limited by decreased weight acceptance, pain, weakness. She tolerated ther-ex well. Will continue to progress as able.  -     Row Name 08/08/22 1323          Therapy Assessment/Plan (PT)    Rehab Potential (PT) fair, will monitor progress closely  -     Criteria for Skilled Interventions Met (PT) yes;meets criteria;skilled treatment is necessary  -     Therapy Frequency (PT) daily  -     Row Name 08/08/22 1323          Vital Signs    Pre Systolic BP Rehab  --  VSS  -SS     Row Name 08/08/22 1323          Positioning and Restraints    Pre-Treatment Position in bed  -SS     Post Treatment Position chair  -SS     In Chair notified nsg;reclined;call light within reach;encouraged to call for assist;exit alarm on;with family/caregiver;on mechanical lift sling;waffle cushion;ABD pillow  -           User Key  (r) = Recorded By, (t) = Taken By, (c) = Cosigned By    Initials Name Provider Type     Teresa Hurtado PT Physical Therapist               Outcome Measures     Row Name 08/08/22 1328          How much help from another person do you currently need...    Turning from your back to your side while in flat bed without using bedrails? 2  -SS     Moving from lying on back to sitting on the side of a flat bed without bedrails? 2  -SS     Moving to and from a bed to a chair (including a wheelchair)? 2  -SS     Standing up from a chair using your arms (e.g., wheelchair, bedside chair)? 2  -SS     Climbing 3-5 steps with a railing? 1  -SS     To walk in hospital room? 2  -SS     AM-PAC 6 Clicks Score (PT) 11  -SS     Highest level of mobility 4 --> Transferred to chair/commode  -     Row Name 08/08/22 1328          Functional Assessment    Outcome Measure Options AM-PAC 6 Clicks Basic Mobility (PT)  -           User Key  (r) = Recorded By, (t) = Taken By, (c) = Cosigned By    Initials Name Provider Type    Teresa Coates PT Physical Therapist                             Physical Therapy Education                 Title: PT OT SLP Therapies (Done)     Topic: Physical Therapy (Done)     Point: Mobility training (Done)     Learning Progress Summary           Patient Eager, E, VU,NR by  at 8/8/2022 1328    Comment: Reviewed safety/technique with bed mobility, transfers, ambulation, HEP, PT POC    Acceptance, E, VU,NR by NS at 8/7/2022 1601    Comment: reinforced posterior hip precautions, sequencing bed mobility and transfers, and safety with activity.    Acceptance, E,  VU by MA at 8/7/2022 0334    Eager, E, VU,NR by SS at 8/6/2022 1658    Comment: Reviewed safety/technique with bed mobility, transfers, ambulation, HEP, PT POC    Eager, E, VU,DU,NR by  at 8/5/2022 1658    Comment: Reviewed safety/technique with bed mobility, transfers, ambulation, HEP, PT POC    Eager, E,H, VU,NR by SS at 8/4/2022 1644    Comment: Reviewed safety/technique with bed mobility, transfers, ambulation, HEP, posterior hip precautions, PT POC    Acceptance, E, VU,NR by FW at 8/3/2022 1147    Comment: pt requires reinforcement for post hip precautions   Family Eager, E, VU,NR by  at 8/8/2022 1328    Comment: Reviewed safety/technique with bed mobility, transfers, ambulation, HEP, PT POC    Acceptance, E, VU,NR by NS at 8/7/2022 1601    Comment: reinforced posterior hip precautions, sequencing bed mobility and transfers, and safety with activity.    Eager, E, VU,NR by  at 8/6/2022 1658    Comment: Reviewed safety/technique with bed mobility, transfers, ambulation, HEP, PT POC    Eager, E, VU,DU,NR by  at 8/5/2022 1658    Comment: Reviewed safety/technique with bed mobility, transfers, ambulation, HEP, PT POC    Eager, E,H, VU,NR by  at 8/4/2022 1644    Comment: Reviewed safety/technique with bed mobility, transfers, ambulation, HEP, posterior hip precautions, PT POC    Acceptance, E, VU,NR by  at 8/3/2022 1147    Comment: pt requires reinforcement for post hip precautions                   Point: Home exercise program (Done)     Learning Progress Summary           Patient Eager, E, VU,NR by  at 8/8/2022 1328    Comment: Reviewed safety/technique with bed mobility, transfers, ambulation, HEP, PT POC    Acceptance, E, VU,NR by NS at 8/7/2022 1601    Comment: reinforced posterior hip precautions, sequencing bed mobility and transfers, and safety with activity.    Acceptance, E, VU by MA at 8/7/2022 0334    Eager, E, VU,NR by  at 8/6/2022 1658    Comment: Reviewed safety/technique with bed  mobility, transfers, ambulation, HEP, PT POC    Eager, E, VU,DU,NR by SS at 8/5/2022 1658    Comment: Reviewed safety/technique with bed mobility, transfers, ambulation, HEP, PT POC    Eager, E,H, VU,NR by SS at 8/4/2022 1644    Comment: Reviewed safety/technique with bed mobility, transfers, ambulation, HEP, posterior hip precautions, PT POC    Acceptance, E, VU,NR by FW at 8/3/2022 1147    Comment: pt requires reinforcement for post hip precautions   Family Eager, E, VU,NR by SS at 8/8/2022 1328    Comment: Reviewed safety/technique with bed mobility, transfers, ambulation, HEP, PT POC    Acceptance, E, VU,NR by NS at 8/7/2022 1601    Comment: reinforced posterior hip precautions, sequencing bed mobility and transfers, and safety with activity.    Eager, E, VU,NR by  at 8/6/2022 1658    Comment: Reviewed safety/technique with bed mobility, transfers, ambulation, HEP, PT POC    Eager, E, VU,DU,NR by SS at 8/5/2022 1658    Comment: Reviewed safety/technique with bed mobility, transfers, ambulation, HEP, PT POC    Eager, E,H, VU,NR by  at 8/4/2022 1644    Comment: Reviewed safety/technique with bed mobility, transfers, ambulation, HEP, posterior hip precautions, PT POC    Acceptance, E, VU,NR by  at 8/3/2022 1147    Comment: pt requires reinforcement for post hip precautions                   Point: Body mechanics (Done)     Learning Progress Summary           Patient Eager, E, VU,NR by SS at 8/8/2022 1328    Comment: Reviewed safety/technique with bed mobility, transfers, ambulation, HEP, PT POC    Acceptance, E, VU,NR by NS at 8/7/2022 1601    Comment: reinforced posterior hip precautions, sequencing bed mobility and transfers, and safety with activity.    Acceptance, E, VU by MA at 8/7/2022 0334    Eager, E, VU,NR by SS at 8/6/2022 1658    Comment: Reviewed safety/technique with bed mobility, transfers, ambulation, HEP, PT POC    Eager, E, VU,DU,NR by SS at 8/5/2022 1658    Comment: Reviewed safety/technique  with bed mobility, transfers, ambulation, HEP, PT POC    Eager, E,H, VU,NR by SS at 8/4/2022 1644    Comment: Reviewed safety/technique with bed mobility, transfers, ambulation, HEP, posterior hip precautions, PT POC    Acceptance, E, VU,NR by FW at 8/3/2022 1147    Comment: pt requires reinforcement for post hip precautions   Family Eager, E, VU,NR by SS at 8/8/2022 1328    Comment: Reviewed safety/technique with bed mobility, transfers, ambulation, HEP, PT POC    Acceptance, E, VU,NR by NS at 8/7/2022 1601    Comment: reinforced posterior hip precautions, sequencing bed mobility and transfers, and safety with activity.    Eager, E, VU,NR by  at 8/6/2022 1658    Comment: Reviewed safety/technique with bed mobility, transfers, ambulation, HEP, PT POC    Eager, E, VU,DU,NR by SS at 8/5/2022 1658    Comment: Reviewed safety/technique with bed mobility, transfers, ambulation, HEP, PT POC    Eager, E,H, VU,NR by SS at 8/4/2022 1644    Comment: Reviewed safety/technique with bed mobility, transfers, ambulation, HEP, posterior hip precautions, PT POC    Acceptance, E, VU,NR by  at 8/3/2022 1147    Comment: pt requires reinforcement for post hip precautions                   Point: Precautions (Done)     Learning Progress Summary           Patient Eager, E, VU,NR by SS at 8/8/2022 1328    Comment: Reviewed safety/technique with bed mobility, transfers, ambulation, HEP, PT POC    Acceptance, E, VU,NR by NS at 8/7/2022 1601    Comment: reinforced posterior hip precautions, sequencing bed mobility and transfers, and safety with activity.    Acceptance, E, VU by MA at 8/7/2022 0334    Eager, E, VU,NR by SS at 8/6/2022 1658    Comment: Reviewed safety/technique with bed mobility, transfers, ambulation, HEP, PT POC    Eager, E, VU,DU,NR by SS at 8/5/2022 1658    Comment: Reviewed safety/technique with bed mobility, transfers, ambulation, HEP, PT POC    Eager, E,H, VU,NR by SS at 8/4/2022 1644    Comment: Reviewed  safety/technique with bed mobility, transfers, ambulation, HEP, posterior hip precautions, PT POC    Acceptance, E, VU,NR by  at 8/3/2022 1147    Comment: pt requires reinforcement for post hip precautions   Family Eager, E, VU,NR by  at 8/8/2022 1328    Comment: Reviewed safety/technique with bed mobility, transfers, ambulation, HEP, PT POC    Acceptance, E, VU,NR by NS at 8/7/2022 1601    Comment: reinforced posterior hip precautions, sequencing bed mobility and transfers, and safety with activity.    Eager, E, VU,NR by  at 8/6/2022 1658    Comment: Reviewed safety/technique with bed mobility, transfers, ambulation, HEP, PT POC    Eager, E, VU,DU,NR by  at 8/5/2022 1658    Comment: Reviewed safety/technique with bed mobility, transfers, ambulation, HEP, PT POC    Eager, E,H, VU,NR by  at 8/4/2022 1644    Comment: Reviewed safety/technique with bed mobility, transfers, ambulation, HEP, posterior hip precautions, PT POC    Acceptance, E, VU,NR by  at 8/3/2022 1147    Comment: pt requires reinforcement for post hip precautions                               User Key     Initials Effective Dates Name Provider Type Discipline    NS 06/16/21 -  Lori Kiser, PT Physical Therapist PT     05/05/22 -  Kayden Chawla, PT Physical Therapist PT    MA 10/22/20 -  Monica Walton, RN Registered Nurse Nurse     06/01/21 -  Teresa Hurtado, PT Physical Therapist PT              PT Recommendation and Plan     Plan of Care Reviewed With: patient, daughter  Progress: no change  Outcome Evaluation: Pt. performed bed mobility with mod assist of 2 for sling placement. She transferred to the chair w/mechanical lift device, dependent of 2. She ambulated 3' w/front wheeled walker, max assist of 2. Gait limited by decreased weight acceptance, pain, weakness. She tolerated ther-ex well. Will continue to progress as able.     Time Calculation:    PT Charges     Row Name 08/08/22 4427             Time Calculation     Start Time 1055  -SS      Stop Time 1119  -SS      Time Calculation (min) 24 min  -SS      PT Received On 08/08/22  -              Time Calculation- PT    Total Timed Code Minutes- PT 24 minute(s)  -SS              Timed Charges    73122 - PT Therapeutic Exercise Minutes 10  -SS      19016 - Gait Training Minutes  4  -SS      07940 - PT Therapeutic Activity Minutes 10  -SS              Total Minutes    Timed Charges Total Minutes 24  -SS       Total Minutes 24  -SS            User Key  (r) = Recorded By, (t) = Taken By, (c) = Cosigned By    Initials Name Provider Type     Teresa Hurtado, PT Physical Therapist              Therapy Charges for Today     Code Description Service Date Service Provider Modifiers Qty    12989257492 HC PT THER PROC EA 15 MIN 8/8/2022 Teresa Hurtado, PT GP 1    74873596574 HC PT THERAPEUTIC ACT EA 15 MIN 8/8/2022 Teresa Hurtado, PT GP 1    70462535286 HC PT THER SUPP EA 15 MIN 8/8/2022 Teresa Hurtado, PT GP 2          PT G-Codes  Outcome Measure Options: AM-PAC 6 Clicks Basic Mobility (PT)  AM-PAC 6 Clicks Score (PT): 11  AM-PAC 6 Clicks Score (OT): 12    Teresa Hurtado PT  8/8/2022

## 2022-08-08 NOTE — PLAN OF CARE
Goal Outcome Evaluation:  Plan of Care Reviewed With: patient        Progress: no change     Pt remains confused,  VSS, RA/2L NC. Daughter at bedside.

## 2022-08-08 NOTE — PROGRESS NOTES
[facility] Medicine Services  PROGRESS NOTE    Patient Name: Patricia Obrien  : 1935  MRN: [id number]    Date of Admission: 2022  Primary Care Physician: Provider, No Known    Subjective   Subjective     CC:  Hip fracture    HPI:  Patient is resting in bed. She remains pleasantly confused. Daughter at bedside.    ROS:  UTO 2/2 mental status/dementia    Objective   Objective     Vital Signs:   Temp:  [97.4 °F (36.3 °C)-98 °F (36.7 °C)] 97.7 °F (36.5 °C)  Heart Rate:  [60-73] 60  Resp:  [18] 18  BP: (116-153)/(59-80) 123/60     Physical Exam:  Constitutional: No acute distress  HENT: NCAT, mucous membranes moist  Respiratory: Clear to auscultation bilaterally, respiratory effort normal   Cardiovascular: RRR, no murmurs, rubs, or gallops  Gastrointestinal soft, nondistended  Musculoskeletal: No bilateral ankle edema  Neurologic: awake, pleasantly confused  Skin: No rashes    Results Reviewed:  LAB RESULTS:      Lab 22  1209 22  0755 22  0446 22  1835   WBC 5.45 6.04  --  8.5   HEMOGLOBIN 8.3* 8.1*  --  11.6   HEMATOCRIT 24.6* 23.5*  --  34.8*   PLATELETS 143 107*  --  146*   NEUTROS ABS 3.66  --   --  7.1   IMMATURE GRANS (ABS) 0.03  --   --  0.1   LYMPHS ABS 1.08  --   --  0.9*   MONOS ABS 0.41  --   --  0.3   EOS ABS 0.23  --   --  0.0   MCV 92.1 92.2  --  93.5   PROTIME  --   --  15.3*  --          Lab 22  0755 22  0446   SODIUM 136 143   POTASSIUM 4.1 4.1   CHLORIDE 105 109*   CO2 25.0 22.0   ANION GAP 6.0 12.0   BUN 15 20   CREATININE 0.90 1.00   EGFR 62.0 54.6*   GLUCOSE 95 117*   CALCIUM 7.9* 8.8             Lab 22  0446   PROTIME 15.3*   INR 1.22*                 Brief Urine Lab Results  (Last result in the past 365 days)      Color   Clarity   Blood   Leuk Est   Nitrite   Protein   CREAT   Urine HCG        22 1920 Yellow   Clear   Negative   Negative   Negative                   Microbiology Results Abnormal     Procedure  Component Value - Date/Time    COVID PRE-OP / PRE-PROCEDURE SCREENING ORDER (NO ISOLATION) - Swab, Nasopharynx [[id number]]  (Normal) Collected: 08/02/22 0630    Lab Status: Final result Specimen: Swab from Nasopharynx Updated: 08/02/22 0701    Narrative:      The following orders were created for panel order COVID PRE-OP / PRE-PROCEDURE SCREENING ORDER (NO ISOLATION) - Swab, Nasopharynx.  Procedure                               Abnormality         Status                     ---------                               -----------         ------                     COVID-19 and FLU A/B PCR...[[id number]]  Normal              Final result                 Please view results for these tests on the individual orders.    COVID-19 and FLU A/B PCR - Swab, Nasopharynx [[id number]]  (Normal) Collected: 08/02/22 0630    Lab Status: Final result Specimen: Swab from Nasopharynx Updated: 08/02/22 0701     COVID19 Not Detected     Influenza A PCR Not Detected     Influenza B PCR Not Detected    Narrative:      Fact sheet for providers: https://www.fda.gov/media/488188/download    Fact sheet for patients: https://www.fda.gov/media/630286/download    Test performed by PCR.          No radiology results from the last 24 hrs        I have reviewed the medications:  Scheduled Meds:atorvastatin, 10 mg, Oral, Daily  busPIRone, 10 mg, Oral, TID  donepezil, 10 mg, Oral, Nightly  folic acid, 1 mg, Oral, Daily  levothyroxine, 50 mcg, Oral, Daily  memantine, 10 mg, Oral, Daily  pantoprazole, 40 mg, Oral, QAM  polyethylene glycol, 17 g, Oral, Daily   And  senna-docusate sodium, 2 tablet, Oral, BID  QUEtiapine, 25 mg, Oral, Nightly  sertraline, 50 mg, Oral, Daily  sodium chloride, 10 mL, Intravenous, Q12H      Continuous Infusions:lactated ringers, 9 mL/hr, Last Rate: 9 mL/hr (08/04/22 7268)      PRN Meds:.senna-docusate sodium **AND** polyethylene glycol **AND** bisacodyl **AND** bisacodyl  •  HYDROcodone-acetaminophen  •  labetalol  •  Morphine  •   ondansetron **OR** ondansetron  •  potassium chloride  •  potassium chloride  •  sodium chloride  •  traMADol    Assessment & Plan   Assessment & Plan     Active Hospital Problems    Diagnosis  POA   • Hip fracture (HCC) [S72.009A]  Yes   • Dementia (HCC) [F03.90]  Yes   • HTN (hypertension) [I10]  Yes   • Hypothyroidism (acquired) [E03.9]  Yes      Resolved Hospital Problems   No resolved problems to display.        Brief Hospital Course to date:  Patricia Obrien is a 87 y.o. female with dementia hypotension hypothyroidism presenting from [facility] with a right hip fracture following mechanical fall. X-rays reviewed 8-2-22 showing right femoral neck fracture    Right hip fracture   - s/p right hemiarthroplasty on 8/2/22 w/Dr. Harrington  -PT/OT Case management  -PRN pain control, bowel regimen  -WBAT RLE with post hip precautions x 6 weeks. Limit active abduction.  - DVT ppx with  mg x 6 weeks and mechanical while in house     Dementia  -continue Namenda and Aricept     Hyperlipidemia  -continue statin     Anxiety  -continue buspirone     Depression  - continue Zoloft     Hypothyroidism  - continue Synthroid 50 mcg     Hypertension  -holding lisinopril due to some low blood pressures/normotension, monitor and resume as needed     Expected Discharge Location and Transportation: Skilled nursing facility by ambulance  Expected Discharge Date: 8/8/2022    DVT prophylaxis:  Mechanical DVT prophylaxis orders are present.     AM-PAC 6 Clicks Score (PT): 11 (08/07/22 1437)    CODE STATUS:   Code Status and Medical Interventions:   Ordered at: 08/02/22 0910     Level Of Support Discussed With:    Next of Kin (If No Surrogate)     Code Status (Patient has no pulse and is not breathing):    CPR (Attempt to Resuscitate)     Medical Interventions (Patient has pulse or is breathing):    Full Support       Colleen Noe,   08/08/22

## 2022-08-08 NOTE — CASE MANAGEMENT/SOCIAL WORK
Continued Stay Note  [facility]     Patient Name: Patricia Obrien  MRN: [id number]  Today's Date: 8/8/2022    Admit Date: 8/2/2022     Discharge Plan     Row Name 08/08/22 0926       Plan    Plan SNF    Plan Comments Updated rehab notes faxed and I called to Tracie at [facility], [facility] and to ZULAY Jack for the [facility] in [place]. New referral to Randi at [facility].     Final Discharge Disposition Code 03 - skilled nursing facility (SNF)               Discharge Codes    No documentation.               Expected Discharge Date and Time     Expected Discharge Date Expected Discharge Time    Aug 5, 2022             Teresa Newberry RN

## 2022-08-08 NOTE — PLAN OF CARE
Goal Outcome Evaluation:  Plan of Care Reviewed With: patient, daughter        Progress: improving  Outcome Evaluation: Patient remains consfused, which is her baseline. No complaint of pain today, except when she is being turned. Family remains at bedside. VSS.

## 2022-08-09 LAB
FLUAV RNA RESP QL NAA+PROBE: NOT DETECTED
FLUBV RNA RESP QL NAA+PROBE: NOT DETECTED
RSV RNA NPH QL NAA+NON-PROBE: NOT DETECTED
SARS-COV-2 RNA RESP QL NAA+PROBE: DETECTED

## 2022-08-09 PROCEDURE — 97110 THERAPEUTIC EXERCISES: CPT

## 2022-08-09 PROCEDURE — 87637 SARSCOV2&INF A&B&RSV AMP PRB: CPT | Performed by: INTERNAL MEDICINE

## 2022-08-09 PROCEDURE — 97530 THERAPEUTIC ACTIVITIES: CPT

## 2022-08-09 PROCEDURE — 99231 SBSQ HOSP IP/OBS SF/LOW 25: CPT | Performed by: INTERNAL MEDICINE

## 2022-08-09 RX ADMIN — ATORVASTATIN CALCIUM 10 MG: 10 TABLET, FILM COATED ORAL at 09:24

## 2022-08-09 RX ADMIN — PANTOPRAZOLE SODIUM 40 MG: 40 TABLET, DELAYED RELEASE ORAL at 05:50

## 2022-08-09 RX ADMIN — SENNOSIDES AND DOCUSATE SODIUM 2 TABLET: 50; 8.6 TABLET ORAL at 19:45

## 2022-08-09 RX ADMIN — HYDROCODONE BITARTRATE AND ACETAMINOPHEN 1 TABLET: 5; 325 TABLET ORAL at 10:18

## 2022-08-09 RX ADMIN — DONEPEZIL HYDROCHLORIDE 10 MG: 10 TABLET, FILM COATED ORAL at 19:46

## 2022-08-09 RX ADMIN — QUETIAPINE FUMARATE 25 MG: 25 TABLET ORAL at 19:45

## 2022-08-09 RX ADMIN — Medication 10 ML: at 19:46

## 2022-08-09 RX ADMIN — POLYETHYLENE GLYCOL 3350 17 G: 17 POWDER, FOR SOLUTION ORAL at 09:24

## 2022-08-09 RX ADMIN — BUSPIRONE HYDROCHLORIDE 10 MG: 10 TABLET ORAL at 19:45

## 2022-08-09 RX ADMIN — BUSPIRONE HYDROCHLORIDE 10 MG: 10 TABLET ORAL at 16:20

## 2022-08-09 RX ADMIN — LEVOTHYROXINE SODIUM 50 MCG: 50 TABLET ORAL at 05:50

## 2022-08-09 RX ADMIN — SENNOSIDES AND DOCUSATE SODIUM 2 TABLET: 50; 8.6 TABLET ORAL at 09:24

## 2022-08-09 RX ADMIN — FOLIC ACID 1 MG: 1 TABLET ORAL at 09:24

## 2022-08-09 RX ADMIN — MEMANTINE 10 MG: 10 TABLET ORAL at 09:24

## 2022-08-09 RX ADMIN — SERTRALINE HYDROCHLORIDE 50 MG: 50 TABLET ORAL at 09:24

## 2022-08-09 RX ADMIN — BISACODYL 10 MG: 10 SUPPOSITORY RECTAL at 13:18

## 2022-08-09 RX ADMIN — BUSPIRONE HYDROCHLORIDE 10 MG: 10 TABLET ORAL at 09:24

## 2022-08-09 NOTE — THERAPY TREATMENT NOTE
Patient Name: Patricia Obrien  : 1935    MRN: [id number]                              Today's Date: 2022       Admit Date: 2022    Visit Dx: No diagnosis found.  Patient Active Problem List   Diagnosis   • Hip fracture (HCC)   • Dementia (HCC)   • HTN (hypertension)   • Hypothyroidism (acquired)     Past Medical History:   Diagnosis Date   • Anxiety    • Dementia (HCC)    • Depression    • Disease of thyroid gland    • Elevated cholesterol    • Hypertension    • Osteoarthritis      Past Surgical History:   Procedure Laterality Date   • BLADDER SUSPENSION     • CHOLECYSTECTOMY     • HIP HEMIARTHROPLASTY Right 2022    Procedure: HIP HEMIARTHROPLASTY RIGHT;  Surgeon: Jaspal Harrington Jr., MD;  Location: [facility];  Service: Orthopedics;  Laterality: Right;   • HYSTERECTOMY     • LAPAROSCOPIC TUBAL LIGATION        General Information     Row Name 22 1528          Physical Therapy Time and Intention    Document Type therapy note (daily note)  -FW     Mode of Treatment physical therapy  -FW     Row Name 22 1528          General Information    Patient Profile Reviewed yes  -FW     Existing Precautions/Restrictions fall;right;hip, posterior;other (see comments)  Limit active ABD, KI for quad weakness, dementia  -FW     Row Name 22 1528          Cognition    Orientation Status (Cognition) oriented to;person  -FW     Row Name 22 1528          Safety Issues, Functional Mobility    Impairments Affecting Function (Mobility) cognition;balance;endurance/activity tolerance;pain;strength;range of motion (ROM);postural/trunk control  -FW           User Key  (r) = Recorded By, (t) = Taken By, (c) = Cosigned By    Initials Name Provider Type    FW Kayden Chawla PT Physical Therapist               Mobility     Row Name 22 1529          Bed Mobility    Bed Mobility rolling left;rolling right  -FW     Rolling Right [place] (Bed Mobility) moderate assist (50% patient effort);2  person assist;verbal cues;nonverbal cues (demo/gesture)  -     Scooting/Bridging [place] (Bed Mobility) maximum assist (25% patient effort);2 person assist;verbal cues  -FW     Supine-Sit [place] (Bed Mobility) maximum assist (25% patient effort);2 person assist;verbal cues  -FW     Assistive Device (Bed Mobility) bed rails;head of bed elevated;draw sheet  -     Row Name 08/09/22 1529          Bed-Chair Transfer    Bed-Chair [place] (Transfers) maximum assist (25% patient effort);verbal cues;nonverbal cues (demo/gesture)  -     Comment, (Bed-Chair Transfer) stand pivot w/ BUE support  -     Row Name 08/09/22 1529          Sit-Stand Transfer    Sit-Stand [place] (Transfers) 2 person assist;verbal cues;moderate assist (50% patient effort)  -     Assistive Device (Sit-Stand Transfers) walker, front-wheeled  -     Comment, (Sit-Stand Transfer) pt with strong posterior lean and was unable stand upright without full assist. Pt unable to advance either LE this date  -     Row Name 08/09/22 1529          Gait/Stairs (Locomotion)    Comment, (Gait/Stairs) pt with strong posterior lean and was unable stand upright without full assist. Pt unable to advance either LE this date  -     Row Name 08/09/22 1529          Mobility    Extremity Weight-bearing Status right lower extremity  -     Right Lower Extremity (Weight-bearing Status) weight-bearing as tolerated (WBAT)  -           User Key  (r) = Recorded By, (t) = Taken By, (c) = Cosigned By    Initials Name Provider Type     Kayden Chawla PT Physical Therapist               Obj/Interventions     Row Name 08/09/22 1531          Motor Skills    Therapeutic Exercise hip;knee;ankle  -     Row Name 08/09/22 1531          Hip (Therapeutic Exercise)    Hip Strengthening (Therapeutic Exercise) right;heel slides;10 repetitions  -     Row Name 08/09/22 1531          Knee (Therapeutic Exercise)    Knee (Therapeutic Exercise)  strengthening exercise  -FW     Knee Strengthening (Therapeutic Exercise) right;SLR (straight leg raise);LAQ (long arc quad);10 repetitions  -FW     Row Name 08/09/22 1531          Ankle (Therapeutic Exercise)    Ankle (Therapeutic Exercise) AROM (active range of motion)  -FW     Ankle AROM (Therapeutic Exercise) bilateral;dorsiflexion;plantarflexion;10 repetitions  -FW     Row Name 08/09/22 1531          Balance    Balance Assessment sitting static balance;sitting dynamic balance;standing static balance;standing dynamic balance  -FW     Static Sitting Balance standby assist  -FW     Dynamic Sitting Balance contact guard  -FW     Position, Sitting Balance sitting in chair;sitting edge of bed  -FW     Static Standing Balance maximum assist;2-person assist  -FW     Dynamic Standing Balance maximum assist;2-person assist  -FW     Position/Device Used, Standing Balance supported;walker, front-wheeled  -           User Key  (r) = Recorded By, (t) = Taken By, (c) = Cosigned By    Initials Name Provider Type    FW Kayden Chawla, NIKOLAY Physical Therapist               Goals/Plan    No documentation.                Clinical Impression     Row Name 08/09/22 1533          Pain    Pretreatment Pain Rating 0/10 - no pain  -     Posttreatment Pain Rating 0/10 - no pain  -FW     Pre/Posttreatment Pain Comment pt noted pain with bed and standing mobility but no pain at rest  -     Pain Intervention(s) Repositioned;Ambulation/increased activity  -     Row Name 08/09/22 1533          Pain Scale: FACES Pre/Post-Treatment    Pain: FACES Scale, Pretreatment 0-->no hurt  -     Posttreatment Pain Rating 0-->no hurt  -     Row Name 08/09/22 1533          Plan of Care Review    Plan of Care Reviewed With patient;daughter  -     Outcome Evaluation Pt with confusion and difficulty following commands throughout treatment. Pt performed bed mobility max Ax2 and STS mod Ax2 w/ a RW. Pt with difficulty sequencing  transfers,demonstrated a strong posterior lean while standing, and was able to advance either LE this date. Continue PT POC. Recommend dc SNF  -FW     Row Name 08/09/22 1533          Vital Signs    Pre Systolic BP Rehab --  vss  -FW     O2 Delivery Pre Treatment room air  -FW     O2 Delivery Intra Treatment room air  -FW     O2 Delivery Post Treatment room air  -FW     Pre Patient Position Supine  -FW     Intra Patient Position Standing  -FW     Post Patient Position Sitting  -FW     Row Name 08/09/22 1533          Positioning and Restraints    Pre-Treatment Position in bed  -FW     Post Treatment Position chair  -FW     In Chair notified nsg;reclined;sitting;call light within reach;encouraged to call for assist;exit alarm on;legs elevated;on mechanical lift sling  -FW           User Key  (r) = Recorded By, (t) = Taken By, (c) = Cosigned By    Initials Name Provider Type    Kayden Clark PT Physical Therapist               Outcome Measures     Row Name 08/09/22 1536          How much help from another person do you currently need...    Turning from your back to your side while in flat bed without using bedrails? 2  -FW     Moving from lying on back to sitting on the side of a flat bed without bedrails? 2  -FW     Moving to and from a bed to a chair (including a wheelchair)? 2  -FW     Standing up from a chair using your arms (e.g., wheelchair, bedside chair)? 2  -FW     Climbing 3-5 steps with a railing? 1  -FW     To walk in hospital room? 2  -FW     AM-PAC 6 Clicks Score (PT) 11  -FW     Highest level of mobility 4 --> Transferred to chair/commode  -FW     Row Name 08/09/22 1536          Functional Assessment    Outcome Measure Options AM-PAC 6 Clicks Basic Mobility (PT)  -FW           User Key  (r) = Recorded By, (t) = Taken By, (c) = Cosigned By    Initials Name Provider Type    Kayden Clark PT Physical Therapist                             Physical Therapy Education                 Title: PT  OT SLP Therapies (Done)     Topic: Physical Therapy (Done)     Point: Mobility training (Done)     Learning Progress Summary           Patient Acceptance, E, VU,NR by  at 8/9/2022 1536    Eager, E, VU,NR by  at 8/8/2022 1328    Comment: Reviewed safety/technique with bed mobility, transfers, ambulation, HEP, PT POC    Acceptance, E, VU,NR by NS at 8/7/2022 1601    Comment: reinforced posterior hip precautions, sequencing bed mobility and transfers, and safety with activity.    Acceptance, E, VU by MA at 8/7/2022 0334    Eager, E, VU,NR by  at 8/6/2022 1658    Comment: Reviewed safety/technique with bed mobility, transfers, ambulation, HEP, PT POC    Eager, E, VU,DU,NR by  at 8/5/2022 1658    Comment: Reviewed safety/technique with bed mobility, transfers, ambulation, HEP, PT POC    Eager, E,H, VU,NR by  at 8/4/2022 1644    Comment: Reviewed safety/technique with bed mobility, transfers, ambulation, HEP, posterior hip precautions, PT POC    Acceptance, E, VU,NR by  at 8/3/2022 1147    Comment: pt requires reinforcement for post hip precautions   Family Eager, E, VU,NR by  at 8/8/2022 1328    Comment: Reviewed safety/technique with bed mobility, transfers, ambulation, HEP, PT POC    Acceptance, E, VU,NR by NS at 8/7/2022 1601    Comment: reinforced posterior hip precautions, sequencing bed mobility and transfers, and safety with activity.    Eager, E, VU,NR by  at 8/6/2022 1658    Comment: Reviewed safety/technique with bed mobility, transfers, ambulation, HEP, PT POC    Eager, E, VU,DU,NR by  at 8/5/2022 1658    Comment: Reviewed safety/technique with bed mobility, transfers, ambulation, HEP, PT POC    Eager, E,H, VU,NR by  at 8/4/2022 1644    Comment: Reviewed safety/technique with bed mobility, transfers, ambulation, HEP, posterior hip precautions, PT POC    Acceptance, E, VU,NR by  at 8/3/2022 1147    Comment: pt requires reinforcement for post hip precautions                   Point: Home  exercise program (Done)     Learning Progress Summary           Patient Acceptance, E, VU,NR by FW at 8/9/2022 1536    Eager, E, VU,NR by SS at 8/8/2022 1328    Comment: Reviewed safety/technique with bed mobility, transfers, ambulation, HEP, PT POC    Acceptance, E, VU,NR by NS at 8/7/2022 1601    Comment: reinforced posterior hip precautions, sequencing bed mobility and transfers, and safety with activity.    Acceptance, E, VU by MA at 8/7/2022 0334    Eager, E, VU,NR by SS at 8/6/2022 1658    Comment: Reviewed safety/technique with bed mobility, transfers, ambulation, HEP, PT POC    Eager, E, VU,DU,NR by  at 8/5/2022 1658    Comment: Reviewed safety/technique with bed mobility, transfers, ambulation, HEP, PT POC    Eager, E,H, VU,NR by  at 8/4/2022 1644    Comment: Reviewed safety/technique with bed mobility, transfers, ambulation, HEP, posterior hip precautions, PT POC    Acceptance, E, VU,NR by  at 8/3/2022 1147    Comment: pt requires reinforcement for post hip precautions   Family Eager, E, VU,NR by SS at 8/8/2022 1328    Comment: Reviewed safety/technique with bed mobility, transfers, ambulation, HEP, PT POC    Acceptance, E, VU,NR by NS at 8/7/2022 1601    Comment: reinforced posterior hip precautions, sequencing bed mobility and transfers, and safety with activity.    Eager, E, VU,NR by  at 8/6/2022 1658    Comment: Reviewed safety/technique with bed mobility, transfers, ambulation, HEP, PT POC    Eager, E, VU,DU,NR by  at 8/5/2022 1658    Comment: Reviewed safety/technique with bed mobility, transfers, ambulation, HEP, PT POC    Eager, E,H, VU,NR by  at 8/4/2022 1644    Comment: Reviewed safety/technique with bed mobility, transfers, ambulation, HEP, posterior hip precautions, PT POC    Acceptance, E, VU,NR by  at 8/3/2022 1147    Comment: pt requires reinforcement for post hip precautions                   Point: Body mechanics (Done)     Learning Progress Summary           Patient  Acceptance, E, VU,NR by FW at 8/9/2022 1536    Eager, E, VU,NR by SS at 8/8/2022 1328    Comment: Reviewed safety/technique with bed mobility, transfers, ambulation, HEP, PT POC    Acceptance, E, VU,NR by NS at 8/7/2022 1601    Comment: reinforced posterior hip precautions, sequencing bed mobility and transfers, and safety with activity.    Acceptance, E, VU by MA at 8/7/2022 0334    Eager, E, VU,NR by SS at 8/6/2022 1658    Comment: Reviewed safety/technique with bed mobility, transfers, ambulation, HEP, PT POC    Eager, E, VU,DU,NR by SS at 8/5/2022 1658    Comment: Reviewed safety/technique with bed mobility, transfers, ambulation, HEP, PT POC    Eager, E,H, VU,NR by SS at 8/4/2022 1644    Comment: Reviewed safety/technique with bed mobility, transfers, ambulation, HEP, posterior hip precautions, PT POC    Acceptance, E, VU,NR by FW at 8/3/2022 1147    Comment: pt requires reinforcement for post hip precautions   Family Eager, E, VU,NR by SS at 8/8/2022 1328    Comment: Reviewed safety/technique with bed mobility, transfers, ambulation, HEP, PT POC    Acceptance, E, VU,NR by NS at 8/7/2022 1601    Comment: reinforced posterior hip precautions, sequencing bed mobility and transfers, and safety with activity.    Eager, E, VU,NR by SS at 8/6/2022 1658    Comment: Reviewed safety/technique with bed mobility, transfers, ambulation, HEP, PT POC    Eager, E, VU,DU,NR by SS at 8/5/2022 1658    Comment: Reviewed safety/technique with bed mobility, transfers, ambulation, HEP, PT POC    Eager, E,H, VU,NR by SS at 8/4/2022 1644    Comment: Reviewed safety/technique with bed mobility, transfers, ambulation, HEP, posterior hip precautions, PT POC    Acceptance, E, VU,NR by FW at 8/3/2022 1147    Comment: pt requires reinforcement for post hip precautions                   Point: Precautions (Done)     Learning Progress Summary           Patient Acceptance, E, VU,NR by FW at 8/9/2022 1536    CONSUELO Rodriguez VU,NR by SS at 8/8/2022  1328    Comment: Reviewed safety/technique with bed mobility, transfers, ambulation, HEP, PT POC    Acceptance, E, VU,NR by NS at 8/7/2022 1601    Comment: reinforced posterior hip precautions, sequencing bed mobility and transfers, and safety with activity.    Acceptance, E, VU by MA at 8/7/2022 0334    Eager, E, VU,NR by  at 8/6/2022 1658    Comment: Reviewed safety/technique with bed mobility, transfers, ambulation, HEP, PT POC    Eager, E, VU,DU,NR by  at 8/5/2022 1658    Comment: Reviewed safety/technique with bed mobility, transfers, ambulation, HEP, PT POC    Eager, E,H, VU,NR by  at 8/4/2022 1644    Comment: Reviewed safety/technique with bed mobility, transfers, ambulation, HEP, posterior hip precautions, PT POC    Acceptance, E, VU,NR by  at 8/3/2022 1147    Comment: pt requires reinforcement for post hip precautions   Family Eager, E, VU,NR by  at 8/8/2022 1328    Comment: Reviewed safety/technique with bed mobility, transfers, ambulation, HEP, PT POC    Acceptance, E, VU,NR by NS at 8/7/2022 1601    Comment: reinforced posterior hip precautions, sequencing bed mobility and transfers, and safety with activity.    Eager, E, VU,NR by  at 8/6/2022 1658    Comment: Reviewed safety/technique with bed mobility, transfers, ambulation, HEP, PT POC    Eager, E, VU,DU,NR by  at 8/5/2022 1658    Comment: Reviewed safety/technique with bed mobility, transfers, ambulation, HEP, PT POC    Eager, E,H, VU,NR by  at 8/4/2022 1644    Comment: Reviewed safety/technique with bed mobility, transfers, ambulation, HEP, posterior hip precautions, PT POC    Acceptance, E, VU,NR by  at 8/3/2022 1147    Comment: pt requires reinforcement for post hip precautions                               User Key     Initials Effective Dates Name Provider Type Discipline    NS 06/16/21 -  Lori Kiser, PT Physical Therapist PT    FW 05/05/22 -  Kayden Chawla, PT Physical Therapist PT    MA 10/22/20 -  Anton  Monica, RN Registered Nurse Nurse     06/01/21 -  Teresa Hurtado, PT Physical Therapist PT              PT Recommendation and Plan  Planned Therapy Interventions (PT): balance training, bed mobility training, gait training, home exercise program, joint mobilization, orthotic fitting/training, patient/family education, manual therapy techniques, ROM (range of motion), stair training, strengthening, stretching  Plan of Care Reviewed With: patient, daughter  Outcome Evaluation: Pt with confusion and difficulty following commands throughout treatment. Pt performed bed mobility max Ax2 and STS mod Ax2 w/ a RW. Pt with difficulty sequencing transfers,demonstrated a strong posterior lean while standing, and was able to advance either LE this date. Continue PT POC. Recommend dc SNF     Time Calculation:    PT Charges     Row Name 08/09/22 1537             Time Calculation    Start Time 1458  -FW      PT Received On 08/09/22  -FW      PT Goal Re-Cert Due Date 08/13/22  -FW              Timed Charges    86630 - PT Therapeutic Exercise Minutes 8  -FW      88631 - PT Therapeutic Activity Minutes 19  -FW              Total Minutes    Timed Charges Total Minutes 27  -FW       Total Minutes 27  -FW            User Key  (r) = Recorded By, (t) = Taken By, (c) = Cosigned By    Initials Name Provider Type    FW Kayden Chawla, NIKOLAY Physical Therapist              Therapy Charges for Today     Code Description Service Date Service Provider Modifiers Qty    54182017695 HC PT THER PROC EA 15 MIN 8/9/2022 Kayden Chawla, PT GP 1    30772660607 HC PT THERAPEUTIC ACT EA 15 MIN 8/9/2022 Kayden Chawla, PT GP 1    93267430751 HC PT THER SUPP EA 15 MIN 8/9/2022 Kayden Chawla, PT GP 2          PT G-Codes  Outcome Measure Options: AM-PAC 6 Clicks Basic Mobility (PT)  AM-PAC 6 Clicks Score (PT): 11  AM-PAC 6 Clicks Score (OT): 12    Kayden Chawla PT  8/9/2022

## 2022-08-09 NOTE — CASE MANAGEMENT/SOCIAL WORK
Case Management Discharge Note      Final Note: for Wednesday: Transfer to [facility] bed. Nursing to call report to [phone]. fax [phone]. [facility] will transfer at 9 am. I will update her daughters.         Selected Continued Care - Admitted Since 8/2/2022     Destination Coordination complete.    Service Provider Selected Services Address Phone Fax Patient Preferred    [facility] SWING BED UNIT  Skilled Nursing [address] [address] [phone] --          Durable Medical Equipment    No services have been selected for the patient.              Dialysis/Infusion    No services have been selected for the patient.              Home Medical Care    No services have been selected for the patient.              Therapy    No services have been selected for the patient.              Community Resources    No services have been selected for the patient.              Community & DME    No services have been selected for the patient.                  Transportation Services  Ambulance: [facility]    Final Discharge Disposition Code: 61 - hospital-based swing bed

## 2022-08-09 NOTE — CASE MANAGEMENT/SOCIAL WORK
Continued Stay Note  [facility]     Patient Name: Patricia Obrien  MRN: [id number]  Today's Date: 8/9/2022    Admit Date: 8/2/2022     Discharge Plan     Row Name 08/09/22 0912       Plan    Plan [facility] swing bed    Plan Comments Patient has been accepted at [facility] pending insurance approval. Updated records faxed to [facility] so they can include these in their insurance pre-cert.    Final Discharge Disposition Code 61 - hospital-based swing bed               Discharge Codes    No documentation.               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

## 2022-08-09 NOTE — PLAN OF CARE
Goal Outcome Evaluation:  Plan of Care Reviewed With: patient, daughter           Outcome Evaluation: Pt with confusion and difficulty following commands throughout treatment. Pt performed bed mobility max Ax2 and STS mod Ax2 w/ a RW. Pt with difficulty sequencing transfers,demonstrated a strong posterior lean while standing, and was able to advance either LE this date. Continue PT POC. Recommend dc SNF

## 2022-08-09 NOTE — PROGRESS NOTES
[facility] Medicine Services  PROGRESS NOTE    Patient Name: Patricia Obrien  : 1935  MRN: [id number]    Date of Admission: 2022  Primary Care Physician: Provider, No Known    Subjective   Subjective     CC:  Hip fracture    HPI:  Patient is resting in bed. No changes. Remains pleasantly confused.    ROS:  UTO 2/2 mental status/dementia    Objective   Objective     Vital Signs:   Temp:  [97.4 °F (36.3 °C)-98.4 °F (36.9 °C)] 98.4 °F (36.9 °C)  Heart Rate:  [59-90] 72  Resp:  [12-18] 12  BP: (105-135)/(57-76) 114/65     Physical Exam:  Constitutional: No acute distress  HENT: NCAT, mucous membranes moist  Respiratory: Clear to auscultation bilaterally, respiratory effort normal   Cardiovascular: RRR, no murmurs, rubs, or gallops  Gastrointestinal soft, nondistended  Musculoskeletal: No bilateral ankle edema  Neurologic: awake, pleasantly confused  Skin: No rashes    Exam unchanged from     Results Reviewed:  LAB RESULTS:      Lab 22  1209 22  0755   WBC 5.45 6.04   HEMOGLOBIN 8.3* 8.1*   HEMATOCRIT 24.6* 23.5*   PLATELETS 143 107*   NEUTROS ABS 3.66  --    IMMATURE GRANS (ABS) 0.03  --    LYMPHS ABS 1.08  --    MONOS ABS 0.41  --    EOS ABS 0.23  --    MCV 92.1 92.2         Lab 22  0755   SODIUM 136   POTASSIUM 4.1   CHLORIDE 105   CO2 25.0   ANION GAP 6.0   BUN 15   CREATININE 0.90   EGFR 62.0   GLUCOSE 95   CALCIUM 7.9*                         Brief Urine Lab Results  (Last result in the past 365 days)      Color   Clarity   Blood   Leuk Est   Nitrite   Protein   CREAT   Urine HCG        22 192 Yellow   Clear   Negative   Negative   Negative                   Microbiology Results Abnormal     Procedure Component Value - Date/Time    COVID PRE-OP / PRE-PROCEDURE SCREENING ORDER (NO ISOLATION) - Swab, Nasopharynx [[id number]]  (Normal) Collected: 22    Lab Status: Final result Specimen: Swab from Nasopharynx Updated: 22    Narrative:       The following orders were created for panel order COVID PRE-OP / PRE-PROCEDURE SCREENING ORDER (NO ISOLATION) - Swab, Nasopharynx.  Procedure                               Abnormality         Status                     ---------                               -----------         ------                     COVID-19 and FLU A/B PCR...[[id number]]  Normal              Final result                 Please view results for these tests on the individual orders.    COVID-19 and FLU A/B PCR - Swab, Nasopharynx [[id number]]  (Normal) Collected: 08/02/22 0630    Lab Status: Final result Specimen: Swab from Nasopharynx Updated: 08/02/22 0701     COVID19 Not Detected     Influenza A PCR Not Detected     Influenza B PCR Not Detected    Narrative:      Fact sheet for providers: https://www.fda.gov/media/067750/download    Fact sheet for patients: https://www.fda.gov/media/390625/download    Test performed by PCR.          No radiology results from the last 24 hrs        I have reviewed the medications:  Scheduled Meds:atorvastatin, 10 mg, Oral, Daily  busPIRone, 10 mg, Oral, TID  donepezil, 10 mg, Oral, Nightly  folic acid, 1 mg, Oral, Daily  levothyroxine, 50 mcg, Oral, Daily  memantine, 10 mg, Oral, Daily  pantoprazole, 40 mg, Oral, QAM  polyethylene glycol, 17 g, Oral, Daily   And  senna-docusate sodium, 2 tablet, Oral, BID  QUEtiapine, 25 mg, Oral, Nightly  sertraline, 50 mg, Oral, Daily  sodium chloride, 10 mL, Intravenous, Q12H      Continuous Infusions:lactated ringers, 9 mL/hr, Last Rate: 9 mL/hr (08/04/22 1628)      PRN Meds:.senna-docusate sodium **AND** polyethylene glycol **AND** bisacodyl **AND** bisacodyl  •  HYDROcodone-acetaminophen  •  labetalol  •  ondansetron **OR** ondansetron  •  potassium chloride  •  potassium chloride  •  sodium chloride    Assessment & Plan   Assessment & Plan     Active Hospital Problems    Diagnosis  POA   • Hip fracture (HCC) [S72.009A]  Yes   • Dementia (HCC) [F03.90]  Yes   • HTN  (hypertension) [I10]  Yes   • Hypothyroidism (acquired) [E03.9]  Yes      Resolved Hospital Problems   No resolved problems to display.        Brief Hospital Course to date:  Patricia Obrien is a 87 y.o. female with dementia hypotension hypothyroidism presenting from [facility] with a right hip fracture following mechanical fall. X-rays reviewed 8-2-22 showing right femoral neck fracture    Right hip fracture   - s/p right hemiarthroplasty on 8/2/22 w/Dr. Harrington  -PT/OT Case management  -PRN pain control, bowel regimen  -WBAT RLE with post hip precautions x 6 weeks. Limit active abduction.  - DVT ppx with  mg x 6 weeks and mechanical while in house     Dementia  -continue Namenda and Aricept     Hyperlipidemia  -continue statin     Anxiety  -continue buspirone     Depression  - continue Zoloft     Hypothyroidism  - continue Synthroid 50 mcg     Hypertension  -holding lisinopril due to some low blood pressures/normotension, monitor and resume as needed     Expected Discharge Location and Transportation: Skilled nursing facility by ambulance  Expected Discharge Date: 8/8/2022    DVT prophylaxis:  Mechanical DVT prophylaxis orders are present.     AM-PAC 6 Clicks Score (PT): 11 (08/08/22 1328)    CODE STATUS:   Code Status and Medical Interventions:   Ordered at: 08/02/22 0910     Level Of Support Discussed With:    Next of Kin (If No Surrogate)     Code Status (Patient has no pulse and is not breathing):    CPR (Attempt to Resuscitate)     Medical Interventions (Patient has pulse or is breathing):    Full Support       Colleen Noe,   08/09/22

## 2022-08-09 NOTE — PROGRESS NOTES
"Orthopedic Daily Progress Note      CC: AARON overnight    Pain well controlled  General: no fevers, chills  Abdomen: no nausea, vomiting, or diarrhea    No other complaints    Physical Exam:  I have reviewed the vital signs.  Temp:  [97.4 °F (36.3 °C)-98.4 °F (36.9 °C)] 98.4 °F (36.9 °C)  Heart Rate:  [59-90] 72  Resp:  [12-18] 12  BP: (105-135)/(57-76) 114/65    Objective:  Vital signs: (most recent): Blood pressure 114/65, pulse 72, temperature 98.4 °F (36.9 °C), temperature source Axillary, resp. rate 12, height 149.9 cm (59\"), weight 56.2 kg (124 lb), SpO2 91 %.            General Appearance:    Alert, cooperative, no distress  Extremities: No clubbing, cyanosis, or edema to lower extremities  Pulses:  2+ in distal surgical extremity  Skin: Incision Clean/dry/intact      Results Review:    I have reviewed the labs, radiology results and diagnostic studies: no new labs    Results from last 7 days   Lab Units 08/06/22  1209   WBC 10*3/mm3 5.45   HEMOGLOBIN g/dL 8.3*   PLATELETS 10*3/mm3 143     Results from last 7 days   Lab Units 08/05/22  0755   SODIUM mmol/L 136   POTASSIUM mmol/L 4.1   CO2 mmol/L 25.0   CREATININE mg/dL 0.90   GLUCOSE mg/dL 95       I have reviewed the medications.    Assessment/Problem List  POD# 7 Day Post-Op   S/p R hip abdiaziz for femoral neck fx    Plan  WBAT RLE with posterior hip precautions x 6 weeks. Limit active abduction.  PT/OT  Ok with DVT ppx with  mg x 6 weeks and mechanical while in house      Discharge Planning: I expect patient to be discharged to rehab in 1-2 days.    Shala Gardner PA-C  08/09/22  09:48 EDT            "

## 2022-08-09 NOTE — DISCHARGE PLACEMENT REQUEST
"Yovana Hall (87 y.o. Female)     Thank you  Teresa BERMUDEZ, RN, [facility]  [phone]        Date of Birth   1935    Social Security Number       Address   [facility] [place] [address]    Home Phone   [phone]    MRN   [id number]       Baptism   Non-Restorationist    Marital Status                               Admission Date   8/2/22    Admission Type   Urgent    Admitting Provider   Colleen Noe DO    Attending Provider   Colleen Noe DO    Department, Room/Bed   [facility] 3G, S354/1       Discharge Date       Discharge Disposition       Discharge Destination                               Attending Provider: Colleen Noe DO    Allergies: Sulfa Antibiotics    Isolation: None   Infection: None   Code Status: CPR   Advance Care Planning Activity    Ht: 149.9 cm (59\")   Wt: 56.2 kg (124 lb)    Admission Cmt: None   Principal Problem: None                Active Insurance as of 8/2/2022     Primary Coverage     Payor Plan Insurance Group Employer/Plan Group    ANTHEM MEDICARE REPLACEMENT Swift[employer]MD(TM) by [employer] MEDICARE ADVANTAGE [id number]     Payor Plan Address Payor Plan Phone Number Payor Plan Fax Number Effective Dates    [address] [phone]  1/1/2020 - None Entered    [facility] [id number]       Subscriber Name Subscriber Birth Date Member ID       YOVANA HALL 1935 [id number]                 Emergency Contacts      (Rel.) Home Phone Work Phone Mobile Phone    eric perkins (Daughter) [phone] -- [phone]    lito Kendrick (Grandchild) -- -- [phone]            Insurance Information                ANTHEM MEDICARE REPLACEMENT/ANTHEM MEDICARE ADVANTAGE Phone: [phone]    Subscriber: Yovana Hall Subscriber#: [id number]    Group#: [id number] Precert#: [id number]          Teresa Hurtado PT    Physical Therapist   Physical Therapy   Plan of Care       Signed   Date of Service:  08/08/22 1055   Creation Time:  08/08/22 1329       "        Signed              Show:Clear all  []Manual[x]Template[]Copied    Added by:  [x]Teresa Hurtado PT      []Twan for details    Goal Outcome Evaluation:  Plan of Care Reviewed With: patient, daughter  Progress: no change  Outcome Evaluation: Pt. performed bed mobility with mod assist of 2 for sling placement. She transferred to the chair w/mechanical lift device, dependent of 2. She ambulated 3' w/front wheeled walker, max assist of 2. Gait limited by decreased weight acceptance, pain, weakness. She tolerated ther-ex well. Will continue to progress as able.                      Teresa Hurtado, PT    Physical Therapist   Physical Therapy   Plan of Care       Signed   Date of Service:  22 1508   Creation Time:  22 165              Signed              Show:Clear all  []Manual[x]Template[]Copied    Added by:  [x]Teresa Hurtado, NIKOLAY      []Twan for details    Goal Outcome Evaluation:  Plan of Care Reviewed With: patient, daughter  Progress: improving  Outcome Evaluation: Pt. performed bed mobility with max assist of 2. She performed sit to stand transfer w/mod assist of 2 and bed to chair transfer w/max assist of 2. Pt. able to progress ambulation with 2 steps forward. Pt. demonstrates limited weight acceptance inhibiting further progression. She tolerated ther-ex well but requires assist. Will continue to progress as able. Recommend SNF upon discharge.                             History & Physical      Chelsey Campbell MD at 22 0338              [facility] Medicine Services  HISTORY AND PHYSICAL    Patient Name: Patricia Obrien  : 1935  MRN: [id number]  Primary Care Physician: Provider, No Known  Date of admission: 2022      Subjective   Subjective     Chief Complaint:  Hip fracture    HPI:  Patricia Obrien is a 87 y.o. female with history of dementia, hypotension, hypothyroidism who presents from [facility] for hip fracture.  Patient unable to provide  history due to her dementia.  History obtained from daughter at the bedside.  Patient's dog jumped up on her and she stepped backward and fell.  Patient is denying any pain currently.  She has no other complaints.  CBC from outside hospital was unremarkable other than mild anemia of 11.6.  BMP showed a creatinine of 1.2 but otherwise was unremarkable.  UA was unremarkable.  Vital signs were within normal limits.  X-ray of the right hip showed a femoral neck fracture.      Review of Systems   Constitutional: Negative for fever.   HENT: Negative for trouble swallowing.    Eyes: Negative for visual disturbance.   Respiratory: Negative for shortness of breath.    Cardiovascular: Negative for chest pain and leg swelling.   Gastrointestinal: Negative for diarrhea and nausea.   Genitourinary: Negative for dysuria.   Musculoskeletal: Negative.    Skin: Negative for rash.   Neurological: Negative for dizziness and weakness.          Personal History     No past medical history on file.          No past surgical history on file.    Family History: Family denies any significant medical history.  Otherwise pertinent FHx was reviewed and unremarkable.     Social History:  Patient does not drink alcohol or smoke.  Social History     Social History Narrative   • Not on file       Medications:  Available home medication information reviewed.  Medications Prior to Admission   Medication Sig Dispense Refill Last Dose   • atorvastatin (LIPITOR) 10 MG tablet Take 10 mg by mouth Daily.      • busPIRone (BUSPAR) 10 MG tablet Take 10 mg by mouth 3 (Three) Times a Day.      • folic acid (FOLVITE) 1 MG tablet Take 1 mg by mouth Daily.      • levothyroxine (SYNTHROID, LEVOTHROID) 50 MCG tablet Take 50 mcg by mouth Daily.      • linaclotide (Linzess) 72 MCG capsule capsule Take 72 mcg by mouth As Needed.      • lisinopril (PRINIVIL,ZESTRIL) 40 MG tablet Take 40 mg by mouth Daily.      • meloxicam (MOBIC) 15 MG tablet Take 15 mg by mouth  Daily.      • Memantine HCl-Donepezil HCl (Namzaric) 28-10 MG capsule sustained-release 24 hr Take  by mouth Daily.      • Mirabegron ER (MYRBETRIQ) 25 MG tablet sustained-release 24 hour 24 hr tablet Take 25 mg by mouth Every Night.      • omeprazole (priLOSEC) 20 MG capsule Take 40 mg by mouth Every Night.      • QUEtiapine (SEROquel) 25 MG tablet Take 25 mg by mouth Every Night.      • sertraline (ZOLOFT) 50 MG tablet Take 50 mg by mouth Daily.          Allergies   Allergen Reactions   • Sulfa Antibiotics Other (See Comments)     unknown       Objective   Objective     Vital Signs:   Temp:  [98.1 °F (36.7 °C)] 98.1 °F (36.7 °C)  Heart Rate:  [66] 66  Resp:  [16] 16  BP: (143)/(95) 143/95       Physical Exam   Constitutional: Awake, alert  Eyes: PERRLA, sclerae anicteric, no conjunctival injection  HENT: NCAT, mucous membranes moist  Neck: Supple, trachea midline  Respiratory: Clear to auscultation bilaterally, nonlabored respirations   Cardiovascular: RRR, no murmurs, rubs, or gallops, palpable pulses in right lower extremity  Gastrointestinal: Positive bowel sounds, soft, nontender, nondistended  Musculoskeletal: 1+ lower extremity edema, right hip without any bruising noted  Psychiatric: Appropriate affect, cooperative  Neurologic: Oriented to person only, talking about her prior job at [employer], no gross focal neurological deficits  Skin: No rashes        Result Review:  I have personally reviewed the results from the time of this admission to 8/2/2022 04:05 EDT and agree with these findings:  [x]  Laboratory list / accordion  []  Microbiology  []  Radiology  []  EKG/Telemetry   []  Cardiology/Vascular   []  Pathology  [x]  Old records  []  Other:         LAB RESULTS:      Lab 08/01/22  1835   WBC 8.5   HEMOGLOBIN 11.6   HEMATOCRIT 34.8*   PLATELETS 146*   NEUTROS ABS 7.1   IMMATURE GRANS (ABS) 0.1   LYMPHS ABS 0.9*   MONOS ABS 0.3   EOS ABS 0.0   MCV 93.5                             UA    Urinalysis  7/9/22 7/9/22 8/1/22 8/1/22    1400 1400 1920 1920   Specific Gravity, UA  1.020  >=1.030   Blood, UA  TRACE-INTACT (A)  Negative   Leukocytes, UA  LARGE (A)  Negative   Nitrite, UA  Negative  Negative   RBC, UA None seen  None seen    Bacteria, UA 3+ (A)  Rare (A)    (A) Abnormal value              Microbiology Results (last 10 days)     ** No results found for the last 240 hours. **          XR Outside Films    Result Date: 8/2/2022  This procedure was auto-finalized with no dictation required.    XR Outside Films    Result Date: 8/2/2022  This procedure was auto-finalized with no dictation required.    XR CHEST PORTABLE    Result Date: 8/1/2022  PORTABLE CHEST Indication: Preoperative Comparison: 7/3/22 Findings: The cardiomediastinal silhouette is within normal limits. No focal consolidation, pleural effusion or pneumothorax. Bones are unremarkable.    Impression: No acute cardiopulmonary findings.    XR HIP RIGHT (2-3 VIEWS)    Result Date: 8/1/2022  EXAM: PELVIS AND RIGHT HIP 3 VIEWS INDICATION: Fall. Right hip pain. COMPARISON: None FINDINGS: Right femoral neck basicervical fracture with displacement and varus impaction deformity. No definite intertrochanteric extension. No additional fractures identified. The right hip is congruent. Mild osteoarthritis is present.    Impression: Right femoral neck bases cervical fracture with displacement and varus impaction. No definite intertrochanteric extension but consider CT if needed for preoperative planning.          Assessment & Plan   Assessment & Plan     Active Hospital Problems    Diagnosis  POA   • Hip fracture (HCC) [S72.009A]  Yes   • Dementia (HCC) [F03.90]  Yes   • HTN (hypertension) [I10]  Yes   • Hypothyroidism (acquired) [E03.9]  Yes       Patricia Obrien is a 87 y.o. female with history of dementia, hypotension, hypothyroidism who presents from [facility] for hip fracture.    Right femoral neck fracture  Mechanical fall  -History of mechanical  nature  - Noted on x-ray at outside hospital  -Outside hospital labs unremarkable, we will repeat here.  -We will upload x-rays for review  - Check EKG  -Start scheduled Tylenol, as needed tramadol  -PT OT following surgery  -N.p.o.  -Ortho consult in the a.m.    Dementia  -At high risk for encephalopathy  - continue memantine, donepezil    Hyperlipidemia: Continue atorvastatin    Anxiety depression: Continue BuSpar, zoloft    Hypothyroidism: continue Synthroid    HTN:  - holding lisinpril for surgery, resume following surgery        DVT prophylaxis:  Holding for surgery      CODE STATUS:  Full code  There are no questions and answers to display.         Chelsey Campbell MD  08/02/22      Electronically signed by Chelsey Campbell MD at 08/02/22 0406         Current Facility-Administered Medications   Medication Dose Route Frequency Provider Last Rate Last Admin   • atorvastatin (LIPITOR) tablet 10 mg  10 mg Oral Daily Jaspal Harrington Jr., MD   10 mg at 08/08/22 0857   • polyethylene glycol (MIRALAX) packet 17 g  17 g Oral Daily Hemanth Brady DO   17 g at 08/08/22 0857    And   • sennosides-docusate (PERICOLACE) 8.6-50 MG per tablet 2 tablet  2 tablet Oral BID Hemanth Brady DO   2 tablet at 08/08/22 2031    And   • bisacodyl (DULCOLAX) EC tablet 5 mg  5 mg Oral Daily PRN Hemanth Brady DO   5 mg at 08/04/22 2023    And   • bisacodyl (DULCOLAX) suppository 10 mg  10 mg Rectal Daily PRN Hemanth Brady DO       • busPIRone (BUSPAR) tablet 10 mg  10 mg Oral TID Jaspal Harrington Jr., MD   10 mg at 08/08/22 2031   • donepezil (ARICEPT) tablet 10 mg  10 mg Oral Nightly Jaspal Harrington Jr., MD   10 mg at 08/08/22 2031   • folic acid (FOLVITE) tablet 1 mg  1 mg Oral Daily Jaspal Harrington Jr., MD   1 mg at 08/08/22 0857   • HYDROcodone-acetaminophen (NORCO) 5-325 MG per tablet 1 tablet  1 tablet Oral Q4H PRN Caitlyn, Hemanth, DO   1 tablet at 08/08/22 2031   • labetalol (NORMODYNE,TRANDATE) injection 20 mg  20 mg  Intravenous Q10 Min PRN Jaspal Harrington Jr., MD       • lactated ringers infusion  9 mL/hr Intravenous Continuous Jaspal Harrington Jr., MD 9 mL/hr at 08/04/22 1628 9 mL/hr at 08/04/22 1628   • levothyroxine (SYNTHROID, LEVOTHROID) tablet 50 mcg  50 mcg Oral Daily Jaspal Harrington Jr., MD   50 mcg at 08/09/22 0550   • memantine (NAMENDA) tablet 10 mg  10 mg Oral Daily Jaspal Harrington Jr., MD   10 mg at 08/08/22 0857   • ondansetron (ZOFRAN) tablet 4 mg  4 mg Oral Q6H PRN Jaspal Harrington Jr., MD        Or   • ondansetron (ZOFRAN) injection 4 mg  4 mg Intravenous Q6H PRN Jaspal Harrington Jr., MD       • pantoprazole (PROTONIX) EC tablet 40 mg  40 mg Oral QAM Jaspal Harrington Jr., MD   40 mg at 08/09/22 0550   • potassium chloride (KLOR-CON) packet 40 mEq  40 mEq Oral PRN Jaspal Harrington Jr., MD       • potassium chloride (MICRO-K) CR capsule 40 mEq  40 mEq Oral PRN Jaspal Harrington Jr., MD       • QUEtiapine (SEROquel) tablet 25 mg  25 mg Oral Nightly Jaspal Harrington Jr., MD   25 mg at 08/08/22 2031   • sertraline (ZOLOFT) tablet 50 mg  50 mg Oral Daily Jaspal Harrington Jr., MD   50 mg at 08/08/22 0857   • sodium chloride 0.9 % flush 10 mL  10 mL Intravenous Q12H Jaspal Harrington Jr., MD   10 mL at 08/08/22 2031   • sodium chloride 0.9 % flush 10 mL  10 mL Intravenous PRN Jaspal Harrington Jr., MD

## 2022-08-09 NOTE — PLAN OF CARE
Goal Outcome Evaluation:  Plan of Care Reviewed With: patient        Progress: no change     Pt remains confused,  VSS, RA. Norco for pain. Daughter at bedside.

## 2022-08-09 NOTE — NURSING NOTE
VSS. Pt alert, oriented only to self but very pleasant. Pt participated with therapy today. Pt did test positive for COVID after preadmission testing for transfer tomorrow, provider notified.

## 2022-08-10 ENCOUNTER — HOSPITAL ENCOUNTER (INPATIENT)
Facility: HOSPITAL | Age: 87
LOS: 13 days | Discharge: HOME HEALTH | DRG: 560 | End: 2022-08-23
Attending: INTERNAL MEDICINE | Admitting: INTERNAL MEDICINE
Payer: MEDICARE

## 2022-08-10 VITALS
HEIGHT: 59 IN | TEMPERATURE: 98.5 F | RESPIRATION RATE: 16 BRPM | WEIGHT: 128 LBS | HEART RATE: 75 BPM | OXYGEN SATURATION: 96 % | BODY MASS INDEX: 25.8 KG/M2 | SYSTOLIC BLOOD PRESSURE: 121 MMHG | DIASTOLIC BLOOD PRESSURE: 66 MMHG

## 2022-08-10 DIAGNOSIS — K59.00 CONSTIPATION, UNSPECIFIED CONSTIPATION TYPE: ICD-10-CM

## 2022-08-10 DIAGNOSIS — Z87.81 HISTORY OF FRACTURE OF RIGHT HIP: Primary | ICD-10-CM

## 2022-08-10 PROBLEM — D68.9 COAGULOPATHY: Status: ACTIVE | Noted: 2022-08-10

## 2022-08-10 PROBLEM — R53.81 DECLINING FUNCTIONAL STATUS: Status: ACTIVE | Noted: 2022-08-10

## 2022-08-10 LAB — SARS-COV-2 RDRP RESP QL NAA+PROBE: NORMAL

## 2022-08-10 PROCEDURE — 99239 HOSP IP/OBS DSCHRG MGMT >30: CPT | Performed by: HOSPITALIST

## 2022-08-10 PROCEDURE — 97535 SELF CARE MNGMENT TRAINING: CPT

## 2022-08-10 PROCEDURE — 87635 SARS-COV-2 COVID-19 AMP PRB: CPT | Performed by: HOSPITALIST

## 2022-08-10 PROCEDURE — 97530 THERAPEUTIC ACTIVITIES: CPT

## 2022-08-10 PROCEDURE — 1200000002 HC SEMI PRIVATE SWING BED

## 2022-08-10 PROCEDURE — 97110 THERAPEUTIC EXERCISES: CPT

## 2022-08-10 RX ORDER — FOLIC ACID 1 MG/1
1 TABLET ORAL DAILY
Status: DISCONTINUED | OUTPATIENT
Start: 2022-08-11 | End: 2022-08-23 | Stop reason: HOSPADM

## 2022-08-10 RX ORDER — ASPIRIN 325 MG
325 TABLET, DELAYED RELEASE (ENTERIC COATED) ORAL DAILY
Status: DISCONTINUED | OUTPATIENT
Start: 2022-08-10 | End: 2022-08-10 | Stop reason: HOSPADM

## 2022-08-10 RX ORDER — ASPIRIN 325 MG
325 TABLET, DELAYED RELEASE (ENTERIC COATED) ORAL DAILY
Qty: 42 TABLET | Refills: 0 | Status: SHIPPED | OUTPATIENT
Start: 2022-08-10 | End: 2022-09-21

## 2022-08-10 RX ORDER — ATORVASTATIN CALCIUM 10 MG/1
10 TABLET, FILM COATED ORAL DAILY
Status: DISCONTINUED | OUTPATIENT
Start: 2022-08-11 | End: 2022-08-23 | Stop reason: HOSPADM

## 2022-08-10 RX ORDER — TROSPIUM CHLORIDE 20 MG/1
20 TABLET, FILM COATED ORAL DAILY
Status: DISCONTINUED | OUTPATIENT
Start: 2022-08-11 | End: 2022-08-23 | Stop reason: HOSPADM

## 2022-08-10 RX ORDER — TRAMADOL HYDROCHLORIDE 50 MG/1
25 TABLET ORAL EVERY 12 HOURS PRN
Status: ON HOLD | COMMUNITY
Start: 2022-08-10 | End: 2022-08-23 | Stop reason: HOSPADM

## 2022-08-10 RX ORDER — TRAMADOL HYDROCHLORIDE 50 MG/1
25 TABLET ORAL EVERY 12 HOURS PRN
Qty: 2 TABLET | Refills: 0 | Status: SHIPPED | OUTPATIENT
Start: 2022-08-10 | End: 2022-08-12

## 2022-08-10 RX ORDER — TRAMADOL HYDROCHLORIDE 50 MG/1
50 TABLET ORAL EVERY 12 HOURS PRN
Status: ON HOLD | COMMUNITY
End: 2022-08-11

## 2022-08-10 RX ORDER — LEVOTHYROXINE SODIUM 0.05 MG/1
50 TABLET ORAL DAILY
Status: DISCONTINUED | OUTPATIENT
Start: 2022-08-11 | End: 2022-08-23 | Stop reason: HOSPADM

## 2022-08-10 RX ORDER — QUETIAPINE FUMARATE 25 MG/1
25 TABLET, FILM COATED ORAL NIGHTLY
Status: DISCONTINUED | OUTPATIENT
Start: 2022-08-11 | End: 2022-08-23 | Stop reason: HOSPADM

## 2022-08-10 RX ORDER — TRAMADOL HYDROCHLORIDE 50 MG/1
25 TABLET ORAL ONCE
Status: COMPLETED | OUTPATIENT
Start: 2022-08-10 | End: 2022-08-10

## 2022-08-10 RX ORDER — TRAMADOL HYDROCHLORIDE 50 MG/1
50 TABLET ORAL EVERY 12 HOURS PRN
Status: DISCONTINUED | OUTPATIENT
Start: 2022-08-10 | End: 2022-08-17

## 2022-08-10 RX ORDER — BUSPIRONE HYDROCHLORIDE 5 MG/1
10 TABLET ORAL 3 TIMES DAILY
Status: DISCONTINUED | OUTPATIENT
Start: 2022-08-11 | End: 2022-08-23 | Stop reason: HOSPADM

## 2022-08-10 RX ORDER — PANTOPRAZOLE SODIUM 40 MG/1
40 TABLET, DELAYED RELEASE ORAL
Status: DISCONTINUED | OUTPATIENT
Start: 2022-08-11 | End: 2022-08-23 | Stop reason: HOSPADM

## 2022-08-10 RX ADMIN — FOLIC ACID 1 MG: 1 TABLET ORAL at 08:48

## 2022-08-10 RX ADMIN — BUSPIRONE HYDROCHLORIDE 10 MG: 10 TABLET ORAL at 08:48

## 2022-08-10 RX ADMIN — TRAMADOL HYDROCHLORIDE 25 MG: 50 TABLET ORAL at 19:34

## 2022-08-10 RX ADMIN — LEVOTHYROXINE SODIUM 50 MCG: 50 TABLET ORAL at 06:00

## 2022-08-10 RX ADMIN — SERTRALINE HYDROCHLORIDE 50 MG: 50 TABLET ORAL at 08:49

## 2022-08-10 RX ADMIN — MEMANTINE 10 MG: 10 TABLET ORAL at 08:48

## 2022-08-10 RX ADMIN — BUSPIRONE HYDROCHLORIDE 10 MG: 10 TABLET ORAL at 17:16

## 2022-08-10 RX ADMIN — ATORVASTATIN CALCIUM 10 MG: 10 TABLET, FILM COATED ORAL at 08:49

## 2022-08-10 RX ADMIN — ASPIRIN 325 MG: 325 TABLET, COATED ORAL at 13:44

## 2022-08-10 RX ADMIN — PANTOPRAZOLE SODIUM 40 MG: 40 TABLET, DELAYED RELEASE ORAL at 06:00

## 2022-08-10 NOTE — DISCHARGE SUMMARY
[facility] Medicine Services  DISCHARGE SUMMARY    Patient Name: Patricia Obrien  : 1935  MRN: [id number]    Date of Admission: 2022  3:17 AM  Date of Discharge:  8/10/2022 (Wednesday)  Primary Care Physician: Provider, No Known    Consults     Date and Time Order Name Status Description    2022  4:07 AM Inpatient Orthopedic Surgery Consult Completed         Jaspal Harrington MD - Orthopedic Surgery -  Right Hip Hemiarthroplasty on     Hospital Course     Presenting Problem:   Hip fracture (HCC) [S72.009A]    Active Hospital Problems    Diagnosis  POA   • Hip fracture (HCC) [S72.009A]  Yes   • Dementia (HCC) [F03.90]  Yes   • HTN (hypertension) [I10]  Yes   • Hypothyroidism (acquired) [E03.9]  Yes      Resolved Hospital Problems   No resolved problems to display.      Moderate-severe dementia      Hospital Course:  Patricia Obrien is a 87 y.o. female with past medical history of moderate to severe dementia, hypotension, hypothyroidism who presented from [facility] for a hip fracture.  She was transferred on 2022 for the need for surgery due to Right Hip Fracture.    She had a fall at home, was brought to the ER at [facility] and was transferred here after diagnosing a hip fracture.    She was seen by Dr. Harrington (Orthopedic Surgery) on the day of arrival and taken to the OR for Right Hemiarthroplasty.    She had Open treatment of a displaced Femoral Neck fracture with Hemiarthroplasty on 2022 (Tuesday) over a week ago.    Screening COVID-19 test prior to discharge was incidentally positive, but repeat COVID-19 test was negative, most concerning for a false positive.    She is asymptomatic and not requiring supplemental oxygen.    She was transferred to an isolation room while in the hospital but not treated for COVID-19 as she has baseline dementia and unable to obtain history.    She will transfer today to a Swing Bed at [facility] with WBAT  of her Right Lower Extremity with posterior hip precautions for 6 week.  Additionally, Orthopedic Surgery recommended aspirin 325 mg for 6 week for DVT ppx.    Discharge Follow Up Recommendations for outpatient labs/diagnostics:   follow up with Orthopedic Surgery as suggested    Day of Discharge     HPI:    No family in room.  This is my first time seeing this patient and she appears symptom free and ready for planned discharge.  Has not needed pain medication in the last 24 hours.    Review of Systems    Unable to assess due to mental status    Vital Signs:   Temp:  [98.1 °F (36.7 °C)-98.7 °F (37.1 °C)] 98.1 °F (36.7 °C)  Heart Rate:  [67-75] 75  Resp:  [16] 16  BP: (129-148)/(58-66) 129/63    Physical Exam:    Constitutional: awake, No acute distress  HENT: NCAT, mucous membranes moist  Respiratory: Clear to auscultation bilaterally, respiratory effort normal   Cardiovascular: RRR, s1 and s2  Gastrointestinal: Positive bowel sounds, soft, nontender, nondistended  Musculoskeletal: No bilateral ankle edema  Mental status:  Oriented to Person, does not know the year she was born, does not know where she is  Skin: dry skin    Pertinent  and/or Most Recent Results     LAB RESULTS:      Lab 08/06/22  1209 08/05/22  0755   WBC 5.45 6.04   HEMOGLOBIN 8.3* 8.1*   HEMATOCRIT 24.6* 23.5*   PLATELETS 143 107*   NEUTROS ABS 3.66  --    IMMATURE GRANS (ABS) 0.03  --    LYMPHS ABS 1.08  --    MONOS ABS 0.41  --    EOS ABS 0.23  --    MCV 92.1 92.2         Lab 08/05/22  0755   SODIUM 136   POTASSIUM 4.1   CHLORIDE 105   CO2 25.0   ANION GAP 6.0   BUN 15   CREATININE 0.90   EGFR 62.0   GLUCOSE 95   CALCIUM 7.9*                   Brief Urine Lab Results  (Last result in the past 365 days)      Color   Clarity   Blood   Leuk Est   Nitrite   Protein   CREAT   Urine HCG        08/01/22 1920 Yellow   Clear   Negative   Negative   Negative                 Microbiology Results (last 10 days)     Procedure Component Value - Date/Time     COVID PRE-OP / PRE-PROCEDURE SCREENING ORDER (NO ISOLATION) - Swab, Nasopharynx [[id number]]  (Normal) Collected: 08/10/22 0904    Lab Status: Final result Specimen: Swab from Nasopharynx Updated: 08/10/22 0959    Narrative:      The following orders were created for panel order COVID PRE-OP / PRE-PROCEDURE SCREENING ORDER (NO ISOLATION) - Swab, Nasopharynx.  Procedure                               Abnormality         Status                     ---------                               -----------         ------                     COVID-19, ABBOTT IN-HOUS...[[id number]]  Normal              Final result                 Please view results for these tests on the individual orders.    COVID-19, ABBOTT IN-HOUSE,NASAL Swab (NO TRANSPORT MEDIA) 2 HR TAT - Swab, Nasopharynx [[id number]]  (Normal) Collected: 08/10/22 0904    Lab Status: Final result Specimen: Swab from Nasopharynx Updated: 08/10/22 0959     COVID19 Presumptive Negative    Narrative:      Fact sheet for providers: https://www.fda.gov/media/348484/download     Fact sheet for patients: https://www.fda.gov/media/862758/download    Test performed by PCR.  If inconsistent with clinical signs and symptoms patient should be tested with different authorized molecular test.    COVID PRE-OP / PRE-PROCEDURE SCREENING ORDER (NO ISOLATION) - Swab, Nasopharynx [[id number]]  (Abnormal) Collected: 08/09/22 1722    Lab Status: Final result Specimen: Swab from Nasopharynx Updated: 08/09/22 1832    Narrative:      The following orders were created for panel order COVID PRE-OP / PRE-PROCEDURE SCREENING ORDER (NO ISOLATION) - Swab, Nasopharynx.  Procedure                               Abnormality         Status                     ---------                               -----------         ------                     COVID-19, FLU A/B, RSV P...[[id number]]  Abnormal            Final result                 Please view results for these tests on the individual orders.    COVID-19, FLU  A/B, RSV PCR - Swab, Nasopharynx [[id number]]  (Abnormal) Collected: 08/09/22 1722    Lab Status: Final result Specimen: Swab from Nasopharynx Updated: 08/09/22 1832     COVID19 Detected     Influenza A PCR Not Detected     Influenza B PCR Not Detected     RSV, PCR Not Detected    Narrative:      Fact sheet for providers: https://www.fda.gov/media/653584/download    Fact sheet for patients: https://www.fda.gov/media/898822/download    Test performed by PCR.    COVID PRE-OP / PRE-PROCEDURE SCREENING ORDER (NO ISOLATION) - Swab, Nasopharynx [[id number]]  (Normal) Collected: 08/02/22 0630    Lab Status: Final result Specimen: Swab from Nasopharynx Updated: 08/02/22 0701    Narrative:      The following orders were created for panel order COVID PRE-OP / PRE-PROCEDURE SCREENING ORDER (NO ISOLATION) - Swab, Nasopharynx.  Procedure                               Abnormality         Status                     ---------                               -----------         ------                     COVID-19 and FLU A/B PCR...[[id number]]  Normal              Final result                 Please view results for these tests on the individual orders.    COVID-19 and FLU A/B PCR - Swab, Nasopharynx [[id number]]  (Normal) Collected: 08/02/22 0630    Lab Status: Final result Specimen: Swab from Nasopharynx Updated: 08/02/22 0701     COVID19 Not Detected     Influenza A PCR Not Detected     Influenza B PCR Not Detected    Narrative:      Fact sheet for providers: https://www.fda.gov/media/423185/download    Fact sheet for patients: https://www.fda.gov/media/258659/download    Test performed by PCR.          Peripheral Block    Result Date: 8/2/2022  Paula Brooks CRNA     8/2/2022  1:55 PM Peripheral Block Patient reassessed immediately prior to procedure Patient location during procedure: OR Reason for block: procedure for pain, at surgeon's request and post-op pain management Performed by CRNA/CAA: Leah Richardson CRNA  Preanesthetic Checklist Completed: patient identified, IV checked, site marked, risks and benefits discussed, surgical consent, monitors and equipment checked, pre-op evaluation and timeout performed Prep: Sterile barriers:gloves, cap, sterile barriers and mask Prep: ChloraPrep Patient monitoring: blood pressure monitoring, continuous pulse oximetry and EKG Procedure Performed under: local infiltration Guidance:ultrasound guided, nerve stimulator and landmark technique Images:still images not obtained Laterality:right Block Type:fascia iliaca compartment Injection Technique:single-shot Needle Type:short-bevel Needle Gauge:20 G Resistance on Injection: none Catheter Size:20 G (20g) Medications Used: ropivacaine (NAROPIN) 0.2 % injection, 50 mL Med administered at 8/2/2022 1:38 PM Medications Preservative Free Saline:10ml Post Assessment Injection Assessment: negative aspiration for heme, no paresthesia on injection and incremental injection Patient Tolerance:comfortable throughout block Complications:no Additional Notes The BBRaun 360 degree echogenic needle was introduced in plane, in a lateral to medial direction at the level of the inguinal crease.  Under ultrasound guidance, the femoral artery and vein where located.  The needle was then directed below Fascia Iliacus towards the Femoral nerve.  NS was utilized to hydro dissect and galina needle advancement towards the target structure.   LA was injected incrementally in 3-5 ml aliquots with negative aspirate.  LA spread was visualized around the nerve, negative intraneural injection, low injection pressures.  Thank you     XR Outside Films    Result Date: 8/2/2022  This procedure was auto-finalized with no dictation required.    XR Outside Films    Result Date: 8/2/2022  This procedure was auto-finalized with no dictation required.    XR CHEST PORTABLE    Result Date: 8/1/2022  PORTABLE CHEST Indication: Preoperative Comparison: 7/3/22 Findings: The  cardiomediastinal silhouette is within normal limits. No focal consolidation, pleural effusion or pneumothorax. Bones are unremarkable.    No acute cardiopulmonary findings.    XR HIP RIGHT (2-3 VIEWS)    Result Date: 8/1/2022  EXAM: PELVIS AND RIGHT HIP 3 VIEWS INDICATION: Fall. Right hip pain. COMPARISON: None FINDINGS: Right femoral neck basicervical fracture with displacement and varus impaction deformity. No definite intertrochanteric extension. No additional fractures identified. The right hip is congruent. Mild osteoarthritis is present.    Right femoral neck bases cervical fracture with displacement and varus impaction. No definite intertrochanteric extension but consider CT if needed for preoperative planning.    XR Hip With or Without Pelvis 2 - 3 View Right    Result Date: 8/2/2022  DATE OF EXAM: 8/2/2022 3:40 PM  PROCEDURE: XR HIP W OR WO PELVIS 2-3 VIEW RIGHT-  INDICATIONS: s/p R hip abdiaziz  COMPARISON: 8/2/2022  TECHNIQUE: AP and Lateral views of the right hip with Pelvis were obtained.  FINDINGS: The patient has undergone right total hip arthroplasty since the last study. The prosthesis appears in appropriate position and alignment. There is a fracture identified through the greater trochanter which is better seen on the current radiograph.       1. Status post right total hip arthroplasty. 2. Fracture through the greater trochanter.  This report was finalized on 8/2/2022 3:59 PM by Dario Toussaint MD.      XR Hip With or Without Pelvis 2 - 3 View Right    Result Date: 8/2/2022  DATE OF EXAM: 8/2/2022 7:10 AM  PROCEDURE: XR HIP W OR WO PELVIS 2-3 VIEW RIGHT-  INDICATIONS: need dedicated AP/lateral of R hip for operative planning  COMPARISON: No comparisons available.  TECHNIQUE: AP and Lateral views of the right hip with Pelvis were obtained.  FINDINGS: There is deformity of the femoral neck with foreshortening on the AP view. This is suggestive of underlying fracture that could be subcapital or  transcervical. The right hemipelvis appears to be intact. The intertrochanteric area reveals no definite fracture. The visualized shaft of the femur also grossly unremarkable.      1.  Findings suggestive of a right femoral neck fracture. Additional imaging with CT could be obtained as thought clinically appropriate.  This report was finalized on 8/2/2022 8:01 AM by Lane Nelson MD.                    Discharge Details        Discharge Medications      New Medications      Instructions Start Date   aspirin  MG tablet   325 mg, Oral, Daily      traMADol 50 MG tablet  Commonly known as: ULTRAM   25 mg, Oral, Every 12 Hours PRN         Continue These Medications      Instructions Start Date   atorvastatin 10 MG tablet  Commonly known as: LIPITOR   10 mg, Oral, Daily      busPIRone 10 MG tablet  Commonly known as: BUSPAR   10 mg, Oral, 3 Times Daily      folic acid 1 MG tablet  Commonly known as: FOLVITE   1 mg, Oral, Daily      levothyroxine 50 MCG tablet  Commonly known as: SYNTHROID, LEVOTHROID   50 mcg, Oral, Daily      linaclotide 72 MCG capsule capsule  Commonly known as: LINZESS   72 mcg, Oral, As Needed      Mirabegron ER 25 MG tablet sustained-release 24 hour 24 hr tablet  Commonly known as: MYRBETRIQ   25 mg, Oral, Nightly      Namzaric 28-10 MG capsule sustained-release 24 hr  Generic drug: Memantine HCl-Donepezil HCl   Oral, Daily      omeprazole 20 MG capsule  Commonly known as: priLOSEC   40 mg, Oral, Daily      QUEtiapine 25 MG tablet  Commonly known as: SEROquel   25 mg, Oral, Nightly      sertraline 50 MG tablet  Commonly known as: ZOLOFT   50 mg, Oral, Daily         Stop These Medications    lisinopril 40 MG tablet  Commonly known as: PRINIVIL,ZESTRIL     meloxicam 15 MG tablet  Commonly known as: MOBIC            Allergies   Allergen Reactions   • Sulfa Antibiotics Other (See Comments)     unknown     Discharge Disposition:  Skilled Nursing Facility ([place] - [place]) - [facility]  bed at [facility]    Diet:  Hospital:  Diet Order   Procedures   • Diet Regular     Activity:  As tolerated    Restrictions or Other Recommendations:   Per Ortho:  WBAT on RLE with posterior Hip precautions for 6 weeks.  Limit active abduction.  DVT ppx with aspirin 325 mg for 6 weeks       CODE STATUS:    Code Status and Medical Interventions:   Ordered at: 08/02/22 0910     Level Of Support Discussed With:    Next of Kin (If No Surrogate)     Code Status (Patient has no pulse and is not breathing):    CPR (Attempt to Resuscitate)     Medical Interventions (Patient has pulse or is breathing):    Full Support       Future Appointments   Date Time Provider Department Center   8/10/2022  9:45 PM EMS [facility]       Additional Instructions for the Follow-ups that You Need to Schedule     Discharge Follow-up with PCP   As directed       Currently Documented PCP:    Provider, No Known    PCP Phone Number:    None     Follow Up Details: Primary Care Doctor - 2 weeks         Discharge Follow-up with Specialty: Orthopedic Surgery - Dr. Harrington; 3 Weeks   As directed      Specialty: Orthopedic Surgery - Dr. Harrington    Follow Up: 3 Weeks    Follow Up Details: WBAT RLE with posterior hip precautions x 6 weeks. Limit active abduction.             WBAT RLE with posterior hip precautions x 6 weeks. Limit active abduction.  PT/OT  Ok with DVT ppx with  mg x 6 weeks per Ortho        Mohsen Talavera MD  08/10/22      Time Spent on Discharge:  I spent  42  minutes on this discharge activity which included: face-to-face encounter with the patient, reviewing the data in the system, coordination of the care with the nursing staff as well as consultants, documentation, and entering orders.

## 2022-08-10 NOTE — PLAN OF CARE
Goal Outcome Evaluation:  VSS, O2=96% on RA. Resting comfortably in bed, no signs of discomfort. Will continue to monitor.

## 2022-08-10 NOTE — DISCHARGE INSTRUCTIONS
Weight bearing as tolerated to right lower extremity; hip precautions for three weeks. Limit active abduction.

## 2022-08-10 NOTE — CASE MANAGEMENT/SOCIAL WORK
Case Management Discharge Note      Final Note: Transfer to [facility] bed. Nursing to call report to [phone]. Please fax DC summary before 1530pm to [phone]. Transport arranged with [facility] for 2145pm tonight. Family is aware of plans and agreeable.         Selected Continued Care - Admitted Since 8/2/2022     Destination Coordination complete.    Service Provider Selected Services Address Phone Fax Patient Preferred    [facility]  Skilled Nursing [address] [id number] [phone] --          Durable Medical Equipment    No services have been selected for the patient.              Dialysis/Infusion    No services have been selected for the patient.              Home Medical Care    No services have been selected for the patient.              Therapy    No services have been selected for the patient.              Community Resources    No services have been selected for the patient.              Community & DME    No services have been selected for the patient.                  Transportation Services  Ambulance: [facility]    Final Discharge Disposition Code: 61 - hospital-based swing bed

## 2022-08-10 NOTE — PLAN OF CARE
Goal Outcome Evaluation:  Plan of Care Reviewed With: patient        Progress: no change  Outcome Evaluation: Patient continues to be limited by weakness, impaired balance, and difficulty following commands. She transferred sit to stand w/ max A x 2, but was unable to safely take steps. PT continues to recommend SNF at D/C.

## 2022-08-10 NOTE — THERAPY TREATMENT NOTE
Patient Name: Patricai Obrien  : 1935    MRN: [id number]                              Today's Date: 8/10/2022       Admit Date: 2022    Visit Dx:     ICD-10-CM ICD-9-CM   1. Closed fracture of right hip, initial encounter ([facility])  S72.001A 820.8     Patient Active Problem List   Diagnosis   • Hip fracture (HCC)   • Dementia (HCC)   • HTN (hypertension)   • Hypothyroidism (acquired)     Past Medical History:   Diagnosis Date   • Anxiety    • Dementia (HCC)    • Depression    • Disease of thyroid gland    • Elevated cholesterol    • Hypertension    • Osteoarthritis      Past Surgical History:   Procedure Laterality Date   • BLADDER SUSPENSION     • CHOLECYSTECTOMY     • HIP HEMIARTHROPLASTY Right 2022    Procedure: HIP HEMIARTHROPLASTY RIGHT;  Surgeon: Jaspal Harrington Jr., MD;  Location: [facility];  Service: Orthopedics;  Laterality: Right;   • HYSTERECTOMY     • LAPAROSCOPIC TUBAL LIGATION        General Information     Row Name 08/10/22 1002          OT Time and Intention    Document Type therapy note (daily note)  -MA     Mode of Treatment occupational therapy  -MA     Row Name 08/10/22 1002          General Information    Patient Profile Reviewed yes  -MA     Existing Precautions/Restrictions fall;right;hip, posterior;other (see comments)  Limit active ABD, KI for quad weakness, dementia  -MA     Barriers to Rehab previous functional deficit;cognitive status  -MA     Row Name 08/10/22 1002          Cognition    Orientation Status (Cognition) oriented to;person;disoriented to;place;situation;time;verbal cues/prompts needed for orientation  -MA     Row Name 08/10/22 1002          Safety Issues, Functional Mobility    Safety Issues Affecting Function (Mobility) awareness of need for assistance;insight into deficits/self-awareness;safety precaution awareness;safety precautions follow-through/compliance;sequencing abilities  -MA     Impairments Affecting Function (Mobility)  cognition;balance;endurance/activity tolerance;pain;strength;range of motion (ROM);postural/trunk control  -MA     Cognitive Impairments, Mobility Safety/Performance awareness, need for assistance;insight into deficits/self-awareness;safety precaution awareness;safety precaution follow-through;sequencing abilities  -MA           User Key  (r) = Recorded By, (t) = Taken By, (c) = Cosigned By    Initials Name Provider Type    Randi Mari OT Occupational Therapist                 Mobility/ADL's     Row Name 08/10/22 1136          Bed Mobility    Bed Mobility rolling left;rolling right;scooting/bridging  -MA     Rolling Left [place] (Bed Mobility) maximum assist (25% patient effort);2 person assist;verbal cues  -MA     Rolling Right [place] (Bed Mobility) maximum assist (25% patient effort);2 person assist;verbal cues  -MA     Scooting/Bridging [place] (Bed Mobility) dependent (less than 25% patient effort);1 person assist;verbal cues  -MA     Bed Mobility, Safety Issues impaired trunk control for bed mobility;decreased use of arms for pushing/pulling;decreased use of legs for bridging/pushing  -MA     Assistive Device (Bed Mobility) bed rails;head of bed elevated;draw sheet  -MA     Comment, (Bed Mobility) Pt max Ax2 for rolling L/R for placement of mechanical lift sling, dep x1 for scooting pt up in chair.  -MA     Row Name 08/10/22 1136          Transfers    Transfers bed-chair transfer  -MA     Bed-Chair [place] (Transfers) dependent (less than 25% patient effort);2 person assist;verbal cues  -MA     Assistive Device (Bed-Chair Transfers) lift device  -MA     Row Name 08/10/22 1136          Activities of Daily Living    BADL Assessment/Intervention grooming;lower body dressing;feeding  -MA     Row Name 08/10/22 1136          Mobility    Extremity Weight-bearing Status right lower extremity   -MA     Right Lower Extremity (Weight-bearing Status) weight-bearing as tolerated (WBAT)   -MA      Row Name 08/10/22 1136          Lower Body Dressing Assessment/Training    [place] Level (Lower Body Dressing) don;socks;other (see comments);dependent (less than 25% patient effort);verbal cues  -MA     Position (Lower Body Dressing) supine  -MA     Comment, (Lower Body Dressing) Pt dep for LBD (donning socks and KI)  -MA     Row Name 08/10/22 1136          Grooming Assessment/Training    [place] Level (Grooming) wash face, hands;hair care, combing/brushing;set up;verbal cues  -MA     Position (Grooming) supported sitting  -MA     Row Name 08/10/22 1136          Self-Feeding Assessment/Training    [place] Level (Feeding) prepare tray/open items;dependent (less than 25% patient effort);scoop food and bring to mouth;liquids to mouth;set up;verbal cues;nonverbal cues (demo/gesture)  -MA     Position (Self-Feeding) supported sitting  -MA           User Key  (r) = Recorded By, (t) = Taken By, (c) = Cosigned By    Initials Name Provider Type    Randi Mari OT Occupational Therapist               Obj/Interventions    No documentation.                Goals/Plan    No documentation.                Clinical Impression     Row Name 08/10/22 1138          Pain Assessment    Pretreatment Pain Rating 0/10 - no pain  -MA     Posttreatment Pain Rating 0/10 - no pain  -[facility] Name 08/10/22 1138          Plan of Care Review    Plan of Care Reviewed With patient  -MA     Progress no change  -MA     Outcome Evaluation Pt presents w/ impaired cognition, decreased activity tolerance, and generalized weakness limiting her ADL indpendence. Pt max Ax2 for rolling L/R, dep x2 for lift from bed-to-chair, dep for LB ADLs, SUA for grooming and self-feeding w/ simple, single-step commands. Will continue ot progress pt as tolerated per OT POC. Rec SNF at d/c.  -MA     Row Name 08/10/22 1138          Therapy Assessment/Plan (OT)    Rehab Potential (OT) fair, will monitor progress closely  -MA     Criteria for Skilled  Therapeutic Interventions Met (OT) yes;meets criteria;skilled treatment is necessary  -MA     Therapy Frequency (OT) daily  -MA     Row Name 08/10/22 1138          Therapy Plan Review/Discharge Plan (OT)    Anticipated Discharge Disposition (OT) [facility] nursing [facility]  -MA     Row Name 08/10/22 1138          Vital Signs    Pre Systolic BP Rehab --  VSS - RN cleared OT  -MA     O2 Delivery Pre Treatment room air  -MA     O2 Delivery Intra Treatment room air  -MA     O2 Delivery Post Treatment room air  -MA     Pre Patient Position Supine  -MA     Intra Patient Position Sitting  -MA     Post Patient Position Sitting  -MA     Row Name 08/10/22 1138          Positioning and Restraints    Pre-Treatment Position in bed  -MA     Post Treatment Position chair  -MA     In Chair reclined;with PT;waffle cushion;on mechanical lift sling;with brace  w/ R KI donned  -MA           User Key  (r) = Recorded By, (t) = Taken By, (c) = Cosigned By    Initials Name Provider Type    Randi Mari OT Occupational Therapist               Outcome Measures     Row Name 08/10/22 1140          How much help from another is currently needed...    Putting on and taking off regular lower body clothing? 1  -MA     Bathing (including washing, rinsing, and drying) 2  -MA     Toileting (which includes using toilet bed pan or urinal) 1  -MA     Putting on and taking off regular upper body clothing 2  -MA     Taking care of personal grooming (such as brushing teeth) 3  -MA     Eating meals 3  -MA     AM-PAC 6 Clicks Score (OT) 12  -MA     Row Name 08/10/22 1140          Functional Assessment    Outcome Measure Options AM-PAC 6 Clicks Daily Activity (OT)  -MA           User Key  (r) = Recorded By, (t) = Taken By, (c) = Cosigned By    Initials Name Provider Type    Randi Mari OT Occupational Therapist                Occupational Therapy Education                 Title: PT OT SLP Therapies (In Progress)     Topic: Occupational Therapy (In  Progress)     Point: ADL training (In Progress)     Description:   Instruct learner(s) on proper safety adaptation and remediation techniques during self care or transfers.   Instruct in proper use of assistive devices.              Learning Progress Summary           Patient Acceptance, E, NR by MA at 8/10/2022 1141    Acceptance, E, VU,NR by FW at 8/9/2022 1536    Acceptance, E,D, VU,NR by TB at 8/7/2022 1528    Acceptance, E, VU by MA1 at 8/7/2022 0334    Acceptance, E,D, VU,NR by TB at 8/3/2022 1336   Family Acceptance, E,D, VU,NR by TB at 8/7/2022 1528    Acceptance, E,D, VU,NR by TB at 8/3/2022 1336                   Point: Home exercise program (Done)     Description:   Instruct learner(s) on appropriate technique for monitoring, assisting and/or progressing therapeutic exercises/activities.              Learning Progress Summary           Patient Acceptance, E, VU,NR by FW at 8/9/2022 1536    Acceptance, E,D, VU,NR by TB at 8/7/2022 1528    Acceptance, E, VU by MA1 at 8/7/2022 0334   Family Acceptance, E,D, VU,NR by TB at 8/7/2022 1528                   Point: Precautions (In Progress)     Description:   Instruct learner(s) on prescribed precautions during self-care and functional transfers.              Learning Progress Summary           Patient Acceptance, E, NR by MA at 8/10/2022 1141    Acceptance, E, VU,NR by FW at 8/9/2022 1536    Acceptance, E,D, VU,NR by TB at 8/7/2022 1528    Acceptance, E, VU by MA1 at 8/7/2022 0334    Acceptance, E,D, VU,NR by TB at 8/3/2022 1336   Family Acceptance, E,D, VU,NR by TB at 8/7/2022 1528    Acceptance, E,D, VU,NR by TB at 8/3/2022 1336                   Point: Body mechanics (In Progress)     Description:   Instruct learner(s) on proper positioning and spine alignment during self-care, functional mobility activities and/or exercises.              Learning Progress Summary           Patient Acceptance, E, NR by MA at 8/10/2022 1141    Acceptance, E, VU,NR by FW at  8/9/2022 1536    Acceptance, E, VU by MA1 at 8/7/2022 0334                               User Key     Initials Effective Dates Name Provider Type Discipline    [facility] 06/16/21 -  Emma Johnson, OT Occupational Therapist OT    [facility] 05/05/22 -  Kayden Chawla, PT Physical Therapist PT    [facility] 10/22/20 -  Monica Walton, RN Registered Nurse Nurse    [place] 11/19/20 -  Randi Cheema OT Occupational Therapist OT              OT Recommendation and Plan  Therapy Frequency (OT): daily  Plan of Care Review  Plan of Care Reviewed With: patient  Progress: no change  Outcome Evaluation: Pt presents w/ impaired cognition, decreased activity tolerance, and generalized weakness limiting her ADL indpendence. Pt max Ax2 for rolling L/R, dep x2 for lift from bed-to-chair, dep for LB ADLs, SUA for grooming and self-feeding w/ simple, single-step commands. Will continue ot progress pt as tolerated per OT POC. Rec SNF at d/c.     Time Calculation:    Time Calculation- OT     Row Name 08/10/22 1141             Time Calculation- OT    OT Start Time 1002  -MA      OT Received On 08/10/22  -MA      OT Goal Re-Cert Due Date 08/13/22  -MA              Timed Charges    07156 - OT Therapeutic Activity Minutes 12  -MA      78698 - OT Self Care/Mgmt Minutes 11  -MA              Total Minutes    Timed Charges Total Minutes 23  -MA       Total Minutes 23  -MA            User Key  (r) = Recorded By, (t) = Taken By, (c) = Cosigned By    Initials Name Provider Type    MA Randi Cheema OT Occupational Therapist              Therapy Charges for Today     Code Description Service Date Service Provider Modifiers Qty    79460238795 HC OT THERAPEUTIC ACT EA 15 MIN 8/10/2022 Randi Cheema OT GO 1    04562575258 HC OT SELF CARE/MGMT/TRAIN EA 15 MIN 8/10/2022 Randi Cheema OT GO 1               Randi Cheema OT  8/10/2022

## 2022-08-10 NOTE — DISCHARGE PLACEMENT REQUEST
"Yovana Hall (87 y.o. Female)             Date of Birth   1935    Social Security Number       Address   [address] [place] [address]    Home Phone   [phone]    MRN   [id number]       Holiness   Non-Yarsani    Marital Status                               Admission Date   8/2/22    Admission Type   Urgent    Admitting Provider   Mohsen Talavera MD    Attending Provider   Mohsen Talavera MD    Department, Room/Bed   [facility] 6A, N616/1       Discharge Date       Discharge Disposition   Skilled Nursing Facility (DC - External)    Discharge Destination                               Attending Provider: Mohsen Talavera MD    Allergies: Sulfa Antibiotics    Isolation: Contact Air   Infection: COVID (confirmed) (08/09/22)   Code Status: CPR   Advance Care Planning Activity    Ht: 149.9 cm (59\")   Wt: 58.1 kg (128 lb)    Admission Cmt: None   Principal Problem: None                Active Insurance as of 8/2/2022     Primary Coverage     Payor Plan Insurance Group Employer/Plan Group    ANTH MEDICARE REPLACEMENT ANTH MEDICARE ADVANTAGE [id number]     Payor Plan Address Payor Plan Phone Number Payor Plan Fax Number Effective Dates    [address] [phone]  1/1/2020 - None Entered    [facility] [id number]       Subscriber Name Subscriber Birth Date Member ID       YOVANA HALL 1935 [id number]                 Emergency Contacts      (Rel.) Home Phone Work Phone Mobile Phone    eric perkins (Daughter) [phone] -- [phone]    rAoldolito (Grandchild) -- -- [phone]          COVID PRE-OP / PRE-PROCEDURE SCREENING ORDER (NO ISOLATION) - Swab, Nasopharynx [[id number]] (Order [id number])  Order  Date: 8/10/2022 Department: [address] Released By/Authorizing: Mohsen Talavera MD (auto-released)       Linked Results    Procedure Abnormality Status   COVID-19, ABBOTT IN-HOUSE,NASAL Swab (NO TRANSPORT MEDIA) 2 HR TAT - Swab, " Nasopharynx Normal Final result       Reprint Order Requisition             Discharge Summary      Mohsen Talavera MD at 08/10/22 1129              [facility] Medicine Services  DISCHARGE SUMMARY    Patient Name: Patricia Obrien  : 1935  MRN: [id number]    Date of Admission: 2022  3:17 AM  Date of Discharge:  8/10/2022 (Wednesday)  Primary Care Physician: Provider, No Known    Consults     Date and Time Order Name Status Description    2022  4:07 AM Inpatient Orthopedic Surgery Consult Completed         Jaspal Harrington MD - Orthopedic Surgery -  Right Hip Hemiarthroplasty on     Hospital Course     Presenting Problem:   Hip fracture (HCC) [S72.009A]    Active Hospital Problems    Diagnosis  POA   • Hip fracture (HCC) [S72.009A]  Yes   • Dementia (HCC) [F03.90]  Yes   • HTN (hypertension) [I10]  Yes   • Hypothyroidism (acquired) [E03.9]  Yes      Resolved Hospital Problems   No resolved problems to display.      Moderate-severe dementia      Hospital Course:  Patricia Obrien is a 87 y.o. female with past medical history of moderate to severe dementia, hypotension, hypothyroidism who presented from [facility] for a hip fracture.  She was transferred on 2022 for the need for surgery due to Right Hip Fracture.    She had a fall at home, was brought to the ER at [facility] and was transferred here after diagnosing a hip fracture.    She was seen by Dr. Harrington (Orthopedic Surgery) on the day of arrival and taken to the OR for Right Hemiarthroplasty.    She had Open treatment of a displaced Femoral Neck fracture with Hemiarthroplasty on 2022 (Tuesday) over a week ago.    Screening COVID-19 test prior to discharge was incidentally positive, but repeat COVID-19 test was negative, most concerning for a false positive.    She is asymptomatic and not requiring supplemental oxygen.    She was transferred to an isolation room while in the hospital but not treated for  COVID-19 as she has baseline dementia and unable to obtain history.    She will transfer today to a Swing Bed at [facility] with WBAT of her Right Lower Extremity with posterior hip precautions for 6 week.  Additionally, Orthopedic Surgery recommended aspirin 325 mg for 6 week for DVT ppx.    Discharge Follow Up Recommendations for outpatient labs/diagnostics:   follow up with Orthopedic Surgery as suggested    Day of Discharge     HPI:    No family in room.  This is my first time seeing this patient and she appears symptom free and ready for planned discharge.  Has not needed pain medication in the last 24 hours.    Review of Systems    Unable to assess due to mental status    Vital Signs:   Temp:  [98.1 °F (36.7 °C)-98.7 °F (37.1 °C)] 98.1 °F (36.7 °C)  Heart Rate:  [67-75] 75  Resp:  [16] 16  BP: (129-148)/(58-66) 129/63    Physical Exam:    Constitutional: awake, No acute distress  HENT: NCAT, mucous membranes moist  Respiratory: Clear to auscultation bilaterally, respiratory effort normal   Cardiovascular: RRR, s1 and s2  Gastrointestinal: Positive bowel sounds, soft, nontender, nondistended  Musculoskeletal: No bilateral ankle edema  Mental status:  Oriented to Person, does not know the year she was born, does not know where she is  Skin: dry skin    Pertinent  and/or Most Recent Results     LAB RESULTS:      Lab 08/06/22  1209 08/05/22  0755   WBC 5.45 6.04   HEMOGLOBIN 8.3* 8.1*   HEMATOCRIT 24.6* 23.5*   PLATELETS 143 107*   NEUTROS ABS 3.66  --    IMMATURE GRANS (ABS) 0.03  --    LYMPHS ABS 1.08  --    MONOS ABS 0.41  --    EOS ABS 0.23  --    MCV 92.1 92.2         Lab 08/05/22  0755   SODIUM 136   POTASSIUM 4.1   CHLORIDE 105   CO2 25.0   ANION GAP 6.0   BUN 15   CREATININE 0.90   EGFR 62.0   GLUCOSE 95   CALCIUM 7.9*                   Brief Urine Lab Results  (Last result in the past 365 days)      Color   Clarity   Blood   Leuk Est   Nitrite   Protein   CREAT   Urine HCG        08/01/22 1920  Yellow   Clear   Negative   Negative   Negative                 Microbiology Results (last 10 days)     Procedure Component Value - Date/Time    COVID PRE-OP / PRE-PROCEDURE SCREENING ORDER (NO ISOLATION) - Swab, Nasopharynx [[id number]]  (Normal) Collected: 08/10/22 0904    Lab Status: Final result Specimen: Swab from Nasopharynx Updated: 08/10/22 0959    Narrative:      The following orders were created for panel order COVID PRE-OP / PRE-PROCEDURE SCREENING ORDER (NO ISOLATION) - Swab, Nasopharynx.  Procedure                               Abnormality         Status                     ---------                               -----------         ------                     COVID-19, ABBOTT IN-HOUS...[[id number]]  Normal              Final result                 Please view results for these tests on the individual orders.    COVID-19, ABBOTT IN-HOUSE,NASAL Swab (NO TRANSPORT MEDIA) 2 HR TAT - Swab, Nasopharynx [[id number]]  (Normal) Collected: 08/10/22 0904    Lab Status: Final result Specimen: Swab from Nasopharynx Updated: 08/10/22 0959     COVID19 Presumptive Negative    Narrative:      Fact sheet for providers: https://www.fda.gov/media/231012/download     Fact sheet for patients: https://www.fda.gov/media/697973/download    Test performed by PCR.  If inconsistent with clinical signs and symptoms patient should be tested with different authorized molecular test.    COVID PRE-OP / PRE-PROCEDURE SCREENING ORDER (NO ISOLATION) - Swab, Nasopharynx [[id number]]  (Abnormal) Collected: 08/09/22 1722    Lab Status: Final result Specimen: Swab from Nasopharynx Updated: 08/09/22 1832    Narrative:      The following orders were created for panel order COVID PRE-OP / PRE-PROCEDURE SCREENING ORDER (NO ISOLATION) - Swab, Nasopharynx.  Procedure                               Abnormality         Status                     ---------                               -----------         ------                     COVID-19, FLU A/B, RSV  P...[[id number]]  Abnormal            Final result                 Please view results for these tests on the individual orders.    COVID-19, FLU A/B, RSV PCR - Swab, Nasopharynx [[id number]]  (Abnormal) Collected: 08/09/22 1722    Lab Status: Final result Specimen: Swab from Nasopharynx Updated: 08/09/22 1832     COVID19 Detected     Influenza A PCR Not Detected     Influenza B PCR Not Detected     RSV, PCR Not Detected    Narrative:      Fact sheet for providers: https://www.fda.gov/media/889950/download    Fact sheet for patients: https://www.fda.gov/media/566105/download    Test performed by PCR.    COVID PRE-OP / PRE-PROCEDURE SCREENING ORDER (NO ISOLATION) - Swab, Nasopharynx [[id number]]  (Normal) Collected: 08/02/22 0630    Lab Status: Final result Specimen: Swab from Nasopharynx Updated: 08/02/22 0701    Narrative:      The following orders were created for panel order COVID PRE-OP / PRE-PROCEDURE SCREENING ORDER (NO ISOLATION) - Swab, Nasopharynx.  Procedure                               Abnormality         Status                     ---------                               -----------         ------                     COVID-19 and FLU A/B PCR...[[id number]]  Normal              Final result                 Please view results for these tests on the individual orders.    COVID-19 and FLU A/B PCR - Swab, Nasopharynx [[id number]]  (Normal) Collected: 08/02/22 0630    Lab Status: Final result Specimen: Swab from Nasopharynx Updated: 08/02/22 0701     COVID19 Not Detected     Influenza A PCR Not Detected     Influenza B PCR Not Detected    Narrative:      Fact sheet for providers: https://www.fda.gov/media/687364/download    Fact sheet for patients: https://www.fda.gov/media/897968/download    Test performed by PCR.          Peripheral Block    Result Date: 8/2/2022  Paula Brooks CRNA     8/2/2022  1:55 PM Peripheral Block Patient reassessed immediately prior to procedure Patient location during procedure:  OR Reason for block: procedure for pain, at surgeon's request and post-op pain management Performed by CRNA/CAA: Leah Richardson CRNA Preanesthetic Checklist Completed: patient identified, IV checked, site marked, risks and benefits discussed, surgical consent, monitors and equipment checked, pre-op evaluation and timeout performed Prep: Sterile barriers:gloves, cap, sterile barriers and mask Prep: ChloraPrep Patient monitoring: blood pressure monitoring, continuous pulse oximetry and EKG Procedure Performed under: local infiltration Guidance:ultrasound guided, nerve stimulator and landmark technique Images:still images not obtained Laterality:right Block Type:fascia iliaca compartment Injection Technique:single-shot Needle Type:short-bevel Needle Gauge:20 G Resistance on Injection: none Catheter Size:20 G (20g) Medications Used: ropivacaine (NAROPIN) 0.2 % injection, 50 mL Med administered at 8/2/2022 1:38 PM Medications Preservative Free Saline:10ml Post Assessment Injection Assessment: negative aspiration for heme, no paresthesia on injection and incremental injection Patient Tolerance:comfortable throughout block Complications:no Additional Notes The BBRaun 360 degree echogenic needle was introduced in plane, in a lateral to medial direction at the level of the inguinal crease.  Under ultrasound guidance, the femoral artery and vein where located.  The needle was then directed below Fascia Iliacus towards the Femoral nerve.  NS was utilized to hydro dissect and galina needle advancement towards the target structure.   LA was injected incrementally in 3-5 ml aliquots with negative aspirate.  LA spread was visualized around the nerve, negative intraneural injection, low injection pressures.  Thank you     XR Outside Films    Result Date: 8/2/2022  This procedure was auto-finalized with no dictation required.    XR Outside Films    Result Date: 8/2/2022  This procedure was auto-finalized with no dictation  required.    XR CHEST PORTABLE    Result Date: 8/1/2022  PORTABLE CHEST Indication: Preoperative Comparison: 7/3/22 Findings: The cardiomediastinal silhouette is within normal limits. No focal consolidation, pleural effusion or pneumothorax. Bones are unremarkable.    No acute cardiopulmonary findings.    XR HIP RIGHT (2-3 VIEWS)    Result Date: 8/1/2022  EXAM: PELVIS AND RIGHT HIP 3 VIEWS INDICATION: Fall. Right hip pain. COMPARISON: None FINDINGS: Right femoral neck basicervical fracture with displacement and varus impaction deformity. No definite intertrochanteric extension. No additional fractures identified. The right hip is congruent. Mild osteoarthritis is present.    Right femoral neck bases cervical fracture with displacement and varus impaction. No definite intertrochanteric extension but consider CT if needed for preoperative planning.    XR Hip With or Without Pelvis 2 - 3 View Right    Result Date: 8/2/2022  DATE OF EXAM: 8/2/2022 3:40 PM  PROCEDURE: XR HIP W OR WO PELVIS 2-3 VIEW RIGHT-  INDICATIONS: s/p R hip abdiaziz  COMPARISON: 8/2/2022  TECHNIQUE: AP and Lateral views of the right hip with Pelvis were obtained.  FINDINGS: The patient has undergone right total hip arthroplasty since the last study. The prosthesis appears in appropriate position and alignment. There is a fracture identified through the greater trochanter which is better seen on the current radiograph.       1. Status post right total hip arthroplasty. 2. Fracture through the greater trochanter.  This report was finalized on 8/2/2022 3:59 PM by Dario Toussaint MD.      XR Hip With or Without Pelvis 2 - 3 View Right    Result Date: 8/2/2022  DATE OF EXAM: 8/2/2022 7:10 AM  PROCEDURE: XR HIP W OR WO PELVIS 2-3 VIEW RIGHT-  INDICATIONS: need dedicated AP/lateral of R hip for operative planning  COMPARISON: No comparisons available.  TECHNIQUE: AP and Lateral views of the right hip with Pelvis were obtained.  FINDINGS: There is deformity of the  femoral neck with foreshortening on the AP view. This is suggestive of underlying fracture that could be subcapital or transcervical. The right hemipelvis appears to be intact. The intertrochanteric area reveals no definite fracture. The visualized shaft of the femur also grossly unremarkable.      1.  Findings suggestive of a right femoral neck fracture. Additional imaging with CT could be obtained as thought clinically appropriate.  This report was finalized on 8/2/2022 8:01 AM by Lane Nelson MD.                    Discharge Details        Discharge Medications      New Medications      Instructions Start Date   aspirin  MG tablet   325 mg, Oral, Daily      traMADol 50 MG tablet  Commonly known as: ULTRAM   25 mg, Oral, Every 12 Hours PRN         Continue These Medications      Instructions Start Date   atorvastatin 10 MG tablet  Commonly known as: LIPITOR   10 mg, Oral, Daily      busPIRone 10 MG tablet  Commonly known as: BUSPAR   10 mg, Oral, 3 Times Daily      folic acid 1 MG tablet  Commonly known as: FOLVITE   1 mg, Oral, Daily      levothyroxine 50 MCG tablet  Commonly known as: SYNTHROID, LEVOTHROID   50 mcg, Oral, Daily      linaclotide 72 MCG capsule capsule  Commonly known as: LINZESS   72 mcg, Oral, As Needed      Mirabegron ER 25 MG tablet sustained-release 24 hour 24 hr tablet  Commonly known as: MYRBETRIQ   25 mg, Oral, Nightly      Namzaric 28-10 MG capsule sustained-release 24 hr  Generic drug: Memantine HCl-Donepezil HCl   Oral, Daily      omeprazole 20 MG capsule  Commonly known as: priLOSEC   40 mg, Oral, Daily      QUEtiapine 25 MG tablet  Commonly known as: SEROquel   25 mg, Oral, Nightly      sertraline 50 MG tablet  Commonly known as: ZOLOFT   50 mg, Oral, Daily         Stop These Medications    lisinopril 40 MG tablet  Commonly known as: PRINIVIL,ZESTRIL     meloxicam 15 MG tablet  Commonly known as: MOBIC            Allergies   Allergen Reactions   • Sulfa Antibiotics Other (See  Comments)     unknown     Discharge Disposition:  Skilled Nursing Facility (DC - External) - [facility] at  and [facility]    Diet:  Hospital:  Diet Order   Procedures   • Diet Regular     Activity:  As tolerated    Restrictions or Other Recommendations:   Per Ortho:  WBAT on RLE with posterior Hip precautions for 6 weeks.  Limit active abduction.  DVT ppx with aspirin 325 mg for 6 weeks       CODE STATUS:    Code Status and Medical Interventions:   Ordered at: 08/02/22 0910     Level Of Support Discussed With:    Next of Kin (If No Surrogate)     Code Status (Patient has no pulse and is not breathing):    CPR (Attempt to Resuscitate)     Medical Interventions (Patient has pulse or is breathing):    Full Support       Future Appointments   Date Time Provider Department Center   8/10/2022  9:45 PM EMS 1  [facility]       Additional Instructions for the Follow-ups that You Need to Schedule     Discharge Follow-up with PCP   As directed       Currently Documented PCP:    Provider, No Known    PCP Phone Number:    None     Follow Up Details: Primary Care Doctor - 2 weeks         Discharge Follow-up with Specialty: Orthopedic Surgery - Dr. Harrington; 3 Weeks   As directed      Specialty: Orthopedic Surgery - Dr. Harrington    Follow Up: 3 Weeks    Follow Up Details: WBAT RLE with posterior hip precautions x 6 weeks. Limit active abduction.             WBAT RLE with posterior hip precautions x 6 weeks. Limit active abduction.  PT/OT  Ok with DVT ppx with  mg x 6 weeks per Ortho        Mohsen Talavera MD  08/10/22      Time Spent on Discharge:  I spent  42  minutes on this discharge activity which included: face-to-face encounter with the patient, reviewing the data in the system, coordination of the care with the nursing staff as well as consultants, documentation, and entering orders.          Electronically signed by Mohsen Talavera MD at 08/10/22 8003

## 2022-08-10 NOTE — PLAN OF CARE
Goal Outcome Evaluation:  Plan of Care Reviewed With: patient        Progress: no change  Outcome Evaluation: Pt presents w/ impaired cognition, decreased activity tolerance, and generalized weakness limiting her ADL indpendence. Pt max Ax2 for rolling L/R, dep x2 for lift from bed-to-chair, dep for LB ADLs, SUA for grooming and self-feeding w/ simple, single-step commands. Will continue ot progress pt as tolerated per OT POC. Rec SNF at d/c.

## 2022-08-10 NOTE — THERAPY TREATMENT NOTE
Patient Name: Patricia Obrien  : 1935    MRN: [id number]                              Today's Date: 8/10/2022       Admit Date: 2022    Visit Dx:     ICD-10-CM ICD-9-CM   1. Closed fracture of right hip, initial encounter ([facility])  S72.001A 820.8     Patient Active Problem List   Diagnosis   • Hip fracture (HCC)   • Dementia (HCC)   • HTN (hypertension)   • Hypothyroidism (acquired)   • Coagulopathy (HCC)     Past Medical History:   Diagnosis Date   • Anxiety    • Dementia (HCC)    • Depression    • Disease of thyroid gland    • Elevated cholesterol    • Hypertension    • Osteoarthritis      Past Surgical History:   Procedure Laterality Date   • BLADDER SUSPENSION     • CHOLECYSTECTOMY     • HIP HEMIARTHROPLASTY Right 2022    Procedure: HIP HEMIARTHROPLASTY RIGHT;  Surgeon: Jaspal Harrington Jr., MD;  Location: [facility];  Service: Orthopedics;  Laterality: Right;   • HYSTERECTOMY     • LAPAROSCOPIC TUBAL LIGATION        General Information     Row Name 08/10/22 104          Physical Therapy Time and Intention    Document Type therapy note (daily note)  -MB     Mode of Treatment physical therapy  -MB     Row Name 08/10/22 1041          General Information    Patient Profile Reviewed yes  -MB     Existing Precautions/Restrictions fall;right;hip, posterior;other (see comments)  Limit active ABD, KI for quad weakness, dementia  -MB     Barriers to Rehab previous functional deficit;cognitive status  -MB     Row Name 08/10/22 1041          Cognition    Orientation Status (Cognition) oriented to;person;disoriented to;place;situation;time  -MB     Row Name 08/10/22 1041          Safety Issues, Functional Mobility    Safety Issues Affecting Function (Mobility) awareness of need for assistance;insight into deficits/self-awareness;judgment;positioning of assistive device;problem-solving;safety precaution awareness;safety precautions follow-through/compliance;sequencing abilities  -MB     Impairments Affecting  Function (Mobility) cognition;balance;endurance/activity tolerance;pain;strength;range of motion (ROM);postural/trunk control  -MB           User Key  (r) = Recorded By, (t) = Taken By, (c) = Cosigned By    Initials Name Provider Type    Jasmin Sinclair, PT Physical Therapist               Mobility     Row Name 08/10/22 1519          Bed Mobility    Rolling Left [place] (Bed Mobility) maximum assist (25% patient effort);2 person assist;verbal cues  -MB     Rolling Right [place] (Bed Mobility) maximum assist (25% patient effort);2 person assist;verbal cues  -MB     Comment, (Bed Mobility) R KI place in supine. Pt. required max A x 2 for rolling and demo. difficulty following commands.  -MB     Row Name 08/10/22 1519          Transfers    Comment, (Transfers) Pt. transferred to chair via mechanical lift. STS x 3 reps from chair w/ assist for set up and VCs/tactile cues for sequencing. Pt. demo. posterior lean, narrow KATHRINE in standing, and difficulty w/ shifting weight. Pt. able to stand ~90% upright on final stand.  -MB     Row Name 08/10/22 1519          Bed-Chair Transfer    Bed-Chair [place] (Transfers) dependent (less than 25% patient effort);2 person assist;verbal cues  -MB     Assistive Device (Bed-Chair Transfers) lift device  -MB     Row Name 08/10/22 1519          Sit-Stand Transfer    Sit-Stand [place] (Transfers) maximum assist (25% patient effort);2 person assist;verbal cues;nonverbal cues (demo/gesture)  -MB     Assistive Device (Sit-Stand Transfers) walker, front-wheeled  -MB     Row Name 08/10/22 1519          Gait/Stairs (Locomotion)    [place] Level (Gait) unable to assess  -MB     Comment, (Gait/Stairs) Pt. required max A x 2 for static standing and minimal lateral weight shifting. Unable to safely take steps this date.  -MB     Row Name 08/10/22 1519          Mobility    Extremity Weight-bearing Status right lower extremity  -MB     Right Lower Extremity  (Weight-bearing Status) weight-bearing as tolerated (WBAT)  -MB           User Key  (r) = Recorded By, (t) = Taken By, (c) = Cosigned By    Initials Name Provider Type    Jasmin Sinclair, PT Physical Therapist               Obj/Interventions     Row Name 08/10/22 1527          Motor Skills    Therapeutic Exercise hip;knee;ankle  -[facility] Name 08/10/22 1527          Hip (Therapeutic Exercise)    Hip (Therapeutic Exercise) AAROM (active assistive range of motion)  -MB     Hip AAROM (Therapeutic Exercise) right;flexion;aBduction;10 repetitions  -[facility] Name 08/10/22 1527          Knee (Therapeutic Exercise)    Knee (Therapeutic Exercise) AAROM (active assistive range of motion)  -MB     Knee AAROM (Therapeutic Exercise) bilateral;flexion;extension;10 repetitions  -[facility] Name 08/10/22 1527          Ankle (Therapeutic Exercise)    Ankle (Therapeutic Exercise) AAROM (active assistive range of motion)  -MB     Ankle AAROM (Therapeutic Exercise) bilateral;dorsiflexion;plantarflexion;10 repetitions  -[facility] Name 08/10/22 1527          Balance    Balance Assessment standing static balance  -MB     Static Standing Balance maximum assist;2-person assist  -MB     Position/Device Used, Standing Balance supported;walker, rolling  -MB     Balance Interventions standing;sit to stand;weight shifting activity  -MB           User Key  (r) = Recorded By, (t) = Taken By, (c) = Cosigned By    Initials Name Provider Type    Jasmin Sinclair, PT Physical Therapist               Goals/Plan    No documentation.                Clinical Impression     Row Name 08/10/22 1529          Pain Scale: FACES Pre/Post-Treatment    Pain: FACES Scale, Pretreatment 0-->no hurt  -MB     Posttreatment Pain Rating 2-->hurts little bit  -MB     Pain Location - Side/Orientation Right  -MB     Pain Location - hip  -MB     Row Name 08/10/22 1529          Plan of Care Review    Plan of Care Reviewed With patient  -MB     Progress no  change  -MB     Outcome Evaluation Patient continues to be limited by weakness, impaired balance, and difficulty following commands. She transferred sit to stand w/ max A x 2, but was unable to safely take steps. PT continues to recommend SNF at D/C.  -MB     Row Name 08/10/22 1529          Therapy Assessment/Plan (PT)    Rehab Potential (PT) fair, will monitor progress closely  -MB     Criteria for Skilled Interventions Met (PT) yes;meets criteria;skilled treatment is necessary  -MB     Therapy Frequency (PT) daily  -MB     Row Name 08/10/22 1529          Vital Signs    Pre Systolic BP Rehab --  VSS  -MB     Pre Patient Position Supine  -MB     Intra Patient Position Standing  -MB     Post Patient Position Sitting  -MB     Row Name 08/10/22 1529          Positioning and Restraints    Pre-Treatment Position in bed  -MB     Post Treatment Position chair  -MB     In Chair notified nsg;reclined;call light within reach;encouraged to call for assist;exit alarm on;waffle cushion;on mechanical lift sling;legs elevated;heels elevated;waffle boot/both;compression device;ABD pillow  doffed R KI once in chair  -MB           User Key  (r) = Recorded By, (t) = Taken By, (c) = Cosigned By    Initials Name Provider Type    MB Jasmin Heath, PT Physical Therapist               Outcome Measures     Row Name 08/10/22 1533 08/10/22 0800       How much help from another person do you currently need...    Turning from your back to your side while in flat bed without using bedrails? 2  -MB 2  -TT    Moving from lying on back to sitting on the side of a flat bed without bedrails? 2  -MB 2  -TT    Moving to and from a bed to a chair (including a wheelchair)? 1  -MB 1  -TT    Standing up from a chair using your arms (e.g., wheelchair, bedside chair)? 1  -MB 1  -TT    Climbing 3-5 steps with a railing? 1  -MB 1  -TT    To walk in hospital room? 1  -MB 1  -TT    AM-PAC 6 Clicks Score (PT) 8  -MB 8  -TT    Highest level of mobility 3 -->  Sat at edge of bed  -MB 3 --> Sat at edge of bed  -TT    Row Name 08/10/22 1533 08/10/22 1140       Functional Assessment    Outcome Measure Options AM-PAC 6 Clicks Basic Mobility (PT)  -MB AM-PAC 6 Clicks Daily Activity (OT)  -MA          User Key  (r) = Recorded By, (t) = Taken By, (c) = Cosigned By    Initials Name Provider Type    Jasmin Sinclair, PT Physical Therapist    Randi Mari, OT Occupational Therapist    Nazia Hathaway, RNA Registered Nurse                             Physical Therapy Education                 Title: PT OT SLP Therapies (Resolved)     Topic: Physical Therapy (Resolved)     Point: Mobility training (Resolved)     Learning Progress Summary           Patient Acceptance, E, VU,NR by  at 8/9/2022 1536    Eager, E, VU,NR by  at 8/8/2022 1328    Comment: Reviewed safety/technique with bed mobility, transfers, ambulation, HEP, PT POC    Acceptance, E, VU,NR by NS at 8/7/2022 1601    Comment: reinforced posterior hip precautions, sequencing bed mobility and transfers, and safety with activity.    Acceptance, E, VU by MA at 8/7/2022 0334    Eager, E, VU,NR by  at 8/6/2022 1658    Comment: Reviewed safety/technique with bed mobility, transfers, ambulation, HEP, PT POC    Eager, E, VU,DU,NR by  at 8/5/2022 1658    Comment: Reviewed safety/technique with bed mobility, transfers, ambulation, HEP, PT POC    Eager, E,H, VU,NR by  at 8/4/2022 1644    Comment: Reviewed safety/technique with bed mobility, transfers, ambulation, HEP, posterior hip precautions, PT POC    Acceptance, E, VU,NR by  at 8/3/2022 1147    Comment: pt requires reinforcement for post hip precautions   Family Eager, E, VU,NR by  at 8/8/2022 1328    Comment: Reviewed safety/technique with bed mobility, transfers, ambulation, HEP, PT POC    Acceptance, E, VU,NR by NS at 8/7/2022 1601    Comment: reinforced posterior hip precautions, sequencing bed mobility and transfers, and safety with activity.     Eager, E, VU,NR by SS at 8/6/2022 1658    Comment: Reviewed safety/technique with bed mobility, transfers, ambulation, HEP, PT POC    Eager, E, VU,DU,NR by SS at 8/5/2022 1658    Comment: Reviewed safety/technique with bed mobility, transfers, ambulation, HEP, PT POC    Eager, E,H, VU,NR by SS at 8/4/2022 1644    Comment: Reviewed safety/technique with bed mobility, transfers, ambulation, HEP, posterior hip precautions, PT POC    Acceptance, E, VU,NR by  at 8/3/2022 1147    Comment: pt requires reinforcement for post hip precautions                   Point: Home exercise program (Resolved)     Learning Progress Summary           Patient Acceptance, E, VU,NR by  at 8/9/2022 1536    Eager, E, VU,NR by SS at 8/8/2022 1328    Comment: Reviewed safety/technique with bed mobility, transfers, ambulation, HEP, PT POC    Acceptance, E, VU,NR by NS at 8/7/2022 1601    Comment: reinforced posterior hip precautions, sequencing bed mobility and transfers, and safety with activity.    Acceptance, E, VU by MA at 8/7/2022 0334    Eager, E, VU,NR by SS at 8/6/2022 1658    Comment: Reviewed safety/technique with bed mobility, transfers, ambulation, HEP, PT POC    Eager, E, VU,DU,NR by SS at 8/5/2022 1658    Comment: Reviewed safety/technique with bed mobility, transfers, ambulation, HEP, PT POC    Eager, E,H, VU,NR by SS at 8/4/2022 1644    Comment: Reviewed safety/technique with bed mobility, transfers, ambulation, HEP, posterior hip precautions, PT POC    Acceptance, E, VU,NR by  at 8/3/2022 1147    Comment: pt requires reinforcement for post hip precautions   Family Eager, E, VU,NR by SS at 8/8/2022 1328    Comment: Reviewed safety/technique with bed mobility, transfers, ambulation, HEP, PT POC    Acceptance, E, VU,NR by NS at 8/7/2022 1601    Comment: reinforced posterior hip precautions, sequencing bed mobility and transfers, and safety with activity.    Eager, E, VU,NR by SS at 8/6/2022 8236    Comment: Reviewed  safety/technique with bed mobility, transfers, ambulation, HEP, PT POC    Eager, E, VU,DU,NR by  at 8/5/2022 1658    Comment: Reviewed safety/technique with bed mobility, transfers, ambulation, HEP, PT POC    Eager, E,H, VU,NR by SS at 8/4/2022 1644    Comment: Reviewed safety/technique with bed mobility, transfers, ambulation, HEP, posterior hip precautions, PT POC    Acceptance, E, VU,NR by  at 8/3/2022 1147    Comment: pt requires reinforcement for post hip precautions                   Point: Body mechanics (Resolved)     Learning Progress Summary           Patient Acceptance, E, VU,NR by  at 8/9/2022 1536    Eager, E, VU,NR by SS at 8/8/2022 1328    Comment: Reviewed safety/technique with bed mobility, transfers, ambulation, HEP, PT POC    Acceptance, E, VU,NR by NS at 8/7/2022 1601    Comment: reinforced posterior hip precautions, sequencing bed mobility and transfers, and safety with activity.    Acceptance, E, VU by MA at 8/7/2022 0334    Eager, E, VU,NR by SS at 8/6/2022 1658    Comment: Reviewed safety/technique with bed mobility, transfers, ambulation, HEP, PT POC    Eager, E, VU,DU,NR by  at 8/5/2022 1658    Comment: Reviewed safety/technique with bed mobility, transfers, ambulation, HEP, PT POC    Eager, E,H, VU,NR by  at 8/4/2022 1644    Comment: Reviewed safety/technique with bed mobility, transfers, ambulation, HEP, posterior hip precautions, PT POC    Acceptance, E, VU,NR by  at 8/3/2022 1147    Comment: pt requires reinforcement for post hip precautions   Family Eager, E, VU,NR by  at 8/8/2022 1328    Comment: Reviewed safety/technique with bed mobility, transfers, ambulation, HEP, PT POC    Acceptance, E, VU,NR by NS at 8/7/2022 1601    Comment: reinforced posterior hip precautions, sequencing bed mobility and transfers, and safety with activity.    Eager, E, VU,NR by  at 8/6/2022 1658    Comment: Reviewed safety/technique with bed mobility, transfers, ambulation, HEP, PT POC     Eager, E, VU,DU,NR by SS at 8/5/2022 1658    Comment: Reviewed safety/technique with bed mobility, transfers, ambulation, HEP, PT POC    Eager, E,H, VU,NR by SS at 8/4/2022 1644    Comment: Reviewed safety/technique with bed mobility, transfers, ambulation, HEP, posterior hip precautions, PT POC    Acceptance, E, VU,NR by  at 8/3/2022 1147    Comment: pt requires reinforcement for post hip precautions                   Point: Precautions (Resolved)     Learning Progress Summary           Patient Acceptance, E, VU,NR by FW at 8/9/2022 1536    Eager, E, VU,NR by SS at 8/8/2022 1328    Comment: Reviewed safety/technique with bed mobility, transfers, ambulation, HEP, PT POC    Acceptance, E, VU,NR by NS at 8/7/2022 1601    Comment: reinforced posterior hip precautions, sequencing bed mobility and transfers, and safety with activity.    Acceptance, E, VU by MA at 8/7/2022 0334    Eager, E, VU,NR by SS at 8/6/2022 1658    Comment: Reviewed safety/technique with bed mobility, transfers, ambulation, HEP, PT POC    Eager, E, VU,DU,NR by SS at 8/5/2022 1658    Comment: Reviewed safety/technique with bed mobility, transfers, ambulation, HEP, PT POC    Eager, E,H, VU,NR by SS at 8/4/2022 1644    Comment: Reviewed safety/technique with bed mobility, transfers, ambulation, HEP, posterior hip precautions, PT POC    Acceptance, E, VU,NR by  at 8/3/2022 1147    Comment: pt requires reinforcement for post hip precautions   Family Eager, E, VU,NR by SS at 8/8/2022 1328    Comment: Reviewed safety/technique with bed mobility, transfers, ambulation, HEP, PT POC    Acceptance, E, VU,NR by NS at 8/7/2022 1601    Comment: reinforced posterior hip precautions, sequencing bed mobility and transfers, and safety with activity.    Eager, E, VU,NR by SS at 8/6/2022 1658    Comment: Reviewed safety/technique with bed mobility, transfers, ambulation, HEP, PT POC    Eager, E, VU,DU,NR by SS at 8/5/2022 7534    Comment: Reviewed safety/technique  with bed mobility, transfers, ambulation, HEP, PT POC    Eager, E,H, VU,NR by  at 8/4/2022 1644    Comment: Reviewed safety/technique with bed mobility, transfers, ambulation, HEP, posterior hip precautions, PT POC    Acceptance, E, VU,NR by  at 8/3/2022 1147    Comment: pt requires reinforcement for post hip precautions                               User Key     Initials Effective Dates Name Provider Type Discipline    NS 06/16/21 -  Lori Kiser, PT Physical Therapist PT    FW 05/05/22 -  Kayden Chawla, PT Physical Therapist PT    MA 10/22/20 -  Monica Walton, RN Registered Nurse Nurse     06/01/21 -  Teresa Hurtado, PT Physical Therapist PT              PT Recommendation and Plan     Plan of Care Reviewed With: patient  Progress: no change  Outcome Evaluation: Patient continues to be limited by weakness, impaired balance, and difficulty following commands. She transferred sit to stand w/ max A x 2, but was unable to safely take steps. PT continues to recommend SNF at D/C.     Time Calculation:    PT Charges     Row Name 08/10/22 1534             Time Calculation    Start Time 1041  -MB      PT Received On 08/10/22  -MB      PT Goal Re-Cert Due Date 08/13/22  -MB              Time Calculation- PT    Total Timed Code Minutes- PT 35 minute(s)  -MB              Timed Charges    50365 - PT Therapeutic Exercise Minutes 35  -MB              Total Minutes    Timed Charges Total Minutes 35  -MB       Total Minutes 35  -MB            User Key  (r) = Recorded By, (t) = Taken By, (c) = Cosigned By    Initials Name Provider Type    Jasmin Sinclair, PT Physical Therapist              Therapy Charges for Today     Code Description Service Date Service Provider Modifiers Qty    [id number] HC PT THER PROC EA 15 MIN 8/10/2022 Jasmin Heath, PT GP 2          PT G-Codes  Outcome Measure Options: AM-PAC 6 Clicks Basic Mobility (PT)  AM-PAC 6 Clicks Score (PT): 8  AM-PAC 6 Clicks Score (OT):  12    Jasmin Heath, PT  8/10/2022

## 2022-08-11 PROBLEM — F03.90 DEMENTIA (HCC): Status: ACTIVE | Noted: 2022-08-11

## 2022-08-11 PROBLEM — Z87.81 HISTORY OF FRACTURE OF RIGHT HIP: Status: ACTIVE | Noted: 2022-08-11

## 2022-08-11 PROBLEM — D64.9 ANEMIA: Status: ACTIVE | Noted: 2022-08-11

## 2022-08-11 LAB
A/G RATIO: 1 (ref 0.8–2)
ALBUMIN SERPL-MCNC: 3.4 G/DL (ref 3.4–4.8)
ALP BLD-CCNC: 79 U/L (ref 25–100)
ALT SERPL-CCNC: 9 U/L (ref 4–36)
AMORPHOUS: ABNORMAL /HPF
ANION GAP SERPL CALCULATED.3IONS-SCNC: 13 MMOL/L (ref 3–16)
AST SERPL-CCNC: 13 U/L (ref 8–33)
BACTERIA: ABNORMAL /HPF
BASOPHILS ABSOLUTE: 0.1 K/UL (ref 0–0.1)
BASOPHILS RELATIVE PERCENT: 0.8 %
BILIRUB SERPL-MCNC: 0.6 MG/DL (ref 0.3–1.2)
BILIRUBIN URINE: ABNORMAL
BLOOD, URINE: NEGATIVE
BUN BLDV-MCNC: 21 MG/DL (ref 6–20)
CALCIUM SERPL-MCNC: 8.8 MG/DL (ref 8.5–10.5)
CHLORIDE BLD-SCNC: 102 MMOL/L (ref 98–107)
CLARITY: CLEAR
CO2: 24 MMOL/L (ref 20–30)
COLOR: YELLOW
CREAT SERPL-MCNC: 0.8 MG/DL (ref 0.4–1.2)
CRYSTALS, UA: ABNORMAL /HPF
EOSINOPHILS ABSOLUTE: 0.1 K/UL (ref 0–0.4)
EOSINOPHILS RELATIVE PERCENT: 1.1 %
EPITHELIAL CELLS, UA: ABNORMAL /HPF (ref 0–5)
GFR AFRICAN AMERICAN: >59
GFR NON-AFRICAN AMERICAN: >60
GLOBULIN: 3.3 G/DL
GLUCOSE BLD-MCNC: 117 MG/DL (ref 74–106)
GLUCOSE URINE: NEGATIVE MG/DL
HCT VFR BLD CALC: 27.2 % (ref 37–47)
HEMOGLOBIN: 8.7 G/DL (ref 11.5–16.5)
IMMATURE GRANULOCYTES #: 0.1 K/UL
IMMATURE GRANULOCYTES %: 0.8 % (ref 0–5)
KETONES, URINE: NEGATIVE MG/DL
LEUKOCYTE ESTERASE, URINE: ABNORMAL
LYMPHOCYTES ABSOLUTE: 1.6 K/UL (ref 1.5–4)
LYMPHOCYTES RELATIVE PERCENT: 17.9 %
MCH RBC QN AUTO: 30.2 PG (ref 27–32)
MCHC RBC AUTO-ENTMCNC: 32 G/DL (ref 31–35)
MCV RBC AUTO: 94.4 FL (ref 80–100)
MICROSCOPIC EXAMINATION: YES
MONOCYTES ABSOLUTE: 0.7 K/UL (ref 0.2–0.8)
MONOCYTES RELATIVE PERCENT: 7.6 %
NEUTROPHILS ABSOLUTE: 6.5 K/UL (ref 2–7.5)
NEUTROPHILS RELATIVE PERCENT: 71.8 %
NITRITE, URINE: NEGATIVE
PDW BLD-RTO: 13.2 % (ref 11–16)
PH UA: 5.5 (ref 5–8)
PLATELET # BLD: 261 K/UL (ref 150–400)
PMV BLD AUTO: 10.1 FL (ref 6–10)
POTASSIUM REFLEX MAGNESIUM: 4 MMOL/L (ref 3.4–5.1)
PROTEIN UA: 30 MG/DL
RBC # BLD: 2.88 M/UL (ref 3.8–5.8)
RBC UA: ABNORMAL /HPF (ref 0–4)
SODIUM BLD-SCNC: 139 MMOL/L (ref 136–145)
SPECIFIC GRAVITY UA: >=1.03 (ref 1–1.03)
TOTAL PROTEIN: 6.7 G/DL (ref 6.4–8.3)
URINE REFLEX TO CULTURE: ABNORMAL
URINE TYPE: ABNORMAL
UROBILINOGEN, URINE: 1 E.U./DL
WBC # BLD: 9 K/UL (ref 4–11)
WBC UA: ABNORMAL /HPF (ref 0–5)

## 2022-08-11 PROCEDURE — 1200000002 HC SEMI PRIVATE SWING BED

## 2022-08-11 PROCEDURE — 80053 COMPREHEN METABOLIC PANEL: CPT

## 2022-08-11 PROCEDURE — 97530 THERAPEUTIC ACTIVITIES: CPT

## 2022-08-11 PROCEDURE — 6370000000 HC RX 637 (ALT 250 FOR IP): Performed by: INTERNAL MEDICINE

## 2022-08-11 PROCEDURE — 97161 PT EVAL LOW COMPLEX 20 MIN: CPT

## 2022-08-11 PROCEDURE — 6360000002 HC RX W HCPCS: Performed by: PHYSICIAN ASSISTANT

## 2022-08-11 PROCEDURE — 92610 EVALUATE SWALLOWING FUNCTION: CPT

## 2022-08-11 PROCEDURE — 97165 OT EVAL LOW COMPLEX 30 MIN: CPT

## 2022-08-11 PROCEDURE — 85025 COMPLETE CBC W/AUTO DIFF WBC: CPT

## 2022-08-11 PROCEDURE — 36415 COLL VENOUS BLD VENIPUNCTURE: CPT

## 2022-08-11 PROCEDURE — 99305 1ST NF CARE MODERATE MDM 35: CPT | Performed by: INTERNAL MEDICINE

## 2022-08-11 PROCEDURE — 81001 URINALYSIS AUTO W/SCOPE: CPT

## 2022-08-11 PROCEDURE — 97535 SELF CARE MNGMENT TRAINING: CPT

## 2022-08-11 RX ORDER — ENOXAPARIN SODIUM 100 MG/ML
40 INJECTION SUBCUTANEOUS DAILY
Status: DISCONTINUED | OUTPATIENT
Start: 2022-08-11 | End: 2022-08-11

## 2022-08-11 RX ORDER — ASPIRIN 81 MG/1
81 TABLET ORAL DAILY
Status: DISCONTINUED | OUTPATIENT
Start: 2022-08-12 | End: 2022-08-23 | Stop reason: HOSPADM

## 2022-08-11 RX ORDER — ACETAMINOPHEN 325 MG/1
650 TABLET ORAL EVERY 4 HOURS PRN
Status: DISCONTINUED | OUTPATIENT
Start: 2022-08-11 | End: 2022-08-23 | Stop reason: HOSPADM

## 2022-08-11 RX ORDER — POLYETHYLENE GLYCOL 3350 17 G/17G
17 POWDER, FOR SOLUTION ORAL DAILY PRN
Status: DISCONTINUED | OUTPATIENT
Start: 2022-08-11 | End: 2022-08-23 | Stop reason: HOSPADM

## 2022-08-11 RX ORDER — ENOXAPARIN SODIUM 100 MG/ML
40 INJECTION SUBCUTANEOUS DAILY
Status: DISCONTINUED | OUTPATIENT
Start: 2022-08-11 | End: 2022-08-23 | Stop reason: HOSPADM

## 2022-08-11 RX ADMIN — PANTOPRAZOLE SODIUM 40 MG: 40 TABLET, DELAYED RELEASE ORAL at 06:20

## 2022-08-11 RX ADMIN — BUSPIRONE HYDROCHLORIDE 10 MG: 5 TABLET ORAL at 08:17

## 2022-08-11 RX ADMIN — ENOXAPARIN SODIUM 40 MG: 100 INJECTION SUBCUTANEOUS at 14:12

## 2022-08-11 RX ADMIN — LEVOTHYROXINE SODIUM 50 MCG: 0.05 TABLET ORAL at 06:19

## 2022-08-11 RX ADMIN — FOLIC ACID 1 MG: 1 TABLET ORAL at 08:17

## 2022-08-11 RX ADMIN — BUSPIRONE HYDROCHLORIDE 10 MG: 5 TABLET ORAL at 20:12

## 2022-08-11 RX ADMIN — TRAMADOL HYDROCHLORIDE 50 MG: 50 TABLET, COATED ORAL at 20:12

## 2022-08-11 RX ADMIN — ATORVASTATIN CALCIUM 10 MG: 10 TABLET, FILM COATED ORAL at 08:16

## 2022-08-11 RX ADMIN — ASPIRIN 325 MG: 325 TABLET, COATED ORAL at 08:17

## 2022-08-11 RX ADMIN — BUSPIRONE HYDROCHLORIDE 10 MG: 5 TABLET ORAL at 14:12

## 2022-08-11 RX ADMIN — QUETIAPINE FUMARATE 25 MG: 25 TABLET ORAL at 20:12

## 2022-08-11 RX ADMIN — TROSPIUM CHLORIDE 20 MG: 20 TABLET, FILM COATED ORAL at 08:16

## 2022-08-11 RX ADMIN — SERTRALINE HYDROCHLORIDE 50 MG: 50 TABLET ORAL at 08:17

## 2022-08-11 NOTE — PROGRESS NOTES
Medication Reconciliation completed with discharge summary from [facility].  The following changes were made;    Removed: Aspirin 586XE (duplicate)                   Tramadol 71TG (duplicate)                    Lisinopril 40mg                    Linaclotide 145mg    Added: Linaclotide 72mg

## 2022-08-11 NOTE — FLOWSHEET NOTE
08/11/22 0817   Assessment   Charting Type Shift assessment   Psychosocial   Psychosocial (WDL) WDL   Neurological   Neuro (WDL) WDL   Level of Consciousness Alert (0)   Orientation Level Oriented to person;Disoriented to place; Disoriented to time;Disoriented to situation   Cognition Follows commands;Poor attention/concentration   Speech Clear   Kamar Coma Scale   Eye Opening 4   Best Verbal Response 4   Best Motor Response 6   Kamar Coma Scale Score 14   HEENT (Head, Ears, Eyes, Nose, & Throat)   HEENT (WDL) X   Teeth Missing teeth;Dentures upper;Dentures lower   Respiratory   Respiratory (WDL) WDL   Respiratory Pattern Regular   Respiratory Depth Normal   Respiratory Quality/Effort Unlabored   Chest Assessment Chest expansion symmetrical;Trachea midline   Breath Sounds   Right Upper Lobe Clear   Right Middle Lobe Clear   Right Lower Lobe Clear   Left Upper Lobe Clear   Left Lower Lobe Clear   Cardiac   Cardiac (WDL) X  (body guardian left chest)   Gastrointestinal   Abdominal (WDL) WDL   Genitourinary   Genitourinary (WDL) X  (incontinence)   Peripheral Vascular   Peripheral Vascular (WDL) WDL   Skin Integumentary    Skin Integumentary (WDL) WDL   Musculoskeletal   Musculoskeletal (WDL) X   RUE Weakness   LUE Weakness   RL Extremity Weakness   LL Extremity Weakness   Pt awake in bed. RN assisted Pt with repositioning in bed and placement of abductor pillow. Pt took morning medications whole with water without difficulty. Pt denies any pain and appears in no acute distress. Vital signs reviewed and stable.

## 2022-08-11 NOTE — H&P
History and Physical    Patient:  Red Muñoz    CHIEF COMPLAINT:    Declining functional status    HISTORY OF PRESENT ILLNESS:   The patient is a 80 y.o. female with past medical history of dementia, hypertension, hypothyroidism who presented due to declining functional status following right hip fracture. Patient has advanced dementia and is not able to provide any history so it was all obtained from chart review. Patient suffered a fall at home on 8/1. Found to have a right femoral neck fracture at that time and She was transferred to [facility]. Underwent right hemiarthroplasty on 8/2 with Dr. Ignacio Hernandez. Per discharge summary she tolerated the procedure well. Does have post op anemia. Transitioned to this facility for ongoing PT/OT. At time of exam today she is resting in bed. Appears comfortable. She denies pain. She is alert and oriented to self only which seems to be consistent with her baseline mental status. She denies acute complaint. Past Medical History:      Diagnosis Date    Allergic rhinitis     Dementia ([address]     Hypertension     Hyperthyroidism     Osteoarthritis        Past Surgical History:      Procedure Laterality Date    BLADDER SUSPENSION  9/2015    CHOLECYSTECTOMY      HYSTERECTOMY (CERVIX STATUS UNKNOWN)      TUBAL LIGATION         Medications Prior to Admission:    Prior to Admission medications    Medication Sig Start Date End Date Taking? Authorizing Provider   linaCLOtide (LINZESS) 72 MCG CAPS capsule Take 72 mcg by mouth as needed   Yes Historical Provider, MD   aspirin 325 MG tablet Take 325 mg by mouth in the morning. Yes Historical Provider, MD   traMADol (ULTRAM) 50 MG tablet Take 25 mg by mouth every 12 hours as needed.  8/10/22 8/12/22 Yes Historical Provider, MD   QUEtiapine (SEROQUEL) 25 MG tablet TAKE 1 TABLET BY MOUTH EVERY NIGHT 7/16/22   Cuca Mendoza MD   busPIRone (BUSPAR) 10 MG tablet TAKE 1 TABLET BY MOUTH THREE TIMES DAILY 6/8/22   Cuca Mendoza MD levothyroxine (SYNTHROID) 50 MCG tablet TAKE 1 TABLET BY MOUTH ONCE A DAY 6/8/22   Leonardo Dimas MD   folic acid (FOLVITE) 1 MG tablet TAKE 1 TABLET BY MOUTH EVERY DAY 6/8/22   Leonardo Dimas MD   omeprazole (PRILOSEC) 40 MG delayed release capsule Take 1 capsule by mouth daily 6/8/22   Leonardo Dimas MD   mirabegron (MYRBETRIQ) 25 MG TB24 TAKE 1 TABLET BY MOUTH ONCE A DAY 6/8/22   Leonardo Dimas MD   atorvastatin (LIPITOR) 10 MG tablet Take 1 tablet by mouth daily 6/6/22   Leonardo Dimas MD   Memantine HCl-Donepezil HCl 28-10 MG CP24 Take 1 capsule by mouth daily 3/24/22   Leonardo Dimas MD   sertraline (ZOLOFT) 50 MG tablet TAKE 1 TABLET BY MOUTH DAILY 2/3/22   Leonardo Dimas MD       Allergies:  Sulfa antibiotics    Social History:   TOBACCO:   reports that she has never smoked. She has never used smokeless tobacco.  ETOH:   reports no history of alcohol use. OCCUPATION:  None     Family History:   No family history on file. Review of system  Unable to obtain with advanced dementia     Vitals:    08/11/22 0709   BP: 117/66   Pulse: 66   Resp: 18   Temp: 97.9 °F (36.6 °C)   SpO2: 95%       Physical exam  Constitutional:  Well developed, frail elderly lady lying in bed in no acute distress  Eyes:  PERRL, no scleral icterus, conjunctiva normal   HENT:  Atraumatic, external ears normal, nose normal, oropharynx moist, no pharyngeal exudates. Neck- supple, no JVD, no lymphadenopathy  Respiratory:  No respiratory distress on RA, no wheezing, rales or rhonchi detected  Cardiovascular:  Normal rate, normal rhythm, no murmurs, no gallops, no rubs, no edema   GI:  Soft, nondistended, normal bowel sounds, nontender, no voluntary guarding  Musculoskeletal:  No cyanosis or obvious acute deformity. Hip abductor pillow in place. Integument:  Warm and dry. Well healing incision right hip. External cardiac monitor noted to left upper chest.  Neurologic:  Alert & oriented to self. Pleasantly confused.  no apparent focal deficits noted   Psychiatric:  Speech and behavior appropriate       Lab Results   Component Value Date     08/11/2022    K 4.0 08/11/2022     08/11/2022    CO2 24 08/11/2022    BUN 21 (H) 08/11/2022    CREATININE 0.8 08/11/2022    GLUCOSE 117 (H) 08/11/2022    CALCIUM 8.8 08/11/2022    PROT 6.7 08/11/2022    LABALBU 3.4 08/11/2022    BILITOT 0.6 08/11/2022    ALKPHOS 79 08/11/2022    AST 13 08/11/2022    ALT 9 08/11/2022    LABGLOM >60 08/11/2022    GFRAA >59 08/11/2022    AGRATIO 1.0 08/11/2022    GLOB 3.3 08/11/2022           Lab Results   Component Value Date    WBC 9.0 08/11/2022    HGB 8.7 (L) 08/11/2022    HCT 27.2 (L) 08/11/2022    MCV 94.4 08/11/2022     08/11/2022        Assessment and Plan     Active Hospital Problems    Diagnosis Date Noted    Dementia ([facility] [address] [F03.90]  - Advanced dementia oriented to person only at baseline  - Patient's daughter is her guardian   - continue home medication regimen    08/11/2022    Anemia [D64.9]  - Hgb 8.7 8/11 (11.6 on 8/1 prior to surgical intervention)   - suspect related to acute blood loss since she is post op   - continue to monitor and consider further workup as indicated   - on gi ppx    08/11/2022    History of fracture of right hip [Z87.81]  - Patient suffered right femoral neck fracture after a fall at home   - s/p right hemiarthroplasty by Dr. Xavier Jones on 8/2   - WBAT to RLE with posterior hip precautions and limited active abduction for 6 weeks per ortho   - continue pain medication as needed with bowel regimen   - f/u with Dr. Xavier Jones in 3 weeks as scheduled   - Per ortho  mg for 6 weeks for DVT ppx. Since she is admitted at this facility we will place her on lovenox for dvt ppx and continue baby ASA.  Plan to transition to  at time of discharge to complete 6 week course.   - PT/OT consulted and following     08/11/2022    Declining functional status [R53.81]  - see above    08/10/2022    Hypothyroidism [E03.9]  - continue home regimen

## 2022-08-11 NOTE — PROGRESS NOTES
Pt assisted to commode with X2 max assist via gustavo steady. Pt unable to void and provide specimen at this time. Pt assisted back to bed and placed on a purwick. Bladder scan performed which revealed 123 mL in bladder. Will continue to monitor Pt for UOP.

## 2022-08-11 NOTE — PROGRESS NOTES
Occupational Therapy  Facility/Department: [facility]  Occupational Therapy Initial Assessment    Name: Vance Moncada  : 1935  MRN: [id number]  Date of Service: 2022    Discharge Recommendations:  Continue to assess pending progress, 24 hour supervision or assist          Patient Diagnosis(es): There were no encounter diagnoses. Past Medical History:  has a past medical history of Allergic rhinitis, Dementia ([address] Hypertension, Hyperthyroidism, and Osteoarthritis. Past Surgical History:  has a past surgical history that includes Cholecystectomy; Tubal ligation; Hysterectomy; and bladder suspension (2015). Assessment   Performance deficits / Impairments: Decreased functional mobility ; Decreased ADL status; Decreased strength;Decreased endurance;Decreased high-level IADLs;Decreased cognition;Decreased safe awareness;Decreased balance;Decreased posture;Decreased fine motor control  Assessment: This 80year old female was referred to OT services upon admission following recent R hemiarthroplasty. Pt is WBAT with PHP. Pt required MOD A to come to sit at EOB. MOD A to scoot at EOB. Pt with difficulty following basic commands requiring repetition at times. Pt come to stand with MOD x2. Pt transferred to [facility] with MOD x2 requiring max assist for toileting. Pt able to assist with colby hygiene however, not able to assist with LB clothing management. Pt required Max x2 to transfer to EOB. Pt had brief rest break. Pt transferred to recliner with max x2 assist. Pt required DEP for LB dressing. Pt will benefit from skilled OT services while IP to decrease burden of care and improve independence with ADL's and mobility. Pt is expected to gain progress towards stated goals.   Prognosis: Good  Decision Making: Medium Complexity  REQUIRES OT FOLLOW-UP: Yes  Activity Tolerance  Activity Tolerance: Treatment limited secondary to decreased cognition        Plan   Plan  Times per Week: 3-5  Times per Day: Daily  Plan Weeks: 2  Current Treatment Recommendations: Strengthening, Balance training, Functional mobility training, Endurance training, Cognitive reorientation, Safety education & training, Patient/Caregiver education & training, Equipment evaluation, education, & procurement, Self-Care / ADL     Restrictions  Restrictions/Precautions  Restrictions/Precautions: Fall Risk, General Precautions  Required Braces or Orthoses?: No  Position Activity Restriction  Hip Precautions: Posterior hip precautions  Other position/activity restrictions: 6 weeks postop PHP    Subjective   General  Chart Reviewed: Yes  Patient assessed for rehabilitation services?: Yes  Family / Caregiver Present: No  Referring Practitioner: Sera Hawley PA-C  Diagnosis: Functional Decline  Subjective  Subjective: Pt pleasant and confused. Agreeable to OT services. General Comment  Comments: Pt reports pain in back of her head. Scab present on base of skull. Social/Functional History  Social/Functional History  Type of Home: House  Home Layout: One level  Home Access: Stairs to enter without rails  Entrance Stairs - Number of Steps: 1  Additional Comments: Pt is poor historian unable to gain all information on PLOF and AE at home. Pt states she stays with daughter.        Objective   Heart Rate: 66  Heart Rate Source: Monitor  BP: 117/66  BP Location: Left upper arm  Patient Position: Sitting  MAP (Calculated): 83  Resp: 18  SpO2: 95 %  O2 Device: None (Room air)          Observation/Palpation  Posture: Poor  Observation: Pt received lying supine in bed, NAD, pleasantly confused, oriented to person only, room air     Balance  Sitting: Intact  Standing: With support     AROM: Within functional limits  PROM: Within functional limits  Strength: Generally decreased, functional  Coordination: Generally decreased, functional  Tone: Normal  Sensation: Intact  ADL  LE Dressing: Dependent/Total  Toileting: Maximum assistance        Bed mobility  Supine to Sit: Moderate assistance  Scooting: Moderate assistance  Transfers  Stand Pivot Transfers: Moderate assistance;2 Person assistance;Maximum assistance  Sit to stand: Moderate assistance;2 Person assistance     Cognition  Cognition Comment: decreased safety awareness, decreased deficit awareness, fall risk  Orientation  Overall Orientation Status: Impaired  Orientation Level: Oriented to person                  Education Given To: Patient  Education Provided: Transfer Training  Education Method: Verbal  Barriers to Learning: Cognition  Education Outcome: Continued education needed      Goals  Short Term Goals  Time Frame for Short term goals: 2 weeks  Short Term Goal 1: Pt to complete ADL transfers with CGA. Short Term Goal 2: Pt to complete toileting with CGA. Short Term Goal 3: Pt to complete dressing with min assist.  Short Term Goal 4: Pt to complete bathing with min assist.  Short Term Goal 5: Pt to tolerate x10 minutes of activity to increase functional activity tolerance. Short Term Goal 6: Pt to complete hygiene/grooming with SOLOMON. Therapy Time   Individual Concurrent Group Co-treatment   Time In 7279         Time Out 1014         Minutes 43             This note serves as a DC summary in the event of pt discharge.       Marcia Can, OTR/L

## 2022-08-11 NOTE — ACP (ADVANCE CARE PLANNING)
Advance Care Planning     General Advance Care Planning (ACP) Conversation    Date of Conversation: 8/10/2022  Conducted with:  Healthcare Decision Maker: Court-Appointed Legal Guardian (name) [phone] Decision Maker:    Primary Decision Maker: Aung Charter - [phone]    Secondary Decision Maker: Scout Das - Child - [phone]    Supplemental (Other) Decision Maker: Jared Chu - Child - [phone]  Click here to complete Healthcare Decision Makers including selection of the Healthcare Decision Maker Relationship (ie \"Primary\"). Today we discussed Healthcare Decision Makers. The patient is considering options.     Content/Action Overview:  Has NO ACP documents/care preferences - information provided, considering goals and options  Reviewed DNR/DNI and patient elects Full Code (Attempt Resuscitation)      Length of Voluntary ACP Conversation in minutes:  <16 minutes (Non-Billable)    CHANCE Hatch

## 2022-08-11 NOTE — PROGRESS NOTES
Pt to room 3-1 per EMS on stretcher from [facility] s/p hip fracture. Accompanied by daughter who has guardianship. Oriented to room, call light, bed in low position.

## 2022-08-11 NOTE — PROGRESS NOTES
Speech Language Pathology  Facility/Department: [facility] MED SURG   CLINICAL BEDSIDE SWALLOW EVALUATION    NAME: Sharon Solis  : 1935  MRN: [id number]    ADMISSION DATE: 8/10/2022  ADMITTING DIAGNOSIS: has Bursitis of right hip; Hyperthyroidism; Arthritis; Seasonal allergies; Tinnitus of right ear; Cheilitis; Essential hypertension; Memory loss, short term; Hypothyroidism; Urinary incontinence; B12 deficiency; Declining functional status; Dementia ([address] Anemia; and History of fracture of right hip on their problem list.  ONSET DATE: 2022    Recent Chest Xray/CT of Chest: (2022)     Impression       No acute cardiopulmonary findings     Date of Eval: 2022  Evaluating Therapist: Terri Bloch, SLP    Current Diet level:  Current Diet : Regular    Primary Complaint  Patient Complaint: No complaints at this time; patient stated \"I feel just fine\". Pain:  None reported or indicated. Reason for Referral  Sharon Solis was referred for a bedside swallow evaluation to assess the efficiency of her swallow function, identify signs and symptoms of aspiration and make recommendations regarding safe dietary consistencies, effective compensatory strategies, and safe eating environment. Impression  Dysphagia Diagnosis: Swallow function appears WFL  Dysphagia Impression : No overt s/sx dysphagia indicated; however, recommend diet downgrade to soft and bite sized due to impaired cognition increasing risk of choking/aspiration. Dysphagia Outcome Severity Scale: Level 6: Within functional limits/Modified independence     Treatment Plan  Requires SLP Intervention: No  Duration of Treatment: N/A - Skilled ST services not indicated at this time. D/C Recommendations: No follow up therapy recommended post discharge     Recommended Diet and Intervention  Soft and bite sized texture/thin liquids; No skilled ST services indicated at this time.       Recommended Form of Meds: PO  Recommendations: Assistance with meals     Compensatory Swallowing Strategies  Compensatory Swallowing Strategies : Alternate solids and liquids;Eat/Feed slowly;Upright as possible for all oral intake;Remain upright for 30-45 minutes after meals; Set up assist;Check for pocketing of food on the Left; Check for pocketing of food on the Right;Lingual sweep;Small bites/sips; External pacing    Treatment/Goals  Short-term Goals  Timeframe for Short-term Goals: N/A - Skilled ST services not indicated at this time. Long-term Goals  Timeframe for Long-term Goals: N/A - Skilled ST services not indicated at this time. General  Chart Reviewed: Yes  Subjective  Subjective: Patient upright in bed, pleasant and agreeable to ST eval. Patient oriented to self only; 2 daughters present at bedside and assisted with providing hx. Per daughters' report, patient has no difficulty chewing or swallowing. Behavior/Cognition: Alert; Cooperative;Pleasant mood;Confused  Follows Directions: Simple  Dentition: Dentures bottom; Dentures top  Patient Positioning: Upright in bed  Baseline Vocal Quality: Normal  Volitional Cough: Strong  Prior Dysphagia History: None reported or indicated  Consistencies Administered: Soft and Bite-Sized; Thin - straw    Vision/Hearing  Vision  Vision: Within Functional Limits  Hearing  Hearing: Within functional limits    Oral Motor Deficits  Oral/Motor  Oral Hygiene: Moist  Gag: No Impairment    Oral Phase Dysfunction  Oral Phase  Oral Phase: Exceptions  Oral Phase  Oral Phase - Comment: When given trials of soft and bite sized texture, patient demonstrated reduced rate of anterior to posterior transit, which appeared to be deliberate, due to thorough chewing. Patient attempted talking while chewing bolus and required min cues to wait until after clearing bolus to speak. Patient cleared trials effectively without oral stasis indicated following trials.      Indicators of Pharyngeal Phase Dysfunction   Pharyngeal Phase   Pharyngeal Phase: No deficits indicated. Prognosis  Individuals consulted  Consulted and agree with results and recommendations: Patient; Family member  Family member consulted: 2 daughters    Education  Patient Education: Education provided to patient and her daughters re: diet recommendation, aspiration precaution guidelines, compensatory strategies and recommended supervision during all PO intake d/t impaired cognition. Patient Education Response: Verbalizes understanding;Demonstrated understanding;Needs reinforcement  Safety Devices in place: Yes  Type of devices: All fall risk precautions in place; Left in bed;Bed alarm in place;Call light within reach; Sitter present; Patient at risk for falls       Therapy Time  SLP Individual Minutes  Time In: 9321  Time Out: 0073  Minutes: 1100 [facility], [address]  8/11/2022 5:05 PM

## 2022-08-11 NOTE — PROGRESS NOTES
Physical Therapy  Facility/Department: [facility]  Physical Therapy Initial Assessment- Swingbed    Name: Sri Han  : 1935  MRN: [id number]  Date of Service: 2022    Discharge Recommendations:  Continue to assess pending progress          Patient Diagnosis(es): There were no encounter diagnoses. Past Medical History:  has a past medical history of Allergic rhinitis, Dementia ([address] Hypertension, Hyperthyroidism, and Osteoarthritis. Past Surgical History:  has a past surgical history that includes Cholecystectomy; Tubal ligation; Hysterectomy; and bladder suspension (2015). Assessment   Body Structures, Functions, Activity Limitations Requiring Skilled Therapeutic Intervention: Decreased functional mobility ; Decreased strength;Decreased safe awareness;Decreased cognition;Decreased ROM; Decreased balance;Decreased endurance; Increased pain;Decreased posture  Assessment: PT eval completed on a patient who is s/p L hip hemiarthroplasty following a fall and is WBAT with standard posterior hip precautions. She has cognitive deficts and is oriented to person only but is pleasant, cooperative and able to participate with PT. She requires max A to transition from sup to sit. Mod A x 2 for sit to stand from bed and mod-max A x 2 for standing pivot transfer from EOB to bedside commode, back to EOB and then to recliner chair. Patient is unable to achieve full standing position because her weight is positioned posterior to her ELLIOT and her upper body is markedly forward flexed. Patient left in recliner chair with ABD pillow in place. She presents with deficits in functional mobility but is expected to benefit from continued skilled PT intervention to improve her mobility status prior to D/C. Therapy Prognosis: Good  Decision Making: Low Complexity  Requires PT Follow-Up: Yes  Activity Tolerance  Activity Tolerance: Patient limited by fatigue;Patient limited by pain; Patient limited by endurance;Treatment limited secondary to decreased cognition     Plan   Plan  Plan: 3-5 times per week  Current Treatment Recommendations: Strengthening, ROM, Balance training, Functional mobility training, Transfer training, Gait training, Endurance training, Cognitive/Perceptual training, Pain management, Therapeutic activities, Home exercise program, Safety education & training, Patient/Caregiver education & training  Safety Devices  Type of Devices: Call light within reach, Left in chair     Restrictions  Restrictions/Precautions  Restrictions/Precautions: Fall Risk, General Precautions  Required Braces or Orthoses?: No  Position Activity Restriction  Hip Precautions: Posterior hip precautions  Other position/activity restrictions: 6 weeks postop PHP     Subjective   Pain: No c/o pain. General  Chart Reviewed: Yes  Patient assessed for rehabilitation services?: Yes  Family / Caregiver Present: No  Referring Practitioner: LANA Isaac  Follows Commands: Within Functional Limits         Social/Functional History  Social/Functional History  Lives With: Daughter (Per patient.)  Type of Home: House  Home Layout: One level  Home Access: Stairs to enter without rails  Entrance Stairs - Number of Steps: 1  Additional Comments: Pt is poor historian unable to gain all information on PLOF and AE at home. Pt states she stays with daughter.   Vision/Hearing       Cognition   Orientation  Overall Orientation Status: Impaired  Orientation Level: Oriented to person  Cognition  Cognition Comment: decreased safety awareness, decreased deficit awareness, fall risk     Objective   Heart Rate: 66  Heart Rate Source: Monitor  BP: 117/66  BP Location: Left upper arm  Patient Position: Sitting  MAP (Calculated): 83  Resp: 18  SpO2: 95 %  O2 Device: None (Room air)     Observation/Palpation  Posture: Poor  Observation: Pt received lying supine in bed, NAD, pleasantly confused, oriented to person only, room air  Gross Assessment  AROM: Within functional limits (L LE limited d/t pain s/p sx.)  PROM: Within functional limits  Strength: Generally decreased, functional  Coordination: Within functional limits                 Balance  Sitting: Intact  Standing: With support  Bed mobility  Supine to Sit: Moderate assistance  Scooting: Moderate assistance  Transfers  Sit to Stand: Moderate Assistance;Maximum Assistance;2 Person Assistance  Stand to sit: Moderate Assistance;2 Person Assistance  Bed to Chair: Moderate assistance;Maximum assistance;2 Person Assistance  Stand Pivot Transfers: Moderate Assistance;Maximum Assistance;2 Person Assistance  Comment: Patient stands with weight posterior to ELLIOT and forward flexed posture; unable to achieve fully upright standing position  Ambulation  Assistance: Unable to assess     Balance  Posture: Fair  Sitting - Static: Good  Sitting - Dynamic: Good  Standing - Static: Fair;+  Standing - Dynamic: Poor             Goals  Long Term Goals  Time Frame for Long term goals : 14 days  Long term goal 1: Patient will perform all bed mobility with no more than Tye. Long term goal 2: Patient will perform sit to stand and transfers with RW and no more than min A. Long term goal 3: Patient will ambulate 20'x2 with RW and no more than min A. Long term goal 4: Patient will perform B LE ther ex x 15 reps. Education  Patient Education  Education Given To: Patient  Education Provided: Role of Therapy;Plan of Care;Transfer Training  Education Provided Comments: DIXIE precautions; proper use of RW for safe transfers/ambulation.   Education Method: Demonstration;Verbal  Barriers to Learning: Cognition  Education Outcome: Continued education needed      Therapy Time   Individual Concurrent Group Co-treatment   Time In [facility]         Time Out 1007         Minutes [address] [place] [address]

## 2022-08-12 PROCEDURE — 6370000000 HC RX 637 (ALT 250 FOR IP): Performed by: INTERNAL MEDICINE

## 2022-08-12 PROCEDURE — 97535 SELF CARE MNGMENT TRAINING: CPT

## 2022-08-12 PROCEDURE — 97530 THERAPEUTIC ACTIVITIES: CPT

## 2022-08-12 PROCEDURE — 97110 THERAPEUTIC EXERCISES: CPT

## 2022-08-12 PROCEDURE — 6360000002 HC RX W HCPCS: Performed by: PHYSICIAN ASSISTANT

## 2022-08-12 PROCEDURE — 6370000000 HC RX 637 (ALT 250 FOR IP): Performed by: PHYSICIAN ASSISTANT

## 2022-08-12 PROCEDURE — 1200000002 HC SEMI PRIVATE SWING BED

## 2022-08-12 PROCEDURE — 86480 TB TEST CELL IMMUN MEASURE: CPT

## 2022-08-12 RX ORDER — CEFDINIR 300 MG/1
300 CAPSULE ORAL EVERY 12 HOURS SCHEDULED
Status: COMPLETED | OUTPATIENT
Start: 2022-08-12 | End: 2022-08-14

## 2022-08-12 RX ADMIN — PANTOPRAZOLE SODIUM 40 MG: 40 TABLET, DELAYED RELEASE ORAL at 05:14

## 2022-08-12 RX ADMIN — BUSPIRONE HYDROCHLORIDE 10 MG: 5 TABLET ORAL at 19:56

## 2022-08-12 RX ADMIN — CEFDINIR 300 MG: 300 CAPSULE ORAL at 10:44

## 2022-08-12 RX ADMIN — SERTRALINE HYDROCHLORIDE 50 MG: 50 TABLET ORAL at 08:47

## 2022-08-12 RX ADMIN — ENOXAPARIN SODIUM 40 MG: 100 INJECTION SUBCUTANEOUS at 08:47

## 2022-08-12 RX ADMIN — TRAMADOL HYDROCHLORIDE 50 MG: 50 TABLET, COATED ORAL at 13:08

## 2022-08-12 RX ADMIN — BUSPIRONE HYDROCHLORIDE 10 MG: 5 TABLET ORAL at 08:47

## 2022-08-12 RX ADMIN — ATORVASTATIN CALCIUM 10 MG: 10 TABLET, FILM COATED ORAL at 08:47

## 2022-08-12 RX ADMIN — CEFDINIR 300 MG: 300 CAPSULE ORAL at 19:56

## 2022-08-12 RX ADMIN — QUETIAPINE FUMARATE 25 MG: 25 TABLET ORAL at 19:56

## 2022-08-12 RX ADMIN — ACETAMINOPHEN 650 MG: 325 TABLET, FILM COATED ORAL at 13:08

## 2022-08-12 RX ADMIN — FOLIC ACID 1 MG: 1 TABLET ORAL at 08:47

## 2022-08-12 RX ADMIN — BUSPIRONE HYDROCHLORIDE 10 MG: 5 TABLET ORAL at 14:37

## 2022-08-12 RX ADMIN — TROSPIUM CHLORIDE 20 MG: 20 TABLET, FILM COATED ORAL at 08:47

## 2022-08-12 RX ADMIN — LEVOTHYROXINE SODIUM 50 MCG: 0.05 TABLET ORAL at 05:14

## 2022-08-12 RX ADMIN — ASPIRIN 81 MG: 81 TABLET, COATED ORAL at 08:47

## 2022-08-12 NOTE — PROGRESS NOTES
X2  Bed to Chair: Maximum assistance;Assist X2     ADL  Grooming: Contact guard assistance  LE Dressing: Maximum assistance  Toileting: Maximum assistance (x2)      Goals  Short Term Goals  Time Frame for Short term goals: 2 weeks  Short Term Goal 1: Pt to complete ADL transfers with CGA. Short Term Goal 2: Pt to complete toileting with CGA. Short Term Goal 3: Pt to complete dressing with min assist.  Short Term Goal 4: Pt to complete bathing with min assist.  Short Term Goal 5: Pt to tolerate x10 minutes of activity to increase functional activity tolerance. Short Term Goal 6: Pt to complete hygiene/grooming with SOLOMON. Therapy Time   Individual Concurrent Group Co-treatment   Time In 5039         Time Out 9175         Minutes 40             This note serves as a DC summary in the event of pt discharge.       Marcia Can OTR/L

## 2022-08-12 NOTE — PROGRESS NOTES
Requested record from cardiologist Dr. Kimber Vences regarding patient's event monitor that is in place.      Jumana Roberts PA-C

## 2022-08-12 NOTE — PROGRESS NOTES
Physical Therapy  Facility/Department: [facility] MED [facility]  Physical Therapy Daily Treatment Note    Name: Sri Han  : 1935  MRN: [id number]  Date of Service: 2022    Discharge Recommendations:  Continue to assess pending progress          Patient Diagnosis(es): There were no encounter diagnoses. Past Medical History:  has a past medical history of Allergic rhinitis, Dementia ([facility] [address] Hypertension, Hyperthyroidism, and Osteoarthritis. Past Surgical History:  has a past surgical history that includes Cholecystectomy; Tubal ligation; Hysterectomy; and bladder suspension (2015). Assessment   Assessment: PT tx completed. Patient more drowsy today and pleasantly confused but cooperative with PT. Requires max A for sup to sit. Sit to stand x 5 from EOB with RW and max A x 2. Patient has a difficult time fully extending her knees to achieve a full stand d/t increased c/o pain. Requires max A x 2 for standing pivot transfer to [facility] with RW. Max A x 2 with RW for standing pivot transfer from [facility] to recliner chair. Patient positioned in recliner with ABD pillow in place. Cont to outlined goals per POC. Activity Tolerance  Activity Tolerance: Patient limited by fatigue;Patient limited by pain; Patient limited by endurance;Treatment limited secondary to decreased cognition     Plan   Plan  Plan: 3-5 times per week  Current Treatment Recommendations: Strengthening, ROM, Balance training, Functional mobility training, Transfer training, Gait training, Endurance training, Cognitive/Perceptual training, Pain management, Therapeutic activities, Home exercise program, Safety education & training, Patient/Caregiver education & training  Safety Devices  Type of Devices: Call light within reach, Left in chair  Restraints  Restraints Initially in Place: No     Restrictions  Restrictions/Precautions  Restrictions/Precautions: Fall Risk, General Precautions  Required Braces or Orthoses?: No  Position Activity Restriction  Hip Precautions: Posterior hip precautions  Other position/activity restrictions: 6 weeks postop PHP     Subjective   Pain: No c/o pain. Social/Functional History  Social/Functional History  Lives With: Daughter (24 hour care)  Type of Home: Mobile home  Home Layout: One level  Home Access: Stairs to enter with rails  Entrance Stairs - Number of Steps: 4 FERNANDEZ  Bathroom Shower/Tub: Tub/Shower unit  Bathroom Toilet: Standard  Bathroom Equipment: Grab bars in shower  Bathroom Accessibility: Walker accessible  Home Equipment: [email or web address]  Has the patient had two or more falls in the past year or any fall with injury in the past year?: Yes  ADL Assistance: Independent (Family supervised bathing and transfer. Pt able to bath, toilet, and dress with independence.)  Homemaking Assistance: Needs assistance (Pt assisted with light housekeeping otherwise family completed all housekeeping tasks)  Ambulation Assistance: Independent (No AD at baseline)  Transfer Assistance: Independent  Active : No  Additional Comments: Pt is poor historian unable to gain all information on PLOF and AE at home. Pt states she stays with daughter. Vision/Hearing       Cognition         Objective   Heart Rate: 66  Heart Rate Source: Monitor  BP: (!) 147/67  BP Location: Left upper arm  MAP (Calculated): 93.67  Resp: 18  SpO2: 98 %  O2 Device: None (Room air)     Observation/Palpation  Posture: Fair  Observation: Pt received lying supine in bed. NAD. Pleasantly confused. Agreeable to work with PT. Bed Mobility Training  Bed Mobility Training: Yes  Overall Level of Assistance: Maximum assistance  Rolling: Maximum assistance  Supine to Sit: Maximum assistance  Scooting: Minimum assistance; Moderate assistance  Balance  Sitting: Intact  Standing: With support  Transfer Training  Transfer Training: Yes  Overall Level of Assistance: Maximum assistance;Assist X2  Sit to Stand: Maximum assistance;Assist X2  Stand to Sit: Maximum assistance;Assist X2  Stand Pivot Transfers: Maximum assistance;Assist X2  Bed to Chair: Maximum assistance;Assist X2  Bed mobility  Supine to Sit: Maximum assistance  Scooting: Moderate assistance  Transfers  Sit to Stand: Maximum Assistance;2 Person Assistance  Stand to sit: Moderate Assistance;2 Person Assistance  Bed to Chair: Maximum assistance;2 Person Assistance  Stand Pivot Transfers: Maximum Assistance;2 Person Assistance  Comment: Sit to stand x 5 from EOB. Stands on flexed knees and with evidence of increased pain with mobiltiy today. Balance  Posture: Fair  Sitting - Static: Good  Sitting - Dynamic: Good  Standing - Static: Fair;+  Standing - Dynamic: Poor  Exercise Treatment: AP, heel slides, hip ABD/ADD, SLR             Goals  Long Term Goals  Time Frame for Long term goals : 14 days  Long term goal 1: Patient will perform all bed mobility with no more than Tye. Long term goal 2: Patient will perform sit to stand and transfers with RW and no more than min A. Long term goal 3: Patient will ambulate 20'x2 with RW and no more than min A. Long term goal 4: Patient will perform B LE ther ex x 15 reps.               Therapy Time   Individual Concurrent Group Co-treatment   Time In 1002         Time Out 1043         Minutes [address] [facility],[address], PT

## 2022-08-12 NOTE — FLOWSHEET NOTE
08/12/22 0847   Assessment   Charting Type Shift assessment   Psychosocial   Psychosocial (WDL) WDL   Neurological   Neuro (WDL) WDL   Level of Consciousness Alert (0)   Orientation Level Oriented to person;Disoriented to place; Disoriented to time;Disoriented to situation   Cognition Follows commands;Poor attention/concentration   Speech Clear   [place] Coma Scale   Eye Opening 4   Best Verbal Response 4   Best Motor Response 6   [place] Coma Scale Score 14   HEENT (Head, Ears, Eyes, Nose, & Throat)   HEENT (WDL) X   Teeth Missing teeth;Dentures upper;Dentures lower   Respiratory   Respiratory (WDL) WDL   Respiratory Pattern Regular   Respiratory Depth Normal   Respiratory Quality/Effort Unlabored   Chest Assessment Chest expansion symmetrical;Trachea midline   L Breath Sounds Clear   R Breath Sounds Clear   Cardiac   Cardiac (WDL) X  (body guardian left chest)   Gastrointestinal   Abdominal (WDL) WDL   Genitourinary   Genitourinary (WDL) X  (incontinence)   Peripheral Vascular   Peripheral Vascular (WDL) WDL   Skin Integumentary    Skin Integumentary (WDL) WDL   Musculoskeletal   Musculoskeletal (WDL) X   RUE Weakness   LUE Weakness   RL Extremity Weakness   LL Extremity Weakness   Pt awake in bed eating breakfast. Pt appears in no acute distress. Pt vital signs reviewed which are stable. Pt denies any pain. Pt took morning medications whole in apple sauce without difficulty. Call light and bedside table left within Pt's reach.

## 2022-08-13 LAB
A/G RATIO: 0.9 (ref 0.8–2)
ALBUMIN SERPL-MCNC: 3 G/DL (ref 3.4–4.8)
ALP BLD-CCNC: 80 U/L (ref 25–100)
ALT SERPL-CCNC: 10 U/L (ref 4–36)
ANION GAP SERPL CALCULATED.3IONS-SCNC: 12 MMOL/L (ref 3–16)
AST SERPL-CCNC: 12 U/L (ref 8–33)
BASOPHILS ABSOLUTE: 0.1 K/UL (ref 0–0.1)
BASOPHILS RELATIVE PERCENT: 1 %
BILIRUB SERPL-MCNC: 0.3 MG/DL (ref 0.3–1.2)
BUN BLDV-MCNC: 26 MG/DL (ref 6–20)
CALCIUM SERPL-MCNC: 8.5 MG/DL (ref 8.5–10.5)
CHLORIDE BLD-SCNC: 103 MMOL/L (ref 98–107)
CO2: 23 MMOL/L (ref 20–30)
CREAT SERPL-MCNC: 0.9 MG/DL (ref 0.4–1.2)
EOSINOPHILS ABSOLUTE: 0.2 K/UL (ref 0–0.4)
EOSINOPHILS RELATIVE PERCENT: 1.9 %
FERRITIN: 523 NG/ML (ref 22–322)
GFR AFRICAN AMERICAN: >59
GFR NON-AFRICAN AMERICAN: 59
GLOBULIN: 3.5 G/DL
GLUCOSE BLD-MCNC: 108 MG/DL (ref 74–106)
HCT VFR BLD CALC: 26.5 % (ref 37–47)
HEMOGLOBIN: 8.1 G/DL (ref 11.5–16.5)
IMMATURE GRANULOCYTES #: 0.1 K/UL
IMMATURE GRANULOCYTES %: 0.6 % (ref 0–5)
IRON SATURATION: 12 % (ref 15–50)
IRON: 24 UG/DL (ref 37–145)
LYMPHOCYTES ABSOLUTE: 2 K/UL (ref 1.5–4)
LYMPHOCYTES RELATIVE PERCENT: 25.6 %
MCH RBC QN AUTO: 29.8 PG (ref 27–32)
MCHC RBC AUTO-ENTMCNC: 30.6 G/DL (ref 31–35)
MCV RBC AUTO: 97.4 FL (ref 80–100)
MONOCYTES ABSOLUTE: 0.5 K/UL (ref 0.2–0.8)
MONOCYTES RELATIVE PERCENT: 6.1 %
NEUTROPHILS ABSOLUTE: 5.1 K/UL (ref 2–7.5)
NEUTROPHILS RELATIVE PERCENT: 64.8 %
PDW BLD-RTO: 13.6 % (ref 11–16)
PLATELET # BLD: 251 K/UL (ref 150–400)
PMV BLD AUTO: 10.1 FL (ref 6–10)
POTASSIUM SERPL-SCNC: 4 MMOL/L (ref 3.4–5.1)
RBC # BLD: 2.72 M/UL (ref 3.8–5.8)
SODIUM BLD-SCNC: 138 MMOL/L (ref 136–145)
TOTAL IRON BINDING CAPACITY: 202 UG/DL (ref 250–450)
TOTAL PROTEIN: 6.5 G/DL (ref 6.4–8.3)
WBC # BLD: 7.9 K/UL (ref 4–11)

## 2022-08-13 PROCEDURE — 6370000000 HC RX 637 (ALT 250 FOR IP): Performed by: INTERNAL MEDICINE

## 2022-08-13 PROCEDURE — 6370000000 HC RX 637 (ALT 250 FOR IP): Performed by: PHYSICIAN ASSISTANT

## 2022-08-13 PROCEDURE — 83550 IRON BINDING TEST: CPT

## 2022-08-13 PROCEDURE — 82728 ASSAY OF FERRITIN: CPT

## 2022-08-13 PROCEDURE — 1200000002 HC SEMI PRIVATE SWING BED

## 2022-08-13 PROCEDURE — 85025 COMPLETE CBC W/AUTO DIFF WBC: CPT

## 2022-08-13 PROCEDURE — 6360000002 HC RX W HCPCS: Performed by: PHYSICIAN ASSISTANT

## 2022-08-13 PROCEDURE — 80053 COMPREHEN METABOLIC PANEL: CPT

## 2022-08-13 PROCEDURE — 83540 ASSAY OF IRON: CPT

## 2022-08-13 PROCEDURE — 36415 COLL VENOUS BLD VENIPUNCTURE: CPT

## 2022-08-13 RX ADMIN — FOLIC ACID 1 MG: 1 TABLET ORAL at 08:24

## 2022-08-13 RX ADMIN — TRAMADOL HYDROCHLORIDE 50 MG: 50 TABLET, COATED ORAL at 03:35

## 2022-08-13 RX ADMIN — SERTRALINE HYDROCHLORIDE 50 MG: 50 TABLET ORAL at 08:25

## 2022-08-13 RX ADMIN — TROSPIUM CHLORIDE 20 MG: 20 TABLET, FILM COATED ORAL at 08:24

## 2022-08-13 RX ADMIN — PANTOPRAZOLE SODIUM 40 MG: 40 TABLET, DELAYED RELEASE ORAL at 05:26

## 2022-08-13 RX ADMIN — QUETIAPINE FUMARATE 25 MG: 25 TABLET ORAL at 20:59

## 2022-08-13 RX ADMIN — BUSPIRONE HYDROCHLORIDE 10 MG: 5 TABLET ORAL at 15:57

## 2022-08-13 RX ADMIN — CEFDINIR 300 MG: 300 CAPSULE ORAL at 08:24

## 2022-08-13 RX ADMIN — ENOXAPARIN SODIUM 40 MG: 100 INJECTION SUBCUTANEOUS at 08:25

## 2022-08-13 RX ADMIN — LEVOTHYROXINE SODIUM 50 MCG: 0.05 TABLET ORAL at 05:25

## 2022-08-13 RX ADMIN — BUSPIRONE HYDROCHLORIDE 10 MG: 5 TABLET ORAL at 08:24

## 2022-08-13 RX ADMIN — ATORVASTATIN CALCIUM 10 MG: 10 TABLET, FILM COATED ORAL at 08:25

## 2022-08-13 RX ADMIN — BUSPIRONE HYDROCHLORIDE 10 MG: 5 TABLET ORAL at 20:59

## 2022-08-13 RX ADMIN — CEFDINIR 300 MG: 300 CAPSULE ORAL at 20:59

## 2022-08-13 RX ADMIN — TRAMADOL HYDROCHLORIDE 50 MG: 50 TABLET, COATED ORAL at 15:57

## 2022-08-13 RX ADMIN — ASPIRIN 81 MG: 81 TABLET, COATED ORAL at 08:24

## 2022-08-13 ASSESSMENT — PAIN SCALES - GENERAL: PAINLEVEL_OUTOF10: 5

## 2022-08-13 NOTE — PLAN OF CARE
Problem: Discharge Planning  Goal: Discharge to home or other facility with appropriate resources  Outcome: Progressing  Flowsheets (Taken 8/13/2022 0800)  Discharge to home or other facility with appropriate resources: Identify barriers to discharge with patient and caregiver     Problem: Safety - Adult  Goal: Free from fall injury  Outcome: Progressing     Problem: ABCDS Injury Assessment  Goal: Absence of physical injury  Outcome: Progressing     Problem: Skin/Tissue Integrity  Goal: Absence of new skin breakdown  Description: 1. Monitor for areas of redness and/or skin breakdown  2. Assess vascular access sites hourly  3. Every 4-6 hours minimum:  Change oxygen saturation probe site  4. Every 4-6 hours:  If on nasal continuous positive airway pressure, respiratory therapy assess nares and determine need for appliance change or resting period.   Outcome: Progressing     Problem: Neurosensory - Adult  Goal: Achieves maximal functionality and self care  Outcome: Progressing  Flowsheets (Taken 8/13/2022 0800)  Achieves maximal functionality and self care:   Monitor swallowing and airway patency with patient fatigue and changes in neurological status   Encourage and assist patient to increase activity and self care with guidance from physical therapy/occupational therapy     Problem: Cardiovascular - Adult  Goal: Maintains optimal cardiac output and hemodynamic stability  Outcome: Progressing  Flowsheets (Taken 8/13/2022 0800)  Maintains optimal cardiac output and hemodynamic stability:   Monitor blood pressure and heart rate   Monitor urine output and notify Licensed Independent Practitioner for values outside of normal range   Assess for signs of decreased cardiac output  Goal: Absence of cardiac dysrhythmias or at baseline  Outcome: Progressing  Flowsheets (Taken 8/13/2022 0800)  Absence of cardiac dysrhythmias or at baseline:   Monitor cardiac rate and rhythm   Assess for signs of decreased cardiac output Administer antiarrhythmia medication and electrolyte replacement as ordered     Problem: Skin/Tissue Integrity - Adult  Goal: Skin integrity remains intact  Outcome: Progressing  Flowsheets (Taken 8/13/2022 0800)  Skin Integrity Remains Intact:   Monitor for areas of redness and/or skin breakdown   Assess vascular access sites hourly     Problem: Musculoskeletal - Adult  Goal: Return mobility to safest level of function  Outcome: Progressing  Flowsheets (Taken 8/13/2022 0800)  Return Mobility to Safest Level of Function:   Assess patient stability and activity tolerance for standing, transferring and ambulating with or without assistive devices   Assist with transfers and ambulation using safe patient handling equipment as needed  Goal: Return ADL status to a safe level of function  Outcome: Progressing  Flowsheets (Taken 8/13/2022 0800)  Return ADL Status to a Safe Level of Function:   Administer medication as ordered   Assess activities of daily living deficits and provide assistive devices as needed   Obtain physical therapy/occupational therapy consults as needed   Assist and instruct patient to increase activity and self care as tolerated     Problem: Gastrointestinal - Adult  Goal: Maintains or returns to baseline bowel function  Outcome: Progressing  Flowsheets (Taken 8/13/2022 0800)  Maintains or returns to baseline bowel function:   Assess bowel function   Administer ordered medications as needed  Goal: Maintains adequate nutritional intake  Outcome: Progressing  Flowsheets (Taken 8/13/2022 0800)  Maintains adequate nutritional intake:   Monitor percentage of each meal consumed   Assist with meals as needed   Monitor intake and output, weight and lab values     Problem: Genitourinary - Adult  Goal: Absence of urinary retention  Outcome: Progressing  Flowsheets (Taken 8/13/2022 0800)  Absence of urinary retention: Monitor intake/output and perform bladder scan as needed     Problem: Infection - Adult  Goal: Absence of infection at discharge  Outcome: Progressing  Flowsheets (Taken 8/13/2022 0800)  Absence of infection at discharge:   Assess and monitor for signs and symptoms of infection   Monitor lab/diagnostic results   Monitor all insertion sites i.e., indwelling lines, tubes and drains   Administer medications as ordered  Goal: Absence of infection during hospitalization  Outcome: Progressing  Flowsheets (Taken 8/13/2022 0800)  Absence of infection during hospitalization:   Assess and monitor for signs and symptoms of infection   Monitor lab/diagnostic results     Problem: Metabolic/Fluid and Electrolytes - Adult  Goal: Electrolytes maintained within normal limits  Outcome: Progressing  Flowsheets (Taken 8/13/2022 0800)  Electrolytes maintained within normal limits: Monitor labs and assess patient for signs and symptoms of electrolyte imbalances

## 2022-08-14 PROCEDURE — 6370000000 HC RX 637 (ALT 250 FOR IP): Performed by: PHYSICIAN ASSISTANT

## 2022-08-14 PROCEDURE — 97530 THERAPEUTIC ACTIVITIES: CPT

## 2022-08-14 PROCEDURE — 6370000000 HC RX 637 (ALT 250 FOR IP): Performed by: INTERNAL MEDICINE

## 2022-08-14 PROCEDURE — 97110 THERAPEUTIC EXERCISES: CPT

## 2022-08-14 PROCEDURE — 6360000002 HC RX W HCPCS: Performed by: PHYSICIAN ASSISTANT

## 2022-08-14 PROCEDURE — 1200000002 HC SEMI PRIVATE SWING BED

## 2022-08-14 RX ORDER — BUSPIRONE HYDROCHLORIDE 15 MG/1
TABLET ORAL
Status: DISPENSED
Start: 2022-08-14 | End: 2022-08-15

## 2022-08-14 RX ADMIN — TROSPIUM CHLORIDE 20 MG: 20 TABLET, FILM COATED ORAL at 08:27

## 2022-08-14 RX ADMIN — CEFDINIR 300 MG: 300 CAPSULE ORAL at 20:30

## 2022-08-14 RX ADMIN — ASPIRIN 81 MG: 81 TABLET, COATED ORAL at 08:28

## 2022-08-14 RX ADMIN — BUSPIRONE HYDROCHLORIDE 10 MG: 5 TABLET ORAL at 08:27

## 2022-08-14 RX ADMIN — ATORVASTATIN CALCIUM 10 MG: 10 TABLET, FILM COATED ORAL at 08:28

## 2022-08-14 RX ADMIN — PANTOPRAZOLE SODIUM 40 MG: 40 TABLET, DELAYED RELEASE ORAL at 05:49

## 2022-08-14 RX ADMIN — TRAMADOL HYDROCHLORIDE 50 MG: 50 TABLET, COATED ORAL at 08:30

## 2022-08-14 RX ADMIN — SERTRALINE HYDROCHLORIDE 50 MG: 50 TABLET ORAL at 08:27

## 2022-08-14 RX ADMIN — BUSPIRONE HYDROCHLORIDE 10 MG: 5 TABLET ORAL at 20:31

## 2022-08-14 RX ADMIN — TRAMADOL HYDROCHLORIDE 50 MG: 50 TABLET, COATED ORAL at 20:30

## 2022-08-14 RX ADMIN — ENOXAPARIN SODIUM 40 MG: 100 INJECTION SUBCUTANEOUS at 08:28

## 2022-08-14 RX ADMIN — CEFDINIR 300 MG: 300 CAPSULE ORAL at 08:27

## 2022-08-14 RX ADMIN — POLYETHYLENE GLYCOL 3350 17 G: 17 POWDER, FOR SOLUTION ORAL at 20:31

## 2022-08-14 RX ADMIN — QUETIAPINE FUMARATE 25 MG: 25 TABLET ORAL at 20:31

## 2022-08-14 RX ADMIN — IRON SUCROSE 200 MG: 20 INJECTION, SOLUTION INTRAVENOUS at 13:28

## 2022-08-14 RX ADMIN — FOLIC ACID 1 MG: 1 TABLET ORAL at 08:30

## 2022-08-14 RX ADMIN — LEVOTHYROXINE SODIUM 50 MCG: 0.05 TABLET ORAL at 05:49

## 2022-08-14 ASSESSMENT — PAIN SCALES - GENERAL
PAINLEVEL_OUTOF10: 5
PAINLEVEL_OUTOF10: 3

## 2022-08-14 ASSESSMENT — PAIN DESCRIPTION - LOCATION
LOCATION: BACK
LOCATION: HIP

## 2022-08-14 ASSESSMENT — PAIN DESCRIPTION - DESCRIPTORS
DESCRIPTORS: ACHING
DESCRIPTORS: ACHING

## 2022-08-14 NOTE — PROGRESS NOTES
Physical Therapy  Facility/Department: [facility] MED SURG  Daily Treatment Note  NAME: Mary Alvarado  : 1935  MRN: [id number]    Date of Service: 2022    Discharge Recommendations:  Continue to assess pending progress    Patient Diagnosis(es): There were no encounter diagnoses. Assessment   Assessment: Engaged patient in B LE therex in supine. Patient transitioned supine to sit with Max A of 1. Able to sit EOB and complete B LE AROM exercises. Stood at bedside 4 times with RW and Mod/Max A of 1. Patient unable to take steps with the RW. Patient very fatigued with activity and requested to lie back down. Patient agreeable to sit up in bed rather than lying down. Transitioned sit to supine with Max A and positioned sitting up in bed. Daughter present and pleased with patient's abilities today. Activity Tolerance: Patient tolerated treatment well;Patient limited by pain; Patient limited by endurance     Plan    Plan  Plan: 3-5 times per week  Current Treatment Recommendations: Strengthening;ROM;Balance training;Functional mobility training;Transfer training;Gait training; Endurance training;Cognitive/Perceptual training;Pain management; Therapeutic activities; Home exercise program;Safety education & training;Patient/Caregiver education & training     Restrictions  Restrictions/Precautions  Restrictions/Precautions: Fall Risk, General Precautions  Required Braces or Orthoses?: No  Position Activity Restriction  Hip Precautions: Posterior hip precautions  Other position/activity restrictions: 6 weeks postop PHP     Subjective    Subjective  Subjective: Patient presents awake in bed, iron infusing, NAD, daughter present  Pain: c/o R hip pain with movement     Objective     Bed Mobility Training  Bed Mobility Training: Yes  Overall Level of Assistance: Maximum assistance  Rolling: Maximum assistance  Supine to Sit: Maximum assistance  Sit to Supine: Maximum assistance  Scooting: Minimum assistance; Moderate

## 2022-08-14 NOTE — PLAN OF CARE
Problem: ABCDS Injury Assessment  Goal: Absence of physical injury  Outcome: Progressing     Problem: Skin/Tissue Integrity  Goal: Absence of new skin breakdown  Description: 1. Monitor for areas of redness and/or skin breakdown  2. Assess vascular access sites hourly  3. Every 4-6 hours minimum:  Change oxygen saturation probe site  4. Every 4-6 hours:  If on nasal continuous positive airway pressure, respiratory therapy assess nares and determine need for appliance change or resting period.   Outcome: Progressing     Problem: Discharge Planning  Goal: Discharge to home or other facility with appropriate resources  Recent Flowsheet Documentation  Taken 8/13/2022 2100 by Katiana Rashid RN  Discharge to home or other facility with appropriate resources:   Identify barriers to discharge with patient and caregiver   Arrange for needed discharge resources and transportation as appropriate   Refer to discharge planning if patient needs post-hospital services based on physician order or complex needs related to functional status, cognitive ability or social support system     Problem: Neurosensory - Adult  Goal: Achieves maximal functionality and self care  Recent Flowsheet Documentation  Taken 8/13/2022 2100 by Katiana Rashid RN  Achieves maximal functionality and self care: Monitor swallowing and airway patency with patient fatigue and changes in neurological status     Problem: Cardiovascular - Adult  Goal: Maintains optimal cardiac output and hemodynamic stability  Recent Flowsheet Documentation  Taken 8/13/2022 2100 by Katiana Rashid RN  Maintains optimal cardiac output and hemodynamic stability:   Monitor blood pressure and heart rate   Monitor urine output and notify Licensed Independent Practitioner for values outside of normal range  Goal: Absence of cardiac dysrhythmias or at baseline  Recent Flowsheet Documentation  Taken 8/13/2022 2100 by Katiana Rashid RN  Absence of cardiac dysrhythmias or at baseline: Monitor cardiac rate and rhythm     Problem: Skin/Tissue Integrity - Adult  Goal: Skin integrity remains intact  Recent Flowsheet Documentation  Taken 8/13/2022 2100 by Irene Carranza RN  Skin Integrity Remains Intact: Monitor for areas of redness and/or skin breakdown     Problem: Musculoskeletal - Adult  Goal: Return mobility to safest level of function  Recent Flowsheet Documentation  Taken 8/13/2022 2100 by Irene Carranza RN  Return Mobility to Safest Level of Function:   Assist with transfers and ambulation using safe patient handling equipment as needed   Obtain physical therapy/occupational therapy consults as needed   Instruct patient/family in ordered activity level  Goal: Return ADL status to a safe level of function  Recent Flowsheet Documentation  Taken 8/13/2022 2100 by Irene Carranza RN  Return ADL Status to a Safe Level of Function:   Administer medication as ordered   Obtain physical therapy/occupational therapy consults as needed     Problem: Metabolic/Fluid and Electrolytes - Adult  Goal: Electrolytes maintained within normal limits  Recent Flowsheet Documentation  Taken 8/13/2022 2100 by Irene Carranza RN  Electrolytes maintained within normal limits: Monitor labs and assess patient for signs and symptoms of electrolyte imbalances

## 2022-08-15 LAB
A/G RATIO: 1 (ref 0.8–2)
ALBUMIN SERPL-MCNC: 3.2 G/DL (ref 3.4–4.8)
ALP BLD-CCNC: 93 U/L (ref 25–100)
ALT SERPL-CCNC: 10 U/L (ref 4–36)
ANION GAP SERPL CALCULATED.3IONS-SCNC: 10 MMOL/L (ref 3–16)
AST SERPL-CCNC: 11 U/L (ref 8–33)
BILIRUB SERPL-MCNC: 0.3 MG/DL (ref 0.3–1.2)
BUN BLDV-MCNC: 19 MG/DL (ref 6–20)
CALCIUM SERPL-MCNC: 8.6 MG/DL (ref 8.5–10.5)
CHLORIDE BLD-SCNC: 103 MMOL/L (ref 98–107)
CO2: 24 MMOL/L (ref 20–30)
CREAT SERPL-MCNC: 0.9 MG/DL (ref 0.4–1.2)
GFR AFRICAN AMERICAN: >59
GFR NON-AFRICAN AMERICAN: 59
GLOBULIN: 3.1 G/DL
GLUCOSE BLD-MCNC: 99 MG/DL (ref 74–106)
HCT VFR BLD CALC: 26.4 % (ref 37–47)
HEMOGLOBIN: 8.2 G/DL (ref 11.5–16.5)
MCH RBC QN AUTO: 29.7 PG (ref 27–32)
MCHC RBC AUTO-ENTMCNC: 31.1 G/DL (ref 31–35)
MCV RBC AUTO: 95.7 FL (ref 80–100)
PDW BLD-RTO: 13.3 % (ref 11–16)
PLATELET # BLD: 306 K/UL (ref 150–400)
PMV BLD AUTO: 9.9 FL (ref 6–10)
POTASSIUM SERPL-SCNC: 4.2 MMOL/L (ref 3.4–5.1)
RBC # BLD: 2.76 M/UL (ref 3.8–5.8)
SODIUM BLD-SCNC: 137 MMOL/L (ref 136–145)
TOTAL PROTEIN: 6.3 G/DL (ref 6.4–8.3)
WBC # BLD: 8.3 K/UL (ref 4–11)

## 2022-08-15 PROCEDURE — 97530 THERAPEUTIC ACTIVITIES: CPT

## 2022-08-15 PROCEDURE — 6360000002 HC RX W HCPCS: Performed by: INTERNAL MEDICINE

## 2022-08-15 PROCEDURE — 6370000000 HC RX 637 (ALT 250 FOR IP): Performed by: INTERNAL MEDICINE

## 2022-08-15 PROCEDURE — 97110 THERAPEUTIC EXERCISES: CPT

## 2022-08-15 PROCEDURE — 6360000002 HC RX W HCPCS: Performed by: PHYSICIAN ASSISTANT

## 2022-08-15 PROCEDURE — 1200000002 HC SEMI PRIVATE SWING BED

## 2022-08-15 PROCEDURE — 97535 SELF CARE MNGMENT TRAINING: CPT

## 2022-08-15 PROCEDURE — 97116 GAIT TRAINING THERAPY: CPT

## 2022-08-15 PROCEDURE — 6370000000 HC RX 637 (ALT 250 FOR IP): Performed by: PHYSICIAN ASSISTANT

## 2022-08-15 PROCEDURE — 2580000003 HC RX 258: Performed by: INTERNAL MEDICINE

## 2022-08-15 PROCEDURE — 80053 COMPREHEN METABOLIC PANEL: CPT

## 2022-08-15 PROCEDURE — 36415 COLL VENOUS BLD VENIPUNCTURE: CPT

## 2022-08-15 PROCEDURE — 85027 COMPLETE CBC AUTOMATED: CPT

## 2022-08-15 RX ADMIN — TRAMADOL HYDROCHLORIDE 50 MG: 50 TABLET, COATED ORAL at 08:33

## 2022-08-15 RX ADMIN — BUSPIRONE HYDROCHLORIDE 10 MG: 5 TABLET ORAL at 14:18

## 2022-08-15 RX ADMIN — SERTRALINE HYDROCHLORIDE 50 MG: 50 TABLET ORAL at 08:18

## 2022-08-15 RX ADMIN — IRON SUCROSE 200 MG: 20 INJECTION, SOLUTION INTRAVENOUS at 12:37

## 2022-08-15 RX ADMIN — ACETAMINOPHEN 650 MG: 325 TABLET, FILM COATED ORAL at 08:32

## 2022-08-15 RX ADMIN — LEVOTHYROXINE SODIUM 50 MCG: 0.05 TABLET ORAL at 05:56

## 2022-08-15 RX ADMIN — FOLIC ACID 1 MG: 1 TABLET ORAL at 08:18

## 2022-08-15 RX ADMIN — PANTOPRAZOLE SODIUM 40 MG: 40 TABLET, DELAYED RELEASE ORAL at 05:56

## 2022-08-15 RX ADMIN — ATORVASTATIN CALCIUM 10 MG: 10 TABLET, FILM COATED ORAL at 08:18

## 2022-08-15 RX ADMIN — TROSPIUM CHLORIDE 20 MG: 20 TABLET, FILM COATED ORAL at 08:18

## 2022-08-15 RX ADMIN — ENOXAPARIN SODIUM 40 MG: 100 INJECTION SUBCUTANEOUS at 08:20

## 2022-08-15 RX ADMIN — QUETIAPINE FUMARATE 25 MG: 25 TABLET ORAL at 19:47

## 2022-08-15 RX ADMIN — BUSPIRONE HYDROCHLORIDE 10 MG: 5 TABLET ORAL at 19:47

## 2022-08-15 RX ADMIN — ASPIRIN 81 MG: 81 TABLET, COATED ORAL at 08:27

## 2022-08-15 RX ADMIN — TRAMADOL HYDROCHLORIDE 50 MG: 50 TABLET, COATED ORAL at 19:45

## 2022-08-15 ASSESSMENT — PAIN DESCRIPTION - LOCATION: LOCATION: BACK;HIP

## 2022-08-15 ASSESSMENT — PAIN SCALES - GENERAL: PAINLEVEL_OUTOF10: 5

## 2022-08-15 NOTE — CARE COORDINATION
9/81/9919    I certify that the skilled nursing and/or rehabilitation services are required to be given on a daily basis, which as a practical matter can only be provided in a skilled nursing unit on an inpatient basis.

## 2022-08-15 NOTE — FLOWSHEET NOTE
08/15/22 0846   Assessment   Charting Type Shift assessment   Psychosocial   Psychosocial (WDL) WDL   Neurological   Neuro (WDL) WDL   Level of Consciousness Alert (0)   Orientation Level Oriented to person;Disoriented to place; Disoriented to time;Disoriented to situation   Cognition Follows commands;Poor attention/concentration;Poor judgement;Poor safety awareness   Speech Clear   [place] Coma Scale   Eye Opening 4   Best Verbal Response 5   Best Motor Response 6   [place] Coma Scale Score 15   HEENT (Head, Ears, Eyes, Nose, & Throat)   HEENT (WDL) X   Teeth Missing teeth;Dentures upper;Dentures lower   Respiratory   Respiratory (WDL) WDL   Respiratory Pattern Regular   Respiratory Depth Normal   Respiratory Quality/Effort Unlabored   Chest Assessment Chest expansion symmetrical;Trachea midline   L Breath Sounds Clear   R Breath Sounds Clear   Breath Sounds   Right Upper Lobe Clear   Right Middle Lobe Clear   Right Lower Lobe Clear   Left Upper Lobe Clear   Left Lower Lobe Clear   Cardiac   Cardiac (WDL) X  (body guardian left chest)   Gastrointestinal   Abdominal (WDL) WDL   Genitourinary   Genitourinary (WDL) X  (incontinence)   Peripheral Vascular   Peripheral Vascular (WDL) WDL   Skin Integumentary    Skin Integumentary (WDL) WDL   Musculoskeletal   Musculoskeletal (WDL) X   RUE Weakness   LUE Weakness   RL Extremity Weakness; Unsteady   LL Extremity Weakness; Unsteady

## 2022-08-15 NOTE — PROGRESS NOTES
Occupational Therapy  Facility/Department: [facility]  Daily Treatment Note  NAME: Marcy Gerard  : 1935  MRN: [id number]    Date of Service: 8/15/2022    Discharge Recommendations:  Continue to assess pending progress, 24 hour supervision or assist         Patient Diagnosis(es): There were no encounter diagnoses. Assessment    Assessment: Pt presents in bed daughter present for OT session. Pt transferred to sitting at EOB with min assist and MOD verbal cuing. Pt scooted at EOB with CGA demo significant improvement in bed mobility. Pt transferred to standing from EOB x4 trials min assist with MAX verbal cuing for establishing ELLIOT. Pt required max verbal and tactiles to spread feet apart to establish ELLIOT for improved standing balance. Pt requested to toilet. Partial co tx with PTA. pt transferred to [facility] with MOD x2. Pt required MOD x2 to complete toileting pt with fatigue and difficulty weight shifting in standing to complete colby hygiene and clothing management. Pt transferred to recliner with MOD x2 using RW max verbal cuing for completing pivot. Pt come to stand and ambulated 4 feet x2 with max verbal cuing for postural correction and weight shifting. Pt returned to sitting position and dmeo ability to scoot in chair. Pt completed AROM BUE ther ex seated in recliner. Pt combed hair with SUP seated in chair. Pt demo improved progress on this date. Pt left seated upright in recliner daughter present in room call light in reach. Restrictions       Subjective   Subjective  Subjective: I need to use the bathroom           Objective    Vitals           ADL  Grooming: Supervision  Toileting:  Moderate assistance (x2)  OT Exercises  A/AROM Exercises: Shoulder flexion x10, Horizontal ABD, ABD x10           Patient Education  Education Given To: Patient  Education Provided: Transfer Training  Education Method: Verbal  Barriers to Learning: Cognition  Education Outcome: Continued education needed    Goals  Short Term Goals  Time Frame for Short term goals: 2 weeks  Short Term Goal 1: Pt to complete ADL transfers with CGA. Short Term Goal 2: Pt to complete toileting with CGA. Short Term Goal 3: Pt to complete dressing with min assist.  Short Term Goal 4: Pt to complete bathing with min assist.  Short Term Goal 5: Pt to tolerate x10 minutes of activity to increase functional activity tolerance. Short Term Goal 6: Pt to complete hygiene/grooming with SOLOMON. Therapy Time   Individual Concurrent Group Co-treatment   Time In 6495         Time Out 5270         Minutes 40             This note serves as a DC summary in the event of pt discharge.       Marcia Can, OTR/L

## 2022-08-15 NOTE — PROGRESS NOTES
Physical Therapy  Facility/Department: [facility] MED SURG  Daily Treatment Note  NAME: Lazaro Hernandez  : 1935  MRN: [id number]    Date of Service: 8/15/2022    Discharge Recommendations:  Continue to assess pending progress      Patient Diagnosis(es): There were no encounter diagnoses. Assessment   Assessment: Partial cotreatment with OT. Patient stood with Mod A of 1 and required RW and Mod A of 2 to transfer to [facility]. Required assistance for hygiene and clothing management. Patient transferred to recliner with RW, Mod A of 2, and cues for sequencing steps. Ambulated 4 feet x 2 with RW and Mod A of 2 with constant cueing for widening ELLIOT and stepping up with L LE. Activity Tolerance: Patient tolerated treatment well;Patient limited by endurance     Plan    Plan  Plan: 3-5 times per week  Current Treatment Recommendations: Strengthening;ROM;Balance training;Functional mobility training;Transfer training;Gait training; Endurance training;Cognitive/Perceptual training;Pain management; Therapeutic activities; Home exercise program;Safety education & training;Patient/Caregiver education & training     Restrictions  Restrictions/Precautions  Restrictions/Precautions: Fall Risk, General Precautions  Required Braces or Orthoses?: No  Position Activity Restriction  Hip Precautions: Posterior hip precautions  Other position/activity restrictions: 6 weeks postop PHP     Subjective    Subjective  Subjective: Patient presents sitting EOB working with OT, daughter present     Objective     Bed Mobility Training  Bed Mobility Training: No  Balance  Sitting: Intact  Standing: With support  Transfer Training  Transfer Training: Yes  Overall Level of Assistance: Moderate assistance;Assist X2  Sit to Stand: Moderate assistance;Assist X1  Stand to Sit: Moderate assistance;Assist X1  Stand Pivot Transfers: Moderate assistance;Assist X2  Bed to Chair: Moderate assistance;Assist X2  Toilet Transfer:  Moderate assistance;Assist X2 [facility])  Gait Training  Gait Training: Yes  Gait  Overall Level of Assistance: Moderate assistance;Assist X2  Interventions: Verbal cues; Tactile cues; Weight shifting training/pressure relief  Base of Support: Narrowed  Speed/[employer]: Slow  Step Length: Left shortened  Gait Abnormalities: Decreased step clearance  Distance (ft): 4 Feet (4 feet x 2)  Assistive Device:  (chair follow)     Safety Devices  Type of Devices: Left in chair;Call light within reach (daughter present)     Goals  Long Term Goals  Time Frame for Long term goals : 14 days  Long term goal 1: Patient will perform all bed mobility with no more than Tye. Long term goal 2: Patient will perform sit to stand and transfers with RW and no more than min A. Long term goal 3: Patient will ambulate 20'x2 with RW and no more than min A. Long term goal 4: Patient will perform B LE ther ex x 15 reps. Therapy Time   Individual Concurrent Group Co-treatment   Time In 9873         Time Out 7961         Minutes 31             This note serves as D/C summary if patient is discharged prior to next visit.    Irma Weiner, PTA

## 2022-08-15 NOTE — CARE COORDINATION
Lives With: Daughter (24 hour care)  Type of Home: Mobile home  Home Layout: One level  Home Access: Stairs to enter with rails  Entrance Stairs - Number of Steps: 4 FERNANDEZ  Bathroom Shower/Tub: Tub/Shower unit  Bathroom Toilet: Standard  Bathroom Equipment: Grab bars in shower  Bathroom Accessibility: Walker accessible  Home Equipment: [address]  Has the patient had two or more falls in the past year or any fall with injury in the past year?: Yes  ADL Assistance: Independent (Family supervised bathing and transfer. Pt able to bath, toilet, and dress with independence.)  Homemaking Assistance: Needs assistance (Pt assisted with light housekeeping otherwise family completed all housekeeping tasks)  Ambulation Assistance: Independent (No AD at baseline)  Transfer Assistance: Independent  Active : No  Additional Comments: Pt is poor historian unable to gain all information on PLOF and AE at home. Pt states she stays with daughter. Lives with daughter. Has cane, grab bars in shower. Pt is I with ambulation, bathing, toileting, and dressing at baseline with supervision during bathing/tx.

## 2022-08-16 PROCEDURE — 6370000000 HC RX 637 (ALT 250 FOR IP): Performed by: INTERNAL MEDICINE

## 2022-08-16 PROCEDURE — 6360000002 HC RX W HCPCS: Performed by: PHYSICIAN ASSISTANT

## 2022-08-16 PROCEDURE — 6360000002 HC RX W HCPCS: Performed by: INTERNAL MEDICINE

## 2022-08-16 PROCEDURE — 97530 THERAPEUTIC ACTIVITIES: CPT

## 2022-08-16 PROCEDURE — 2580000003 HC RX 258: Performed by: INTERNAL MEDICINE

## 2022-08-16 PROCEDURE — 97535 SELF CARE MNGMENT TRAINING: CPT

## 2022-08-16 PROCEDURE — 1200000002 HC SEMI PRIVATE SWING BED

## 2022-08-16 PROCEDURE — 6370000000 HC RX 637 (ALT 250 FOR IP): Performed by: PHYSICIAN ASSISTANT

## 2022-08-16 PROCEDURE — 97802 MEDICAL NUTRITION INDIV IN: CPT

## 2022-08-16 RX ORDER — POLYETHYLENE GLYCOL 3350 17 G/17G
17 POWDER, FOR SOLUTION ORAL 2 TIMES DAILY
Status: DISCONTINUED | OUTPATIENT
Start: 2022-08-16 | End: 2022-08-23 | Stop reason: HOSPADM

## 2022-08-16 RX ADMIN — POLYETHYLENE GLYCOL 3350 17 G: 17 POWDER, FOR SOLUTION ORAL at 11:54

## 2022-08-16 RX ADMIN — ENOXAPARIN SODIUM 40 MG: 100 INJECTION SUBCUTANEOUS at 08:25

## 2022-08-16 RX ADMIN — TRAMADOL HYDROCHLORIDE 50 MG: 50 TABLET, COATED ORAL at 10:00

## 2022-08-16 RX ADMIN — ATORVASTATIN CALCIUM 10 MG: 10 TABLET, FILM COATED ORAL at 08:25

## 2022-08-16 RX ADMIN — QUETIAPINE FUMARATE 25 MG: 25 TABLET ORAL at 19:46

## 2022-08-16 RX ADMIN — IRON SUCROSE 200 MG: 20 INJECTION, SOLUTION INTRAVENOUS at 11:07

## 2022-08-16 RX ADMIN — POLYETHYLENE GLYCOL 3350 17 G: 17 POWDER, FOR SOLUTION ORAL at 19:45

## 2022-08-16 RX ADMIN — TROSPIUM CHLORIDE 20 MG: 20 TABLET, FILM COATED ORAL at 08:25

## 2022-08-16 RX ADMIN — BUSPIRONE HYDROCHLORIDE 10 MG: 5 TABLET ORAL at 13:52

## 2022-08-16 RX ADMIN — LEVOTHYROXINE SODIUM 50 MCG: 0.05 TABLET ORAL at 06:24

## 2022-08-16 RX ADMIN — BISACODYL 10 MG: 5 TABLET, COATED ORAL at 11:54

## 2022-08-16 RX ADMIN — BUSPIRONE HYDROCHLORIDE 10 MG: 5 TABLET ORAL at 19:46

## 2022-08-16 RX ADMIN — SERTRALINE HYDROCHLORIDE 50 MG: 50 TABLET ORAL at 08:27

## 2022-08-16 RX ADMIN — PANTOPRAZOLE SODIUM 40 MG: 40 TABLET, DELAYED RELEASE ORAL at 06:23

## 2022-08-16 RX ADMIN — BUSPIRONE HYDROCHLORIDE 10 MG: 5 TABLET ORAL at 08:26

## 2022-08-16 RX ADMIN — ACETAMINOPHEN 650 MG: 325 TABLET, FILM COATED ORAL at 13:53

## 2022-08-16 RX ADMIN — ASPIRIN 81 MG: 81 TABLET, COATED ORAL at 08:25

## 2022-08-16 RX ADMIN — FOLIC ACID 1 MG: 1 TABLET ORAL at 08:25

## 2022-08-16 ASSESSMENT — PAIN DESCRIPTION - LOCATION
LOCATION: HIP
LOCATION: HIP

## 2022-08-16 ASSESSMENT — PAIN SCALES - GENERAL
PAINLEVEL_OUTOF10: 5
PAINLEVEL_OUTOF10: 5

## 2022-08-16 NOTE — PLAN OF CARE
Problem: Discharge Planning  Goal: Discharge to home or other facility with appropriate resources  Outcome: Progressing     Problem: Safety - Adult  Goal: Free from fall injury  Outcome: Progressing     Problem: Skin/Tissue Integrity  Goal: Absence of new skin breakdown  Description: 1. Monitor for areas of redness and/or skin breakdown  2. Assess vascular access sites hourly  3. Every 4-6 hours minimum:  Change oxygen saturation probe site  4. Every 4-6 hours:  If on nasal continuous positive airway pressure, respiratory therapy assess nares and determine need for appliance change or resting period.   Outcome: Progressing     Problem: Cardiovascular - Adult  Goal: Maintains optimal cardiac output and hemodynamic stability  Outcome: Progressing

## 2022-08-16 NOTE — PROGRESS NOTES
Physical Therapy  Facility/Department: [facility] MED SURG  Daily Treatment Note  NAME: Francesca Roberts  : 1935  MRN: [id number]    Date of Service: 2022    Discharge Recommendations:  Continue to assess pending progress      Patient Diagnosis(es): There were no encounter diagnoses. Assessment   Assessment: Cotreated with OT. Patient completed bed mobility tasks with cues and Min A. Patient found to have had a urinary incontinence episode and required cleanup. Patient sat EOB for sponge bathing and clothing change. Patient c/o increased hip pain today. RN notified and administered pain meds. Patient had increased difficulty with standing and transferring due to pain. Stood with Mod A of 2 and transferred to [facility] with Max A of 2. Dependent for colby-hygiene and clothing mangagment. Max A of 2 required to transfer to the recliner. Activity Tolerance: Patient limited by endurance; Patient limited by pain     Plan    Plan  Plan: 3-5 times per week  Current Treatment Recommendations: Strengthening;ROM;Balance training;Functional mobility training;Transfer training;Gait training; Endurance training;Cognitive/Perceptual training;Pain management; Therapeutic activities; Home exercise program;Safety education & training;Patient/Caregiver education & training     Restrictions  Restrictions/Precautions  Restrictions/Precautions: Fall Risk, General Precautions  Required Braces or Orthoses?: No  Position Activity Restriction  Hip Precautions: Posterior hip precautions  Other position/activity restrictions: 6 weeks postop PHP     Subjective    Subjective  Subjective: Patient presents in bed with urinary incontinence  Pain: c/o R hip pain     Objective     Balance  Sitting: Intact  Standing: With support  Transfer Training  Overall Level of Assistance: Moderate assistance;Maximum assistance;Assist X2  Sit to Stand:  Moderate assistance;Assist X2  Stand to Sit: Moderate assistance;Assist X2  Stand Pivot Transfers: Maximum assistance;Assist X2  Bed to Chair: Maximum assistance;Assist X2  Toilet Transfer: Maximum assistance;Assist X2 (to [facility])  Gait Training  Gait Training: No      Safety Devices  Type of Devices: Left in chair;Call light within reach; Chair alarm in place     Goals  Long Term Goals  Time Frame for Long term goals : 14 days  Long term goal 1: Patient will perform all bed mobility with no more than Tye. Long term goal 2: Patient will perform sit to stand and transfers with RW and no more than min A. Long term goal 3: Patient will ambulate 20'x2 with RW and no more than min A. Long term goal 4: Patient will perform B LE ther ex x 15 reps. Therapy Time   Individual Concurrent Group Co-treatment   Time In 9607         Time Out 8808         Minutes 54             This note serves as D/C summary if patient is discharged prior to next visit.    Rodríguez Loco, PTA

## 2022-08-16 NOTE — CONSULTS
Comprehensive Nutrition Assessment    Type and Reason for Visit:  Initial, Consult    Nutrition Recommendations/Plan:   Recommend to continue with the soft and bite-sized, regular diet. Will start ONS BID  Will ask staff to work with patient and family food preferences. Malnutrition Assessment:  Malnutrition Status:  Insufficient data (08/16/22 1523)    Context:  Acute Illness     Findings of the 6 clinical characteristics of malnutrition:  Energy Intake:  Unable to assess  Weight Loss:  Unable to assess     Body Fat Loss:  No significant body fat loss     Muscle Mass Loss:  Unable to assess    Fluid Accumulation:  No significant fluid accumulation     Strength:       Nutrition Assessment:    Pt admitted with declining fucntional status and poor intakes. Nutrition Related Findings:    Pt presents with fluctuating weight. July was 123lb and then August 9 was 128lb and and August 10 121.5lb  no edema reported. Has some cognitive deficit. PMH: hypertension Wound Type: None       Current Nutrition Intake & Therapies:    Average Meal Intake: 26-50% (34-53% x past 9 meals)     ADULT DIET; Dysphagia - Soft and Bite Sized    Anthropometric Measures:  Height: 4' 11\" (149.9 cm)  Ideal Body Weight (IBW): 95 lbs (43 kg)       Current Body Weight: 121 lb 8 oz (55.1 kg), 127.9 % IBW.  Weight Source: Bed Scale  Current BMI (kg/m2): 24.5  Usual Body Weight: 123 lb 6.4 oz (56 kg)  % Weight Change (Calculated): -1.5  Weight Adjustment For: No Adjustment                 BMI Categories: Normal Weight (BMI 22.0 to 24.9) age over 72    Estimated Daily Nutrient Needs:  Energy Requirements Based On: Kcal/kg  Weight Used for Energy Requirements: Current  Energy (kcal/day): 5352-5503 (25-27 kcal/kg cbw)  Weight Used for Protein Requirements: Current  Protein (g/day): 66-72 (1.2-1.3 mg/kg cbw)  Method Used for Fluid Requirements: 1 ml/kcal  Fluid (ml/day): 6917-8190    Nutrition Diagnosis:   Predicted inadequate energy intake related to cognitive or neurological impairment as evidenced by intake 26-50%, poor intake prior to admission, swallow study results    Nutrition Interventions:   Food and/or Nutrient Delivery: Continue Current Diet, Start Oral Nutrition Supplement  Nutrition Education/Counseling: No recommendation at this time  Coordination of Nutrition Care: Continue to monitor while inpatient, Interdisciplinary Rounds, Swallow Evaluation  Plan of Care discussed with: Patient, family member, speech, kitchen staff    Goals:     Goals: Meet at least 75% of estimated needs, PO intake 50% or greater       Nutrition Monitoring and Evaluation:      Food/Nutrient Intake Outcomes: Food and Nutrient Intake, Supplement Intake  Physical Signs/Symptoms Outcomes: Biochemical Data, Weight, Fluid Status or Edema    Discharge Planning:     Too soon to determine     Demetrius Alexis MS, RD, LD

## 2022-08-16 NOTE — PROGRESS NOTES
Occupational Therapy  Facility/Department: [address] [place] [address] MED SURG  Daily Treatment Note  NAME: Darlene Brock  : 1935  MRN: [id number]    Date of Service: 2022    Discharge Recommendations:  Continue to assess pending progress, 24 hour supervision or assist         Patient Diagnosis(es): There were no encounter diagnoses. Assessment    Assessment: Pt presents in bed easily awakened. Co tx with PTA. Pt transferred to sitting at EOB with min A max verbal cuing for sequencing task. Pt sat unsupported at EOB. Pt bed soiled from UI. Pt required max assist to don brief. Pt SOLOMON with sponge bathing materials. Pt required CGA to wash UB with MOD verbal cuing to sequence task appropriately. Pt applied deoderant with CGA and mod verbal cuing. Pt washed LB with MOD A and mod x2 to come to stand to complete colby hygiene. Pt reported pain in weight bearing activity. Nurse notified and provided pain medication. Pt come required max assist to don brief and mod x2 to come to stand to complete LB clothing management. Upon completing bathing pt requested to toilet. Pt required max x2 to complete SPT from bed to [facility]. Pt required max x2 for toileting. Pt with increased c/o pain. Pt transferred from [facility] to chair with max x2. Pt on chair alarm, call light in reach, Pt positioned in recliner. Subjective   Subjective  Subjective: I feel like I need to use the bathroom           Objective    Vitals           ADL  Grooming: Supervision  UE Bathing: Contact guard assistance; Increased time to complete;Verbal cueing  LE Bathing: Moderate assistance  UE Dressing: Minimal assistance  LE Dressing: Maximum assistance  Toileting: Maximum assistance (x2)                   Goals  Short Term Goals  Time Frame for Short term goals: 2 weeks  Short Term Goal 1: Pt to complete ADL transfers with CGA. Short Term Goal 2: Pt to complete toileting with CGA.   Short Term Goal 3: Pt to complete dressing with min assist.  Short Term Goal 4: Pt to complete bathing with min assist.  Short Term Goal 5: Pt to tolerate x10 minutes of activity to increase functional activity tolerance. Short Term Goal 6: Pt to complete hygiene/grooming with SOLOMON. Therapy Time   Individual Concurrent Group Co-treatment   Time In 6252         Time Out 1024         Minutes 55             This note serves as a DC summary in the event of pt discharge.       Marcia Can, OTR/L

## 2022-08-17 LAB
GLUCOSE BLD-MCNC: 108 MG/DL (ref 74–106)
PERFORMED ON: ABNORMAL
QUANTI TB GOLD PLUS: NEGATIVE
QUANTI TB1 MINUS NIL: 0 IU/ML (ref 0–0.34)
QUANTI TB2 MINUS NIL: 0 IU/ML (ref 0–0.34)
QUANTIFERON MITOGEN: 4.69 IU/ML
QUANTIFERON NIL: 0.02 IU/ML

## 2022-08-17 PROCEDURE — 97110 THERAPEUTIC EXERCISES: CPT

## 2022-08-17 PROCEDURE — 97530 THERAPEUTIC ACTIVITIES: CPT

## 2022-08-17 PROCEDURE — 6370000000 HC RX 637 (ALT 250 FOR IP): Performed by: INTERNAL MEDICINE

## 2022-08-17 PROCEDURE — 1200000002 HC SEMI PRIVATE SWING BED

## 2022-08-17 PROCEDURE — 6370000000 HC RX 637 (ALT 250 FOR IP): Performed by: PHYSICIAN ASSISTANT

## 2022-08-17 PROCEDURE — 6360000002 HC RX W HCPCS: Performed by: PHYSICIAN ASSISTANT

## 2022-08-17 RX ORDER — TRAMADOL HYDROCHLORIDE 50 MG/1
50 TABLET ORAL EVERY 6 HOURS
Status: DISCONTINUED | OUTPATIENT
Start: 2022-08-17 | End: 2022-08-22

## 2022-08-17 RX ADMIN — LEVOTHYROXINE SODIUM 50 MCG: 0.05 TABLET ORAL at 05:53

## 2022-08-17 RX ADMIN — BUSPIRONE HYDROCHLORIDE 10 MG: 5 TABLET ORAL at 14:19

## 2022-08-17 RX ADMIN — TROSPIUM CHLORIDE 20 MG: 20 TABLET, FILM COATED ORAL at 08:18

## 2022-08-17 RX ADMIN — ASPIRIN 81 MG: 81 TABLET, COATED ORAL at 08:18

## 2022-08-17 RX ADMIN — BUSPIRONE HYDROCHLORIDE 10 MG: 5 TABLET ORAL at 20:07

## 2022-08-17 RX ADMIN — TRAMADOL HYDROCHLORIDE 50 MG: 50 TABLET, COATED ORAL at 15:59

## 2022-08-17 RX ADMIN — FOLIC ACID 1 MG: 1 TABLET ORAL at 08:18

## 2022-08-17 RX ADMIN — ATORVASTATIN CALCIUM 10 MG: 10 TABLET, FILM COATED ORAL at 08:18

## 2022-08-17 RX ADMIN — TRAMADOL HYDROCHLORIDE 50 MG: 50 TABLET, COATED ORAL at 10:04

## 2022-08-17 RX ADMIN — BUSPIRONE HYDROCHLORIDE 10 MG: 5 TABLET ORAL at 08:18

## 2022-08-17 RX ADMIN — QUETIAPINE FUMARATE 25 MG: 25 TABLET ORAL at 20:07

## 2022-08-17 RX ADMIN — POLYETHYLENE GLYCOL 3350 17 G: 17 POWDER, FOR SOLUTION ORAL at 08:18

## 2022-08-17 RX ADMIN — TRAMADOL HYDROCHLORIDE 50 MG: 50 TABLET, COATED ORAL at 22:07

## 2022-08-17 RX ADMIN — ENOXAPARIN SODIUM 40 MG: 100 INJECTION SUBCUTANEOUS at 08:18

## 2022-08-17 RX ADMIN — PANTOPRAZOLE SODIUM 40 MG: 40 TABLET, DELAYED RELEASE ORAL at 05:52

## 2022-08-17 RX ADMIN — SERTRALINE HYDROCHLORIDE 50 MG: 50 TABLET ORAL at 08:18

## 2022-08-17 NOTE — FLOWSHEET NOTE
08/17/22 0818   Assessment   Charting Type Shift assessment   Psychosocial   Psychosocial (WDL) WDL   Neurological   Neuro (WDL) WDL   Level of Consciousness Responds to voice (1)   Orientation Level Oriented to person;Disoriented to place; Disoriented to time;Disoriented to situation   Cognition Follows commands;Poor attention/concentration;Poor judgement;Poor safety awareness   Speech Clear   [place] Coma Scale   Eye Opening 4   Best Verbal Response 4   Best Motor Response 6   [place] Coma Scale Score 14   HEENT (Head, Ears, Eyes, Nose, & Throat)   HEENT (WDL) X   Teeth Missing teeth;Dentures upper;Dentures lower   Respiratory   Respiratory (WDL) WDL   Respiratory Pattern Regular   Respiratory Depth Normal   Respiratory Quality/Effort Unlabored   Chest Assessment Chest expansion symmetrical;Trachea midline   L Breath Sounds Clear   R Breath Sounds Clear   Cardiac   Cardiac (WDL) X  (body guardian left chest)   Gastrointestinal   Abdominal (WDL) WDL   Genitourinary   Genitourinary (WDL) X  (incontinence)   Peripheral Vascular   Peripheral Vascular (WDL) WDL   Skin Integumentary    Skin Integumentary (WDL) WDL   Musculoskeletal   Musculoskeletal (WDL) X   RUE Weakness   LUE Weakness   RL Extremity Weakness; Unsteady   LL Extremity Weakness; Unsteady

## 2022-08-17 NOTE — PROGRESS NOTES
Physical Therapy  Facility/Department: [facility] MED SURG  Daily Treatment Note  NAME: Zeynep Galvin  : 1935  MRN: [id number]    Date of Service: 2022    Discharge Recommendations:  Continue to assess pending progress      Patient Diagnosis(es): There were no encounter diagnoses. Assessment   Assessment: Patient completed bed mobility tasks with Mod A and cues for sequencing. Engaged patient in B LE therex in sitting. Patient stood at bedside 3x with Max A but was unable to pivot to the [facility]. Required Max A of 2 to transfer bed to [facility]. Patient able to void and then complete colby-hygiene while seated on BS. Stood with Max A 4 times total to get upright enough to be assisted with pulling up brief. Transferred to the recliner with Max A of 1. Daughter now in to visit. Activity Tolerance: Patient tolerated treatment well     Plan    Plan  Plan: 3-5 times per week  Current Treatment Recommendations: Strengthening;ROM;Balance training;Functional mobility training;Transfer training;Gait training; Endurance training;Cognitive/Perceptual training;Pain management; Therapeutic activities; Home exercise program;Safety education & training;Patient/Caregiver education & training     Restrictions  Restrictions/Precautions  Restrictions/Precautions: Fall Risk, General Precautions  Required Braces or Orthoses?: No  Position Activity Restriction  Hip Precautions: Posterior hip precautions  Other position/activity restrictions: 6 weeks postop PHP     Subjective    Subjective  Subjective: Patient presents awake in bed, NAD. Pain meds had been administered ~30 minutes prior. Pain: c/o R hip pain     Objective     Bed Mobility Training  Overall Level of Assistance: Minimum assistance; Moderate assistance  Rolling:  Moderate assistance  Supine to Sit: Moderate assistance  Scooting: Minimum assistance  Balance  Sitting: Intact  Standing: With support  Transfer Training  Transfer Training: Yes  Sit to Stand: Maximum assistance;Assist X1  Stand to Sit: Maximum assistance;Assist X1  Stand Pivot Transfers: Maximum assistance;Assist X2  Toilet Transfer: Maximum assistance;Assist X2 (to [facility])  Gait Training  Gait Training: No     PT Exercises  Exercise Treatment: B LE therex in sitting     Safety Devices  Type of Devices: Left in chair;Call light within reach; Chair alarm in place (daughter present)     Goals  Long Term Goals  Time Frame for Long term goals : 14 days  Long term goal 1: Patient will perform all bed mobility with no more than Tye. Long term goal 2: Patient will perform sit to stand and transfers with RW and no more than min A. Long term goal 3: Patient will ambulate 20'x2 with RW and no more than min A. Long term goal 4: Patient will perform B LE ther ex x 15 reps. Therapy Time   Individual Concurrent Group Co-treatment   Time In 1027         Time Out 1106         Minutes 39             This note serves as D/C summary if patient is discharged prior to next visit.    Darrick Ramirez, PTA

## 2022-08-17 NOTE — PROGRESS NOTES
Occupational Therapy  Facility/Department: [address] [place] [address] MED SURG  Daily Treatment Note  NAME: Agustina Santacruz  : 1935  MRN: [id number]    Date of Service: 2022    Discharge Recommendations:  Continue to assess pending progress, 24 hour supervision or assist         Patient Diagnosis(es): There were no encounter diagnoses. Assessment    Assessment: Pt presents in chair upon entry daughter present. Pt transitioned to sitting from reclining position. Pt completed chair push ups x10 with difficulty clearing buttocks from chair. Pt completed x6 sit to stand transfers with max verbal cuing for positioning and sequencing task to come to stand. Pt having difficulty achieving erect standing position. Pt required max verbal and tactile cues to correct posture in standing position. Pt reported fatigue and had brief rest breaks between transfers. Pt completed AROM ther ex in sitting with OT demonstrating exercises for pt to mirror. Pt tolerated well. Pt left in recliner with BLE elevated, alarm on , and call light in reach. Activity Tolerance: Patient tolerated treatment well      Plan         Restrictions       Subjective   Subjective  Subjective: Im really tired  Pain: c/o R hip pain           Objective    Vitals     Bed Mobility Training  Overall Level of Assistance: Minimum assistance; Moderate assistance  Rolling:  Moderate assistance  Supine to Sit: Moderate assistance  Scooting: Minimum assistance  Balance  Sitting: Intact  Standing: With support  Transfer Training  Transfer Training: Yes  Sit to Stand: Maximum assistance;Assist X1  Stand to Sit: Maximum assistance;Assist X1  Stand Pivot Transfers: Maximum assistance;Assist X2  Toilet Transfer: Maximum assistance;Assist X2 (to [facility])  Gait Training  Gait Training: No        OT Exercises  A/AROM Exercises: Shoulder flexion x10, Horizontal ABD, ABD x10, elbow flexion/extension, wrist flexion/extension,  x10     Safety Devices  Type of Devices: Left in chair;Call light within reach; Chair alarm in place (daughter present)          Goals  Short Term Goals  Time Frame for Short term goals: 2 weeks  Short Term Goal 1: Pt to complete ADL transfers with CGA. Short Term Goal 2: Pt to complete toileting with CGA. Short Term Goal 3: Pt to complete dressing with min assist.  Short Term Goal 4: Pt to complete bathing with min assist.  Short Term Goal 5: Pt to tolerate x10 minutes of activity to increase functional activity tolerance. Short Term Goal 6: Pt to complete hygiene/grooming with SOLOMON. Therapy Time   Individual Concurrent Group Co-treatment   Time In 0119         Time Out 0151         Minutes 32             This note serves as a DC summary in the event of pt discharge.       Marcia Can, OTR/L

## 2022-08-18 PROCEDURE — 1200000002 HC SEMI PRIVATE SWING BED

## 2022-08-18 PROCEDURE — 6370000000 HC RX 637 (ALT 250 FOR IP): Performed by: PHYSICIAN ASSISTANT

## 2022-08-18 PROCEDURE — 97530 THERAPEUTIC ACTIVITIES: CPT

## 2022-08-18 PROCEDURE — 97535 SELF CARE MNGMENT TRAINING: CPT

## 2022-08-18 PROCEDURE — 99308 SBSQ NF CARE LOW MDM 20: CPT | Performed by: INTERNAL MEDICINE

## 2022-08-18 PROCEDURE — 6370000000 HC RX 637 (ALT 250 FOR IP): Performed by: INTERNAL MEDICINE

## 2022-08-18 PROCEDURE — 6360000002 HC RX W HCPCS: Performed by: PHYSICIAN ASSISTANT

## 2022-08-18 RX ORDER — BISACODYL 10 MG
10 SUPPOSITORY, RECTAL RECTAL ONCE
Status: COMPLETED | OUTPATIENT
Start: 2022-08-18 | End: 2022-08-18

## 2022-08-18 RX ORDER — SENNA AND DOCUSATE SODIUM 50; 8.6 MG/1; MG/1
2 TABLET, FILM COATED ORAL DAILY
Status: DISCONTINUED | OUTPATIENT
Start: 2022-08-18 | End: 2022-08-23 | Stop reason: HOSPADM

## 2022-08-18 RX ORDER — FERROUS SULFATE TAB EC 324 MG (65 MG FE EQUIVALENT) 324 (65 FE) MG
324 TABLET DELAYED RESPONSE ORAL
Status: DISCONTINUED | OUTPATIENT
Start: 2022-08-19 | End: 2022-08-23 | Stop reason: HOSPADM

## 2022-08-18 RX ADMIN — TROSPIUM CHLORIDE 20 MG: 20 TABLET, FILM COATED ORAL at 08:45

## 2022-08-18 RX ADMIN — SERTRALINE HYDROCHLORIDE 50 MG: 50 TABLET ORAL at 08:45

## 2022-08-18 RX ADMIN — FOLIC ACID 1 MG: 1 TABLET ORAL at 08:45

## 2022-08-18 RX ADMIN — POLYETHYLENE GLYCOL 3350 17 G: 17 POWDER, FOR SOLUTION ORAL at 08:44

## 2022-08-18 RX ADMIN — BUSPIRONE HYDROCHLORIDE 10 MG: 5 TABLET ORAL at 08:45

## 2022-08-18 RX ADMIN — POLYETHYLENE GLYCOL 3350 17 G: 17 POWDER, FOR SOLUTION ORAL at 20:10

## 2022-08-18 RX ADMIN — TRAMADOL HYDROCHLORIDE 50 MG: 50 TABLET, COATED ORAL at 09:10

## 2022-08-18 RX ADMIN — LEVOTHYROXINE SODIUM 50 MCG: 0.05 TABLET ORAL at 05:37

## 2022-08-18 RX ADMIN — ENOXAPARIN SODIUM 40 MG: 100 INJECTION SUBCUTANEOUS at 08:45

## 2022-08-18 RX ADMIN — TRAMADOL HYDROCHLORIDE 50 MG: 50 TABLET, COATED ORAL at 04:30

## 2022-08-18 RX ADMIN — TRAMADOL HYDROCHLORIDE 50 MG: 50 TABLET, COATED ORAL at 14:59

## 2022-08-18 RX ADMIN — BUSPIRONE HYDROCHLORIDE 10 MG: 5 TABLET ORAL at 20:10

## 2022-08-18 RX ADMIN — BUSPIRONE HYDROCHLORIDE 10 MG: 5 TABLET ORAL at 14:59

## 2022-08-18 RX ADMIN — PANTOPRAZOLE SODIUM 40 MG: 40 TABLET, DELAYED RELEASE ORAL at 05:36

## 2022-08-18 RX ADMIN — TRAMADOL HYDROCHLORIDE 50 MG: 50 TABLET, COATED ORAL at 20:15

## 2022-08-18 RX ADMIN — QUETIAPINE FUMARATE 25 MG: 25 TABLET ORAL at 20:10

## 2022-08-18 RX ADMIN — ASPIRIN 81 MG: 81 TABLET, COATED ORAL at 08:45

## 2022-08-18 RX ADMIN — BISACODYL 10 MG: 10 SUPPOSITORY RECTAL at 14:59

## 2022-08-18 RX ADMIN — SENNOSIDES AND DOCUSATE SODIUM 2 TABLET: 8.6; 5 TABLET ORAL at 15:54

## 2022-08-18 RX ADMIN — ATORVASTATIN CALCIUM 10 MG: 10 TABLET, FILM COATED ORAL at 08:45

## 2022-08-18 ASSESSMENT — PAIN SCALES - GENERAL: PAINLEVEL_OUTOF10: 3

## 2022-08-18 NOTE — FLOWSHEET NOTE
08/18/22 0835   Assessment   Charting Type Shift assessment   Psychosocial   Psychosocial (WDL) WDL   Neurological   Neuro (WDL) WDL   Level of Consciousness Responds to voice (1)   Orientation Level Oriented to person;Disoriented to place; Disoriented to time;Disoriented to situation   Cognition Follows commands;Poor attention/concentration;Poor judgement;Poor safety awareness   Speech Clear   [place] Coma Scale   Eye Opening 4   Best Verbal Response 4   Best Motor Response 6   [place] Coma Scale Score 14   HEENT (Head, Ears, Eyes, Nose, & Throat)   HEENT (WDL) X   Teeth Missing teeth;Dentures upper;Dentures lower   Respiratory   Respiratory (WDL) WDL   Respiratory Pattern Regular   Respiratory Depth Normal   Respiratory Quality/Effort Unlabored   Chest Assessment Chest expansion symmetrical;Trachea midline   L Breath Sounds Clear   R Breath Sounds Clear   Cardiac   Cardiac (WDL) X  (body guardian left chest)   Gastrointestinal   Abdominal (WDL) WDL   Genitourinary   Genitourinary (WDL) X  (incontinence)   Peripheral Vascular   Peripheral Vascular (WDL) WDL   Skin Integumentary    Skin Integumentary (WDL) WDL   Musculoskeletal   Musculoskeletal (WDL) X   RUE Weakness   LUE Weakness   RL Extremity Weakness; Unsteady   LL Extremity Weakness; Unsteady

## 2022-08-18 NOTE — PROGRESS NOTES
Occupational Therapy  Facility/Department: [address] [place] [address] MED SURG  Daily Treatment Note  NAME: Angelique Escamilla  : 1935  MRN: [id number]    Date of Service: 2022    Discharge Recommendations:  Continue to assess pending progress, 24 hour supervision or assist         Patient Diagnosis(es): There were no encounter diagnoses. Assessment    Assessment: Pt received in bed sleeping but easily awakened to participate in therapy services. Pt sat upright at EOB with min assist on this date. Pt requested to toilet. Pt come to stand at [address] steady with max assist with max verbal and tactile cues for postural correction to achieve erect standing position. Pt DEP for transfer to [facility]. Pt toileted with max assist for colby hygiene and max verbal cues and assist for LB dressing seated on toilet. Pt transferred from bed to chair with max assist. Pt engaged in UB dressing to down gown seate dupright in chair. Pt completed task with SBA requiring cues to open eyes throughout task. Pt applied deoderant with SUP with max verbal cuing to apply and sequence activity appropriately. Pt completed grooming with SUP. Pt required max assist for sit to stand transfer trial x3 unable to come to erect standing position secondary to reports of pain. Pt completed chair push ups x7 with CGA to increase independence with ADL transfers. Pt required max assist for scooting in chair. Pt progress limited secondary to cognitive status. Subjective   Subjective  Subjective: My leg hurts I need to sit back down           Objective    Vitals           ADL  Grooming: Supervision  UE Dressing: Stand by assistance;Verbal cueing  LE Dressing: Maximum assistance  Toileting: Maximum assistance    Goals  Short Term Goals  Time Frame for Short term goals: 2 weeks  Short Term Goal 1: Pt to complete ADL transfers with CGA. Short Term Goal 2: Pt to complete toileting with CGA.   Short Term Goal 3: Pt to complete dressing with min assist.  Short Term Goal 4: Pt to complete bathing with min assist.  Short Term Goal 5: Pt to tolerate x10 minutes of activity to increase functional activity tolerance. Short Term Goal 6: Pt to complete hygiene/grooming with SOLOMON. Therapy Time   Individual Concurrent Group Co-treatment   Time In 8426         Time Out 1045         Minutes 33             This note serves as a DC summary in the event of pt discharge.       Marcia Can, OTR/L

## 2022-08-18 NOTE — PROGRESS NOTES
Progress Note      Subjective:   Chief complaint:   Declining functional status    Interval History:   Pt seen and examined multiple times this week. Continues to work with PT OT and is making slow progress. Pt also noted to be constipated. Bowel regimen adjusted. Review of systems:   Unable to obtain    Past medical history, surgical history, family history and social history reviewed and unchanged compared to H&P earlier this admission. Medications:   Scheduled Meds:   traMADol  50 mg Oral Q6H    polyethylene glycol  17 g Oral BID    aspirin  81 mg Oral Daily    enoxaparin  40 mg SubCUTAneous Daily    atorvastatin  10 mg Oral Daily    busPIRone  10 mg Oral TID    folic acid  1 mg Oral Daily    levothyroxine  50 mcg Oral Daily    linaCLOtide  72 mcg Oral QAM AC    pantoprazole  40 mg Oral QAM AC    QUEtiapine  25 mg Oral Nightly    sertraline  50 mg Oral Daily    trospium  20 mg Oral Daily     Continuous Infusions:    Objective:     Vital Signs  Temp: 97.7 °F (36.5 °C)  Heart Rate: 72  Resp: 18  BP: 107/71  SpO2: 95 %  O2 Device: None (Room air)       Vital signs reviewed in electronic charts. Physical exam  Constitutional:  Well developed, frail elderly lady lying in bed in no acute distress  Eyes:  PERRL, no scleral icterus, conjunctiva normal  HENT:  Atraumatic, external ears normal, nose normal, oropharynx moist, no pharyngeal exudates. Neck- supple, no JVD, no lymphadenopathy  Respiratory:  No respiratory distress on RA, no wheezing, rales or rhonchi detected  Cardiovascular:  Normal rate, normal rhythm, no murmurs, no gallops, no rubs, no edema  GI:  Soft, nondistended, normal bowel sounds, nontender, no voluntary guarding  Musculoskeletal:  No cyanosis or obvious acute deformity. Hip abductor pillow in place. Integument:  Warm and dry. Well healing incision right hip. External cardiac monitor noted to left upper chest.  Neurologic:  Alert & oriented to self. Pleasantly confused.  no apparent focal deficits noted  Psychiatric:  Speech and behavior appropriate     Results:     Lab Results   Component Value Date    WBC 8.3 08/15/2022    HGB 8.2 (L) 08/15/2022    HCT 26.4 (L) 08/15/2022    MCV 95.7 08/15/2022     08/15/2022       Lab Results   Component Value Date/Time     08/15/2022 04:14 AM    K 4.2 08/15/2022 04:14 AM    K 4.0 08/11/2022 04:38 AM     08/15/2022 04:14 AM    CO2 24 08/15/2022 04:14 AM    BUN 19 08/15/2022 04:14 AM    CREATININE 0.9 08/15/2022 04:14 AM    GLUCOSE 99 08/15/2022 04:14 AM    CALCIUM 8.6 08/15/2022 04:14 AM        Assessment and Plan: Active Hospital Problems    Diagnosis Date Noted    Dementia ([facility] [address] [F03.90]  -advanced at baseline; only oriented to person at baseline  -Patient's daughter is her guardian   08/11/2022    Anemia [D64.9]  -Hemoglobin 8.7 on 8/11; 8.2 on 8/15  -Per chart review, hemoglobin 11.6 on 8/1 prior to surgical intervention. Suspect acute anemia secondary to acute blood loss from surgery. -Iron studies with evidence of iron deficiency anemia. Started oral iron supplement 8/18  -Continue to monitor and transfuse for hemoglobin less than 7   08/11/2022    History of fracture of right hip [Z87.81]  -Status post fall resulting in a right femoral neck fracture  -Status post right hemiarthroplasty by Dr. Anil Vale on 8/2  -Weight-bear as tolerated to right lower extremity with posterior hip precautions and limited active abduction for 6 weeks per orthopedics  -Continue pain regimen as needed with bowel regimen  -Follow-up with Dr. Anil Vale as scheduled  -Continue aspirin 325 mg for 6 weeks for DVT prophylaxis per orthopedic recommendations. While patient is admitted to this facility, she will also receive Lovenox for DVT prophylaxis and baby aspirin. Plan to transition to aspirin 325 mg at time of discharge to complete the 6-week course.   -PT OT consulted and following  -Case management consulted for discharge planning assistance   08/11/2022 Declining functional status [R53.81]  -Secondary to recent hip fracture in patient of advanced age with advanced dementia  -PT OT following  -Case management consulted for discharge planning assistance   08/10/2022    Hypothyroidism [E03.9]  -Continue home Synthroid    Constipation  -Continue bowel regimen with Linzess, MiraLAX twice daily, Senna and PRN dulocolax 09/08/2016     Patient was seen and examined by Dr. Lexi De La Cruz and plan of care reviewed. Treatment plan was formulated collaboratively.       Electronically signed by LANA Horta on 8/18/2022 at 12:37 PM

## 2022-08-19 LAB
A/G RATIO: 1.1 (ref 0.8–2)
ALBUMIN SERPL-MCNC: 3.6 G/DL (ref 3.4–4.8)
ALP BLD-CCNC: 125 U/L (ref 25–100)
ALT SERPL-CCNC: 9 U/L (ref 4–36)
ANION GAP SERPL CALCULATED.3IONS-SCNC: 11 MMOL/L (ref 3–16)
AST SERPL-CCNC: 14 U/L (ref 8–33)
BILIRUB SERPL-MCNC: 0.3 MG/DL (ref 0.3–1.2)
BUN BLDV-MCNC: 21 MG/DL (ref 6–20)
CALCIUM SERPL-MCNC: 9 MG/DL (ref 8.5–10.5)
CHLORIDE BLD-SCNC: 100 MMOL/L (ref 98–107)
CO2: 26 MMOL/L (ref 20–30)
CREAT SERPL-MCNC: 1 MG/DL (ref 0.4–1.2)
GFR AFRICAN AMERICAN: >59
GFR NON-AFRICAN AMERICAN: 52
GLOBULIN: 3.4 G/DL
GLUCOSE BLD-MCNC: 95 MG/DL (ref 74–106)
HCT VFR BLD CALC: 28.4 % (ref 37–47)
HEMOGLOBIN: 9 G/DL (ref 11.5–16.5)
MCH RBC QN AUTO: 30.6 PG (ref 27–32)
MCHC RBC AUTO-ENTMCNC: 31.7 G/DL (ref 31–35)
MCV RBC AUTO: 96.6 FL (ref 80–100)
PDW BLD-RTO: 14.1 % (ref 11–16)
PLATELET # BLD: 338 K/UL (ref 150–400)
PMV BLD AUTO: 9.7 FL (ref 6–10)
POTASSIUM REFLEX MAGNESIUM: 4.3 MMOL/L (ref 3.4–5.1)
RBC # BLD: 2.94 M/UL (ref 3.8–5.8)
SODIUM BLD-SCNC: 137 MMOL/L (ref 136–145)
TOTAL PROTEIN: 7 G/DL (ref 6.4–8.3)
WBC # BLD: 6.5 K/UL (ref 4–11)

## 2022-08-19 PROCEDURE — 80053 COMPREHEN METABOLIC PANEL: CPT

## 2022-08-19 PROCEDURE — 1200000002 HC SEMI PRIVATE SWING BED

## 2022-08-19 PROCEDURE — 6370000000 HC RX 637 (ALT 250 FOR IP): Performed by: PHYSICIAN ASSISTANT

## 2022-08-19 PROCEDURE — 6370000000 HC RX 637 (ALT 250 FOR IP): Performed by: INTERNAL MEDICINE

## 2022-08-19 PROCEDURE — 51798 US URINE CAPACITY MEASURE: CPT

## 2022-08-19 PROCEDURE — 36415 COLL VENOUS BLD VENIPUNCTURE: CPT

## 2022-08-19 PROCEDURE — 97110 THERAPEUTIC EXERCISES: CPT

## 2022-08-19 PROCEDURE — 97535 SELF CARE MNGMENT TRAINING: CPT

## 2022-08-19 PROCEDURE — 6360000002 HC RX W HCPCS: Performed by: PHYSICIAN ASSISTANT

## 2022-08-19 PROCEDURE — 97116 GAIT TRAINING THERAPY: CPT

## 2022-08-19 PROCEDURE — 85027 COMPLETE CBC AUTOMATED: CPT

## 2022-08-19 RX ADMIN — ASPIRIN 81 MG: 81 TABLET, COATED ORAL at 08:36

## 2022-08-19 RX ADMIN — TRAMADOL HYDROCHLORIDE 50 MG: 50 TABLET, COATED ORAL at 16:20

## 2022-08-19 RX ADMIN — TROSPIUM CHLORIDE 20 MG: 20 TABLET, FILM COATED ORAL at 08:35

## 2022-08-19 RX ADMIN — QUETIAPINE FUMARATE 25 MG: 25 TABLET ORAL at 21:19

## 2022-08-19 RX ADMIN — FOLIC ACID 1 MG: 1 TABLET ORAL at 08:35

## 2022-08-19 RX ADMIN — FERROUS SULFATE TAB EC 324 MG (65 MG FE EQUIVALENT) 324 MG: 324 (65 FE) TABLET DELAYED RESPONSE at 08:35

## 2022-08-19 RX ADMIN — BUSPIRONE HYDROCHLORIDE 10 MG: 5 TABLET ORAL at 08:35

## 2022-08-19 RX ADMIN — SENNOSIDES AND DOCUSATE SODIUM 2 TABLET: 8.6; 5 TABLET ORAL at 08:36

## 2022-08-19 RX ADMIN — ENOXAPARIN SODIUM 40 MG: 100 INJECTION SUBCUTANEOUS at 08:35

## 2022-08-19 RX ADMIN — TRAMADOL HYDROCHLORIDE 50 MG: 50 TABLET, COATED ORAL at 10:05

## 2022-08-19 RX ADMIN — BUSPIRONE HYDROCHLORIDE 10 MG: 5 TABLET ORAL at 21:18

## 2022-08-19 RX ADMIN — POLYETHYLENE GLYCOL 3350 17 G: 17 POWDER, FOR SOLUTION ORAL at 08:35

## 2022-08-19 RX ADMIN — TRAMADOL HYDROCHLORIDE 50 MG: 50 TABLET, COATED ORAL at 21:19

## 2022-08-19 RX ADMIN — BUSPIRONE HYDROCHLORIDE 10 MG: 5 TABLET ORAL at 13:29

## 2022-08-19 RX ADMIN — LEVOTHYROXINE SODIUM 50 MCG: 0.05 TABLET ORAL at 05:02

## 2022-08-19 RX ADMIN — ATORVASTATIN CALCIUM 10 MG: 10 TABLET, FILM COATED ORAL at 08:35

## 2022-08-19 RX ADMIN — PANTOPRAZOLE SODIUM 40 MG: 40 TABLET, DELAYED RELEASE ORAL at 05:02

## 2022-08-19 RX ADMIN — TRAMADOL HYDROCHLORIDE 50 MG: 50 TABLET, COATED ORAL at 04:55

## 2022-08-19 RX ADMIN — SERTRALINE HYDROCHLORIDE 50 MG: 50 TABLET ORAL at 08:36

## 2022-08-19 ASSESSMENT — PAIN SCALES - GENERAL
PAINLEVEL_OUTOF10: 3
PAINLEVEL_OUTOF10: 0
PAINLEVEL_OUTOF10: 3

## 2022-08-19 ASSESSMENT — PAIN DESCRIPTION - LOCATION: LOCATION: HIP

## 2022-08-19 ASSESSMENT — PAIN DESCRIPTION - ORIENTATION: ORIENTATION: RIGHT

## 2022-08-19 ASSESSMENT — PAIN DESCRIPTION - DESCRIPTORS: DESCRIPTORS: ACHING

## 2022-08-19 NOTE — CARE COORDINATION
Interdisciplinary rounding completed. Gricel (provider rep), Gisele Messer (), Irma Hamilton (nursing), Marsha (PT), Marcia (OT), (pharmacy), and Lucille (dietitian) all involved. Activities reviewed with OT. Therapy- Pt sat upright at EOB with min assist on this date. Pt requested to toilet. Pt come to stand at [address] steady with max assist with max verbal and tactile cues for postural correction to achieve erect standing position. Pt DEP for transfer to [facility]. Pt toileted with max assist for colby hygiene and max verbal cues and assist for LB dressing seated on toilet. Pt transferred from bed to chair with max assist. Pt engaged in UB dressing to down gown seate dupright in chair. Pt completed task with SBA requiring cues to open eyes throughout task. Pt applied deoderant with SUP with max verbal cuing to apply and sequence activity appropriately. Pt completed grooming with SUP. Pt required max assist for sit to stand transfer trial x3 unable to come to erect standing position secondary to reports of pain. Pt completed chair push ups x7 with CGA to increase independence with ADL transfers. Pt required max assist for scooting in chair. Pt progress limited secondary to cognitive status. Dietary- wt: 121.5 lbs; Dysphagia Soft and Bite Sized diet. ONS started BID. Eating < 25% on average past 6 meals. Recommend considering a MVI r/t poor intakes and possibly an appetite stimulant if not contraindicated. Pt. family member stated that she was on before but intakes had improved so the DrTasia stopped it. Care coordination- Lives with daughter. Has cane, grab bars in shower. Pt is I with ambulation, bathing, toileting, and dressing at baseline with supervision during bathing/tx.

## 2022-08-19 NOTE — FLOWSHEET NOTE
08/19/22 0836   Assessment   Charting Type Shift assessment   Psychosocial   Psychosocial (WDL) WDL   Neurological   Neuro (WDL) WDL   Level of Consciousness Responds to voice (1)   Orientation Level Oriented to person;Disoriented to place; Disoriented to time;Disoriented to situation   Cognition Follows commands;Poor attention/concentration;Poor judgement;Poor safety awareness   Speech Clear   Swallow Screening   Is the patient unable to remain alert for testing? No   Is the patient on a modified diet (thickened liquids) due to pre-existing dysphagia? No   Kamar Coma Scale   Eye Opening 4   Best Verbal Response 5   Best Motor Response 6   Kamar Coma Scale Score 15   HEENT (Head, Ears, Eyes, Nose, & Throat)   HEENT (WDL) X   Teeth Missing teeth;Dentures upper;Dentures lower   Respiratory   Respiratory (WDL) WDL   Respiratory Pattern Regular   Respiratory Depth Normal   Respiratory Quality/Effort Unlabored   Chest Assessment Chest expansion symmetrical;Trachea midline   L Breath Sounds Clear   R Breath Sounds Clear   Breath Sounds   Right Upper Lobe Clear   Right Middle Lobe Clear   Right Lower Lobe Clear   Left Upper Lobe Clear   Left Lower Lobe Clear   Cardiac   Cardiac (WDL) X  (body guardian left chest)   Gastrointestinal   Abdominal (WDL) WDL   Genitourinary   Genitourinary (WDL) X  (incontinence)   Urine Assessment   Urine Color Yellow/straw   Urine Appearance Clear   Urine Odor No odor   Peripheral Vascular   Peripheral Vascular (WDL) WDL   Skin Integumentary    Skin Integumentary (WDL) WDL   Musculoskeletal   Musculoskeletal (WDL) X   RUE Weakness   LUE Weakness   RL Extremity Weakness; Unsteady   LL Extremity Weakness; Unsteady   Pt alert to self this am-Am assessment completed breathing equal and unlabored- No complaints of pain Call bell at bedside- Bed alarm on and working will monitor

## 2022-08-19 NOTE — PLAN OF CARE
Problem: Discharge Planning  Goal: Discharge to home or other facility with appropriate resources  Outcome: Progressing     Problem: Safety - Adult  Goal: Free from fall injury  Outcome: Progressing     Problem: ABCDS Injury Assessment  Goal: Absence of physical injury  Outcome: Progressing     Problem: Skin/Tissue Integrity  Goal: Absence of new skin breakdown  Description: 1. Monitor for areas of redness and/or skin breakdown  2. Assess vascular access sites hourly  3. Every 4-6 hours minimum:  Change oxygen saturation probe site  4. Every 4-6 hours:  If on nasal continuous positive airway pressure, respiratory therapy assess nares and determine need for appliance change or resting period.   Outcome: Progressing     Problem: Neurosensory - Adult  Goal: Achieves maximal functionality and self care  Outcome: Progressing     Problem: Cardiovascular - Adult  Goal: Maintains optimal cardiac output and hemodynamic stability  Outcome: Progressing  Goal: Absence of cardiac dysrhythmias or at baseline  Outcome: Progressing     Problem: Skin/Tissue Integrity - Adult  Goal: Skin integrity remains intact  8/18/2022 2307 by Baljit Rivera RN  Outcome: Progressing  8/18/2022 1040 by Valente Muir RN  Outcome: Progressing     Problem: Musculoskeletal - Adult  Goal: Return mobility to safest level of function  8/18/2022 1040 by Valente Muir RN  Outcome: Progressing     Problem: Gastrointestinal - Adult  Goal: Maintains or returns to baseline bowel function  8/18/2022 1040 by Valente Muir RN  Outcome: Progressing     Problem: Genitourinary - Adult  Goal: Absence of urinary retention  8/18/2022 1040 by Valente Muir RN  Outcome: Progressing

## 2022-08-19 NOTE — FLOWSHEET NOTE
08/19/22 0711   Vital Signs   Temp 97.3 °F (36.3 °C)   Heart Rate 84   Resp 18   /73   MAP (Calculated) 82.67   Oxygen Therapy   SpO2 97 %   O2 Device None (Room air)

## 2022-08-19 NOTE — PLAN OF CARE
Problem: Safety - Adult  Goal: Free from fall injury  8/19/2022 0951 by Werner Bentley RN  Outcome: Progressing  8/18/2022 2307 by Warden July RN  Outcome: Progressing     Problem: Musculoskeletal - Adult  Goal: Return mobility to safest level of function  Outcome: Progressing

## 2022-08-19 NOTE — FLOWSHEET NOTE
08/18/22 2015   Assessment   Charting Type Shift assessment   Psychosocial   Psychosocial (WDL) WDL   Neurological   Neuro (WDL) WDL   Level of Consciousness Responds to voice (1)   Orientation Level Oriented to person;Disoriented to place; Disoriented to time;Disoriented to situation   Cognition Follows commands;Poor attention/concentration;Poor judgement;Poor safety awareness   Speech Clear   [place] Coma Scale   Eye Opening 4   Best Verbal Response 4   Best Motor Response 6   [place] Coma Scale Score 14   HEENT (Head, Ears, Eyes, Nose, & Throat)   HEENT (WDL) X   Teeth Missing teeth;Dentures upper;Dentures lower   Respiratory   Respiratory (WDL) WDL   Respiratory Pattern Regular   Respiratory Depth Normal   Respiratory Quality/Effort Unlabored   Chest Assessment Chest expansion symmetrical;Trachea midline   L Breath Sounds Clear   R Breath Sounds Clear   Breath Sounds   Right Upper Lobe Clear   Right Middle Lobe Clear   Right Lower Lobe Clear   Left Upper Lobe Clear   Left Lower Lobe Clear   Cardiac   Cardiac (WDL) X  (body guardian left chest)   Gastrointestinal   Abdominal (WDL) WDL   Genitourinary   Genitourinary (WDL) X  (incontinence)   Urine Assessment   Urine Color Yellow/straw   Urine Appearance Clear   Urine Odor No odor   Peripheral Vascular   Peripheral Vascular (WDL) WDL   Skin Integumentary    Skin Integumentary (WDL) WDL   Musculoskeletal   Musculoskeletal (WDL) X   RUE Weakness   LUE Weakness   RL Extremity Weakness; Unsteady   LL Extremity Weakness; Unsteady

## 2022-08-19 NOTE — PROGRESS NOTES
Physical Therapy  Facility/Department: [facility] MED SURG  Physical Therapy Daily Treatment Note    Name: Hira Wild  : 1935  MRN: [id number]  Date of Service: 2022    Discharge Recommendations:  Continue to assess pending progress          Patient Diagnosis(es): There were no encounter diagnoses. Past Medical History:  has a past medical history of Allergic rhinitis, Dementia ([address] Hypertension, Hyperthyroidism, and Osteoarthritis. Past Surgical History:  has a past surgical history that includes Cholecystectomy; Tubal ligation; Hysterectomy; and bladder suspension (2015). Assessment   Assessment: PT tx completed. Patient received supine in bed and pleasantly confused. She performs AP, heel slides, and hip ABD/ADD (within posterior hip precaution parameters) in supine. Requires CGA sup to sit. Sit to stand x 4 from EOB and bedside commode with assist requirements varying from CGA x 2 to min A x 2 as patient fatigues with activity. Able to ambulate 4' with RW and min A x 2. Patient ambulates with flexed knees and forward flexed upper body d/t pain. Patient left sitting up in bedside chair with ABD pillow in place and chair alarm. Cont to outlined goals per POC. Activity Tolerance  Activity Tolerance: Patient limited by pain; Patient tolerated treatment well     Plan   Plan  Plan: 3-5 times per week  Current Treatment Recommendations: Strengthening, ROM, Balance training, Functional mobility training, Transfer training, Gait training, Endurance training, Cognitive/Perceptual training, Pain management, Therapeutic activities, Home exercise program, Safety education & training, Patient/Caregiver education & training  Safety Devices  Type of Devices: Left in chair, Call light within reach, Chair alarm in place  Restraints  Restraints Initially in Place: No     Restrictions  Restrictions/Precautions  Restrictions/Precautions: Fall Risk, General Precautions  Required Braces or Orthoses?: No  Position Activity Restriction  Hip Precautions: Posterior hip precautions  Other position/activity restrictions: 6 weeks postop PHP     Subjective   General  Chart Reviewed: Yes  Patient assessed for rehabilitation services?: Yes  Family / Caregiver Present: No  Follows Commands: Within Functional Limits         Social/Functional History  Social/Functional History  Lives With: Daughter (24 hour care)  Type of Home: Mobile home  Home Layout: One level  Home Access: Stairs to enter with rails  Entrance Stairs - Number of Steps: 4 FERNANDEZ  Bathroom Shower/Tub: Tub/Shower unit  Bathroom Toilet: Standard  Bathroom Equipment: Grab bars in shower  Bathroom Accessibility: Walker accessible  Home Equipment: Ajungo  Has the patient had two or more falls in the past year or any fall with injury in the past year?: Yes  ADL Assistance: Independent (Family supervised bathing and transfer. Pt able to bath, toilet, and dress with independence.)  Homemaking Assistance: Needs assistance (Pt assisted with light housekeeping otherwise family completed all housekeeping tasks)  Ambulation Assistance: Independent (No AD at baseline)  Transfer Assistance: Independent  Active : No  Additional Comments: Pt is poor historian unable to gain all information on PLOF and AE at home. Pt states she stays with daughter. Vision/Hearing       Cognition         Objective   Heart Rate: 84  BP: 102/73  BP Location: Left upper arm  MAP (Calculated): 82.67  Resp: 18  SpO2: 97 %  O2 Device: None (Room air)     Observation/Palpation  Observation: Pt received lying supine in bed. NAD. Pleasantly confused. Agreeable to work with PT.                        Bed mobility  Supine to Sit: Contact guard assistance  Scooting: Contact guard assistance  Transfers  Sit to Stand: Minimal Assistance;2 Person Assistance  Stand to sit: Minimal Assistance;2 Person Assistance  Bed to Chair: Minimal assistance;2 Person Assistance  Stand Pivot Transfers: Minimal Assistance;2 Person Assistance  Comment: Sit to stand x 4 from EOB and bedside commode. Patient progressively requires more assist for sit to stands as she fatigues. Has difficulty achieve full stand and stands with forward flexed posture and flexed knees. Ambulation  Surface: level tile  Device: Rolling Walker  Assistance: Minimal assistance;2 Person assistance  Gait Deviations: Slow [place];Decreased step length;Decreased step height  Distance: 4'  Comments: flexed knees and forward flexed upper body in stand     Balance  Posture: Fair  Sitting - Static: Good  Sitting - Dynamic: Good  Standing - Static: Fair;+  Standing - Dynamic: Fair;-  Exercise Treatment: B LE ther ex in supine. Goals  Long Term Goals  Time Frame for Long term goals : 14 days  Long term goal 1: Patient will perform all bed mobility with no more than Tye. Long term goal 2: Patient will perform sit to stand and transfers with RW and no more than min A. Long term goal 3: Patient will ambulate 20'x2 with RW and no more than min A. Long term goal 4: Patient will perform B LE ther ex x 15 reps. Education  Patient Education  Education Given To: Patient  Education Provided: Transfer Training; Fall Prevention Strategies;Precautions  Education Provided Comments: DIXIE precautions; proper use of RW for safe transfers/ambulation.   Education Method: Demonstration;Verbal  Barriers to Learning: Cognition  Education Outcome: Continued education needed      Therapy Time   Individual Concurrent Group Co-treatment   Time In [address] [place] [address]         Time Out 0946         Minutes [address], PT

## 2022-08-19 NOTE — PROGRESS NOTES
Patient had not voided, bladder scan 510. Patient placed on BSC, voided 200. Notified MD on call, no new orders at this time.

## 2022-08-19 NOTE — PROGRESS NOTES
Occupational Therapy  Facility/Department: [address] [place] [address] MED SURG  Daily Treatment Note  NAME: Vergie Kehr  : 1935  MRN: [id number]    Date of Service: 2022    Discharge Recommendations:  Continue to assess pending progress, 24 hour supervision or assist         Patient Diagnosis(es): There were no encounter diagnoses. Assessment    Assessment: Co tx with PT on this date. Pt transferred to sitting at EOB with min assist. Pt come to stand from EOB with CGA initially. Pt continues to present with poor standing tolerance and requires max verbal cuing to correct posture to erect standing. Pt come to stand with min assist from EOB and ambulated x4 feet with assist x2 using RW for support. Pt transferred to [facility] and toileted with max assist. Pt required max verbal cuing for assisting with colby hygiene. Pt transferred to standing with poor standing tolerance secondary to pain coming to stand with min x2. Pt transferred to recliner with min x2 and positioned in chair. Pt completed basic grooming/hygiene tasks with SUP. Call light in reach and chair alarm on. Activity Tolerance: Patient limited by pain; Patient tolerated treatment well      Plan         Restrictions  Restrictions/Precautions  Restrictions/Precautions: Fall Risk;General Precautions  Required Braces or Orthoses?: No  Position Activity Restriction  Hip Precautions: Posterior hip precautions    Subjective   Subjective  Subjective: I want to lay back down           Objective    Vitals           ADL  Grooming: Supervision  Toileting: Maximum assistance        Safety Devices  Type of Devices: Left in chair;Call light within reach; Chair alarm in place          Goals  Short Term Goals  Time Frame for Short term goals: 2 weeks  Short Term Goal 1: Pt to complete ADL transfers with CGA. Short Term Goal 2: Pt to complete toileting with CGA.   Short Term Goal 3: Pt to complete dressing with min assist.  Short Term Goal 4: Pt to complete bathing with min assist.  Short Term Goal 5: Pt to tolerate x10 minutes of activity to increase functional activity tolerance. Short Term Goal 6: Pt to complete hygiene/grooming with SOLOMON. Therapy Time   Individual Concurrent Group Co-treatment   Time In 0467         Time Out 1678         Minutes 27             This note serves as a DC summary in the event of pt discharge.       Marcia Can OTR/L

## 2022-08-20 PROCEDURE — 6360000002 HC RX W HCPCS: Performed by: PHYSICIAN ASSISTANT

## 2022-08-20 PROCEDURE — 6370000000 HC RX 637 (ALT 250 FOR IP): Performed by: PHYSICIAN ASSISTANT

## 2022-08-20 PROCEDURE — 1200000002 HC SEMI PRIVATE SWING BED

## 2022-08-20 PROCEDURE — 97110 THERAPEUTIC EXERCISES: CPT

## 2022-08-20 PROCEDURE — 6370000000 HC RX 637 (ALT 250 FOR IP): Performed by: INTERNAL MEDICINE

## 2022-08-20 PROCEDURE — 97530 THERAPEUTIC ACTIVITIES: CPT

## 2022-08-20 RX ADMIN — ENOXAPARIN SODIUM 40 MG: 100 INJECTION SUBCUTANEOUS at 08:46

## 2022-08-20 RX ADMIN — FOLIC ACID 1 MG: 1 TABLET ORAL at 08:47

## 2022-08-20 RX ADMIN — QUETIAPINE FUMARATE 25 MG: 25 TABLET ORAL at 20:52

## 2022-08-20 RX ADMIN — ATORVASTATIN CALCIUM 10 MG: 10 TABLET, FILM COATED ORAL at 08:47

## 2022-08-20 RX ADMIN — ASPIRIN 81 MG: 81 TABLET, COATED ORAL at 08:47

## 2022-08-20 RX ADMIN — BUSPIRONE HYDROCHLORIDE 10 MG: 5 TABLET ORAL at 08:47

## 2022-08-20 RX ADMIN — TRAMADOL HYDROCHLORIDE 50 MG: 50 TABLET, COATED ORAL at 05:19

## 2022-08-20 RX ADMIN — PANTOPRAZOLE SODIUM 40 MG: 40 TABLET, DELAYED RELEASE ORAL at 05:19

## 2022-08-20 RX ADMIN — TRAMADOL HYDROCHLORIDE 50 MG: 50 TABLET, COATED ORAL at 08:47

## 2022-08-20 RX ADMIN — LEVOTHYROXINE SODIUM 50 MCG: 0.05 TABLET ORAL at 05:19

## 2022-08-20 RX ADMIN — BUSPIRONE HYDROCHLORIDE 10 MG: 5 TABLET ORAL at 20:52

## 2022-08-20 RX ADMIN — POLYETHYLENE GLYCOL 3350 17 G: 17 POWDER, FOR SOLUTION ORAL at 08:46

## 2022-08-20 RX ADMIN — TRAMADOL HYDROCHLORIDE 50 MG: 50 TABLET, COATED ORAL at 15:41

## 2022-08-20 RX ADMIN — POLYETHYLENE GLYCOL 3350 17 G: 17 POWDER, FOR SOLUTION ORAL at 09:47

## 2022-08-20 RX ADMIN — SENNOSIDES AND DOCUSATE SODIUM 2 TABLET: 8.6; 5 TABLET ORAL at 08:47

## 2022-08-20 RX ADMIN — TROSPIUM CHLORIDE 20 MG: 20 TABLET, FILM COATED ORAL at 08:47

## 2022-08-20 RX ADMIN — TRAMADOL HYDROCHLORIDE 50 MG: 50 TABLET, COATED ORAL at 20:52

## 2022-08-20 RX ADMIN — BUSPIRONE HYDROCHLORIDE 10 MG: 5 TABLET ORAL at 15:41

## 2022-08-20 RX ADMIN — FERROUS SULFATE TAB EC 324 MG (65 MG FE EQUIVALENT) 324 MG: 324 (65 FE) TABLET DELAYED RESPONSE at 08:47

## 2022-08-20 RX ADMIN — SERTRALINE HYDROCHLORIDE 50 MG: 50 TABLET ORAL at 08:47

## 2022-08-20 ASSESSMENT — PAIN SCALES - GENERAL: PAINLEVEL_OUTOF10: 3

## 2022-08-20 NOTE — PROGRESS NOTES
Physical Therapy  Facility/Department: [facility] MED SURG  Daily Treatment Note  NAME: Keyanna Hernandez  : 1935  MRN: [id number]    Date of Service: 2022    Discharge Recommendations:  Continue to assess pending progress      Patient Diagnosis(es): There were no encounter diagnoses. Assessment   Assessment: Patient awake in bed requesting to toilet. Patient able to complete bed mobility tasks with Mod A.  Nursing assisted with transfer process to [facility] and to [facility]. Patient required cues and encouragement to stand with [facility] to complete colby-hygiene and clothing management. Transferred to the recliner via [facility]. Engaged patient in B LE therex in sitting to improve overall strength and endurance. Activity Tolerance: Patient tolerated treatment well;Patient limited by endurance     Plan    Plan  Plan: 3-5 times per week  Current Treatment Recommendations: Strengthening;ROM;Balance training;Functional mobility training;Transfer training;Gait training; Endurance training;Cognitive/Perceptual training;Pain management; Therapeutic activities; Home exercise program;Safety education & training;Patient/Caregiver education & training     Restrictions  Restrictions/Precautions  Restrictions/Precautions: Fall Risk, General Precautions  Required Braces or Orthoses?: No  Position Activity Restriction  Hip Precautions: Posterior hip precautions  Other position/activity restrictions: 6 weeks postop PHP     Subjective    Subjective  Subjective: Patient presents in bed requesting to toilet. Daughter present. Objective     Bed Mobility Training  Overall Level of Assistance: Moderate assistance  Rolling: Moderate assistance  Supine to Sit: Moderate assistance  Scooting: Minimum assistance  Balance  Sitting: Intact  Standing: With support  Transfer Training  Transfer Training: Yes  Overall Level of Assistance: Moderate assistance;Maximum assistance;Assist X2  Sit to Stand:  Moderate assistance;Maximum assistance;Assist X2  Stand to Sit: Moderate assistance;Maximum assistance;Assist X2  Toilet Transfer: Moderate assistance;Maximum assistance;Assist X2 Connie Sham Steady to [facility])  Gait Training  Gait Training: No     PT Exercises  Exercise Treatment: B LE therex in sitting     Safety Devices  Type of Devices: Left in chair;Chair alarm in place;Call light within reach (daughter present)     Goals  Long Term Goals  Time Frame for Long term goals : 14 days  Long term goal 1: Patient will perform all bed mobility with no more than Tye. Long term goal 2: Patient will perform sit to stand and transfers with RW and no more than min A. Long term goal 3: Patient will ambulate 20'x2 with RW and no more than min A. Long term goal 4: Patient will perform B LE ther ex x 15 reps. Therapy Time   Individual Concurrent Group Co-treatment   Time In 1118         Time Out 1141         Minutes 23             This note serves as D/C summary if patient is discharged prior to next visit.    Madan Montero, PTA

## 2022-08-20 NOTE — FLOWSHEET NOTE
08/20/22 0847   Assessment   Charting Type Shift assessment   Psychosocial   Psychosocial (WDL) WDL   Neurological   Neuro (WDL) WDL   Level of Consciousness Alert (0)   Orientation Level Oriented to person;Disoriented to place; Disoriented to time;Disoriented to situation   Cognition Follows commands;Poor attention/concentration;Poor judgement;Poor safety awareness   Speech Clear   [place] Coma Scale   Eye Opening 4   Best Verbal Response 4   Best Motor Response 6   [place] Coma Scale Score 14   HEENT (Head, Ears, Eyes, Nose, & Throat)   HEENT (WDL) X   Teeth Missing teeth;Dentures upper;Dentures lower   Respiratory   Respiratory (WDL) WDL   Respiratory Pattern Regular   Respiratory Depth Normal   Respiratory Quality/Effort Unlabored   Chest Assessment Chest expansion symmetrical;Trachea midline   L Breath Sounds Clear   R Breath Sounds Clear   Cardiac   Cardiac (WDL) X  (body guardian left chest)   Gastrointestinal   Abdominal (WDL) WDL   Genitourinary   Genitourinary (WDL) X  (incontinence)   Urine Assessment   Urine Color Yellow/straw   Urine Appearance Clear   Urine Odor No odor   Peripheral Vascular   Peripheral Vascular (WDL) WDL   Skin Integumentary    Skin Integumentary (WDL) WDL   Musculoskeletal   Musculoskeletal (WDL) X   RUE Weakness   LUE Weakness   RL Extremity Weakness; Unsteady   LL Extremity Weakness; Unsteady   Musculoskeletal Details   R Hip Surgery  (healed sx incision)

## 2022-08-20 NOTE — PLAN OF CARE
Problem: Discharge Planning  Goal: Discharge to home or other facility with appropriate resources  Outcome: Progressing     Problem: Safety - Adult  Goal: Free from fall injury  8/19/2022 2258 by Shantelle Anderson RN  Outcome: Progressing  8/19/2022 0951 by Juan Sun RN  Outcome: Progressing     Problem: ABCDS Injury Assessment  Goal: Absence of physical injury  Outcome: Progressing     Problem: Skin/Tissue Integrity  Goal: Absence of new skin breakdown  Description: 1. Monitor for areas of redness and/or skin breakdown  2. Assess vascular access sites hourly  3. Every 4-6 hours minimum:  Change oxygen saturation probe site  4. Every 4-6 hours:  If on nasal continuous positive airway pressure, respiratory therapy assess nares and determine need for appliance change or resting period.   Outcome: Progressing     Problem: Neurosensory - Adult  Goal: Achieves maximal functionality and self care  Outcome: Progressing     Problem: Musculoskeletal - Adult  Goal: Return mobility to safest level of function  8/19/2022 0951 by Juan Sun RN  Outcome: Progressing

## 2022-08-21 PROCEDURE — 6370000000 HC RX 637 (ALT 250 FOR IP): Performed by: PHYSICIAN ASSISTANT

## 2022-08-21 PROCEDURE — 1200000002 HC SEMI PRIVATE SWING BED

## 2022-08-21 PROCEDURE — 6360000002 HC RX W HCPCS: Performed by: PHYSICIAN ASSISTANT

## 2022-08-21 PROCEDURE — 6370000000 HC RX 637 (ALT 250 FOR IP): Performed by: INTERNAL MEDICINE

## 2022-08-21 RX ADMIN — FOLIC ACID 1 MG: 1 TABLET ORAL at 08:54

## 2022-08-21 RX ADMIN — SERTRALINE HYDROCHLORIDE 50 MG: 50 TABLET ORAL at 08:54

## 2022-08-21 RX ADMIN — TRAMADOL HYDROCHLORIDE 50 MG: 50 TABLET, COATED ORAL at 15:30

## 2022-08-21 RX ADMIN — ATORVASTATIN CALCIUM 10 MG: 10 TABLET, FILM COATED ORAL at 08:54

## 2022-08-21 RX ADMIN — FERROUS SULFATE TAB EC 324 MG (65 MG FE EQUIVALENT) 324 MG: 324 (65 FE) TABLET DELAYED RESPONSE at 08:54

## 2022-08-21 RX ADMIN — TRAMADOL HYDROCHLORIDE 50 MG: 50 TABLET, COATED ORAL at 04:26

## 2022-08-21 RX ADMIN — POLYETHYLENE GLYCOL 3350 17 G: 17 POWDER, FOR SOLUTION ORAL at 08:55

## 2022-08-21 RX ADMIN — PANTOPRAZOLE SODIUM 40 MG: 40 TABLET, DELAYED RELEASE ORAL at 06:56

## 2022-08-21 RX ADMIN — QUETIAPINE FUMARATE 25 MG: 25 TABLET ORAL at 21:46

## 2022-08-21 RX ADMIN — ENOXAPARIN SODIUM 40 MG: 100 INJECTION SUBCUTANEOUS at 08:53

## 2022-08-21 RX ADMIN — BUSPIRONE HYDROCHLORIDE 10 MG: 5 TABLET ORAL at 21:46

## 2022-08-21 RX ADMIN — ASPIRIN 81 MG: 81 TABLET, COATED ORAL at 08:54

## 2022-08-21 RX ADMIN — POLYETHYLENE GLYCOL 3350 17 G: 17 POWDER, FOR SOLUTION ORAL at 21:46

## 2022-08-21 RX ADMIN — LEVOTHYROXINE SODIUM 50 MCG: 0.05 TABLET ORAL at 06:56

## 2022-08-21 RX ADMIN — SENNOSIDES AND DOCUSATE SODIUM 2 TABLET: 8.6; 5 TABLET ORAL at 08:54

## 2022-08-21 RX ADMIN — BUSPIRONE HYDROCHLORIDE 10 MG: 5 TABLET ORAL at 08:54

## 2022-08-21 RX ADMIN — TRAMADOL HYDROCHLORIDE 50 MG: 50 TABLET, COATED ORAL at 21:46

## 2022-08-21 RX ADMIN — BUSPIRONE HYDROCHLORIDE 10 MG: 5 TABLET ORAL at 15:30

## 2022-08-21 RX ADMIN — TROSPIUM CHLORIDE 20 MG: 20 TABLET, FILM COATED ORAL at 08:54

## 2022-08-21 ASSESSMENT — PAIN DESCRIPTION - LOCATION: LOCATION: HIP

## 2022-08-21 ASSESSMENT — PAIN DESCRIPTION - DESCRIPTORS: DESCRIPTORS: ACHING

## 2022-08-21 ASSESSMENT — PAIN SCALES - GENERAL
PAINLEVEL_OUTOF10: 3
PAINLEVEL_OUTOF10: 2

## 2022-08-21 NOTE — PROGRESS NOTES
Rounded on Inc. Patients at this time. Patient was dry, PW did not have anything to empty at this time. Patient dentures were found in the bed. Placed dentures in the container on the bed side table.

## 2022-08-21 NOTE — PLAN OF CARE
Problem: Discharge Planning  Goal: Discharge to home or other facility with appropriate resources  Outcome: Progressing     Problem: Safety - Adult  Goal: Free from fall injury  Outcome: Progressing     Problem: ABCDS Injury Assessment  Goal: Absence of physical injury  Outcome: Progressing     Problem: Skin/Tissue Integrity  Goal: Absence of new skin breakdown  Description: 1. Monitor for areas of redness and/or skin breakdown  2. Assess vascular access sites hourly  3. Every 4-6 hours minimum:  Change oxygen saturation probe site  4. Every 4-6 hours:  If on nasal continuous positive airway pressure, respiratory therapy assess nares and determine need for appliance change or resting period.   Outcome: Progressing     Problem: Neurosensory - Adult  Goal: Achieves maximal functionality and self care  Outcome: Progressing

## 2022-08-21 NOTE — FLOWSHEET NOTE
08/20/22 2054   Assessment   Charting Type Shift assessment   Psychosocial   Psychosocial (WDL) WDL   Neurological   Neuro (WDL) WDL   Level of Consciousness Alert (0)   Orientation Level Oriented to person;Disoriented to place; Disoriented to time;Disoriented to situation   Cognition Follows commands;Poor attention/concentration;Poor judgement;Poor safety awareness   Speech Clear   [place] Coma Scale   Eye Opening 4   Best Verbal Response 4   Best Motor Response 6   [place] Coma Scale Score 14   HEENT (Head, Ears, Eyes, Nose, & Throat)   HEENT (WDL) X   Teeth Missing teeth;Dentures upper;Dentures lower   Respiratory   Respiratory (WDL) WDL   Respiratory Pattern Regular   Respiratory Depth Normal   Respiratory Quality/Effort Unlabored   Chest Assessment Chest expansion symmetrical;Trachea midline   L Breath Sounds Clear   R Breath Sounds Clear   Breath Sounds   Right Upper Lobe Clear   Right Middle Lobe Clear   Right Lower Lobe Clear   Left Upper Lobe Clear   Left Lower Lobe Clear   Cardiac   Cardiac (WDL) X  (body guardian left chest)   Gastrointestinal   Abdominal (WDL) WDL   Genitourinary   Genitourinary (WDL) X  (incontinence)   Urine Assessment   Urine Color Yellow/straw   Urine Appearance Clear   Urine Odor No odor   Peripheral Vascular   Peripheral Vascular (WDL) WDL   Skin Integumentary    Skin Integumentary (WDL) WDL   Musculoskeletal   Musculoskeletal (WDL) X   RUE Weakness   LUE Weakness   RL Extremity Weakness; Unsteady   LL Extremity Weakness; Unsteady   Musculoskeletal Details   R Hip Surgery  (healed sx incision)

## 2022-08-21 NOTE — PROGRESS NOTES
Patient was moved from chair to bed at this time. Pure wick was replaced and turned on while patient sleeps.

## 2022-08-22 PROCEDURE — 6370000000 HC RX 637 (ALT 250 FOR IP): Performed by: PHYSICIAN ASSISTANT

## 2022-08-22 PROCEDURE — 97530 THERAPEUTIC ACTIVITIES: CPT

## 2022-08-22 PROCEDURE — 97110 THERAPEUTIC EXERCISES: CPT

## 2022-08-22 PROCEDURE — 1200000002 HC SEMI PRIVATE SWING BED

## 2022-08-22 PROCEDURE — 6360000002 HC RX W HCPCS: Performed by: PHYSICIAN ASSISTANT

## 2022-08-22 PROCEDURE — 6370000000 HC RX 637 (ALT 250 FOR IP): Performed by: INTERNAL MEDICINE

## 2022-08-22 PROCEDURE — 97803 MED NUTRITION INDIV SUBSEQ: CPT

## 2022-08-22 PROCEDURE — 97535 SELF CARE MNGMENT TRAINING: CPT

## 2022-08-22 RX ORDER — PROCHLORPERAZINE EDISYLATE 5 MG/ML
10 INJECTION INTRAMUSCULAR; INTRAVENOUS 3 TIMES DAILY PRN
Status: DISCONTINUED | OUTPATIENT
Start: 2022-08-22 | End: 2022-08-23 | Stop reason: HOSPADM

## 2022-08-22 RX ORDER — M-VIT,TX,IRON,MINS/CALC/FOLIC 27MG-0.4MG
1 TABLET ORAL DAILY
Status: DISCONTINUED | OUTPATIENT
Start: 2022-08-23 | End: 2022-08-23 | Stop reason: HOSPADM

## 2022-08-22 RX ORDER — BISACODYL 10 MG
10 SUPPOSITORY, RECTAL RECTAL DAILY PRN
Status: DISCONTINUED | OUTPATIENT
Start: 2022-08-22 | End: 2022-08-23 | Stop reason: HOSPADM

## 2022-08-22 RX ORDER — TRAMADOL HYDROCHLORIDE 50 MG/1
50 TABLET ORAL EVERY 6 HOURS PRN
Status: DISCONTINUED | OUTPATIENT
Start: 2022-08-22 | End: 2022-08-23 | Stop reason: HOSPADM

## 2022-08-22 RX ADMIN — SENNOSIDES AND DOCUSATE SODIUM 2 TABLET: 8.6; 5 TABLET ORAL at 08:41

## 2022-08-22 RX ADMIN — PROCHLORPERAZINE EDISYLATE 10 MG: 5 INJECTION INTRAMUSCULAR; INTRAVENOUS at 10:30

## 2022-08-22 RX ADMIN — TRAMADOL HYDROCHLORIDE 50 MG: 50 TABLET, COATED ORAL at 20:26

## 2022-08-22 RX ADMIN — ENOXAPARIN SODIUM 40 MG: 100 INJECTION SUBCUTANEOUS at 08:42

## 2022-08-22 RX ADMIN — LEVOTHYROXINE SODIUM 50 MCG: 0.05 TABLET ORAL at 06:00

## 2022-08-22 RX ADMIN — FERROUS SULFATE TAB EC 324 MG (65 MG FE EQUIVALENT) 324 MG: 324 (65 FE) TABLET DELAYED RESPONSE at 08:42

## 2022-08-22 RX ADMIN — SERTRALINE HYDROCHLORIDE 50 MG: 50 TABLET ORAL at 08:42

## 2022-08-22 RX ADMIN — TRAMADOL HYDROCHLORIDE 50 MG: 50 TABLET, COATED ORAL at 10:29

## 2022-08-22 RX ADMIN — PANTOPRAZOLE SODIUM 40 MG: 40 TABLET, DELAYED RELEASE ORAL at 06:00

## 2022-08-22 RX ADMIN — FOLIC ACID 1 MG: 1 TABLET ORAL at 08:42

## 2022-08-22 RX ADMIN — BUSPIRONE HYDROCHLORIDE 10 MG: 5 TABLET ORAL at 15:43

## 2022-08-22 RX ADMIN — POLYETHYLENE GLYCOL 3350 17 G: 17 POWDER, FOR SOLUTION ORAL at 20:26

## 2022-08-22 RX ADMIN — ATORVASTATIN CALCIUM 10 MG: 10 TABLET, FILM COATED ORAL at 08:42

## 2022-08-22 RX ADMIN — ASPIRIN 81 MG: 81 TABLET, COATED ORAL at 08:42

## 2022-08-22 RX ADMIN — POLYETHYLENE GLYCOL 3350 17 G: 17 POWDER, FOR SOLUTION ORAL at 08:42

## 2022-08-22 RX ADMIN — BUSPIRONE HYDROCHLORIDE 10 MG: 5 TABLET ORAL at 20:26

## 2022-08-22 RX ADMIN — TROSPIUM CHLORIDE 20 MG: 20 TABLET, FILM COATED ORAL at 08:41

## 2022-08-22 RX ADMIN — BUSPIRONE HYDROCHLORIDE 10 MG: 5 TABLET ORAL at 08:42

## 2022-08-22 RX ADMIN — QUETIAPINE FUMARATE 25 MG: 25 TABLET ORAL at 20:26

## 2022-08-22 RX ADMIN — TRAMADOL HYDROCHLORIDE 50 MG: 50 TABLET, COATED ORAL at 04:20

## 2022-08-22 ASSESSMENT — PAIN DESCRIPTION - DESCRIPTORS
DESCRIPTORS: CRAMPING
DESCRIPTORS: CRAMPING

## 2022-08-22 ASSESSMENT — PAIN DESCRIPTION - LOCATION
LOCATION: BACK;LEG
LOCATION: LEG

## 2022-08-22 ASSESSMENT — PAIN DESCRIPTION - ORIENTATION: ORIENTATION: RIGHT

## 2022-08-22 ASSESSMENT — PAIN SCALES - GENERAL: PAINLEVEL_OUTOF10: 2

## 2022-08-22 ASSESSMENT — PAIN SCALES - WONG BAKER: WONGBAKER_NUMERICALRESPONSE: 6

## 2022-08-22 NOTE — PROGRESS NOTES
Physical Therapy  Facility/Department: [facility] MED SURG  Physical Therapy Daily Treatment Note    Name: Bridger Torres  : 1935  MRN: [id number]  Date of Service: 2022    Discharge Recommendations:  Continue to assess pending progress          Patient Diagnosis(es): There were no encounter diagnoses. Past Medical History:  has a past medical history of Allergic rhinitis, Dementia ([address] Hypertension, Hyperthyroidism, and Osteoarthritis. Past Surgical History:  has a past surgical history that includes Cholecystectomy; Tubal ligation; Hysterectomy; and bladder suspension (2015). Assessment   Assessment: PT tx completed. Patient received sitting on BSC with OT present. She has c/o nausea and has recently vomited. RN also present in the room and gives patient anti-nausea medication via IV. Patient requires max A sit to stand and to perform stand pivot transfer from [facility] to recliner chair. Performs R LE ther ex with assistance. Patient becomes very drowsy from IV medication and is unable to safely advance mobility further this date. Patient positioned in bedside chair in reclined position. Cont PT per POC to outined goals. Activity Tolerance  Activity Tolerance: Patient limited by pain; Patient limited by endurance;Treatment limited secondary to decreased cognition     Plan   Plan  Plan: 3-5 times per week  Current Treatment Recommendations: Strengthening, ROM, Balance training, Functional mobility training, Transfer training, Gait training, Endurance training, Cognitive/Perceptual training, Pain management, Therapeutic activities, Home exercise program, Safety education & training, Patient/Caregiver education & training  Safety Devices  Type of Devices: Left in chair, Chair alarm in place, Call light within reach  Restraints  Restraints Initially in Place: No     Restrictions  Restrictions/Precautions  Restrictions/Precautions: Fall Risk, General Precautions  Required Braces or Orthoses?: No  Position Activity Restriction  Hip Precautions: Posterior hip precautions  Other position/activity restrictions: 6 weeks postop PHP     Subjective             Social/Functional History  Social/Functional History  Lives With: Daughter (24 hour care)  Type of Home: Mobile home  Home Layout: One level  Home Access: Stairs to enter with rails  Entrance Stairs - Number of Steps: 4 FERNANDEZ  Bathroom Shower/Tub: Tub/Shower unit  Bathroom Toilet: Standard  Bathroom Equipment: Grab bars in shower  Bathroom Accessibility: Walker accessible  Home Equipment: [employer]  Has the patient had two or more falls in the past year or any fall with injury in the past year?: Yes  ADL Assistance: Independent (Family supervised bathing and transfer. Pt able to bath, toilet, and dress with independence.)  Homemaking Assistance: Needs assistance (Pt assisted with light housekeeping otherwise family completed all housekeeping tasks)  Ambulation Assistance: Independent (No AD at baseline)  Transfer Assistance: Independent  Active : No  Additional Comments: Pt is poor historian unable to gain all information on PLOF and AE at home. Pt states she stays with daughter. Vision/Hearing       Cognition         Objective   Heart Rate: 78  BP: 119/78  MAP (Calculated): 91.67  Resp: 18  SpO2: 100 %  O2 Device: None (Room air)     Observation/Palpation  Observation: Pt received on bedside commode with OT present. C/o nausea/vomiting and not feeling well. NAD.                           Transfers  Sit to Stand: Minimal Assistance;2 Person Assistance  Stand to sit: Minimal Assistance;2 Person Assistance  Stand Pivot Transfers: Maximum Assistance        Balance  Posture: Fair  Sitting - Static: Good  Sitting - Dynamic: Good  Standing - Static: Fair;+  Standing - Dynamic: Fair;-  Exercise Treatment: B LE therex in sitting             Goals  Long Term Goals  Time Frame for Long term goals : 14 days  Long term goal 1: Patient will perform all bed mobility with no more than Tye.  Long term goal 2: Patient will perform sit to stand and transfers with RW and no more than min A. Long term goal 3: Patient will ambulate 20'x2 with RW and no more than min A. Long term goal 4: Patient will perform B LE ther ex x 15 reps.               Therapy Time   Individual Concurrent Group Co-treatment   Time In 0412         Time Out 1047         Minutes [address] [place] [address]

## 2022-08-22 NOTE — PLAN OF CARE
Problem: Discharge Planning  Goal: Discharge to home or other facility with appropriate resources  Outcome: Progressing     Problem: Safety - Adult  Goal: Free from fall injury  Outcome: Progressing     Problem: ABCDS Injury Assessment  Goal: Absence of physical injury  Outcome: Progressing     Problem: Skin/Tissue Integrity  Goal: Absence of new skin breakdown  Description: 1. Monitor for areas of redness and/or skin breakdown  2. Assess vascular access sites hourly  3. Every 4-6 hours minimum:  Change oxygen saturation probe site  4. Every 4-6 hours:  If on nasal continuous positive airway pressure, respiratory therapy assess nares and determine need for appliance change or resting period.   Outcome: Progressing     Problem: Neurosensory - Adult  Goal: Achieves maximal functionality and self care  Recent Flowsheet Documentation  Taken 8/21/2022 2100 by Irene Carranza RN  Achieves maximal functionality and self care:   Monitor swallowing and airway patency with patient fatigue and changes in neurological status   Encourage and assist patient to increase activity and self care with guidance from physical therapy/occupational therapy     Problem: Cardiovascular - Adult  Goal: Maintains optimal cardiac output and hemodynamic stability  Recent Flowsheet Documentation  Taken 8/21/2022 2100 by Irene Carranza RN  Maintains optimal cardiac output and hemodynamic stability: Monitor blood pressure and heart rate  Goal: Absence of cardiac dysrhythmias or at baseline  Recent Flowsheet Documentation  Taken 8/21/2022 2100 by Irene Carranza RN  Absence of cardiac dysrhythmias or at baseline: Monitor cardiac rate and rhythm     Problem: Gastrointestinal - Adult  Goal: Maintains adequate nutritional intake  Recent Flowsheet Documentation  Taken 8/21/2022 2100 by Irene Carranza RN  Maintains adequate nutritional intake:   Assist with meals as needed   Monitor intake and output, weight and lab values   Obtain nutritional consult as needed

## 2022-08-22 NOTE — PROGRESS NOTES
Occupational Therapy  Facility/Department: [address] [place] [address] MED SURG  Daily Treatment Note  NAME: Lazaro Hernandez  : 1935  MRN: [id number]    Date of Service: 2022    Discharge Recommendations:  Continue to assess pending progress, 24 hour supervision or assist         Patient Diagnosis(es): There were no encounter diagnoses. Assessment    Assessment: Pt seen for OT services. Pt assisted to sitting at EOB with min assist. Upon coming to sit pt reports nausea and vomited in basin. Nursing notified. Pt requested to toilet and transferred with max assist to toilet. Pt required max assist for LB clothing management and colby hygiene. Pt required max assist to don brief. Pt min A to initiate standing however, requiring max assist to come to erect standing position. Pt continues to report nausea throughout session. Partial co tx with PT at this time. Nurse provided antiemetic medication and pt transferred to recliner with max x2 assist. Pt with increased drowsiness. Unable to progress activity. Pt seated in recliner with alarm on and call light in reach. BLE elevated for comfort. Activity Tolerance: Treatment limited secondary to decreased cognition;Patient limited by pain;Treatment limited secondary to medical complications      Plan         Restrictions       Subjective   Subjective  Subjective: I feel so sick I am going to throw up           Objective    Vitals           ADL  LE Dressing: Maximum assistance  Toileting: Maximum assistance                   Goals  Short Term Goals  Time Frame for Short term goals: 2 weeks  Short Term Goal 1: Pt to complete ADL transfers with CGA. Short Term Goal 2: Pt to complete toileting with CGA. Short Term Goal 3: Pt to complete dressing with min assist.  Short Term Goal 4: Pt to complete bathing with min assist.  Short Term Goal 5: Pt to tolerate x10 minutes of activity to increase functional activity tolerance. Short Term Goal 6: Pt to complete hygiene/grooming with SOLOMON.

## 2022-08-22 NOTE — CARE COORDINATION
CM spoke with the daughter, Yessenia Marshall. She is wanting to take her mom home as soon as we can get DME and [facility] set up. No c/o about care, but she feels pt will do better in there \"own house\" around her family and \"things\". CM will discuss with PA/MD.  DME/HH orders received. No preferences. DME orders sent to [facility] (local DME). Pt has used [facility] in the past.  Will send referral to them this afternoon to see if they can  at DC.

## 2022-08-22 NOTE — PROGRESS NOTES
Nutrition Assessment     Type and Reason for Visit:  (follow up)    Nutrition Recommendations/Plan:   Recommend to continue with current diet and ONS. May need to consider adding MVI r/t poor intake. Malnutrition Assessment:  Malnutrition Status: Insufficient data    Nutrition Assessment:  Pt continues with poor intakes and is at moderate to high risk for ongoing nutritional compromise. Estimated Daily Nutrient Needs:  Energy (kcal):  1107-8940 (25-27 kcal/kg cbw) Weight Used for Energy Requirements: Current     Protein (g):  66-72 (1.2-1.3 mg/kg cbw) Weight Used for Protein Requirements: Current        Fluid (ml/day):  9621-8898 Method Used for Fluid Requirements: 1 ml/kcal    Nutrition Related Findings:   Pt has +1 pitting edema to ankles. No new weight, no new labs. Pt intakes have decreased since admission. Does have ONS in place. Wound Type:  (healed surgical incision rt hip)    Current Nutrition Therapies:    ADULT ORAL NUTRITION SUPPLEMENT; Lunch, Dinner; Standard High Calorie/High Protein Oral Supplement  ADULT DIET;  Dysphagia - Soft and Bite Sized; Pimento Cheese Sandwiches, PB and Jelly, grilled cheeses    Anthropometric Measures:  Height: 4' 11\" (149.9 cm)  Current Body Wt: 121 lb 8 oz (55.1 kg)   BMI: 24.5    Nutrition Diagnosis:   Predicted inadequate energy intake related to inadequate protein-energy intake as evidenced by intake 26-50%, poor intake prior to admission, swallow study results    Nutrition Interventions:   Food and/or Nutrient Delivery: Continue Current Diet, Continue Oral Nutrition Supplement  Nutrition Education/Counseling: No recommendation at this time  Coordination of Nutrition Care: Continue to monitor while inpatient, Speech Therapy, Interdisciplinary Rounds  Plan of Care discussed with: Patient, family member, speech, kitchen staff    Goals:  Previous Goal Met: Progressing toward Goal(s)  Goals: Meet at least 75% of estimated needs       Nutrition Monitoring and Evaluation:      Food/Nutrient Intake Outcomes: Food and Nutrient Intake, Supplement Intake  Physical Signs/Symptoms Outcomes: Biochemical Data, Skin, Weight, Fluid Status or Edema    Discharge Planning:    Continue current diet     Radha Astudillo, MS, RD, LD

## 2022-08-23 ENCOUNTER — TELEPHONE (OUTPATIENT)
Dept: FAMILY MEDICINE CLINIC | Age: 87
End: 2022-08-23

## 2022-08-23 VITALS
BODY MASS INDEX: 24.49 KG/M2 | SYSTOLIC BLOOD PRESSURE: 122 MMHG | HEART RATE: 82 BPM | HEIGHT: 59 IN | TEMPERATURE: 98.2 F | OXYGEN SATURATION: 97 % | RESPIRATION RATE: 18 BRPM | DIASTOLIC BLOOD PRESSURE: 68 MMHG | WEIGHT: 121.5 LBS

## 2022-08-23 DIAGNOSIS — R53.81 DECLINING FUNCTIONAL STATUS: ICD-10-CM

## 2022-08-23 DIAGNOSIS — Z87.81 HISTORY OF FRACTURE OF RIGHT HIP: Primary | ICD-10-CM

## 2022-08-23 LAB
BILIRUBIN URINE: ABNORMAL
BLOOD, URINE: NEGATIVE
CLARITY: CLEAR
COLOR: YELLOW
GLUCOSE URINE: NEGATIVE MG/DL
KETONES, URINE: NEGATIVE MG/DL
LEUKOCYTE ESTERASE, URINE: NEGATIVE
MICROSCOPIC EXAMINATION: ABNORMAL
NITRITE, URINE: NEGATIVE
PH UA: 5 (ref 5–8)
PROTEIN UA: NEGATIVE MG/DL
SPECIFIC GRAVITY UA: 1.02 (ref 1–1.03)
URINE REFLEX TO CULTURE: ABNORMAL
URINE TYPE: ABNORMAL
UROBILINOGEN, URINE: 0.2 E.U./DL

## 2022-08-23 PROCEDURE — 97530 THERAPEUTIC ACTIVITIES: CPT

## 2022-08-23 PROCEDURE — 99315 NF DSCHRG MGMT 30 MIN/LESS: CPT | Performed by: INTERNAL MEDICINE

## 2022-08-23 PROCEDURE — 51798 US URINE CAPACITY MEASURE: CPT

## 2022-08-23 PROCEDURE — 81003 URINALYSIS AUTO W/O SCOPE: CPT

## 2022-08-23 PROCEDURE — 6370000000 HC RX 637 (ALT 250 FOR IP): Performed by: INTERNAL MEDICINE

## 2022-08-23 PROCEDURE — 6360000002 HC RX W HCPCS: Performed by: PHYSICIAN ASSISTANT

## 2022-08-23 PROCEDURE — 97535 SELF CARE MNGMENT TRAINING: CPT

## 2022-08-23 PROCEDURE — 6370000000 HC RX 637 (ALT 250 FOR IP): Performed by: PHYSICIAN ASSISTANT

## 2022-08-23 PROCEDURE — 97110 THERAPEUTIC EXERCISES: CPT

## 2022-08-23 RX ORDER — POLYETHYLENE GLYCOL 3350 17 G/17G
17 POWDER, FOR SOLUTION ORAL 2 TIMES DAILY
Qty: 527 G | Refills: 1 | Status: SHIPPED | OUTPATIENT
Start: 2022-08-23 | End: 2022-09-22

## 2022-08-23 RX ORDER — TRAMADOL HYDROCHLORIDE 50 MG/1
50 TABLET ORAL 3 TIMES DAILY PRN
Qty: 15 TABLET | Refills: 0 | Status: SHIPPED | OUTPATIENT
Start: 2022-08-23 | End: 2022-08-28

## 2022-08-23 RX ORDER — SENNA AND DOCUSATE SODIUM 50; 8.6 MG/1; MG/1
2 TABLET, FILM COATED ORAL DAILY
Qty: 60 TABLET | Refills: 0 | Status: SHIPPED | OUTPATIENT
Start: 2022-08-24 | End: 2022-09-01

## 2022-08-23 RX ORDER — BISACODYL 10 MG
10 SUPPOSITORY, RECTAL RECTAL DAILY PRN
Qty: 14 SUPPOSITORY | Refills: 0 | Status: SHIPPED | OUTPATIENT
Start: 2022-08-23 | End: 2022-09-06

## 2022-08-23 RX ORDER — FERROUS SULFATE TAB EC 324 MG (65 MG FE EQUIVALENT) 324 (65 FE) MG
324 TABLET DELAYED RESPONSE ORAL
Qty: 30 TABLET | Refills: 0 | Status: SHIPPED | OUTPATIENT
Start: 2022-08-24 | End: 2022-09-13 | Stop reason: SDUPTHER

## 2022-08-23 RX ORDER — M-VIT,TX,IRON,MINS/CALC/FOLIC 27MG-0.4MG
1 TABLET ORAL DAILY
Qty: 30 TABLET | Refills: 0 | Status: SHIPPED | OUTPATIENT
Start: 2022-08-24 | End: 2022-09-19 | Stop reason: SDUPTHER

## 2022-08-23 RX ADMIN — ATORVASTATIN CALCIUM 10 MG: 10 TABLET, FILM COATED ORAL at 09:33

## 2022-08-23 RX ADMIN — SENNOSIDES AND DOCUSATE SODIUM 2 TABLET: 8.6; 5 TABLET ORAL at 09:32

## 2022-08-23 RX ADMIN — BUSPIRONE HYDROCHLORIDE 10 MG: 5 TABLET ORAL at 09:32

## 2022-08-23 RX ADMIN — BUSPIRONE HYDROCHLORIDE 10 MG: 5 TABLET ORAL at 15:01

## 2022-08-23 RX ADMIN — FERROUS SULFATE TAB EC 324 MG (65 MG FE EQUIVALENT) 324 MG: 324 (65 FE) TABLET DELAYED RESPONSE at 09:33

## 2022-08-23 RX ADMIN — Medication 1 TABLET: at 09:32

## 2022-08-23 RX ADMIN — PANTOPRAZOLE SODIUM 40 MG: 40 TABLET, DELAYED RELEASE ORAL at 07:13

## 2022-08-23 RX ADMIN — TRAMADOL HYDROCHLORIDE 50 MG: 50 TABLET, COATED ORAL at 09:39

## 2022-08-23 RX ADMIN — ENOXAPARIN SODIUM 40 MG: 100 INJECTION SUBCUTANEOUS at 09:38

## 2022-08-23 RX ADMIN — SERTRALINE HYDROCHLORIDE 50 MG: 50 TABLET ORAL at 09:32

## 2022-08-23 RX ADMIN — ASPIRIN 81 MG: 81 TABLET, COATED ORAL at 09:33

## 2022-08-23 RX ADMIN — FOLIC ACID 1 MG: 1 TABLET ORAL at 09:32

## 2022-08-23 RX ADMIN — LEVOTHYROXINE SODIUM 50 MCG: 0.05 TABLET ORAL at 07:13

## 2022-08-23 RX ADMIN — POLYETHYLENE GLYCOL 3350 17 G: 17 POWDER, FOR SOLUTION ORAL at 09:33

## 2022-08-23 RX ADMIN — TROSPIUM CHLORIDE 20 MG: 20 TABLET, FILM COATED ORAL at 09:33

## 2022-08-23 ASSESSMENT — PAIN SCALES - GENERAL: PAINLEVEL_OUTOF10: 3

## 2022-08-23 NOTE — PROGRESS NOTES
Physical Therapy  Facility/Department: [facility] MED SURG  Daily Treatment Note  NAME: Tuyet Page  : 1935  MRN: [id number]    Date of Service: 2022    Discharge Recommendations:  Continue to assess pending progress      Patient Diagnosis(es): The primary encounter diagnosis was History of fracture of right hip. A diagnosis of Constipation, unspecified constipation type was also pertinent to this visit. Assessment   Assessment: Cotreated with OT. Patient required Min A, sequencing cues, and encouragement to transition supine to sit. Completes minimal B LE AROM while seated. Stood with Mod A of 2 and ambulated ~4 feet with RW and Mod A of 2. Patient requires cues to widen ELLIOT and put more weight through her R leg. Patient transferred to [facility] with Mod A of 2 for toileting and sponge bathing. Patient dependent for colby-hygiene and clothing management. Patient transferred to recliner with RW and Mod A of 2. Activity Tolerance: Patient tolerated treatment well;Treatment limited secondary to decreased cognition;Patient limited by pain     Plan    Plan  Plan: 3-5 times per week  Current Treatment Recommendations: Strengthening;ROM;Balance training;Functional mobility training;Transfer training;Gait training; Endurance training;Cognitive/Perceptual training;Pain management; Therapeutic activities; Home exercise program;Safety education & training;Patient/Caregiver education & training     Restrictions  Restrictions/Precautions  Restrictions/Precautions: Fall Risk, General Precautions  Required Braces or Orthoses?: No  Position Activity Restriction  Hip Precautions: Posterior hip precautions  Other position/activity restrictions: 6 weeks postop PHP     Subjective    Subjective  Subjective: Patient presents awake in bed, pleasantly confused, agreeable to therapy     Objective     Bed Mobility Training  Bed Mobility Training: Yes  Overall Level of Assistance: Minimum assistance  Rolling: Minimum assistance  Supine to Sit: Minimum assistance  Scooting: Minimum assistance  Balance  Sitting: Intact  Standing: With support  Transfer Training  Transfer Training: Yes  Overall Level of Assistance: Moderate assistance;Assist X2  Interventions: Verbal cues; Tactile cues; Visual cues; Weight shifting training/pressure relief  Sit to Stand: Moderate assistance;Assist X2  Stand to Sit: Moderate assistance;Assist X2  Stand Pivot Transfers: Moderate assistance;Assist X2  Bed to Chair: Moderate assistance;Assist X2  Toilet Transfer: Moderate assistance;Assist X2 (to [facility])  Gait Training  Gait Training: Yes  Gait  Overall Level of Assistance: Moderate assistance;Assist X2  Interventions: Manual cues  Base of Support: Narrowed  Speed/[place]: Slow  Gait Abnormalities: Decreased step clearance  Distance (ft): 4 Feet  Assistive Device: Walker, rolling     PT Exercises  Exercise Treatment: B LE limited AROM in sitting     Safety Devices  Type of Devices: Left in chair;Chair alarm in place;Call light within reach     Goals  Long Term Goals  Time Frame for Long term goals : 14 days  Long term goal 1: Patient will perform all bed mobility with no more than Tye. Long term goal 2: Patient will perform sit to stand and transfers with RW and no more than min A. Long term goal 3: Patient will ambulate 20'x2 with RW and no more than min A. Long term goal 4: Patient will perform B LE ther ex x 15 reps. Therapy Time   Individual Concurrent Group Co-treatment   Time In 0935         Time Out 1034         Minutes 59             This note serves as D/C summary if patient is discharged prior to next visit.    Tia Goldmann, PTA

## 2022-08-23 NOTE — FLOWSHEET NOTE
Pt has not void yet. [facility] PA notified. Ordered Bladder scan. Straight cath and UA/CULTURE. Bladder scan 456 ml.  UA collected and sent to lab.

## 2022-08-23 NOTE — DISCHARGE SUMMARY
Discharge Summary      Patient ID: Vergie Kehr      Patient's PCP: Dread Xiong MD    Admit Date: 8/10/2022     Discharge Date:   8/23/2022    Admitting Provider: Parvin Monson MD    Discharging Provider: LANA Ortega     Reason for this admission:   Declining functional status     Discharge Diagnoses: Active Hospital Problems    Diagnosis Date Noted    Dementia ([address] [F03.90] 08/11/2022    Anemia [D64.9] 08/11/2022    History of fracture of right hip [Z87.81] 08/11/2022    Declining functional status [R53.81] 08/10/2022    Hypothyroidism [E03.9] 09/08/2016       Procedures:  No orders to display         Consults:   IP CONSULT TO CASE MANAGEMENT  IP CONSULT TO DIETITIAN  IP CONSULT TO DIETITIAN  IP CONSULT TO HOME CARE NEEDS  PT/OT    Briefly:   Ms. Diego Robert is an 79 yo female with PMH of dementia and hypothyroidism who was admitted due to declining functional status following a right hip fracture. See details of hospital course below. Hospital Course:       Dementia [facility]) [F03.90]  -advanced at baseline; only oriented to person at baseline  -Patient's daughter is her guardian and she provides 24 hr care for her   - Continue home regimen     08/11/2022    Anemia [D64.9]  -Hemoglobin 8.7 on 8/11; 8.2 on 8/15; 9 on 8/19  -Per chart review, hemoglobin 11.6 on 8/1 prior to surgical intervention. Suspect acute anemia secondary to acute blood loss from surgery. -Iron studies with evidence of iron deficiency anemia.   Started oral iron supplement 8/18  -Continue to monitor and transfuse for hemoglobin less than 7  - Hgb trending up   - may benefit from upper/lower endoscopy as outpatient, defer to pcp   - continue gi ppx     08/11/2022    History of fracture of right hip [Z87.81]  -Status post fall resulting in a right femoral neck fracture  -Status post right hemiarthroplasty by Dr. Pauly Granados on 8/2  -Weight-bear as tolerated to right lower extremity with posterior hip precautions and limited active abduction for 6 weeks per orthopedics  -Continue pain regimen as needed with bowel regimen  -Follow-up with Dr. Monico Skaggs as scheduled  -Continue aspirin 325 mg for 6 weeks for DVT prophylaxis per orthopedic recommendations. While patient is admitted to this facility, she will also receive Lovenox for DVT prophylaxis and baby aspirin. Transition to aspirin 325 mg at time of discharge to complete the 6-week course. -PT OT consulted and following  -Case management consulted for discharge planning assistance    08/11/2022    Declining functional status [R53.81]  -Secondary to recent hip fracture in patient of advanced age with advanced dementia  -PT OT following  -Case management consulted for discharge planning assistance  - patient's daughter who is her guardian and provides 24 hour care for her requests discharge home to continue PT/OT. Per daughter she believes patient will benefit from being in her home environment with her advanced dementia. - stable for dc home 8/23     08/10/2022    Hypothyroidism [E03.9]  -Continue home Synthroid     Constipation  -Continue bowel regimen with Linzess, MiraLAX twice daily, Senna and PRN dulocolax 09/08/2016       Disposition: home with home health and 24 hour family assist     Discharged Condition: Stable    Vital Signs  Temp: 98.2 °F (36.8 °C)  Heart Rate: 82  Resp: 18  BP: 122/68  SpO2: 97 %  O2 Device: None (Room air)  O2 Flow Rate (L/min): 3 L/min    Vital signs reviewed in electronic chart. Physical exam  Constitutional:  Well developed, frail elderly lady lying in bed in no acute distress  Eyes:  PERRL, no scleral icterus, conjunctiva normal  HENT:  Atraumatic, external ears normal, nose normal, oropharynx moist, no pharyngeal exudates. Neck- supple, no JVD, no lymphadenopathy. Hard of hearing.   Respiratory:  No respiratory distress on RA, no wheezing, rales or rhonchi detected  Cardiovascular:  Normal rate, normal rhythm, no murmurs, no gallops, no rubs, no edema  GI:  Soft, nondistended, normal bowel sounds, nontender, no voluntary guarding  Musculoskeletal:  No cyanosis or obvious acute deformity. Integument:  Warm and dry. Well healed incision right hip. External cardiac monitor noted to left upper chest.  Neurologic:  Alert & oriented to self. Pleasantly confused. no apparent focal deficits noted  Psychiatric:  Speech and behavior appropriate       Activity: activity as tolerated  Diet: regular diet and soft and bite sized foods   Follow Up: Primary Care Physician in 1 week. Follow up with ortho as scheduled on 8/31. Follow up with cardiology Dr. Letitia Choudhary as scheduled. Labs:  For convenience and continuity at follow-up the following most recent labs are provided:    CBC:   Lab Results   Component Value Date/Time    WBC 6.5 08/19/2022 04:34 AM    HGB 9.0 08/19/2022 04:34 AM    HCT 28.4 08/19/2022 04:34 AM     08/19/2022 04:34 AM       RENAL:   Lab Results   Component Value Date/Time     08/19/2022 04:34 AM    K 4.3 08/19/2022 04:34 AM     08/19/2022 04:34 AM    CO2 26 08/19/2022 04:34 AM    BUN 21 08/19/2022 04:34 AM    CREATININE 1.0 08/19/2022 04:34 AM         Discharge Medications:     Current Discharge Medication List             Details   bisacodyl (DULCOLAX) 10 MG suppository Place 1 suppository rectally daily as needed for Constipation  Qty: 14 suppository, Refills: 0      ferrous sulfate 324 (65 Fe) MG EC tablet Take 1 tablet by mouth daily (with breakfast)  Qty: 30 tablet, Refills: 0      polyethylene glycol (GLYCOLAX) 17 g packet Take 17 g by mouth 2 times daily  Qty: 527 g, Refills: 1      sennosides-docusate sodium (SENOKOT-S) 8.6-50 MG tablet Take 2 tablets by mouth daily  Qty: 60 tablet, Refills: 0      Multiple Vitamins-Minerals (THERAPEUTIC MULTIVITAMIN-MINERALS) tablet Take 1 tablet by mouth daily  Qty: 30 tablet, Refills: 0                Details   aspirin 325 MG EC tablet Take 1 tablet by mouth daily  Qty: 30 tablet, Refills: 0      traMADol (ULTRAM) 50 MG tablet Take 1 tablet by mouth 3 times daily as needed for Pain for up to 5 days. Qty: 15 tablet, Refills: 0    Comments: Reduce doses taken as pain becomes manageable  Associated Diagnoses: History of fracture of right hip                Details   linaCLOtide (LINZESS) 72 MCG CAPS capsule Take 72 mcg by mouth as needed      QUEtiapine (SEROQUEL) 25 MG tablet TAKE 1 TABLET BY MOUTH EVERY NIGHT  Qty: 90 tablet, Refills: 3      busPIRone (BUSPAR) 10 MG tablet TAKE 1 TABLET BY MOUTH THREE TIMES DAILY  Qty: 90 tablet, Refills: 5    Associated Diagnoses: Anxiety      levothyroxine (SYNTHROID) 50 MCG tablet TAKE 1 TABLET BY MOUTH ONCE A DAY  Qty: 90 tablet, Refills: 3    Associated Diagnoses: Hypothyroidism, unspecified type      folic acid (FOLVITE) 1 MG tablet TAKE 1 TABLET BY MOUTH EVERY DAY  Qty: 90 tablet, Refills: 3    Comments: 04/23/2021 2:54:47 PM      omeprazole (PRILOSEC) 40 MG delayed release capsule Take 1 capsule by mouth daily  Qty: 90 capsule, Refills: 3      mirabegron (MYRBETRIQ) 25 MG TB24 TAKE 1 TABLET BY MOUTH ONCE A DAY  Qty: 90 tablet, Refills: 3    Associated Diagnoses: Urinary incontinence, unspecified type      atorvastatin (LIPITOR) 10 MG tablet Take 1 tablet by mouth daily  Qty: 90 tablet, Refills: 3      Memantine HCl-Donepezil HCl 28-10 MG CP24 Take 1 capsule by mouth daily  Qty: 30 capsule, Refills: 5      sertraline (ZOLOFT) 50 MG tablet TAKE 1 TABLET BY MOUTH DAILY  Qty: 30 tablet, Refills: 5            Patient was seen and examined with Dr. Goyo Gabriel. After reviewing patient data and diagnostic testing the plan of care was established in conjunction with Dr. Goyo Gabriel. Signed:  Electronically signed by LANA Justin on 8/23/2022 at 12:15 PM       Thank you Rosi Braun MD for the opportunity to be involved in this patient's care. If you have any questions or concerns please feel free to contact me at [phone].

## 2022-08-23 NOTE — CARE COORDINATION
The Plan for Transition of Care is related to the following treatment goals: home with [facility], DME and 24/7 care    The Patient and/or patient representative was provided with a choice of provider and agrees with the discharge plan. [x] Yes [] No    Freedom of choice list was provided with basic dialogue that supports the patient's individualized plan of care/goals, treatment preferences and shares the quality data associated with the providers. [x] Yes [] No      DME to be set up at home today around 2-3. Daughter would like pt to come home tonight. Daughter states ramp to home is not completed, but they are working on it now. All DME needs ordered. PA agreeable to DC today. [facility] notified of DC this evening. No further DC needs noted. Pt will need EMS transfer to home.

## 2022-08-23 NOTE — PLAN OF CARE
Problem: Discharge Planning  Goal: Discharge to home or other facility with appropriate resources  8/23/2022 1513 by Ely Severance, RN  Outcome: Completed  8/23/2022 1513 by Ely Severance, RN  Outcome: Adequate for Discharge  8/23/2022 1158 by Ely Severance, RN  Outcome: Progressing  Flowsheets (Taken 8/23/2022 5321)  Discharge to home or other facility with appropriate resources:   Identify discharge learning needs (meds, wound care, etc)   Identify barriers to discharge with patient and caregiver     Problem: Safety - Adult  Goal: Free from fall injury  8/23/2022 1513 by Ely Severance, RN  Outcome: Completed  8/23/2022 1513 by Ely Severance, RN  Outcome: Adequate for Discharge  8/23/2022 1158 by Ely Severance, RN  Outcome: Progressing  Flowsheets (Taken 8/23/2022 0933)  Free From Fall Injury: Instruct family/caregiver on patient safety     Problem: ABCDS Injury Assessment  Goal: Absence of physical injury  8/23/2022 1513 by Ely Severance, RN  Outcome: Completed  8/23/2022 1513 by Ely Severance, RN  Outcome: Adequate for Discharge  8/23/2022 1158 by Ely Severance, RN  Outcome: Progressing     Problem: Skin/Tissue Integrity  Goal: Absence of new skin breakdown  Description: 1. Monitor for areas of redness and/or skin breakdown  2. Assess vascular access sites hourly  3. Every 4-6 hours minimum:  Change oxygen saturation probe site  4. Every 4-6 hours:  If on nasal continuous positive airway pressure, respiratory therapy assess nares and determine need for appliance change or resting period.   8/23/2022 1513 by Ely Severance, RN  Outcome: Completed  8/23/2022 1513 by Ely Severance, RN  Outcome: Adequate for Discharge  8/23/2022 1158 by Ely Severance, RN  Outcome: Progressing     Problem: Neurosensory - Adult  Goal: Achieves maximal functionality and self care  8/23/2022 1513 by Ely Severance, RN  Outcome: Completed  8/23/2022 1513 by Ely Severance, RN  Outcome: Adequate for Discharge  8/23/2022 1158 by Ely Severance, RN  Outcome: Progressing     Problem: Cardiovascular - Adult  Goal: Maintains optimal cardiac output and hemodynamic stability  8/23/2022 1513 by Ava Odom RN  Outcome: Completed  8/23/2022 1513 by Ava Odom RN  Outcome: Adequate for Discharge  8/23/2022 1158 by Ava Odom RN  Outcome: Progressing  Flowsheets (Taken 8/23/2022 1975)  Maintains optimal cardiac output and hemodynamic stability: Monitor blood pressure and heart rate  Goal: Absence of cardiac dysrhythmias or at baseline  8/23/2022 1513 by Ava Odom RN  Outcome: Completed  8/23/2022 1513 by Ava Odom RN  Outcome: Adequate for Discharge  8/23/2022 1158 by Ava Odom RN  Outcome: Progressing     Problem: Skin/Tissue Integrity - Adult  Goal: Skin integrity remains intact  8/23/2022 1513 by Ava Odom RN  Outcome: Completed  8/23/2022 1513 by Ava Odom RN  Outcome: Adequate for Discharge  8/23/2022 1158 by Ava Odom RN  Outcome: Progressing  Flowsheets (Taken 8/23/2022 9662)  Skin Integrity Remains Intact: Monitor for areas of redness and/or skin breakdown     Problem: Musculoskeletal - Adult  Goal: Return mobility to safest level of function  8/23/2022 1513 by Ava Odom RN  Outcome: Completed  8/23/2022 1513 by Ava Odom RN  Outcome: Adequate for Discharge  8/23/2022 1158 by Ava Odom RN  Outcome: Progressing  Goal: Return ADL status to a safe level of function  8/23/2022 1513 by Ava Odom RN  Outcome: Completed  8/23/2022 1513 by Ava Odom RN  Outcome: Adequate for Discharge  8/23/2022 1158 by Ava Odom RN  Outcome: Progressing     Problem: Gastrointestinal - Adult  Goal: Maintains or returns to baseline bowel function  8/23/2022 1513 by Ava Odom RN  Outcome: Completed  8/23/2022 1513 by Ava Odom RN  Outcome: Adequate for Discharge  8/23/2022 1158 by Ava Odom RN  Outcome: Progressing  Flowsheets (Taken 8/23/2022 0933)  Maintains or returns to baseline bowel function: Encourage oral fluids to ensure adequate hydration  Goal: Maintains adequate nutritional intake  8/23/2022 1513 by Estelle Lr RN  Outcome: Completed  8/23/2022 1513 by Estelle Lr RN  Outcome: Adequate for Discharge  8/23/2022 1158 by Estelle Lr RN  Outcome: Progressing  Flowsheets (Taken 8/23/2022 8604)  Maintains adequate nutritional intake: Assist with meals as needed     Problem: Genitourinary - Adult  Goal: Absence of urinary retention  8/23/2022 1513 by Estelle Lr RN  Outcome: Completed  8/23/2022 1513 by Estelle Lr RN  Outcome: Adequate for Discharge  8/23/2022 1158 by Estelle Lr RN  Outcome: Progressing  Flowsheets (Taken 8/23/2022 0963)  Absence of urinary retention: Monitor intake/output and perform bladder scan as needed     Problem: Infection - Adult  Goal: Absence of infection at discharge  8/23/2022 1513 by Estelle Lr RN  Outcome: Completed  8/23/2022 1513 by Estelle Lr RN  Outcome: Adequate for Discharge  8/23/2022 1158 by Estelle Lr RN  Outcome: Progressing  Goal: Absence of infection during hospitalization  8/23/2022 1513 by Estelle Lr RN  Outcome: Completed  8/23/2022 1513 by Estelle Lr RN  Outcome: Adequate for Discharge  8/23/2022 1158 by Estelle Lr RN  Outcome: Progressing     Problem: Metabolic/Fluid and Electrolytes - Adult  Goal: Electrolytes maintained within normal limits  8/23/2022 1513 by Estelle Lr RN  Outcome: Completed  8/23/2022 1513 by Estelle Lr RN  Outcome: Adequate for Discharge  8/23/2022 1158 by Estelle Lr RN  Outcome: Progressing     Problem: Nutrition Deficit:  Goal: Optimize nutritional status  8/23/2022 1513 by Estelle Lr RN  Outcome: Completed  8/23/2022 1513 by Estelle Lr RN  Outcome: Adequate for Discharge  8/23/2022 1158 by Estelle Lr RN  Outcome: Progressing     Problem: Pain  Goal: Verbalizes/displays adequate comfort level or baseline comfort level  8/23/2022 1513 by Estelle Lr RN  Outcome: Completed  8/23/2022 1513 by Greg Angeles Veronica Khan RN  Outcome: Adequate for Discharge  8/23/2022 1158 by Camilo Arnold RN  Outcome: Progressing

## 2022-08-23 NOTE — PLAN OF CARE
Problem: Discharge Planning  Goal: Discharge to home or other facility with appropriate resources  8/23/2022 1513 by Alban Landeros RN  Outcome: Adequate for Discharge  8/23/2022 1158 by Alban Landeros RN  Outcome: Progressing  Flowsheets (Taken 8/23/2022 7957)  Discharge to home or other facility with appropriate resources:   Identify discharge learning needs (meds, wound care, etc)   Identify barriers to discharge with patient and caregiver     Problem: Safety - Adult  Goal: Free from fall injury  8/23/2022 1513 by Alban Landeros RN  Outcome: Adequate for Discharge  8/23/2022 1158 by Alban Landeros RN  Outcome: Progressing  Flowsheets (Taken 8/23/2022 0981)  Free From Fall Injury: Instruct family/caregiver on patient safety     Problem: ABCDS Injury Assessment  Goal: Absence of physical injury  8/23/2022 1513 by Alban Landeros RN  Outcome: Adequate for Discharge  8/23/2022 1158 by Alban Landeros RN  Outcome: Progressing     Problem: Skin/Tissue Integrity  Goal: Absence of new skin breakdown  Description: 1. Monitor for areas of redness and/or skin breakdown  2. Assess vascular access sites hourly  3. Every 4-6 hours minimum:  Change oxygen saturation probe site  4. Every 4-6 hours:  If on nasal continuous positive airway pressure, respiratory therapy assess nares and determine need for appliance change or resting period.   8/23/2022 1513 by Alban Landeros RN  Outcome: Adequate for Discharge  8/23/2022 1158 by Alban Landeros RN  Outcome: Progressing     Problem: Neurosensory - Adult  Goal: Achieves maximal functionality and self care  8/23/2022 1513 by Alban Landeros RN  Outcome: Adequate for Discharge  8/23/2022 1158 by Alban Landeros RN  Outcome: Progressing     Problem: Cardiovascular - Adult  Goal: Maintains optimal cardiac output and hemodynamic stability  8/23/2022 1513 by Alban Landeros RN  Outcome: Adequate for Discharge  8/23/2022 1158 by Alban Landeros RN  Outcome: Progressing  Flowsheets (Taken 8/23/2022 3532)  Maintains optimal cardiac output and hemodynamic stability: Monitor blood pressure and heart rate  Goal: Absence of cardiac dysrhythmias or at baseline  8/23/2022 1513 by Zane Waldron RN  Outcome: Adequate for Discharge  8/23/2022 1158 by Zane Waldron RN  Outcome: Progressing     Problem: Skin/Tissue Integrity - Adult  Goal: Skin integrity remains intact  8/23/2022 1513 by Zane Waldron RN  Outcome: Adequate for Discharge  8/23/2022 1158 by Zane Waldron RN  Outcome: Progressing  Flowsheets (Taken 8/23/2022 0933)  Skin Integrity Remains Intact: Monitor for areas of redness and/or skin breakdown     Problem: Musculoskeletal - Adult  Goal: Return mobility to safest level of function  8/23/2022 1513 by Zane Waldron RN  Outcome: Adequate for Discharge  8/23/2022 1158 by Zane Waldron RN  Outcome: Progressing  Goal: Return ADL status to a safe level of function  8/23/2022 1513 by Zane Waldron RN  Outcome: Adequate for Discharge  8/23/2022 1158 by Zane Waldron RN  Outcome: Progressing     Problem: Gastrointestinal - Adult  Goal: Maintains or returns to baseline bowel function  8/23/2022 1513 by Zane Waldron RN  Outcome: Adequate for Discharge  8/23/2022 1158 by Zane Waldron RN  Outcome: Progressing  Flowsheets (Taken 8/23/2022 0933)  Maintains or returns to baseline bowel function: Encourage oral fluids to ensure adequate hydration  Goal: Maintains adequate nutritional intake  8/23/2022 1513 by Zane Waldron RN  Outcome: Adequate for Discharge  8/23/2022 1158 by Zane Waldron RN  Outcome: Progressing  Flowsheets (Taken 8/23/2022 0933)  Maintains adequate nutritional intake: Assist with meals as needed     Problem: Genitourinary - Adult  Goal: Absence of urinary retention  8/23/2022 1513 by Zane Waldron RN  Outcome: Adequate for Discharge  8/23/2022 1158 by Zane Waldron RN  Outcome: Progressing  Flowsheets (Taken 8/23/2022 0933)  Absence of urinary retention: Monitor intake/output and perform bladder scan as needed     Problem: Infection - Adult  Goal: Absence of infection at discharge  8/23/2022 1513 by Angela Morel RN  Outcome: Adequate for Discharge  8/23/2022 1158 by Angela Morel RN  Outcome: Progressing  Goal: Absence of infection during hospitalization  8/23/2022 1513 by Angela Morel RN  Outcome: Adequate for Discharge  8/23/2022 1158 by Angela Morel RN  Outcome: Progressing     Problem: Metabolic/Fluid and Electrolytes - Adult  Goal: Electrolytes maintained within normal limits  8/23/2022 1513 by Angela Morel RN  Outcome: Adequate for Discharge  8/23/2022 1158 by Angela Morel RN  Outcome: Progressing     Problem: Nutrition Deficit:  Goal: Optimize nutritional status  8/23/2022 1513 by Angela Morel RN  Outcome: Adequate for Discharge  8/23/2022 1158 by Angela Morel RN  Outcome: Progressing     Problem: Pain  Goal: Verbalizes/displays adequate comfort level or baseline comfort level  8/23/2022 1513 by Angela Morel RN  Outcome: Adequate for Discharge  8/23/2022 1158 by Angela Morel RN  Outcome: Progressing

## 2022-08-23 NOTE — PROGRESS NOTES
Occupational Therapy  Facility/Department: [facility] MED SURG  Daily Treatment Note  NAME: Carol Cortez  : 1935  MRN: [id number]    Date of Service: 2022    Discharge Recommendations:  Continue to assess pending progress, 24 hour supervision or assist         Patient Diagnosis(es): The primary encounter diagnosis was History of fracture of right hip. A diagnosis of Constipation, unspecified constipation type was also pertinent to this visit. Assessment    Assessment: Pt seen for OT services. Co tx with PTA. Pt transferred to sitting at EOB with MIN A max verbal cuing. Pt come to stand and ambulated x4 feet with MOD x2 using RW with max verbal cuing to weight shift. Pt attempting to avoid weight bearing onto RLE. Pt transferred to [facility] with mod x2. Pt transferred to [facility] with mod x2 assist. Pt completed sponge bathing seated upright on BS. Pt required max verbal cuing to sequence task. Pt required min assist for UB with cues to maintain attention to task. Pt required MOD <>MAX assist to complete LB bathing. Pt required max assist for toileting. Pt transferred from [facility] to [facility]. Pt having difficulty coming to erect standing position secondary to pain. Difficulty correcting secondary to cognitive status. Pt left seated upright in recliner with alarm on and call light in reach. Subjective   Subjective  Subjective: Just lay me back down           Objective    Vitals           ADL  Grooming: Stand by assistance  UE Bathing: Moderate assistance;Minimal assistance;Verbal cueing  LE Bathing: Moderate assistance;Maximum assistance;Verbal cueing  UE Dressing: Contact guard assistance;Verbal cueing  LE Dressing: Maximum assistance  Toileting: Maximum assistance; Increased time to complete      Goals  Short Term Goals  Time Frame for Short term goals: 2 weeks  Short Term Goal 1: Pt to complete ADL transfers with CGA. Short Term Goal 2: Pt to complete toileting with CGA.   Short Term Goal 3: Pt to complete dressing with min assist.  Short Term Goal 4: Pt to complete bathing with min assist.  Short Term Goal 5: Pt to tolerate x10 minutes of activity to increase functional activity tolerance. Short Term Goal 6: Pt to complete hygiene/grooming with SOLOMON. Therapy Time   Individual Concurrent Group Co-treatment   Time In 2211         Time Out 1034         Minutes 47             This note serves as a DC summary in the event of pt discharge.       Marcia Can, OTR/L

## 2022-08-23 NOTE — FLOWSHEET NOTE
08/23/22 0933   Assessment   Charting Type Shift assessment   Psychosocial   Psychosocial (WDL) WDL   Neurological   Neuro (WDL) X   Level of Consciousness Alert (0)   Orientation Level Oriented to person;Disoriented to place; Disoriented to time;Disoriented to situation   Cognition Follows commands;Poor attention/concentration;Poor judgement;Poor safety awareness   Speech Clear   Harrah Coma Scale   Eye Opening 4   Best Verbal Response 4   Best Motor Response 6   Kamar Coma Scale Score 14   HEENT (Head, Ears, Eyes, Nose, & Throat)   HEENT (WDL) X   Teeth Missing teeth;Dentures upper;Dentures lower   Respiratory   Respiratory (WDL) WDL   Respiratory Pattern Regular   Respiratory Depth Normal   Respiratory Quality/Effort Unlabored   Chest Assessment Chest expansion symmetrical;Trachea midline   L Breath Sounds Clear   R Breath Sounds Clear   Breath Sounds   Right Upper Lobe Clear   Right Middle Lobe Clear   Right Lower Lobe Clear   Left Upper Lobe Clear   Left Lower Lobe Clear   Cough/Sputum   Cough Moist   Cardiac   Cardiac (WDL) X  (body guardian left chest)   Cardiac Regularity Regular   Cardiac Rhythm Sinus bradford   Care Plan - Cardiovascular Goals   Maintains optimal cardiac output and hemodynamic stability Monitor blood pressure and heart rate   Gastrointestinal   Abdominal (WDL) WDL   Care Plan - Gastrointestinal Goals   Maintains or returns to baseline bowel function Encourage oral fluids to ensure adequate hydration   Maintains adequate nutritional intake Assist with meals as needed   Genitourinary   Genitourinary (WDL) X  (incontinence)   Urine Assessment   Urinary Status Voiding; External catheter   Care Plan - Genitourinary Goals    Absence of urinary retention Monitor intake/output and perform bladder scan as needed   Peripheral Vascular   Peripheral Vascular (WDL) X   RLE Edema +1;Pitting  (ankle)   LLE Edema +1;Pitting  (ankle)   RLE Neurovascular Assessment   Color Pink   Temperature Warm   R Pedal Pulse +2   LLE Neurovascular Assessment   Color Pink   Temperature Warm   L Pedal Pulse +2   Skin Integumentary    Skin Integumentary (WDL) WDL   Care Plan - Skin/Tissue Integrity Goals   Skin Integrity Remains Intact Monitor for areas of redness and/or skin breakdown   Musculoskeletal   Musculoskeletal (WDL) X   RUE Weakness   LUE Weakness   RL Extremity Weakness; Unsteady   LL Extremity Weakness; Unsteady   Musculoskeletal Details   R Hip Surgery  (healed sx incision)   Care Plan - Discharge Planning Goals   Discharge to home or other facility with appropriate resources Identify discharge learning needs (meds, wound care, etc); Identify barriers to discharge with patient and caregiver   Pt is alert and oriented at times. Pt is resting in bed and appears to have no acute distress. Pt currently on room air. Pt's lung sounds are clear and unlabored. Pt encouraged to cough and deep breathe. Pt call bell and bedside table within reach.

## 2022-08-23 NOTE — DISCHARGE INSTRUCTIONS
Activity: activity as tolerated  Diet: regular diet and soft and bite sized foods   Follow Up: Primary Care Physician in 1 week. Follow up with ortho as scheduled on 8/31. Follow up with cardiology Dr. Jeanie Peguero as scheduled.

## 2022-08-23 NOTE — PLAN OF CARE
Problem: Discharge Planning  Goal: Discharge to home or other facility with appropriate resources  Outcome: Progressing  Flowsheets (Taken 8/23/2022 0933)  Discharge to home or other facility with appropriate resources:   Identify discharge learning needs (meds, wound care, etc)   Identify barriers to discharge with patient and caregiver     Problem: Safety - Adult  Goal: Free from fall injury  Outcome: Progressing  Flowsheets (Taken 8/23/2022 0933)  Free From Fall Injury: Instruct family/caregiver on patient safety     Problem: ABCDS Injury Assessment  Goal: Absence of physical injury  Outcome: Progressing     Problem: Skin/Tissue Integrity  Goal: Absence of new skin breakdown  Description: 1. Monitor for areas of redness and/or skin breakdown  2. Assess vascular access sites hourly  3. Every 4-6 hours minimum:  Change oxygen saturation probe site  4. Every 4-6 hours:  If on nasal continuous positive airway pressure, respiratory therapy assess nares and determine need for appliance change or resting period.   Outcome: Progressing     Problem: Neurosensory - Adult  Goal: Achieves maximal functionality and self care  Outcome: Progressing     Problem: Cardiovascular - Adult  Goal: Maintains optimal cardiac output and hemodynamic stability  Outcome: Progressing  Flowsheets (Taken 8/23/2022 0933)  Maintains optimal cardiac output and hemodynamic stability: Monitor blood pressure and heart rate  Goal: Absence of cardiac dysrhythmias or at baseline  Outcome: Progressing     Problem: Skin/Tissue Integrity - Adult  Goal: Skin integrity remains intact  Outcome: Progressing  Flowsheets (Taken 8/23/2022 0933)  Skin Integrity Remains Intact: Monitor for areas of redness and/or skin breakdown     Problem: Musculoskeletal - Adult  Goal: Return mobility to safest level of function  Outcome: Progressing  Goal: Return ADL status to a safe level of function  Outcome: Progressing     Problem: Gastrointestinal - Adult  Goal:

## 2022-08-24 ENCOUNTER — CARE COORDINATION (OUTPATIENT)
Dept: CARE COORDINATION | Age: 87
End: 2022-08-24

## 2022-08-24 RX ORDER — MEGESTROL ACETATE 125 MG/ML
625 SUSPENSION ORAL DAILY
Qty: 150 ML | Refills: 3 | Status: SHIPPED | OUTPATIENT
Start: 2022-08-24 | End: 2022-08-25 | Stop reason: CLARIF

## 2022-08-24 NOTE — CARE COORDINATION
[facility] Transitions Initial Follow Up Call    Call within 2 business days of discharge: Yes     Patient: Diego Gibson Patient : 1935 MRN: [id number]    Last Discharge [facility]       Date Complaint Diagnosis Description Type Department Provider    8/10/22  History of fracture of right hip . .. Admission (Discharged) [facility] Brain Addison MD            RARS: Readmission Risk Score: 13.2       Spoke with: Valery Richards ,daughter states that Corie Nj is doing ok although it is a struggle. She is waiting for a wheelchair and walker that will be there in time for therapy to start. She knows that the mattress pad is coming this afternoon with home health. She tells me that she needs a fracture pan vs regular bed pan for use when her  isn't available to help her get to the commode. Corie Nj remains confused but no issues dealing with that. Her appetite hasn't been good and the request for Megace has been filled so I let Valery Richards know that. Corie Nj has not needed any pain medication so far but hasn't really been up to much. We talked about maybe giving tylenol 45 minutes or so before she does therapy. Valery Richards said that Corie Nj doesn't really complain of pain unless she is being moved. No issues with medications. We [facility].      Discharge department/facility: [facility]    Non-face-to-face services provided:  Scheduled appointment with [facility]  Obtained and reviewed discharge summary and/or continuity of care documents    Follow Up  Future Appointments   Date Time Provider Torri Lr   2022  5:00 PM Anibal Brown MD [address] [place] [address]   10/25/2022  2:45 PM Anibal Brown MD [address] [place] [address]   2023  1:00 PM Anibal Brown MD [address] [place] [address]       Lupe Cochran RN

## 2022-08-24 NOTE — TELEPHONE ENCOUNTER
VO: Patricia Mcginnis MD/ЕКАТЕРИНА Stewart RN megestrol 625mg take 5mls daily.      Medication sent to pharmacy

## 2022-08-25 ENCOUNTER — TELEPHONE (OUTPATIENT)
Dept: FAMILY MEDICINE CLINIC | Age: 87
End: 2022-08-25

## 2022-08-25 RX ORDER — MEGESTROL ACETATE 20 MG/1
20 TABLET ORAL DAILY
Qty: 30 TABLET | Refills: 3 | Status: SHIPPED | OUTPATIENT
Start: 2022-08-25

## 2022-08-30 RX ORDER — MELOXICAM 15 MG/1
TABLET ORAL
Qty: 90 TABLET | Refills: 3 | OUTPATIENT
Start: 2022-08-30

## 2022-09-01 ENCOUNTER — SCHEDULED TELEPHONE ENCOUNTER (OUTPATIENT)
Dept: FAMILY MEDICINE CLINIC | Age: 87
End: 2022-09-01
Payer: MEDICARE

## 2022-09-01 DIAGNOSIS — S72.002S CLOSED FRACTURE OF LEFT HIP, SEQUELA: Primary | ICD-10-CM

## 2022-09-01 DIAGNOSIS — R41.3 MEMORY LOSS, SHORT TERM: ICD-10-CM

## 2022-09-01 DIAGNOSIS — I10 ESSENTIAL HYPERTENSION: ICD-10-CM

## 2022-09-01 PROCEDURE — 99442 PR PHYS/QHP TELEPHONE EVALUATION 11-20 MIN: CPT | Performed by: FAMILY MEDICINE

## 2022-09-01 NOTE — PROGRESS NOTES
No chief complaint on file.       Have you seen any other physician or provider since your last visit yes - ortho and fco    Have you had any other diagnostic tests since your last visit? no    Have you changed or stopped any medications since your last visit? no

## 2022-09-01 NOTE — PROGRESS NOTES
Boogie Jara is a 80 y.o. female evaluated via telephone on 9/1/2022 for Hip Injury [facility] f/u)  . Assessment & Plan   1. Closed fracture of left hip, sequela  2. Essential hypertension  3. Memory loss, short term  Return in about 4 weeks (around 9/29/2022). Subjective     HPI  She is 1 week out of the rehab for her hip fracture. She is having stil some decrease in appetite. She is having some difficulty swallowing some of her pills. She is walking better. She is needing assistance with walking. She is having home pt and ot. She is having some worsening memory issues in transition. She is improving every day per her daughter. She is sure she has lost some weight. She is talkative. She doing well with her bowels. Her blood pressures and oxygen are doing well at home    Review of Systems   Constitutional:  Positive for fatigue. Neurological:  Positive for weakness. All other systems reviewed and are negative. Objective   Patient-Reported Vitals  Patient-Reported Systolic (Top): 99 mmHg  Patient-Reported Diastolic (Bottom): 62 mmHg  Patient-Reported Pulse: 62  Patient-Reported Weight: 121 lb         Total Time: minutes: 11-20 minutes     Boogie Jara was evaluated through a synchronous (real-time) audio only encounter. Patient identification was verified at the start of the visit. She (or guardian if applicable) is aware that this is a billable service, which includes applicable co-pays. This visit was conducted with patient's (and/or legal guardian's) verbal consent. She has not had a related appointment within my department in the past 7 days or scheduled within the next 24 hours. The patient was located at Home: [address] [place] [address]. The provider was located at [facility] ([address] [place], [place] [address]t): [address].      Ivy Mccullough MD

## 2022-09-13 RX ORDER — FERROUS SULFATE TAB EC 324 MG (65 MG FE EQUIVALENT) 324 (65 FE) MG
324 TABLET DELAYED RESPONSE ORAL
Qty: 30 TABLET | Refills: 2 | Status: SHIPPED | OUTPATIENT
Start: 2022-09-13

## 2022-09-19 RX ORDER — M-VIT,TX,IRON,MINS/CALC/FOLIC 27MG-0.4MG
1 TABLET ORAL DAILY
Qty: 90 TABLET | Refills: 3 | Status: SHIPPED | OUTPATIENT
Start: 2022-09-19

## 2022-09-19 RX ORDER — OMEPRAZOLE 40 MG/1
40 CAPSULE, DELAYED RELEASE ORAL DAILY
Qty: 90 CAPSULE | Refills: 3 | Status: SHIPPED | OUTPATIENT
Start: 2022-09-19

## 2022-10-25 ENCOUNTER — OFFICE VISIT (OUTPATIENT)
Dept: FAMILY MEDICINE CLINIC | Age: 87
End: 2022-10-25
Payer: MEDICARE

## 2022-10-25 ENCOUNTER — HOSPITAL ENCOUNTER (OUTPATIENT)
Facility: HOSPITAL | Age: 87
Discharge: HOME | End: 2022-10-25
Payer: MEDICARE

## 2022-10-25 VITALS
HEART RATE: 70 BPM | SYSTOLIC BLOOD PRESSURE: 122 MMHG | OXYGEN SATURATION: 97 % | DIASTOLIC BLOOD PRESSURE: 70 MMHG | BODY MASS INDEX: 22.94 KG/M2 | TEMPERATURE: 97.9 F | WEIGHT: 113.6 LBS | RESPIRATION RATE: 18 BRPM

## 2022-10-25 DIAGNOSIS — I10 ESSENTIAL HYPERTENSION: Primary | ICD-10-CM

## 2022-10-25 DIAGNOSIS — E03.9 HYPOTHYROIDISM, UNSPECIFIED TYPE: ICD-10-CM

## 2022-10-25 DIAGNOSIS — R53.83 OTHER FATIGUE: ICD-10-CM

## 2022-10-25 DIAGNOSIS — Z13.220 SCREENING, LIPID: ICD-10-CM

## 2022-10-25 DIAGNOSIS — D50.9 IRON DEFICIENCY ANEMIA, UNSPECIFIED IRON DEFICIENCY ANEMIA TYPE: ICD-10-CM

## 2022-10-25 DIAGNOSIS — I10 ESSENTIAL HYPERTENSION: ICD-10-CM

## 2022-10-25 DIAGNOSIS — R53.81 DECLINING FUNCTIONAL STATUS: ICD-10-CM

## 2022-10-25 DIAGNOSIS — R41.3 MEMORY LOSS, SHORT TERM: ICD-10-CM

## 2022-10-25 DIAGNOSIS — S72.002S CLOSED FRACTURE OF LEFT HIP, SEQUELA: ICD-10-CM

## 2022-10-25 PROCEDURE — 1123F ACP DISCUSS/DSCN MKR DOCD: CPT | Performed by: FAMILY MEDICINE

## 2022-10-25 PROCEDURE — 84443 ASSAY THYROID STIM HORMONE: CPT

## 2022-10-25 PROCEDURE — 83550 IRON BINDING TEST: CPT

## 2022-10-25 PROCEDURE — 85027 COMPLETE CBC AUTOMATED: CPT

## 2022-10-25 PROCEDURE — 80061 LIPID PANEL: CPT

## 2022-10-25 PROCEDURE — 80053 COMPREHEN METABOLIC PANEL: CPT

## 2022-10-25 PROCEDURE — 82607 VITAMIN B-12: CPT

## 2022-10-25 PROCEDURE — 96372 THER/PROPH/DIAG INJ SC/IM: CPT | Performed by: FAMILY MEDICINE

## 2022-10-25 PROCEDURE — 83540 ASSAY OF IRON: CPT

## 2022-10-25 PROCEDURE — 82746 ASSAY OF FOLIC ACID SERUM: CPT

## 2022-10-25 PROCEDURE — 99214 OFFICE O/P EST MOD 30 MIN: CPT | Performed by: FAMILY MEDICINE

## 2022-10-25 RX ORDER — CYANOCOBALAMIN 1000 UG/ML
1000 INJECTION, SOLUTION INTRAMUSCULAR; SUBCUTANEOUS ONCE
Status: COMPLETED | OUTPATIENT
Start: 2022-10-25 | End: 2022-10-25

## 2022-10-25 RX ORDER — QUETIAPINE FUMARATE 50 MG/1
50 TABLET, FILM COATED ORAL NIGHTLY
Qty: 30 TABLET | Refills: 5 | Status: SHIPPED | OUTPATIENT
Start: 2022-10-25

## 2022-10-25 RX ADMIN — CYANOCOBALAMIN 1000 MCG: 1000 INJECTION, SOLUTION INTRAMUSCULAR; SUBCUTANEOUS at 16:18

## 2022-10-25 NOTE — PROGRESS NOTES
SUBJECTIVE:    Patient ID: Bennie Teague is a 80 y.o. female. Chief Complaint   Patient presents with    Hypertension     F/u    Arthritis       HPI: office visit  She is in the office today in follow-up of her hip fracture. She is doing quite a bit better. She is really doing better than expected. She is getting around pretty well. Her memory continues to be somewhat of an issue. Her daughter says she is eating better. She is having good blood pressures at home. Everything seems to be getting back to her more normal.  She has not had any significant pain in that hip. She is not complaining of any syncopal type episodes. Review of Systems   Constitutional:  Positive for fatigue. Neurological:  Positive for weakness. All other systems reviewed and are negative. OBJECTIVE:  /70   Pulse 70   Temp 97.9 °F (36.6 °C) (Infrared)   Resp 18   Wt 113 lb 9.6 oz (51.5 kg)   SpO2 97% Comment: RA  BMI 22.94 kg/m²    Wt Readings from Last 3 Encounters:   10/25/22 113 lb 9.6 oz (51.5 kg)   08/10/22 121 lb 8 oz (55.1 kg)   08/01/22 123 lb (55.8 kg)     BP Readings from Last 3 Encounters:   10/25/22 122/70   08/23/22 122/68   08/01/22 133/73      Pulse Readings from Last 3 Encounters:   10/25/22 70   08/23/22 82   08/02/22 69     Body mass index is 22.94 kg/m². Resp Readings from Last 3 Encounters:   10/25/22 18   08/23/22 18   08/01/22 16     Past medical, surgical, family and social history were reviewed and updated with the patient. Physical Exam  Vitals and nursing note reviewed. Constitutional:       Appearance: She is well-developed. HENT:      Head: Normocephalic and atraumatic. Right Ear: Decreased hearing noted. Left Ear: Decreased hearing noted. Nose: Nose normal.   Eyes:      Pupils: Pupils are equal, round, and reactive to light. Cardiovascular:      Rate and Rhythm: Normal rate and regular rhythm.       Heart sounds: Normal heart sounds, S1 normal and S2 normal. No murmur heard. No friction rub. No gallop. Pulmonary:      Effort: Pulmonary effort is normal.      Breath sounds: Normal breath sounds. Abdominal:      General: Bowel sounds are normal.      Palpations: Abdomen is soft. Musculoskeletal:         General: Normal range of motion. Cervical back: Normal range of motion and neck supple. Right lower leg: No edema. Left lower leg: No edema. Skin:     General: Skin is warm and dry. Neurological:      Mental Status: She is alert and oriented to person, place, and time. Psychiatric:         Attention and Perception: Attention normal.         Mood and Affect: Affect normal. Mood is anxious. Speech: Speech normal.         Behavior: Behavior is cooperative. Cognition and Memory: Memory is impaired. She exhibits impaired recent memory and impaired remote memory.         Results in Past 30 Days  Result Component Current Result Ref Range Previous Result Ref Range   Albumin/Globulin Ratio 1.4 (10/25/2022) 0.8 - 2.0 Not in Time Range    Albumin 4.5 (10/25/2022) 3.4 - 4.8 g/dL Not in Time Range    Alkaline Phosphatase 122 (H) (10/25/2022) 25 - 100 U/L Not in Time Range    ALT 8 (10/25/2022) 4 - 36 U/L Not in Time Range    AST 13 (10/25/2022) 8 - 33 U/L Not in Time Range    BUN 20 (10/25/2022) 6 - 20 mg/dL Not in Time Range    Calcium 9.7 (10/25/2022) 8.5 - 10.5 mg/dL Not in Time Range    Chloride 104 (10/25/2022) 98 - 107 mmol/L Not in Time Range    CO2 26 (10/25/2022) 20 - 30 mmol/L Not in Time Range    Creatinine 1.1 (10/25/2022) 0.4 - 1.2 mg/dL Not in Time Range    Est, Glom Filt Rate 49 (L) (10/25/2022) >59 Not in Time Range    Globulin 3.2 (10/25/2022) Not Established g/dL Not in Time Range    Glucose 96 (10/25/2022) 74 - 106 mg/dL Not in Time Range    Potassium 4.3 (10/25/2022) 3.4 - 5.1 mmol/L Not in Time Range    Sodium 140 (10/25/2022) 136 - 145 mmol/L Not in Time Range    Total Bilirubin 0.3 (10/25/2022) 0.3 - 1.2 mg/dL Not in Time Range    Total Protein 7.7 (10/25/2022) 6.4 - 8.3 g/dL Not in Time Range      Hemoglobin A1C (%)   Date Value   08/05/2015 5.4     Microscopic Examination (no units)   Date Value   08/23/2022 Not Indicated     LDL Calculated (mg/dL)   Date Value   10/25/2022 72       Lab Results   Component Value Date/Time    WBC 6.4 10/25/2022 04:05 PM    NEUTROABS 5.1 08/13/2022 04:46 AM    HGB 11.6 10/25/2022 04:05 PM    HCT 35.8 10/25/2022 04:05 PM    MCV 94.5 10/25/2022 04:05 PM     10/25/2022 04:05 PM     Lab Results   Component Value Date    TSH 6.00 (H) 10/25/2022       ASSESSMENT/PLAN    Diagnosis Orders   1. Essential hypertension  CBC    Comprehensive Metabolic Panel      2. Memory loss, short term  Seems to be stable with her current medicine      3. Declining functional status  Hopefully she will regain a little strength      4. Hypothyroidism, unspecified type  Doing well with the current regimen      5. Iron deficiency anemia, unspecified iron deficiency anemia type  Iron and TIBC      6. Other fatigue  TSH    Vitamin B12 & Folate      7. Screening, lipid  LIPID PANEL      8. Closed fracture of left hip, sequela            Orders Placed This Encounter   Medications    QUEtiapine (SEROQUEL) 50 MG tablet     Sig: Take 1 tablet by mouth at bedtime     Dispense:  30 tablet     Refill:  5    cyanocobalamin injection 1,000 mcg          Medications Discontinued During This Encounter   Medication Reason    QUEtiapine (SEROQUEL) 25 MG tablet        Controlled Substances Monitoring:      Please note: This chart was generated using Dragon dictation software. Although every effort was made to ensure the accuracy of this automated transcription, some errors in transcription may have occurred.

## 2022-10-25 NOTE — PROGRESS NOTES
Chief Complaint   Patient presents with    Hypertension     F/u    Arthritis       Have you seen any other physician or provider since your last visit yes - Hip Surgeon    Have you had any other diagnostic tests since your last visit?  yes - X-Ray of hip    Have you changed or stopped any medications since your last visit? no

## 2022-10-25 NOTE — PATIENT INSTRUCTIONS
The medication list included in this document is our record of what you are currently taking, including any changes that were made at today's visit. If you find any differences when compared to your medications at home, or have any questions that were not answered at your visit, please contact the office. Keep a list of your medicines with you. List all of the prescription medicines, nonprescription medicines, supplements, natural remedies, and vitamins that you take. Tell your healthcare providers who treat you about all of the products you are taking. Your provider can provide you with a form to keep track of them. Just ask. Follow the directions that come with your medicine, including information about food or alcohol. Make sure you know how and when to take your medicine. Do not take more or less than you are supposed to take. Keep all medicines out of the reach of children. Store medicines according to the directions on the label. Monitor yourself. Learn to know how your body reacts to your new medicine and keep track of how it makes you feel before attempting (If your provider has allowed you to do so) to drive or go to work. Seek emergency medical attention if you think you have used too much of this medicine. An overdose of any prescription medicine can be fatal. Overdose symptoms may include extreme drowsiness, muscle weakness, confusion, cold and clammy skin, pinpoint pupils, shallow breathing, slow heart rate, fainting, or coma. Don't share prescription medicines with others, even when they seem to have the same symptoms. What may be good for you may be harmful to others. If you are no longer taking a prescribed medication and you have pills left please take your pills out of their original containers. Mix crushed pills with an undesirable substance, such as cat litter or used coffee grounds.  Put the mixture into a disposable container with a lid, such as an empty margarine tub, or into a sealable bag.  Cover up or remove any of your personal information on the empty containers by covering it with black permanent marker or duct tape. Place the sealed container with the mixture, and the empty drug containers, in the trash. If you use a medication that is in the form of a patch, dispose of used patches by folding them in half so that the sticky sides meet, and then flushing them down a toilet. They should not be placed in the household trash where children or pets can find them. If you have any questions, ask your provider or pharmacist for more information. Be sure to keep all appointments for provider visits or tests.

## 2022-10-25 NOTE — PROGRESS NOTES
Administrations This Visit       cyanocobalamin injection 1,000 mcg       Admin Date  10/25/2022  16:18 Action  Given Dose  1,000 mcg Route  IntraMUSCular Site  Deltoid Right Administered By  Judit Morrissey RN    Ordering Provider: Sima Gatica MD    NDC: 06521-985-82    Lot#:     : [id number] [place] [address]    Patient Supplied?: No                  Patient tolerated injection well. Patient advised to wait 20 minutes in the office following the injection. No signs/symptoms of reaction noted after 20 minutes.

## 2022-10-26 LAB
A/G RATIO: 1.4 (ref 0.8–2)
ALBUMIN SERPL-MCNC: 4.5 G/DL (ref 3.4–4.8)
ALP BLD-CCNC: 122 U/L (ref 25–100)
ALT SERPL-CCNC: 8 U/L (ref 4–36)
ANION GAP SERPL CALCULATED.3IONS-SCNC: 10 MMOL/L (ref 3–16)
AST SERPL-CCNC: 13 U/L (ref 8–33)
BILIRUB SERPL-MCNC: 0.3 MG/DL (ref 0.3–1.2)
BUN BLDV-MCNC: 20 MG/DL (ref 6–20)
CALCIUM SERPL-MCNC: 9.7 MG/DL (ref 8.5–10.5)
CHLORIDE BLD-SCNC: 104 MMOL/L (ref 98–107)
CHOLESTEROL, TOTAL: 152 MG/DL (ref 0–200)
CO2: 26 MMOL/L (ref 20–30)
CREAT SERPL-MCNC: 1.1 MG/DL (ref 0.4–1.2)
FOLATE: >20 NG/ML
GFR SERPL CREATININE-BSD FRML MDRD: 49 ML/MIN/{1.73_M2}
GLOBULIN: 3.2 G/DL
GLUCOSE BLD-MCNC: 96 MG/DL (ref 74–106)
HCT VFR BLD CALC: 35.8 % (ref 37–47)
HDLC SERPL-MCNC: 53 MG/DL (ref 40–60)
HEMOGLOBIN: 11.6 G/DL (ref 11.5–16.5)
IRON SATURATION: 25 % (ref 15–50)
IRON: 65 UG/DL (ref 37–145)
LDL CHOLESTEROL CALCULATED: 72 MG/DL
MCH RBC QN AUTO: 30.6 PG (ref 27–32)
MCHC RBC AUTO-ENTMCNC: 32.4 G/DL (ref 31–35)
MCV RBC AUTO: 94.5 FL (ref 80–100)
PDW BLD-RTO: 14.9 % (ref 11–16)
PLATELET # BLD: 226 K/UL (ref 150–400)
PMV BLD AUTO: 11.6 FL (ref 6–10)
POTASSIUM SERPL-SCNC: 4.3 MMOL/L (ref 3.4–5.1)
RBC # BLD: 3.79 M/UL (ref 3.8–5.8)
SODIUM BLD-SCNC: 140 MMOL/L (ref 136–145)
TOTAL IRON BINDING CAPACITY: 256 UG/DL (ref 250–450)
TOTAL PROTEIN: 7.7 G/DL (ref 6.4–8.3)
TRIGL SERPL-MCNC: 134 MG/DL (ref 0–249)
TSH SERPL DL<=0.05 MIU/L-ACNC: 6 UIU/ML (ref 0.27–4.2)
VITAMIN B-12: 381 PG/ML (ref 211–911)
VLDLC SERPL CALC-MCNC: 27 MG/DL
WBC # BLD: 6.4 K/UL (ref 4–11)

## 2022-10-28 RX ORDER — LEVOTHYROXINE SODIUM 0.07 MG/1
75 TABLET ORAL DAILY
Qty: 30 TABLET | Refills: 5 | Status: SHIPPED | OUTPATIENT
Start: 2022-10-28

## 2022-10-31 ENCOUNTER — NURSE ONLY (OUTPATIENT)
Dept: FAMILY MEDICINE CLINIC | Age: 87
End: 2022-10-31
Payer: MEDICARE

## 2022-10-31 DIAGNOSIS — Z23 FLU VACCINE NEED: Primary | ICD-10-CM

## 2022-10-31 PROCEDURE — 90694 VACC AIIV4 NO PRSRV 0.5ML IM: CPT | Performed by: FAMILY MEDICINE

## 2022-10-31 PROCEDURE — G0008 ADMIN INFLUENZA VIRUS VAC: HCPCS | Performed by: FAMILY MEDICINE

## 2022-10-31 NOTE — PROGRESS NOTES
Chief Complaint   Patient presents with    Flu Vaccine       Immunizations Administered       Name Date Dose Route    Influenza, FLUAD, (age 72 y+), Adjuvanted, 0.5mL 10/31/2022 0.5 mL Intramuscular    Site: Deltoid- Left    Lot: [id number]    NDC: 70610-039-99          Patient tolerated injection well. Patient advised to wait 20 minutes in the office following the injection. No signs/symptoms of reaction noted after 20 minutes. Vaccine Information Sheet, \"Influenza - Inactivated\"  given to Andres Rivera, or parent/legal guardian of  Andres Rivera and verbalized understanding. Patient responses:    Have you ever had a reaction to a flu vaccine? No  Do you have any current illness? No  Have you ever had Guillian Argyle Syndrome? No  Do you have a serious allergy to any of the follow: Neomycin, Polymyxin, Thimerosal, eggs or egg products? No    Flu vaccine given per order. Please see immunization tab. Risks and benefits explained. Current VIS given.       Immunizations Administered       Name Date Dose Route    Influenza, FLUAD, (age 72 y+), Adjuvanted, 0.5mL 10/31/2022 0.5 mL Intramuscular    Site: Deltoid- Left    Lot: [id number]    NDC: 07076-928-13

## 2022-11-21 DIAGNOSIS — F41.9 ANXIETY: ICD-10-CM

## 2022-11-21 RX ORDER — BUSPIRONE HYDROCHLORIDE 10 MG/1
TABLET ORAL
Qty: 90 TABLET | Refills: 11 | Status: SHIPPED | OUTPATIENT
Start: 2022-11-21

## 2023-01-19 RX ORDER — MEGESTROL ACETATE 40 MG/1
TABLET ORAL
Qty: 15 TABLET | Refills: 11 | Status: SHIPPED | OUTPATIENT
Start: 2023-01-19

## 2023-01-31 ENCOUNTER — HOSPITAL ENCOUNTER (OUTPATIENT)
Facility: HOSPITAL | Age: 88
Discharge: HOME | End: 2023-01-31
Payer: MEDICARE

## 2023-01-31 ENCOUNTER — OFFICE VISIT (OUTPATIENT)
Dept: FAMILY MEDICINE CLINIC | Age: 88
End: 2023-01-31
Payer: MEDICARE

## 2023-01-31 VITALS
BODY MASS INDEX: 24.64 KG/M2 | TEMPERATURE: 97 F | DIASTOLIC BLOOD PRESSURE: 68 MMHG | RESPIRATION RATE: 18 BRPM | OXYGEN SATURATION: 95 % | SYSTOLIC BLOOD PRESSURE: 128 MMHG | HEART RATE: 80 BPM | WEIGHT: 122 LBS

## 2023-01-31 DIAGNOSIS — E53.8 B12 DEFICIENCY: ICD-10-CM

## 2023-01-31 DIAGNOSIS — I10 ESSENTIAL HYPERTENSION: ICD-10-CM

## 2023-01-31 DIAGNOSIS — K59.00 CONSTIPATION, UNSPECIFIED CONSTIPATION TYPE: ICD-10-CM

## 2023-01-31 DIAGNOSIS — E03.9 HYPOTHYROIDISM, UNSPECIFIED TYPE: Primary | ICD-10-CM

## 2023-01-31 DIAGNOSIS — R32 URINARY INCONTINENCE, UNSPECIFIED TYPE: ICD-10-CM

## 2023-01-31 DIAGNOSIS — E03.9 HYPOTHYROIDISM, UNSPECIFIED TYPE: ICD-10-CM

## 2023-01-31 DIAGNOSIS — R41.3 MEMORY LOSS, SHORT TERM: ICD-10-CM

## 2023-01-31 DIAGNOSIS — R53.81 DECLINING FUNCTIONAL STATUS: ICD-10-CM

## 2023-01-31 DIAGNOSIS — E78.9 LIPID DISORDER: ICD-10-CM

## 2023-01-31 DIAGNOSIS — F41.9 ANXIETY: ICD-10-CM

## 2023-01-31 PROCEDURE — 82607 VITAMIN B-12: CPT

## 2023-01-31 PROCEDURE — 1123F ACP DISCUSS/DSCN MKR DOCD: CPT | Performed by: FAMILY MEDICINE

## 2023-01-31 PROCEDURE — 80053 COMPREHEN METABOLIC PANEL: CPT

## 2023-01-31 PROCEDURE — 84443 ASSAY THYROID STIM HORMONE: CPT

## 2023-01-31 PROCEDURE — 85027 COMPLETE CBC AUTOMATED: CPT

## 2023-01-31 PROCEDURE — 99214 OFFICE O/P EST MOD 30 MIN: CPT | Performed by: FAMILY MEDICINE

## 2023-01-31 PROCEDURE — 82746 ASSAY OF FOLIC ACID SERUM: CPT

## 2023-01-31 PROCEDURE — 36415 COLL VENOUS BLD VENIPUNCTURE: CPT

## 2023-01-31 PROCEDURE — 84439 ASSAY OF FREE THYROXINE: CPT

## 2023-01-31 RX ORDER — ATORVASTATIN CALCIUM 10 MG/1
10 TABLET, FILM COATED ORAL DAILY
Qty: 90 TABLET | Refills: 3 | Status: SHIPPED | OUTPATIENT
Start: 2023-01-31

## 2023-01-31 RX ORDER — FOLIC ACID 1 MG/1
TABLET ORAL
Qty: 90 TABLET | Refills: 3 | Status: SHIPPED | OUTPATIENT
Start: 2023-01-31

## 2023-01-31 RX ORDER — M-VIT,TX,IRON,MINS/CALC/FOLIC 27MG-0.4MG
1 TABLET ORAL DAILY
Qty: 90 TABLET | Refills: 3 | Status: SHIPPED | OUTPATIENT
Start: 2023-01-31

## 2023-01-31 RX ORDER — LEVOTHYROXINE SODIUM 0.07 MG/1
75 TABLET ORAL DAILY
Qty: 30 TABLET | Refills: 5 | Status: CANCELLED | OUTPATIENT
Start: 2023-01-31

## 2023-01-31 ASSESSMENT — PATIENT HEALTH QUESTIONNAIRE - PHQ9
1. LITTLE INTEREST OR PLEASURE IN DOING THINGS: 0
SUM OF ALL RESPONSES TO PHQ9 QUESTIONS 1 & 2: 0
SUM OF ALL RESPONSES TO PHQ QUESTIONS 1-9: 0
SUM OF ALL RESPONSES TO PHQ QUESTIONS 1-9: 0
2. FEELING DOWN, DEPRESSED OR HOPELESS: 0
SUM OF ALL RESPONSES TO PHQ QUESTIONS 1-9: 0
SUM OF ALL RESPONSES TO PHQ QUESTIONS 1-9: 0

## 2023-01-31 NOTE — PROGRESS NOTES
SUBJECTIVE:    Patient ID: Adrian Mahoney is a 80 y.o. female. Chief Complaint   Patient presents with    Diarrhea    Dementia       HPI: office visit  She is in the office in follow-up of her dementia. She is having quite a bit of difficulty with her memory. But she is feeling pretty well. She is living with her daughter. She is here to give part of her history. They feel like she is doing pretty well. She is not having much arthritic pains at the moment. She has had some intermittent diarrhea. She has not had any recent falls or injuries. She still limping a little with that hip after the fracture. She is using a rolling walker some but not as much as she was. She has not had any further falls or injuries. She is not having any medication problems. Review of Systems   Constitutional:  Positive for fatigue. Neurological:  Positive for weakness. All other systems reviewed and are negative. OBJECTIVE:  /68   Pulse 80   Temp 97 °F (36.1 °C)   Resp 18   Wt 122 lb (55.3 kg)   SpO2 95% Comment: ra  BMI 24.64 kg/m²    Wt Readings from Last 3 Encounters:   01/31/23 122 lb (55.3 kg)   10/25/22 113 lb 9.6 oz (51.5 kg)   08/10/22 121 lb 8 oz (55.1 kg)     BP Readings from Last 3 Encounters:   01/31/23 128/68   10/25/22 122/70   08/23/22 122/68      Pulse Readings from Last 3 Encounters:   01/31/23 80   10/25/22 70   08/23/22 82     Body mass index is 24.64 kg/m². Resp Readings from Last 3 Encounters:   01/31/23 18   10/25/22 18   08/23/22 18     Past medical, surgical, family and social history were reviewed and updated with the patient. Physical Exam  Vitals and nursing note reviewed. Constitutional:       Appearance: She is well-developed. HENT:      Head: Normocephalic and atraumatic. Right Ear: Decreased hearing noted. Left Ear: Decreased hearing noted. Nose: Nose normal.   Eyes:      Pupils: Pupils are equal, round, and reactive to light.    Cardiovascular: Rate and Rhythm: Normal rate and regular rhythm.      Heart sounds: Normal heart sounds, S1 normal and S2 normal. No murmur heard.    No friction rub. No gallop.   Pulmonary:      Effort: Pulmonary effort is normal.      Breath sounds: Normal breath sounds.   Abdominal:      General: Bowel sounds are normal.      Palpations: Abdomen is soft.   Musculoskeletal:         General: Normal range of motion.      Cervical back: Normal range of motion and neck supple.      Right lower leg: No edema.      Left lower leg: No edema.   Skin:     General: Skin is warm and dry.   Neurological:      Mental Status: She is alert and oriented to person, place, and time.   Psychiatric:         Attention and Perception: Attention normal.         Mood and Affect: Affect normal. Mood is anxious.         Speech: Speech normal.         Behavior: Behavior is cooperative.         Cognition and Memory: Memory is impaired. She exhibits impaired recent memory and impaired remote memory.        Results in Past 30 Days  Result Component Current Result Ref Range Previous Result Ref Range   Albumin/Globulin Ratio 1.5 (1/31/2023) 0.8 - 2.0 Not in Time Range    Albumin 4.6 (1/31/2023) 3.4 - 4.8 g/dL Not in Time Range    Alkaline Phosphatase 96 (1/31/2023) 25 - 100 U/L Not in Time Range    ALT 9 (1/31/2023) 4 - 36 U/L Not in Time Range    AST 12 (1/31/2023) 8 - 33 U/L Not in Time Range    BUN 23 (H) (1/31/2023) 6 - 20 mg/dL Not in Time Range    Calcium 9.9 (1/31/2023) 8.5 - 10.5 mg/dL Not in Time Range    Chloride 104 (1/31/2023) 98 - 107 mmol/L Not in Time Range    CO2 23 (1/31/2023) 20 - 30 mmol/L Not in Time Range    Creatinine 1.3 (H) (1/31/2023) 0.4 - 1.2 mg/dL Not in Time Range    Est, Glom Filt Rate 40 (L) (1/31/2023) >59 Not in Time Range    Globulin 3.1 (1/31/2023) Not Established g/dL Not in Time Range    Glucose 108 (H) (1/31/2023) 74 - 106 mg/dL Not in Time Range    Potassium 4.9 (1/31/2023) 3.4 - 5.1 mmol/L Not in Time Range    Sodium 142  (1/31/2023) 136 - 145 mmol/L Not in Time Range    Total Bilirubin 0.4 (1/31/2023) 0.3 - 1.2 mg/dL Not in Time Range    Total Protein 7.7 (1/31/2023) 6.4 - 8.3 g/dL Not in Time Range      Hemoglobin A1C (%)   Date Value   08/05/2015 5.4     Microscopic Examination (no units)   Date Value   08/23/2022 Not Indicated     LDL Calculated (mg/dL)   Date Value   10/25/2022 72       Lab Results   Component Value Date/Time    WBC 7.2 01/31/2023 03:46 PM    NEUTROABS 5.1 08/13/2022 04:46 AM    HGB 13.0 01/31/2023 03:46 PM    HCT 40.1 01/31/2023 03:46 PM    MCV 97.8 01/31/2023 03:46 PM     01/31/2023 03:46 PM     Lab Results   Component Value Date    TSH 2.11 01/31/2023       ASSESSMENT/PLAN    Diagnosis Orders   1. Hypothyroidism, unspecified type  TSH    T4, Free      2. Urinary incontinence, unspecified type  mirabegron (MYRBETRIQ) 25 MG TB24      3. Declining functional status        4. Memory loss, short term  Memantine HCl-Donepezil HCl 28-10 MG CP24      5. Essential hypertension  Comprehensive Metabolic Panel    CBC      6. Anxiety        7. Constipation, unspecified constipation type        8. B12 deficiency  Vitamin B12 & Folate      9.  Lipid disorder            Orders Placed This Encounter   Medications    Multiple Vitamins-Minerals (THERAPEUTIC MULTIVITAMIN-MINERALS) tablet     Sig: Take 1 tablet by mouth daily     Dispense:  90 tablet     Refill:  3    Memantine HCl-Donepezil HCl 28-10 MG CP24     Sig: Take 1 capsule by mouth daily     Dispense:  90 capsule     Refill:  3    folic acid (FOLVITE) 1 MG tablet     Sig: TAKE 1 TABLET BY MOUTH EVERY DAY     Dispense:  90 tablet     Refill:  3     04/23/2021 2:54:47 PM    mirabegron (MYRBETRIQ) 25 MG TB24     Sig: TAKE 1 TABLET BY MOUTH ONCE A DAY     Dispense:  90 tablet     Refill:  3    atorvastatin (LIPITOR) 10 MG tablet     Sig: Take 1 tablet by mouth daily     Dispense:  90 tablet     Refill:  3        Medications Discontinued During This Encounter Medication Reason    aspirin 325 MG EC tablet LIST CLEANUP    ferrous sulfate 324 (65 Fe) MG EC tablet LIST CLEANUP    atorvastatin (LIPITOR) 10 MG tablet REORDER    folic acid (FOLVITE) 1 MG tablet REORDER    mirabegron (MYRBETRIQ) 25 MG TB24 REORDER    Memantine HCl-Donepezil HCl 28-10 MG CP24 REORDER    Multiple Vitamins-Minerals (THERAPEUTIC MULTIVITAMIN-MINERALS) tablet REORDER       Controlled Substances Monitoring:      Please note: This chart was generated using Dragon dictation software. Although every effort was made to ensure the accuracy of this automated transcription, some errors in transcription may have occurred.

## 2023-01-31 NOTE — PATIENT INSTRUCTIONS
The medication list included in this document is our record of what you are currently taking, including any changes that were made at today's visit.  If you find any differences when compared to your medications at home, or have any questions that were not answered at your visit, please contact the office.We are committed to providing you with the best care possible.   In order to help us achieve these goals please remember to bring all medications, herbal products, and over the counter supplements with you to each visit.     If your provider has ordered testing for you, please be sure to follow up with our office if you have not received results within 7 days after the testing took place.     *If you receive a survey after visiting one of our offices, please take time to share your experience concerning your physician office visit. These surveys are confidential and no health information about you is shared.  We are eager to improve for you and we are counting on your feedback to help make that happen. Keep a list of your medicines with you. List all of the prescription medicines, nonprescription medicines, supplements, natural remedies, and vitamins that you take. Tell your healthcare providers who treat you about all of the products you are taking. Your provider can provide you with a form to keep track of them. Just ask.  Follow the directions that come with your medicine, including information about food or alcohol. Make sure you know how and when to take your medicine. Do not take more or less than you are supposed to take.  Keep all medicines out of the reach of children.  Store medicines according to the directions on the label.  Monitor yourself. Learn to know how your body reacts to your new medicine and keep track of how it makes you feel before attempting (If your provider has allowed you to do so) to drive or go to work.   Seek emergency medical attention if you think you have used too much of this  medicine. An overdose of any prescription medicine can be fatal. Overdose symptoms may include extreme drowsiness, muscle weakness, confusion, cold and clammy skin, pinpoint pupils, shallow breathing, slow heart rate, fainting, or coma. Don't share prescription medicines with others, even when they seem to have the same symptoms. What may be good for you may be harmful to others. If you are no longer taking a prescribed medication and you have pills left please take your pills out of their original containers. Mix crushed pills with an undesirable substance, such as cat litter or used coffee grounds. Put the mixture into a disposable container with a lid, such as an empty margarine tub, or into a sealable bag. Cover up or remove any of your personal information on the empty containers by covering it with black permanent marker or duct tape. Place the sealed container with the mixture, and the empty drug containers, in the trash. If you use a medication that is in the form of a patch, dispose of used patches by folding them in half so that the sticky sides meet, and then flushing them down a toilet. They should not be placed in the household trash where children or pets can find them. If you have any questions, ask your provider or pharmacist for more information. Be sure to keep all appointments for provider visits or tests. Yes

## 2023-02-01 LAB
A/G RATIO: 1.5 (ref 0.8–2)
ALBUMIN SERPL-MCNC: 4.6 G/DL (ref 3.4–4.8)
ALP BLD-CCNC: 96 U/L (ref 25–100)
ALT SERPL-CCNC: 9 U/L (ref 4–36)
ANION GAP SERPL CALCULATED.3IONS-SCNC: 15 MMOL/L (ref 3–16)
AST SERPL-CCNC: 12 U/L (ref 8–33)
BILIRUB SERPL-MCNC: 0.4 MG/DL (ref 0.3–1.2)
BUN BLDV-MCNC: 23 MG/DL (ref 6–20)
CALCIUM SERPL-MCNC: 9.9 MG/DL (ref 8.5–10.5)
CHLORIDE BLD-SCNC: 104 MMOL/L (ref 98–107)
CO2: 23 MMOL/L (ref 20–30)
CREAT SERPL-MCNC: 1.3 MG/DL (ref 0.4–1.2)
FOLATE: >20 NG/ML
GFR SERPL CREATININE-BSD FRML MDRD: 40 ML/MIN/{1.73_M2}
GLOBULIN: 3.1 G/DL
GLUCOSE BLD-MCNC: 108 MG/DL (ref 74–106)
HCT VFR BLD CALC: 40.1 % (ref 37–47)
HEMOGLOBIN: 13 G/DL (ref 11.5–16.5)
MCH RBC QN AUTO: 31.7 PG (ref 27–32)
MCHC RBC AUTO-ENTMCNC: 32.4 G/DL (ref 31–35)
MCV RBC AUTO: 97.8 FL (ref 80–100)
PDW BLD-RTO: 13.6 % (ref 11–16)
PLATELET # BLD: 175 K/UL (ref 150–400)
PMV BLD AUTO: 11.6 FL (ref 6–10)
POTASSIUM SERPL-SCNC: 4.9 MMOL/L (ref 3.4–5.1)
RBC # BLD: 4.1 M/UL (ref 3.8–5.8)
SODIUM BLD-SCNC: 142 MMOL/L (ref 136–145)
T4 FREE: 1.29 NG/DL (ref 0.89–1.76)
TOTAL PROTEIN: 7.7 G/DL (ref 6.4–8.3)
TSH SERPL DL<=0.05 MIU/L-ACNC: 2.11 UIU/ML (ref 0.27–4.2)
VITAMIN B-12: 330 PG/ML (ref 211–911)
WBC # BLD: 7.2 K/UL (ref 4–11)

## 2023-03-06 ENCOUNTER — OFFICE VISIT (OUTPATIENT)
Dept: FAMILY MEDICINE CLINIC | Age: 88
End: 2023-03-06
Payer: MEDICARE

## 2023-03-06 VITALS
SYSTOLIC BLOOD PRESSURE: 118 MMHG | HEART RATE: 78 BPM | BODY MASS INDEX: 24.24 KG/M2 | RESPIRATION RATE: 18 BRPM | WEIGHT: 120 LBS | DIASTOLIC BLOOD PRESSURE: 78 MMHG | OXYGEN SATURATION: 93 % | TEMPERATURE: 97.3 F

## 2023-03-06 DIAGNOSIS — Z00.00 MEDICARE ANNUAL WELLNESS VISIT, SUBSEQUENT: Primary | ICD-10-CM

## 2023-03-06 PROCEDURE — 1123F ACP DISCUSS/DSCN MKR DOCD: CPT | Performed by: FAMILY MEDICINE

## 2023-03-06 PROCEDURE — G0439 PPPS, SUBSEQ VISIT: HCPCS | Performed by: FAMILY MEDICINE

## 2023-03-06 ASSESSMENT — LIFESTYLE VARIABLES
HOW MANY STANDARD DRINKS CONTAINING ALCOHOL DO YOU HAVE ON A TYPICAL DAY: PATIENT DOES NOT DRINK
HOW OFTEN DO YOU HAVE A DRINK CONTAINING ALCOHOL: NEVER

## 2023-03-06 ASSESSMENT — PATIENT HEALTH QUESTIONNAIRE - PHQ9
SUM OF ALL RESPONSES TO PHQ QUESTIONS 1-9: 0
SUM OF ALL RESPONSES TO PHQ9 QUESTIONS 1 & 2: 0
SUM OF ALL RESPONSES TO PHQ QUESTIONS 1-9: 0
1. LITTLE INTEREST OR PLEASURE IN DOING THINGS: 0
2. FEELING DOWN, DEPRESSED OR HOPELESS: 0

## 2023-03-06 NOTE — PATIENT INSTRUCTIONS
Preventing Falls: Care Instructions  Overview     Getting around your home safely can be a challenge if you have injuries or health problems that make it easy for you to fall. Loose rugs and furniture in walkways are among the dangers for many older people who have problems walking or who have poor eyesight. People who have conditions such as arthritis, osteoporosis, or dementia also have to be careful not to fall. You can make your home safer with a few simple measures. Follow-up care is a key part of your treatment and safety. Be sure to make and go to all appointments, and call your doctor if you are having problems. It's also a good idea to know your test results and keep a list of the medicines you take. How can you care for yourself at home? Taking care of yourself  Exercise regularly to improve your strength, muscle tone, and balance. Walk if you can. Swimming may be a good choice if you cannot walk easily. Have your vision and hearing checked each year or any time you notice a change. If you have trouble seeing and hearing, you might not be able to avoid objects and could lose your balance. Know the side effects of the medicines you take. Ask your doctor or pharmacist whether the medicines you take can affect your balance. Sleeping pills or sedatives can affect your balance. Limit the amount of alcohol you drink. Alcohol can impair your balance and other senses. Ask your doctor whether calluses or corns on your feet need to be removed. If you wear loose-fitting shoes because of calluses or corns, you can lose your balance and fall. Talk to your doctor if you have numbness in your feet. You may get dizzy if you do not drink enough water. To prevent dehydration, drink plenty of fluids. Choose water and other clear liquids. If you have kidney, heart, or liver disease and have to limit fluids, talk with your doctor before you increase the amount of fluids you drink.   Preventing falls at home  Remove raised doorway thresholds, throw rugs, and clutter. Repair loose carpet or raised areas in the floor. Move furniture and electrical cords to keep them out of walking paths. Use nonskid floor wax, and wipe up spills right away, especially on ceramic tile floors. If you use a walker or cane, put rubber tips on it. If you use crutches, clean the bottoms of them regularly with an abrasive pad, such as steel wool. Keep your house well lit, especially stairways, porches, and outside walkways. Use night-lights in areas such as hallways and bathrooms. Add extra light switches or use remote switches (such as switches that go on or off when you clap your hands) to make it easier to turn lights on if you have to get up during the night. Install sturdy handrails on stairways. Move items in your cabinets so that the things you use a lot are on the lower shelves (about waist level). Keep a cordless phone and a flashlight with new batteries by your bed. If possible, put a phone in each of the main rooms of your house, or carry a cell phone in case you fall and cannot reach a phone. Or, you can wear a device around your neck or wrist. You push a button that sends a signal for help. Wear low-heeled shoes that fit well and give your feet good support. Use footwear with nonskid soles. Check the heels and soles of your shoes for wear. Repair or replace worn heels or soles. Do not wear socks without shoes on smooth floors, such as wood. Walk on the grass when the sidewalks are slippery. If you live in an area that gets snow and ice in the winter, sprinkle salt on slippery steps and sidewalks. Or ask a family member or friend to do this for you. Preventing falls in the bath  Install grab bars and nonskid mats inside and outside your shower or tub and near the toilet and sinks. Use shower chairs and bath benches. Use a hand-held shower head that will allow you to sit while showering.   Get into a tub or shower by putting the weaker leg in first. Get out of a tub or shower with your strong side first.  Repair loose toilet seats and consider installing a raised toilet seat to make getting on and off the toilet easier. Keep your bathroom door unlocked while you are in the shower. Where can you learn more? Go to http://www.blevins.com/ and enter G117 to learn more about \"Preventing Falls: Care Instructions. \"  Current as of: May 4, 2022               Content Version: 13.5  © 4028-9216 Healthwise, [facility]. Care instructions adapted under license by [facility] ([facility]). If you have questions about a medical condition or this instruction, always ask your healthcare professional. [address] any warranty or liability for your use of this information. Learning About Being Active as an Older Adult  Why is being active important as you get older? Being active is one of the best things you can do for your health. And it's never too late to start. Being active--or getting active, if you aren't already--has definite benefits. It can:  Give you more energy,  Keep your mind sharp. Improve balance to reduce your risk of falls. Help you manage chronic illness with fewer medicines. No matter how old you are, how fit you are, or what health problems you have, there is a form of activity that will work for you. And the more physical activity you can do, the better your overall health will be. What kinds of activity can help you stay healthy? Being more active will make your daily activities easier. Physical activity includes planned exercise and things you do in daily life. There are four types of activity:  Aerobic. Doing aerobic activity makes your heart and lungs strong. Includes walking, dancing, and gardening. Aim for at least 2½ hours spread throughout the week. It improves your energy and can help you sleep better. Muscle-strengthening.   This type of activity can help maintain muscle and strengthen bones. Includes climbing stairs, using resistance bands, and lifting or carrying heavy loads. Aim for at least twice a week. It can help protect the knees and other joints. Stretching. Stretching gives you better range of motion in joints and muscles. Includes upper arm stretches, calf stretches, and gentle yoga. Aim for at least twice a week, preferably after your muscles are warmed up from other activities. It can help you function better in daily life. Balancing. This helps you stay coordinated and have good posture. Includes heel-to-toe walking, britany chi, and certain types of yoga. Aim for at least 3 days a week. It can reduce your risk of falling. Even if you have a hard time meeting the recommendations, it's better to be more active than less active. All activity done in each category counts toward your weekly total. You'd be surprised how daily things like carrying groceries, keeping up with grandchildren, and taking the stairs can add up. What keeps you from being active? If you've had a hard time being more active, you're not alone. Maybe you remember being able to do more. Or maybe you've never thought of yourself as being active. It's frustrating when you can't do the things you want. Being more active can help. What's holding you back? Getting started. Have a goal, but break it into easy tasks. Small steps build into big accomplishments. Staying motivated. If you feel like skipping your activity, remember your goal. Maybe you want to move better and stay independent. Every activity gets you one step closer. Not feeling your best.  Start with 5 minutes of an activity you enjoy. Prove to yourself you can do it. As you get comfortable, increase your time. You may not be where you want to be. But you're in the process of getting there. Everyone starts somewhere. How can you find safe ways to stay active?   Talk with your doctor about any physical challenges you're facing. Make a plan with your doctor if you have a health problem or aren't sure how to get started with activity. If you're already active, ask your doctor if there is anything you should change to stay safe as your body and health change. If you tend to feel dizzy after you take medicine, avoid activity at that time. Try being active before you take your medicine. This will reduce your risk of falls. If you plan to be active at home, make sure to clear your space before you get started. Remove things like TV cords, coffee tables, and throw rugs. It's safest to have plenty of space to move freely. The key to getting more active is to take it slow and steady. Try to improve only a little bit at a time. Pick just one area to improve on at first. And if an activity hurts, stop and talk to your doctor. Where can you learn more? Go to http://www.blevins.com/ and enter P600 to learn more about \"Learning About Being Active as an Older Adult. \"  Current as of: October 10, 2022               Content Version: 13.5  © 4449-9383 Healthwise, [employer]. Care instructions adapted under license by [facility] ([facility] [place]). If you have questions about a medical condition or this instruction, always ask your healthcare professional. [place] [address] any warranty or liability for your use of this information. Learning About Dental Care for Older Adults  Dental care for older adults: Overview  Dental care for older people is much the same as for younger adults. But older adults do have concerns that younger adults do not. Older adults may have problems with gum disease and decay on the roots of their teeth. They may need missing teeth replaced or broken fillings fixed. Or they may have dentures that need to be cared for. Some older adults may have trouble holding a toothbrush. You can help remind the person you are caring for to brush and floss their teeth or to clean their dentures.  In some cases, you may need to do the brushing and other dental care tasks. People who have trouble using their hands or who have dementia may need this extra help. How can you help with dental care? Normal dental care  To keep the teeth and gums healthy:  Brush the teeth with fluoride toothpaste twice a day--in the morning and at night--and floss at least once a day. Plaque can quickly build up on the teeth of older adults. Watch for the signs of gum disease. These signs include gums that bleed after brushing or after eating hard foods, such as apples. See a dentist regularly. Many experts recommend checkups every 6 months. Keep the dentist up to date on any new medications the person is taking. Encourage a balanced diet that includes whole grains, vegetables, and fruits, and that is low in saturated fat and sodium. Encourage the person you're caring for not to use tobacco products. They can affect dental and general health. Many older adults have a fixed income and feel that they can't afford dental care. But most [facility] and [facility] have programs in which dentists help older adults by lowering fees. Contact your area's public health offices or  for information about dental care in your area. Using a toothbrush  Older adults with arthritis sometimes have trouble brushing their teeth because they can't easily hold the toothbrush. Their hands and fingers may be stiff, painful, or weak. If this is the case, you can: Offer an electric toothbrush. Enlarge the handle of a non-electric toothbrush by wrapping a sponge, an elastic bandage, or adhesive tape around it. Push the toothbrush handle through a ball made of rubber or soft foam.  Make the handle longer and thicker by taping Popsicle sticks or tongue depressors to it. You may also be able to buy special toothbrushes, toothpaste dispensers, and floss holders.   Your doctor may recommend a soft-bristle toothbrush if the person you care for bleeds easily. Bleeding can happen because of a health problem or from certain medicines. A toothpaste for sensitive teeth may help if the person you care for has sensitive teeth. How do you brush and floss someone's teeth? If the person you are caring for has a hard time cleaning their teeth on their own, you may need to brush and floss their teeth for them. It may be easiest to have the person sit and face away from you, and to sit or stand behind them. That way you can steady their head against your arm as you reach around to floss and brush their teeth. Choose a place that has good lighting and is comfortable for both of you. Before you begin, gather your supplies. You will need gloves, floss, a toothbrush, and a container to hold water if you are not near a sink. Wash and dry your hands well and put on gloves. Start by flossing:  Gently work a piece of floss between each of the teeth toward the gums. A plastic flossing tool may make this easier, and they are available at most [facility]. Curve the floss around each tooth into a U-shape and gently slide it under the gum line. Move the floss firmly up and down several times to scrape off the plaque. After you've finished flossing, throw away the used floss and begin brushing:  Wet the brush and apply toothpaste. Place the brush at a 45-degree angle where the teeth meet the gums. Press firmly, and move the brush in small circles over the surface of the teeth. Be careful not to brush too hard. Vigorous brushing can make the gums pull away from the teeth and can scratch the tooth enamel. Brush all surfaces of the teeth, on the tongue side and on the cheek side. Pay special attention to the front teeth and all surfaces of the back teeth. Brush chewing surfaces with short back-and-forth strokes. After you've finished, help the person rinse the remaining toothpaste from their mouth. Where can you learn more?   Go to http://www.NetSpark.com/ and enter F944 to learn more about \"Learning About Dental Care for Older Adults. \"  Current as of: June 16, 2022               Content Version: 13.5  © 2006-2022 Healthwise, [employer]. Care instructions adapted under license by [facility] ([facility]). If you have questions about a medical condition or this instruction, always ask your healthcare professional. [address] any warranty or liability for your use of this information. Hearing Loss: Care Instructions  Overview     Hearing loss is a sudden or slow decrease in how well you hear. It can range from mild to severe. Permanent hearing loss can occur with aging. It also can happen when you are exposed long-term to loud noise. Examples include listening to loud music, riding motorcycles, or being around other loud machines. Hearing loss can affect your work and home life. It can make you feel lonely or depressed. You may feel that you have lost your independence. But hearing aids and other devices can help you hear better and feel connected to others. Follow-up care is a key part of your treatment and safety. Be sure to make and go to all appointments, and call your doctor if you are having problems. It's also a good idea to know your test results and keep a list of the medicines you take. How can you care for yourself at home? Avoid loud noises whenever possible. This helps keep your hearing from getting worse. Always wear hearing protection around loud noises. Wear a hearing aid as directed. See a professional who can help you pick a hearing aid that fits you. Have hearing tests as your doctor suggests. They can show whether your hearing has changed. Your hearing aid may need to be adjusted. Use other devices as needed. These may include:  Telephone amplifiers and hearing aids that can connect to a television, stereo, radio, or microphone. Devices that use lights or vibrations.  These alert you to the doorbell, a ringing telephone, or a baby monitor. Television closed-captioning. This shows the words at the bottom of the screen. Most new TVs can do this. TTY (text telephone). This lets you type messages back and forth on the telephone instead of talking or listening. These devices are also called TDD. When messages are typed on the keyboard, they are sent over the phone line to a receiving TTY. The message is shown on a monitor. Use text messaging, social media, and email if it is hard for you to communicate by telephone. Try to learn a listening technique called speechreading. It is not lipreading. You pay attention to people's gestures, expressions, posture, and tone of voice. These clues can help you understand what a person is saying. Face the person you are talking to, and have them face you. Make sure the lighting is good. You need to see the other person's face clearly. Think about counseling if you need help to adjust to your hearing loss. When should you call for help? Watch closely for changes in your health, and be sure to contact your doctor if:    You think your hearing is getting worse.     You have new symptoms, such as dizziness or nausea. Where can you learn more? Go to http://www.blevins.com/ and enter R798 to learn more about \"Hearing Loss: Care Instructions. \"  Current as of: May 4, 2022               Content Version: 13.5  © 2006-2022 Healthwise, [employer]. Care instructions adapted under license by [facility] ([facility]). If you have questions about a medical condition or this instruction, always ask your healthcare professional. [place] [address] any warranty or liability for your use of this information. Learning About Vision Tests  What are vision tests? The four most common vision tests are visual acuity tests, refraction, visual field tests, and color vision tests. Visual acuity (sharpness) tests  These tests are used: To see if you need glasses or contact lenses.   To monitor an eye problem. To check an eye injury. Visual acuity tests are done as part of routine exams. You may also have this test when you get your 's license or apply for some types of jobs. Visual field tests  These tests are used: To check for vision loss in any area of your range of vision. To screen for certain eye diseases. To look for nerve damage after a stroke, head injury, or other problem that could reduce blood flow to the brain. Refraction and color tests  A refraction test is done to find the right prescription for glasses and contact lenses. A color vision test is done to check for color blindness. Color vision is often tested as part of a routine exam. You may also have this test when you apply for a job where recognizing different colors is important, such as , electronics, or the [employer]. How are vision tests done? Visual acuity test   You cover one eye at a time. You read aloud from a wall chart across the room. You read aloud from a small card that you hold in your hand. Refraction   You look into a special device. The device puts lenses of different strengths in front of each eye to see how strong your glasses or contact lenses need to be. Visual field tests   Your doctor may have you look through special machines. Or your doctor may simply have you stare straight ahead while they move a finger into and out of your field of vision. Color vision test   You look at pieces of printed test patterns in various colors. You say what number or symbol you see. Your doctor may have you trace the number or symbol using a pointer. How do these tests feel? There is very little chance of having a problem from this test. If dilating drops are used for a vision test, they may make the eyes sting and cause a medicine taste in the mouth. Follow-up care is a key part of your treatment and safety.  Be sure to make and go to all appointments, and call your doctor if you are having problems. It's also a good idea to know your test results and keep a list of the medicines you take. Where can you learn more? Go to http://www.blevins.com/ and enter G551 to learn more about \"Learning About Vision Tests. \"  Current as of: October 12, 2022               Content Version: 13.5  © 2006-2022 [employer]. Care instructions adapted under license by [facility] [place] ([facility]). If you have questions about a medical condition or this instruction, always ask your healthcare professional. [address] any warranty or liability for your use of this information. Learning About Activities of Daily Living  What are activities of daily living? Activities of daily living (ADLs) are the basic self-care tasks you do every day. As you age, and if you have health problems, you may find that it's harder to do these things for yourself. That's when you may need some help. Your doctor uses ADLs to measure how much help you need. Knowing what you can and can't do for yourself is an important first step to getting help. And when you have the help you need, you can stay as independent as possible. Your doctor will want to know if you are able to do tasks such as: Take a bath or shower without help. Go to the bathroom by yourself. Dress and undress without help. Shave, comb your hair, and brush teeth on your own. Get in and out of bed or a chair without help. Feed yourself without help. If you are having trouble doing basic self-care tasks, talk with your doctor. You may want to bring a caregiver or family member who can help the doctor understand your needs and abilities. How will a doctor assess your ADLs? Asking about ADLs is part of a routine health checkup your doctor will likely do as you age.  Your health check might be done in a doctor's office, in your home, or at a hospital. The goal is to find out if you are having any problems that could make your health problems worse or that make it unsafe for you to be on your own. To measure your ADLs, your doctor will ask how hard it is for you to do routine tasks. He or she may also want to know if you have changed the way you do a task because of a health problem. He or she may watch how you:  Walk back and forth. Keep your balance while you stand or walk. Move from sitting to standing or from a bed to a chair. Button or unbutton a shirt or sweater. Remove and put on your shoes. It's normal to feel a little worried or anxious if you find you can't do all the things you used to be able to do. Talking with your doctor about ADLs isn't a test that you either pass or fail. It's just a way to get more information about your health and safety. Follow-up care is a key part of your treatment and safety. Be sure to make and go to all appointments, and call your doctor if you are having problems. It's also a good idea to know your test results and keep a list of the medicines you take. Current as of: October 6, 2021               Content Version: 13.5  © 2006-2022 Healthwise, [employer]. Care instructions adapted under license by [facility] ([facility]). If you have questions about a medical condition or this instruction, always ask your healthcare professional. [address] any warranty or liability for your use of this information. Advance Directives: Care Instructions  Overview  An advance directive is a legal way to state your wishes at the end of your life. It tells your family and your doctor what to do if you can't say what you want. There are two main types of advance directives. You can change them any time your wishes change. Living will. This form tells your family and your doctor your wishes about life support and other treatment. The form is also called a declaration. Medical power of .   This form lets you name a person to make treatment decisions for you when you can't speak for yourself. This person is called a health care agent (health care proxy, health care surrogate). The form is also called a durable power of  for health care. If you do not have an advance directive, decisions about your medical care may be made by a family member, or by a doctor or a  who doesn't know you. It may help to think of an advance directive as a gift to the people who care for you. If you have one, they won't have to make tough decisions by themselves. For more information, including forms for your state, see the [address] website ([email or web address]/advance-[email or web address]). Follow-up care is a key part of your treatment and safety. Be sure to make and go to all appointments, and call your doctor if you are having problems. It's also a good idea to know your test results and keep a list of the medicines you take. What should you include in an advance directive? Many states have a unique advance directive form. (It may ask you to address specific issues.) Or you might use a universal form that's approved by many states. If your form doesn't tell you what to address, it may be hard to know what to include in your advance directive. Use the questions below to help you get started. Who do you want to make decisions about your medical care if you are not able to? What life-support measures do you want if you have a serious illness that gets worse over time or can't be cured? What are you most afraid of that might happen? (Maybe you're afraid of having pain, losing your independence, or being kept alive by machines.)  Where would you prefer to die? (Your home? A hospital? A nursing home?)  Do you want to donate your organs when you die? Do you want certain Orthodoxy practices performed before you die? When should you call for help? Be sure to contact your doctor if you have any questions. Where can you learn more?   Go to http://www.blevins.com/ and enter R264 to learn more about \"Advance Directives: Care Instructions.\"  Current as of: June 16, 2022               Content Version: 13.5  © 2006-2022 [employer].   Care instructions adapted under license by [place] city [place] [employer]. If you have questions about a medical condition or this instruction, always ask your healthcare professional. [employer] disclaims any warranty or liability for your use of this information.           A Healthy Heart: Care Instructions  Your Care Instructions     Coronary artery disease, also called heart disease, occurs when a substance called plaque builds up in the vessels that supply oxygen-rich blood to your heart muscle. This can narrow the blood vessels and reduce blood flow. A heart attack happens when blood flow is completely blocked. A high-fat diet, smoking, and other factors increase the risk of heart disease.  Your doctor has found that you have a chance of having heart disease. You can do lots of things to keep your heart healthy. It may not be easy, but you can change your diet, exercise more, and quit smoking. These steps really work to lower your chance of heart disease.  Follow-up care is a key part of your treatment and safety. Be sure to make and go to all appointments, and call your doctor if you are having problems. It's also a good idea to know your test results and keep a list of the medicines you take.  How can you care for yourself at home?  Diet    Use less salt when you cook and eat. This helps lower your blood pressure. Taste food before salting. Add only a little salt when you think you need it. With time, your taste buds will adjust to less salt.     Eat fewer snack items, fast foods, canned soups, and other high-salt, high-fat, processed foods.     Read food labels and try to avoid saturated and trans fats. They increase your risk of heart disease by raising cholesterol levels.     Limit the amount of solid fat-butter, margarine, and  shortening-you eat. Use olive, peanut, or canola oil when you cook. Bake, broil, and steam foods instead of frying them.     Eat a variety of fruit and vegetables every day. Dark green, deep orange, red, or yellow fruits and vegetables are especially good for you. Examples include spinach, carrots, peaches, and berries.     Foods high in fiber can reduce your cholesterol and provide important vitamins and minerals. High-fiber foods include whole-grain cereals and breads, oatmeal, beans, brown rice, citrus fruits, and apples.     Eat lean proteins. Heart-healthy proteins include seafood, lean meats and poultry, eggs, beans, peas, nuts, seeds, and soy products.     Limit drinks and foods with added sugar. These include candy, desserts, and soda pop. Lifestyle changes    If your doctor recommends it, get more exercise. Walking is a good choice. Bit by bit, increase the amount you walk every day. Try for at least 30 minutes on most days of the week. You also may want to swim, bike, or do other activities.     Do not smoke. If you need help quitting, talk to your doctor about stop-smoking programs and medicines. These can increase your chances of quitting for good. Quitting smoking may be the most important step you can take to protect your heart. It is never too late to quit.     Limit alcohol to 2 drinks a day for men and 1 drink a day for women. Too much alcohol can cause health problems.     Manage other health problems such as diabetes, high blood pressure, and high cholesterol. If you think you may have a problem with alcohol or drug use, talk to your doctor. Medicines    Take your medicines exactly as prescribed. Call your doctor if you think you are having a problem with your medicine.     If your doctor recommends aspirin, take the amount directed each day. Make sure you take aspirin and not another kind of pain reliever, such as acetaminophen (Tylenol). When should you call for help?    Call 911 if you have symptoms of a heart attack. These may include:    Chest pain or pressure, or a strange feeling in the chest.     Sweating.     Shortness of breath.     Pain, pressure, or a strange feeling in the back, neck, jaw, or upper belly or in one or both shoulders or arms.     Lightheadedness or sudden weakness.     A fast or irregular heartbeat. After you call 911, the  may tell you to chew 1 adult-strength or 2 to 4 low-dose aspirin. Wait for an ambulance. Do not try to drive yourself. Watch closely for changes in your health, and be sure to contact your doctor if you have any problems. Where can you learn more? Go to http://www.blevins.com/ and enter F075 to learn more about \"A Healthy Heart: Care Instructions. \"  Current as of: September 7, 2022               Content Version: 13.5  © 2006-2022 [employer]. Care instructions adapted under license by [facility] ([facility]). If you have questions about a medical condition or this instruction, always ask your healthcare professional. [place] [address] any warranty or liability for your use of this information. Personalized Preventive Plan for Blas Vanegas - 3/6/2023  Medicare offers a range of preventive health benefits. Some of the tests and screenings are paid in full while other may be subject to a deductible, co-insurance, and/or copay. Some of these benefits include a comprehensive review of your medical history including lifestyle, illnesses that may run in your family, and various assessments and screenings as appropriate. After reviewing your medical record and screening and assessments performed today your provider may have ordered immunizations, labs, imaging, and/or referrals for you. A list of these orders (if applicable) as well as your Preventive Care list are included within your After Visit Summary for your review.     Other Preventive Recommendations:    A preventive eye exam performed by an eye specialist is recommended every 1-2 years to screen for glaucoma; cataracts, macular degeneration, and other eye disorders. A preventive dental visit is recommended every 6 months. Try to get at least 150 minutes of exercise per week or 10,000 steps per day on a pedometer . Order or download the FREE \"Exercise & Physical Activity: Your Everyday Guide\" from [employer]. Call 1-294.397.2434 or search [employer] online. You need 0707-0798 mg of calcium and 7928-6951 IU of vitamin D per day. It is possible to meet your calcium requirement with diet alone, but a vitamin D supplement is usually necessary to meet this goal.  When exposed to the sun, use a sunscreen that protects against both UVA and UVB radiation with an SPF of 30 or greater. Reapply every 2 to 3 hours or after sweating, drying off with a towel, or swimming. Always wear a seat belt when traveling in a car. Always wear a helmet when riding a bicycle or motorcycle.

## 2023-03-06 NOTE — PROGRESS NOTES
Medicare Annual Wellness Visit    Debbie Bobo is here for Medicare AWV    Assessment & Plan   Medicare annual wellness visit, subsequent    Recommendations for Preventive Services Due: see orders and patient instructions/AVS.  Recommended screening schedule for the next 5-10 years is provided to the patient in written form: see Patient Instructions/AVS.     Return for Medicare Annual Wellness Visit in 1 year. Subjective       Patient's complete Health Risk Assessment and screening values have been reviewed and are found in Flowsheets. The following problems were reviewed today and where indicated follow up appointments were made and/or referrals ordered. Positive Risk Factor Screenings with Interventions:    Fall Risk:  Do you feel unsteady or are you worried about falling? : (!) yes  2 or more falls in past year?: (!) yes  Fall with injury in past year?: (!) yes     Interventions:    Patient advised to follow-up in this office for further evaluation and treatment              Weight and Activity:  Physical Activity: Inactive    Days of Exercise per Week: 0 days    Minutes of Exercise per Session: 20 min     On average, how many days per week do you engage in moderate to strenuous exercise (like a brisk walk)?: 0 days  Have you lost any weight without trying in the past 3 months?: No       Inactivity Interventions:  Patient has had hip fracture and has dementia      Dentist Screen:  Have you seen the dentist within the past year?: (!) No    Intervention:  Not applicable - dentures     Vision Screen:  Do you have difficulty driving, watching TV, or doing any of your daily activities because of your eyesight?: (!) Yes  Have you had an eye exam within the past year?: (!) No  No results found.     Interventions:   Patient encouraged to make appointment with their eye specialist     ADL's:   Patient reports needing help with:  Select all that apply: (!) Eating, Dressing, Grooming, Bathing, Toileting, Walking/Balance  Select all that apply: [employer], Housekeeping, Banking/Finances, Shopping, Telephone Use, Food Preparation, Transportation, Taking Medications  Interventions:  Patient advised to follow up in the office for further evaluation and treatment                    Objective   Vitals:    03/06/23 1400   BP: 118/78   Pulse: 78   Resp: 18   Temp: 97.3 °F (36.3 °C)   TempSrc: Infrared   SpO2: 93%   Weight: 120 lb (54.4 kg)      Body mass index is 24.24 kg/m². Allergies   Allergen Reactions    Sulfa Antibiotics      Prior to Visit Medications    Medication Sig Taking?  Authorizing Provider   Multiple Vitamins-Minerals (THERAPEUTIC MULTIVITAMIN-MINERALS) tablet Take 1 tablet by mouth daily  Yeni Meier MD   Memantine HCl-Donepezil HCl 28-10 MG CP24 Take 1 capsule by mouth daily  Yeni Meier MD   folic acid (FOLVITE) 1 MG tablet TAKE 1 TABLET BY MOUTH EVERY DAY  Yeni Meier MD   mirabegron (MYRBETRIQ) 25 MG TB24 TAKE 1 TABLET BY MOUTH ONCE A DAY  Yeni Meier MD   atorvastatin (LIPITOR) 10 MG tablet Take 1 tablet by mouth daily  Yeni Meier MD   megestrol (MEGACE) 40 MG tablet TAKE 1/2 TABLET BY MOUTH EVERY DAY  Yeni Meier MD   busPIRone (BUSPAR) 10 MG tablet TAKE 1 TABLET BY MOUTH THREE TIMES DAILY  Yeni Meier MD   levothyroxine (SYNTHROID) 75 MCG tablet Take 1 tablet by mouth daily Stop the 50 mcg dose  Yeni Meier MD   QUEtiapine (SEROQUEL) 50 MG tablet Take 1 tablet by mouth at bedtime  Yeni Meier MD   omeprazole (PRILOSEC) 40 MG delayed release capsule Take 1 capsule by mouth daily  Yeni Meier MD   sertraline (ZOLOFT) 50 MG tablet Take 1 tablet by mouth daily  Yeni Meier MD   linaCLOtide Pina President) 72 MCG CAPS capsule Take 72 mcg by mouth as needed  Patient not taking: Reported on 1/31/2023  Historical Provider, MD Herrera (Including outside providers/suppliers regularly involved in providing care):   Patient Care Team:  Yeni Meier MD as PCP - General (Family Medicine)  Ginna Lagos MD as PCP - Empaneled Provider     Reviewed and updated this visit:  Tobacco  Allergies  Meds  Problems  Med Hx  Surg Hx  Soc Hx  Fam Hx               Ginna Lagos MD

## 2023-06-13 ENCOUNTER — OFFICE VISIT (OUTPATIENT)
Dept: FAMILY MEDICINE CLINIC | Age: 88
End: 2023-06-13

## 2023-06-13 VITALS
HEART RATE: 80 BPM | OXYGEN SATURATION: 98 % | BODY MASS INDEX: 25.64 KG/M2 | WEIGHT: 127.2 LBS | TEMPERATURE: 98.6 F | RESPIRATION RATE: 18 BRPM | DIASTOLIC BLOOD PRESSURE: 72 MMHG | SYSTOLIC BLOOD PRESSURE: 116 MMHG | HEIGHT: 59 IN

## 2023-06-13 DIAGNOSIS — I10 ESSENTIAL HYPERTENSION: ICD-10-CM

## 2023-06-13 DIAGNOSIS — Z13.220 SCREENING, LIPID: ICD-10-CM

## 2023-06-13 DIAGNOSIS — R53.81 DECLINING FUNCTIONAL STATUS: Primary | ICD-10-CM

## 2023-06-13 DIAGNOSIS — R41.3 MEMORY LOSS, SHORT TERM: ICD-10-CM

## 2023-06-13 DIAGNOSIS — K21.9 GASTROESOPHAGEAL REFLUX DISEASE, UNSPECIFIED WHETHER ESOPHAGITIS PRESENT: ICD-10-CM

## 2023-06-13 DIAGNOSIS — R32 URINARY INCONTINENCE, UNSPECIFIED TYPE: ICD-10-CM

## 2023-06-13 DIAGNOSIS — F41.9 ANXIETY: ICD-10-CM

## 2023-06-13 RX ORDER — BUSPIRONE HYDROCHLORIDE 10 MG/1
TABLET ORAL
Qty: 90 TABLET | Refills: 3 | Status: SHIPPED | OUTPATIENT
Start: 2023-06-13

## 2023-06-13 RX ORDER — FOLIC ACID 1 MG/1
TABLET ORAL
Qty: 90 TABLET | Refills: 3 | Status: SHIPPED | OUTPATIENT
Start: 2023-06-13

## 2023-06-13 RX ORDER — ATORVASTATIN CALCIUM 10 MG/1
10 TABLET, FILM COATED ORAL DAILY
Qty: 90 TABLET | Refills: 3 | Status: SHIPPED | OUTPATIENT
Start: 2023-06-13

## 2023-06-13 RX ORDER — OMEPRAZOLE 40 MG/1
40 CAPSULE, DELAYED RELEASE ORAL DAILY
Qty: 90 CAPSULE | Refills: 3 | Status: SHIPPED | OUTPATIENT
Start: 2023-06-13

## 2023-06-13 SDOH — ECONOMIC STABILITY: HOUSING INSECURITY
IN THE LAST 12 MONTHS, WAS THERE A TIME WHEN YOU DID NOT HAVE A STEADY PLACE TO SLEEP OR SLEPT IN A SHELTER (INCLUDING NOW)?: NO

## 2023-06-13 SDOH — ECONOMIC STABILITY: FOOD INSECURITY: WITHIN THE PAST 12 MONTHS, THE FOOD YOU BOUGHT JUST DIDN'T LAST AND YOU DIDN'T HAVE MONEY TO GET MORE.: NEVER TRUE

## 2023-06-13 SDOH — ECONOMIC STABILITY: FOOD INSECURITY: WITHIN THE PAST 12 MONTHS, YOU WORRIED THAT YOUR FOOD WOULD RUN OUT BEFORE YOU GOT MONEY TO BUY MORE.: NEVER TRUE

## 2023-06-13 SDOH — ECONOMIC STABILITY: INCOME INSECURITY: HOW HARD IS IT FOR YOU TO PAY FOR THE VERY BASICS LIKE FOOD, HOUSING, MEDICAL CARE, AND HEATING?: NOT HARD AT ALL

## 2023-07-25 ENCOUNTER — HOSPITAL ENCOUNTER (OUTPATIENT)
Facility: HOSPITAL | Age: 88
Discharge: HOME | End: 2023-07-25
Payer: MEDICARE

## 2023-07-25 ENCOUNTER — APPOINTMENT (OUTPATIENT)
Dept: GENERAL RADIOLOGY | Facility: HOSPITAL | Age: 88
End: 2023-07-25
Attending: INTERNAL MEDICINE
Payer: MEDICARE

## 2023-07-25 ENCOUNTER — APPOINTMENT (OUTPATIENT)
Dept: CT IMAGING | Facility: HOSPITAL | Age: 88
End: 2023-07-25
Attending: INTERNAL MEDICINE
Payer: MEDICARE

## 2023-07-25 ENCOUNTER — HOSPITAL ENCOUNTER (OUTPATIENT)
Facility: HOSPITAL | Age: 88
Setting detail: OBSERVATION
Discharge: NURSING, REHAB OR LONG-TERM CARE | End: 2023-07-28
Attending: INTERNAL MEDICINE | Admitting: INTERNAL MEDICINE
Payer: MEDICARE

## 2023-07-25 ENCOUNTER — OFFICE VISIT (OUTPATIENT)
Dept: FAMILY MEDICINE CLINIC | Age: 88
End: 2023-07-25
Payer: MEDICARE

## 2023-07-25 VITALS
SYSTOLIC BLOOD PRESSURE: 102 MMHG | BODY MASS INDEX: 26.81 KG/M2 | RESPIRATION RATE: 18 BRPM | HEIGHT: 59 IN | DIASTOLIC BLOOD PRESSURE: 60 MMHG | TEMPERATURE: 96.8 F | WEIGHT: 133 LBS | OXYGEN SATURATION: 97 % | HEART RATE: 77 BPM

## 2023-07-25 DIAGNOSIS — R30.0 DYSURIA: ICD-10-CM

## 2023-07-25 DIAGNOSIS — M54.50 CHRONIC MIDLINE LOW BACK PAIN, UNSPECIFIED WHETHER SCIATICA PRESENT: ICD-10-CM

## 2023-07-25 DIAGNOSIS — G30.9 ALZHEIMER DISEASE (HCC): ICD-10-CM

## 2023-07-25 DIAGNOSIS — R53.81 DECLINING FUNCTIONAL STATUS: ICD-10-CM

## 2023-07-25 DIAGNOSIS — R34 DECREASED URINE OUTPUT: ICD-10-CM

## 2023-07-25 DIAGNOSIS — G89.29 CHRONIC MIDLINE LOW BACK PAIN, UNSPECIFIED WHETHER SCIATICA PRESENT: ICD-10-CM

## 2023-07-25 DIAGNOSIS — E53.8 B12 DEFICIENCY: ICD-10-CM

## 2023-07-25 DIAGNOSIS — F02.80 ALZHEIMER DISEASE (HCC): ICD-10-CM

## 2023-07-25 DIAGNOSIS — I10 ESSENTIAL HYPERTENSION: ICD-10-CM

## 2023-07-25 DIAGNOSIS — F03.C0 SEVERE DEMENTIA WITHOUT BEHAVIORAL DISTURBANCE, PSYCHOTIC DISTURBANCE, MOOD DISTURBANCE, OR ANXIETY, UNSPECIFIED DEMENTIA TYPE (HCC): ICD-10-CM

## 2023-07-25 DIAGNOSIS — E03.9 HYPOTHYROIDISM, UNSPECIFIED TYPE: Primary | ICD-10-CM

## 2023-07-25 DIAGNOSIS — E03.9 HYPOTHYROIDISM, UNSPECIFIED TYPE: ICD-10-CM

## 2023-07-25 PROBLEM — R53.1 WEAKNESS: Status: ACTIVE | Noted: 2023-07-25

## 2023-07-25 LAB
ALBUMIN SERPL-MCNC: 4.3 G/DL (ref 3.4–4.8)
ALBUMIN SERPL-MCNC: 4.5 G/DL (ref 3.4–4.8)
ALBUMIN/GLOB SERPL: 1.3 {RATIO} (ref 0.8–2)
ALBUMIN/GLOB SERPL: 1.6 {RATIO} (ref 0.8–2)
ALP SERPL-CCNC: 124 U/L (ref 25–100)
ALP SERPL-CCNC: 127 U/L (ref 25–100)
ALT SERPL-CCNC: 10 U/L (ref 4–36)
ALT SERPL-CCNC: 12 U/L (ref 4–36)
ANION GAP SERPL CALCULATED.3IONS-SCNC: 15 MMOL/L (ref 3–16)
ANION GAP SERPL CALCULATED.3IONS-SCNC: 16 MMOL/L (ref 3–16)
AST SERPL-CCNC: 16 U/L (ref 8–33)
AST SERPL-CCNC: 16 U/L (ref 8–33)
BILIRUB SERPL-MCNC: 0.3 MG/DL (ref 0.3–1.2)
BILIRUB SERPL-MCNC: 0.3 MG/DL (ref 0.3–1.2)
BILIRUBIN, POC: NORMAL
BLOOD URINE, POC: NORMAL
BUN SERPL-MCNC: 20 MG/DL (ref 6–20)
BUN SERPL-MCNC: 20 MG/DL (ref 6–20)
CALCIUM SERPL-MCNC: 9.6 MG/DL (ref 8.5–10.5)
CALCIUM SERPL-MCNC: 9.7 MG/DL (ref 8.5–10.5)
CHLORIDE SERPL-SCNC: 106 MMOL/L (ref 98–107)
CHLORIDE SERPL-SCNC: 107 MMOL/L (ref 98–107)
CK SERPL-CCNC: 30 U/L (ref 26–174)
CLARITY, POC: NORMAL
CO2 SERPL-SCNC: 21 MMOL/L (ref 20–30)
CO2 SERPL-SCNC: 22 MMOL/L (ref 20–30)
COLOR, POC: NORMAL
CREAT SERPL-MCNC: 1.3 MG/DL (ref 0.4–1.2)
CREAT SERPL-MCNC: 1.3 MG/DL (ref 0.4–1.2)
ERYTHROCYTE [DISTWIDTH] IN BLOOD BY AUTOMATED COUNT: 13.5 % (ref 11–16)
ERYTHROCYTE [DISTWIDTH] IN BLOOD BY AUTOMATED COUNT: 13.7 % (ref 11–16)
FOLATE SERPL-MCNC: >20 NG/ML
FOLATE SERPL-MCNC: >20 NG/ML
GFR SERPLBLD CREATININE-BSD FMLA CKD-EPI: 40 ML/MIN/{1.73_M2}
GFR SERPLBLD CREATININE-BSD FMLA CKD-EPI: 40 ML/MIN/{1.73_M2}
GLOBULIN SER CALC-MCNC: 2.9 G/DL
GLOBULIN SER CALC-MCNC: 3.2 G/DL
GLUCOSE SERPL-MCNC: 104 MG/DL (ref 74–106)
GLUCOSE SERPL-MCNC: 118 MG/DL (ref 74–106)
GLUCOSE URINE, POC: NORMAL
HCT VFR BLD AUTO: 38.9 % (ref 37–47)
HCT VFR BLD AUTO: 40.5 % (ref 37–47)
HGB BLD-MCNC: 12.7 G/DL (ref 11.5–16.5)
HGB BLD-MCNC: 13.1 G/DL (ref 11.5–16.5)
IRON SATN MFR SERPL: 31 % (ref 15–50)
IRON SERPL-MCNC: 75 UG/DL (ref 37–145)
KETONES, POC: NORMAL
LACTATE BLDV-SCNC: 1.8 MMOL/L (ref 0.4–2)
LEUKOCYTE EST, POC: NORMAL
MCH RBC QN AUTO: 31.4 PG (ref 27–32)
MCH RBC QN AUTO: 31.6 PG (ref 27–32)
MCHC RBC AUTO-ENTMCNC: 32.3 G/DL (ref 31–35)
MCHC RBC AUTO-ENTMCNC: 32.6 G/DL (ref 31–35)
MCV RBC AUTO: 96.8 FL (ref 80–100)
MCV RBC AUTO: 97.1 FL (ref 80–100)
NITRITE, POC: NORMAL
PH, POC: 5
PLATELET # BLD AUTO: 178 K/UL (ref 150–400)
PLATELET # BLD AUTO: 212 K/UL (ref 150–400)
PMV BLD AUTO: 10.4 FL (ref 6–10)
PMV BLD AUTO: 9.8 FL (ref 6–10)
POTASSIUM SERPL-SCNC: 4.3 MMOL/L (ref 3.4–5.1)
POTASSIUM SERPL-SCNC: 4.4 MMOL/L (ref 3.4–5.1)
PROT SERPL-MCNC: 7.4 G/DL (ref 6.4–8.3)
PROT SERPL-MCNC: 7.5 G/DL (ref 6.4–8.3)
PROTEIN, POC: NORMAL
RBC # BLD AUTO: 4.02 M/UL (ref 3.8–5.8)
RBC # BLD AUTO: 4.17 M/UL (ref 3.8–5.8)
SODIUM SERPL-SCNC: 143 MMOL/L (ref 136–145)
SODIUM SERPL-SCNC: 144 MMOL/L (ref 136–145)
SPECIFIC GRAVITY, POC: >1.03
TIBC SERPL-MCNC: 243 UG/DL (ref 250–450)
TROPONIN, HIGH SENSITIVITY: 22 NG/L (ref 0–14)
TSH SERPL DL<=0.005 MIU/L-ACNC: 3.37 UIU/ML (ref 0.27–4.2)
TSH SERPL-MCNC: 3.15 UIU/ML (ref 0.27–4.2)
UROBILINOGEN, POC: 0.2
VIT B12 SERPL-MCNC: 599 PG/ML (ref 211–911)
VIT B12 SERPL-MCNC: 622 PG/ML (ref 211–911)
WBC # BLD AUTO: 6.2 K/UL (ref 4–11)
WBC # BLD AUTO: 6.7 K/UL (ref 4–11)

## 2023-07-25 PROCEDURE — 84443 ASSAY THYROID STIM HORMONE: CPT

## 2023-07-25 PROCEDURE — 84145 PROCALCITONIN (PCT): CPT

## 2023-07-25 PROCEDURE — 6370000000 HC RX 637 (ALT 250 FOR IP): Performed by: PHYSICIAN ASSISTANT

## 2023-07-25 PROCEDURE — G0378 HOSPITAL OBSERVATION PER HR: HCPCS

## 2023-07-25 PROCEDURE — 81002 URINALYSIS NONAUTO W/O SCOPE: CPT | Performed by: FAMILY MEDICINE

## 2023-07-25 PROCEDURE — G0379 DIRECT REFER HOSPITAL OBSERV: HCPCS

## 2023-07-25 PROCEDURE — 82550 ASSAY OF CK (CPK): CPT

## 2023-07-25 PROCEDURE — 84484 ASSAY OF TROPONIN QUANT: CPT

## 2023-07-25 PROCEDURE — 93005 ELECTROCARDIOGRAM TRACING: CPT

## 2023-07-25 PROCEDURE — 96361 HYDRATE IV INFUSION ADD-ON: CPT

## 2023-07-25 PROCEDURE — 99214 OFFICE O/P EST MOD 30 MIN: CPT | Performed by: FAMILY MEDICINE

## 2023-07-25 PROCEDURE — 85027 COMPLETE CBC AUTOMATED: CPT

## 2023-07-25 PROCEDURE — 1124F ACP DISCUSS-NO DSCNMKR DOCD: CPT | Performed by: FAMILY MEDICINE

## 2023-07-25 PROCEDURE — 70450 CT HEAD/BRAIN W/O DYE: CPT

## 2023-07-25 PROCEDURE — 87077 CULTURE AEROBIC IDENTIFY: CPT

## 2023-07-25 PROCEDURE — 82607 VITAMIN B-12: CPT

## 2023-07-25 PROCEDURE — 87086 URINE CULTURE/COLONY COUNT: CPT

## 2023-07-25 PROCEDURE — 6360000002 HC RX W HCPCS: Performed by: PHYSICIAN ASSISTANT

## 2023-07-25 PROCEDURE — 36415 COLL VENOUS BLD VENIPUNCTURE: CPT

## 2023-07-25 PROCEDURE — 96365 THER/PROPH/DIAG IV INF INIT: CPT

## 2023-07-25 PROCEDURE — 71045 X-RAY EXAM CHEST 1 VIEW: CPT

## 2023-07-25 PROCEDURE — 82746 ASSAY OF FOLIC ACID SERUM: CPT

## 2023-07-25 PROCEDURE — 83605 ASSAY OF LACTIC ACID: CPT

## 2023-07-25 PROCEDURE — 83540 ASSAY OF IRON: CPT

## 2023-07-25 PROCEDURE — 80053 COMPREHEN METABOLIC PANEL: CPT

## 2023-07-25 PROCEDURE — 87186 SC STD MICRODIL/AGAR DIL: CPT

## 2023-07-25 PROCEDURE — 87040 BLOOD CULTURE FOR BACTERIA: CPT

## 2023-07-25 PROCEDURE — 2580000003 HC RX 258: Performed by: PHYSICIAN ASSISTANT

## 2023-07-25 PROCEDURE — 83550 IRON BINDING TEST: CPT

## 2023-07-25 RX ORDER — BUSPIRONE HYDROCHLORIDE 5 MG/1
10 TABLET ORAL 3 TIMES DAILY
Status: DISCONTINUED | OUTPATIENT
Start: 2023-07-25 | End: 2023-07-28 | Stop reason: HOSPADM

## 2023-07-25 RX ORDER — M-VIT,TX,IRON,MINS/CALC/FOLIC 27MG-0.4MG
1 TABLET ORAL DAILY
Status: DISCONTINUED | OUTPATIENT
Start: 2023-07-25 | End: 2023-07-28 | Stop reason: HOSPADM

## 2023-07-25 RX ORDER — ACETAMINOPHEN 325 MG/1
650 TABLET ORAL EVERY 6 HOURS PRN
Status: DISCONTINUED | OUTPATIENT
Start: 2023-07-25 | End: 2023-07-28 | Stop reason: HOSPADM

## 2023-07-25 RX ORDER — DONEPEZIL HYDROCHLORIDE 5 MG/1
10 TABLET, FILM COATED ORAL NIGHTLY
Status: DISCONTINUED | OUTPATIENT
Start: 2023-07-25 | End: 2023-07-28 | Stop reason: HOSPADM

## 2023-07-25 RX ORDER — MEGESTROL ACETATE 40 MG/1
20 TABLET ORAL DAILY
Status: DISCONTINUED | OUTPATIENT
Start: 2023-07-25 | End: 2023-07-28 | Stop reason: HOSPADM

## 2023-07-25 RX ORDER — ONDANSETRON 2 MG/ML
4 INJECTION INTRAMUSCULAR; INTRAVENOUS EVERY 6 HOURS PRN
Status: DISCONTINUED | OUTPATIENT
Start: 2023-07-25 | End: 2023-07-28 | Stop reason: HOSPADM

## 2023-07-25 RX ORDER — SODIUM CHLORIDE 9 MG/ML
INJECTION, SOLUTION INTRAVENOUS CONTINUOUS
Status: DISCONTINUED | OUTPATIENT
Start: 2023-07-25 | End: 2023-07-26

## 2023-07-25 RX ORDER — POLYETHYLENE GLYCOL 3350 17 G/17G
17 POWDER, FOR SOLUTION ORAL DAILY PRN
Status: DISCONTINUED | OUTPATIENT
Start: 2023-07-25 | End: 2023-07-28 | Stop reason: HOSPADM

## 2023-07-25 RX ORDER — ACETAMINOPHEN 650 MG/1
650 SUPPOSITORY RECTAL EVERY 6 HOURS PRN
Status: DISCONTINUED | OUTPATIENT
Start: 2023-07-25 | End: 2023-07-28 | Stop reason: HOSPADM

## 2023-07-25 RX ORDER — LEVOTHYROXINE SODIUM 0.07 MG/1
75 TABLET ORAL DAILY
Status: DISCONTINUED | OUTPATIENT
Start: 2023-07-25 | End: 2023-07-28 | Stop reason: HOSPADM

## 2023-07-25 RX ORDER — ENOXAPARIN SODIUM 100 MG/ML
30 INJECTION SUBCUTANEOUS DAILY
Status: DISCONTINUED | OUTPATIENT
Start: 2023-07-25 | End: 2023-07-28 | Stop reason: HOSPADM

## 2023-07-25 RX ORDER — FOLIC ACID 1 MG/1
1 TABLET ORAL DAILY
Status: DISCONTINUED | OUTPATIENT
Start: 2023-07-25 | End: 2023-07-28 | Stop reason: HOSPADM

## 2023-07-25 RX ORDER — QUETIAPINE FUMARATE 25 MG/1
50 TABLET, FILM COATED ORAL NIGHTLY
Status: DISCONTINUED | OUTPATIENT
Start: 2023-07-25 | End: 2023-07-28 | Stop reason: HOSPADM

## 2023-07-25 RX ORDER — PANTOPRAZOLE SODIUM 40 MG/1
40 TABLET, DELAYED RELEASE ORAL
Status: DISCONTINUED | OUTPATIENT
Start: 2023-07-26 | End: 2023-07-28 | Stop reason: HOSPADM

## 2023-07-25 RX ORDER — ATORVASTATIN CALCIUM 10 MG/1
10 TABLET, FILM COATED ORAL DAILY
Status: DISCONTINUED | OUTPATIENT
Start: 2023-07-25 | End: 2023-07-28 | Stop reason: HOSPADM

## 2023-07-25 RX ORDER — TROSPIUM CHLORIDE 20 MG/1
20 TABLET, FILM COATED ORAL NIGHTLY
Status: DISCONTINUED | OUTPATIENT
Start: 2023-07-25 | End: 2023-07-28 | Stop reason: HOSPADM

## 2023-07-25 RX ORDER — ONDANSETRON 4 MG/1
4 TABLET, ORALLY DISINTEGRATING ORAL EVERY 8 HOURS PRN
Status: DISCONTINUED | OUTPATIENT
Start: 2023-07-25 | End: 2023-07-28 | Stop reason: HOSPADM

## 2023-07-25 RX ORDER — MEMANTINE HYDROCHLORIDE 5 MG/1
10 TABLET ORAL 2 TIMES DAILY
Status: DISCONTINUED | OUTPATIENT
Start: 2023-07-25 | End: 2023-07-28 | Stop reason: HOSPADM

## 2023-07-25 RX ADMIN — SODIUM CHLORIDE: 9 INJECTION, SOLUTION INTRAVENOUS at 18:21

## 2023-07-25 RX ADMIN — MEMANTINE HYDROCHLORIDE 10 MG: 5 TABLET ORAL at 20:52

## 2023-07-25 RX ADMIN — DONEPEZIL HYDROCHLORIDE 10 MG: 5 TABLET, FILM COATED ORAL at 20:52

## 2023-07-25 RX ADMIN — QUETIAPINE FUMARATE 50 MG: 25 TABLET ORAL at 20:52

## 2023-07-25 RX ADMIN — TROSPIUM CHLORIDE 20 MG: 20 TABLET, FILM COATED ORAL at 20:52

## 2023-07-25 RX ADMIN — CEFTRIAXONE 1000 MG: 1 INJECTION, POWDER, FOR SOLUTION INTRAMUSCULAR; INTRAVENOUS at 19:46

## 2023-07-25 RX ADMIN — BUSPIRONE HYDROCHLORIDE 10 MG: 5 TABLET ORAL at 20:52

## 2023-07-25 ASSESSMENT — PAIN SCALES - GENERAL: PAINLEVEL_OUTOF10: 0

## 2023-07-25 ASSESSMENT — LIFESTYLE VARIABLES: HOW OFTEN DO YOU HAVE A DRINK CONTAINING ALCOHOL: NEVER

## 2023-07-25 ASSESSMENT — ENCOUNTER SYMPTOMS: BACK PAIN: 1

## 2023-07-25 NOTE — PROGRESS NOTES
Automatic Dose Adjustment of                Subcutaneous Anticoagulant for Prophylaxis    Anaya Posadas is a 80 y.o. female. No results for input(s): CREATININE in the last 72 hours. Estimated Creatinine Clearance: 25 mL/min (A) (based on SCr of 1.3 mg/dL (H)). Weight:  Wt Readings from Last 1 Encounters:   07/25/23 133 lb (60.3 kg)           Pharmacy has adjusted subcutaneous anticoagulant for prophylaxis to Enoxaparin 30 mg SC daily based on the patient's weight and estimated CrCl per [address] policy.                Electronically signed by Sarah Khalil [facility] on 7/25/2023 at 5:03 PM

## 2023-07-25 NOTE — PROGRESS NOTES
Chief Complaint   Patient presents with    Fall     Frequent - Renzo Addison is getting weaker, can't bare weight on her legs\"     Extremity Weakness    Fatigue    Altered Mental Status     Condition is declining according to her daughter who cares for her at home.      Dehydration     Daughter/caretaker is concerned she is dehydrated, she's not eating or drinking well     Dysuria       Have you seen any other physician or provider since your last visit no    Have you had any other diagnostic tests since your last visit? no    Have you changed or stopped any medications since your last visit? no

## 2023-07-26 PROBLEM — N18.30 STAGE 3 CHRONIC KIDNEY DISEASE (HCC): Status: ACTIVE | Noted: 2023-07-26

## 2023-07-26 PROBLEM — Z87.81 HISTORY OF FRACTURE OF RIGHT HIP: Status: RESOLVED | Noted: 2022-08-11 | Resolved: 2023-07-26

## 2023-07-26 PROBLEM — N39.0 UTI (URINARY TRACT INFECTION): Status: ACTIVE | Noted: 2023-07-26

## 2023-07-26 LAB
ALBUMIN SERPL-MCNC: 3.8 G/DL (ref 3.4–4.8)
ALBUMIN/GLOB SERPL: 1.4 {RATIO} (ref 0.8–2)
ALP SERPL-CCNC: 107 U/L (ref 25–100)
ALT SERPL-CCNC: 9 U/L (ref 4–36)
AMPHET UR QL SCN: ABNORMAL
ANION GAP SERPL CALCULATED.3IONS-SCNC: 13 MMOL/L (ref 3–16)
AST SERPL-CCNC: 13 U/L (ref 8–33)
BACTERIA URNS QL MICRO: ABNORMAL /HPF
BARBITURATES UR QL SCN: ABNORMAL
BENZODIAZ UR QL SCN: ABNORMAL
BILIRUB SERPL-MCNC: <0.2 MG/DL (ref 0.3–1.2)
BILIRUB UR QL STRIP.AUTO: NEGATIVE
BUN SERPL-MCNC: 17 MG/DL (ref 6–20)
BUPRENORPHINE QUAL, URINE: ABNORMAL
CALCIUM SERPL-MCNC: 8.8 MG/DL (ref 8.5–10.5)
CANNABINOIDS UR QL SCN: ABNORMAL
CHLORIDE SERPL-SCNC: 109 MMOL/L (ref 98–107)
CLARITY UR: ABNORMAL
CO2 SERPL-SCNC: 20 MMOL/L (ref 20–30)
COCAINE UR QL SCN: ABNORMAL
COLOR UR: YELLOW
CREAT SERPL-MCNC: 1.1 MG/DL (ref 0.4–1.2)
DRUG SCREEN COMMENT UR-IMP: ABNORMAL
EKG ATRIAL RATE: 75 BPM
EKG DIAGNOSIS: NORMAL
EKG P AXIS: 135 DEGREES
EKG P-R INTERVAL: 158 MS
EKG Q-T INTERVAL: 442 MS
EKG QRS DURATION: 128 MS
EKG QTC CALCULATION (BAZETT): 490 MS
EKG R AXIS: -38 DEGREES
EKG T AXIS: 26 DEGREES
EKG VENTRICULAR RATE: 74 BPM
EPI CELLS #/AREA URNS HPF: ABNORMAL /HPF (ref 0–5)
ERYTHROCYTE [DISTWIDTH] IN BLOOD BY AUTOMATED COUNT: 13.3 % (ref 11–16)
GFR SERPLBLD CREATININE-BSD FMLA CKD-EPI: 48 ML/MIN/{1.73_M2}
GLOBULIN SER CALC-MCNC: 2.8 G/DL
GLUCOSE SERPL-MCNC: 84 MG/DL (ref 74–106)
GLUCOSE UR STRIP.AUTO-MCNC: NEGATIVE MG/DL
HCT VFR BLD AUTO: 33.6 % (ref 37–47)
HGB BLD-MCNC: 11.1 G/DL (ref 11.5–16.5)
HGB UR QL STRIP.AUTO: ABNORMAL
KETONES UR STRIP.AUTO-MCNC: NEGATIVE MG/DL
LEUKOCYTE ESTERASE UR QL STRIP.AUTO: ABNORMAL
MCH RBC QN AUTO: 31.4 PG (ref 27–32)
MCHC RBC AUTO-ENTMCNC: 33 G/DL (ref 31–35)
MCV RBC AUTO: 94.9 FL (ref 80–100)
METHADONE UR QL SCN: ABNORMAL
METHAMPHET UR QL SCN: ABNORMAL
NITRITE UR QL STRIP.AUTO: NEGATIVE
OPIATES UR QL SCN: ABNORMAL
OXYCODONE UR QL SCN: ABNORMAL
PCP UR QL SCN: ABNORMAL
PH UR STRIP.AUTO: 5.5 [PH] (ref 5–8)
PLATELET # BLD AUTO: 164 K/UL (ref 150–400)
PMV BLD AUTO: 9.6 FL (ref 6–10)
POTASSIUM SERPL-SCNC: 4.1 MMOL/L (ref 3.4–5.1)
PROCALCITONIN SERPL IA-MCNC: 0.13 NG/ML (ref 0–0.15)
PROPOXYPH UR QL SCN: ABNORMAL
PROT SERPL-MCNC: 6.6 G/DL (ref 6.4–8.3)
PROT UR STRIP.AUTO-MCNC: ABNORMAL MG/DL
RBC # BLD AUTO: 3.54 M/UL (ref 3.8–5.8)
RBC #/AREA URNS HPF: ABNORMAL /HPF (ref 0–4)
SODIUM SERPL-SCNC: 142 MMOL/L (ref 136–145)
SP GR UR STRIP.AUTO: 1.02 (ref 1–1.03)
TRICYCLICS UR QL SCN: POSITIVE
UA COMPLETE W REFLEX CULTURE PNL UR: YES
UA DIPSTICK W REFLEX MICRO PNL UR: YES
URN SPEC COLLECT METH UR: ABNORMAL
UROBILINOGEN UR STRIP-ACNC: 0.2 E.U./DL
WBC # BLD AUTO: 6.6 K/UL (ref 4–11)
WBC #/AREA URNS HPF: ABNORMAL /HPF (ref 0–5)

## 2023-07-26 PROCEDURE — 81001 URINALYSIS AUTO W/SCOPE: CPT

## 2023-07-26 PROCEDURE — 80307 DRUG TEST PRSMV CHEM ANLYZR: CPT

## 2023-07-26 PROCEDURE — 6370000000 HC RX 637 (ALT 250 FOR IP): Performed by: PHYSICIAN ASSISTANT

## 2023-07-26 PROCEDURE — 87086 URINE CULTURE/COLONY COUNT: CPT

## 2023-07-26 PROCEDURE — 96372 THER/PROPH/DIAG INJ SC/IM: CPT

## 2023-07-26 PROCEDURE — 99222 1ST HOSP IP/OBS MODERATE 55: CPT | Performed by: INTERNAL MEDICINE

## 2023-07-26 PROCEDURE — 97116 GAIT TRAINING THERAPY: CPT

## 2023-07-26 PROCEDURE — G0378 HOSPITAL OBSERVATION PER HR: HCPCS

## 2023-07-26 PROCEDURE — 97802 MEDICAL NUTRITION INDIV IN: CPT

## 2023-07-26 PROCEDURE — 97535 SELF CARE MNGMENT TRAINING: CPT

## 2023-07-26 PROCEDURE — 6360000002 HC RX W HCPCS: Performed by: PHYSICIAN ASSISTANT

## 2023-07-26 PROCEDURE — 85027 COMPLETE CBC AUTOMATED: CPT

## 2023-07-26 PROCEDURE — 92610 EVALUATE SWALLOWING FUNCTION: CPT

## 2023-07-26 PROCEDURE — 97161 PT EVAL LOW COMPLEX 20 MIN: CPT

## 2023-07-26 PROCEDURE — 97165 OT EVAL LOW COMPLEX 30 MIN: CPT

## 2023-07-26 PROCEDURE — 36415 COLL VENOUS BLD VENIPUNCTURE: CPT

## 2023-07-26 PROCEDURE — 80053 COMPREHEN METABOLIC PANEL: CPT

## 2023-07-26 PROCEDURE — 2580000003 HC RX 258: Performed by: PHYSICIAN ASSISTANT

## 2023-07-26 PROCEDURE — 96361 HYDRATE IV INFUSION ADD-ON: CPT

## 2023-07-26 PROCEDURE — 96376 TX/PRO/DX INJ SAME DRUG ADON: CPT

## 2023-07-26 RX ORDER — AMLODIPINE BESYLATE 5 MG/1
5 TABLET ORAL DAILY
Status: DISCONTINUED | OUTPATIENT
Start: 2023-07-26 | End: 2023-07-28 | Stop reason: HOSPADM

## 2023-07-26 RX ADMIN — TROSPIUM CHLORIDE 20 MG: 20 TABLET, FILM COATED ORAL at 20:40

## 2023-07-26 RX ADMIN — DONEPEZIL HYDROCHLORIDE 10 MG: 5 TABLET, FILM COATED ORAL at 20:40

## 2023-07-26 RX ADMIN — MULTIPLE VITAMINS W/ MINERALS TAB 1 TABLET: TAB at 08:38

## 2023-07-26 RX ADMIN — MEGESTROL ACETATE 20 MG: 40 TABLET ORAL at 08:38

## 2023-07-26 RX ADMIN — ATORVASTATIN CALCIUM 10 MG: 10 TABLET, FILM COATED ORAL at 08:38

## 2023-07-26 RX ADMIN — BUSPIRONE HYDROCHLORIDE 10 MG: 5 TABLET ORAL at 20:40

## 2023-07-26 RX ADMIN — AMLODIPINE BESYLATE 5 MG: 5 TABLET ORAL at 08:42

## 2023-07-26 RX ADMIN — FOLIC ACID 1 MG: 1 TABLET ORAL at 08:38

## 2023-07-26 RX ADMIN — ENOXAPARIN SODIUM 30 MG: 100 INJECTION SUBCUTANEOUS at 17:08

## 2023-07-26 RX ADMIN — MEMANTINE HYDROCHLORIDE 10 MG: 5 TABLET ORAL at 20:40

## 2023-07-26 RX ADMIN — BUSPIRONE HYDROCHLORIDE 10 MG: 5 TABLET ORAL at 08:38

## 2023-07-26 RX ADMIN — BUSPIRONE HYDROCHLORIDE 10 MG: 5 TABLET ORAL at 13:43

## 2023-07-26 RX ADMIN — QUETIAPINE FUMARATE 50 MG: 25 TABLET ORAL at 20:40

## 2023-07-26 RX ADMIN — MEMANTINE HYDROCHLORIDE 10 MG: 5 TABLET ORAL at 08:38

## 2023-07-26 RX ADMIN — PANTOPRAZOLE SODIUM 40 MG: 40 TABLET, DELAYED RELEASE ORAL at 06:44

## 2023-07-26 RX ADMIN — CEFTRIAXONE 1000 MG: 1 INJECTION, POWDER, FOR SOLUTION INTRAMUSCULAR; INTRAVENOUS at 17:07

## 2023-07-26 RX ADMIN — SERTRALINE HYDROCHLORIDE 50 MG: 50 TABLET ORAL at 08:38

## 2023-07-26 RX ADMIN — LEVOTHYROXINE SODIUM 75 MCG: 0.07 TABLET ORAL at 08:37

## 2023-07-26 ASSESSMENT — PAIN SCALES - GENERAL
PAINLEVEL_OUTOF10: 0

## 2023-07-26 NOTE — H&P
History and Physical    Patient:  Chrissy Mclain    CHIEF COMPLAINT:    Declining functional status    HISTORY OF PRESENT ILLNESS:   The patient is a 80 y.o. female with PMH of seasonal allergies, dementia, hypertension, hypothyroidism and OA who presented to the direct admission from PCP Dr. Andrew Jordan office for further management of declining functional status. Pt seen in PCPs office yesterday and felt to be slightly dehydrated and with UTI. Family stated they were unable to care for patient any longer at home and requested nursing home placement. Patient was then [facility] to [facility] for further care. Patient has advanced dementia and is only oriented times person. She is unable to give any useful information for HPI. HPI taken from chart review. Routine labs repeated upon admission here and essentially unremarkable. Concerning for UTI patient was started on Rocephin. Case management consulted for discharge planning assistance. Past Medical History:      Diagnosis Date    Allergic rhinitis     Dementia ([address])     Hypertension     Hyperthyroidism     Osteoarthritis        Past Surgical History:      Procedure Laterality Date    BLADDER SUSPENSION  09/01/2015    CHOLECYSTECTOMY      HYSTERECTOMY (CERVIX STATUS UNKNOWN)      INSERTABLE CARDIAC MONITOR  10/2022    TUBAL LIGATION         Medications Prior to Admission:    Prior to Admission medications    Medication Sig Start Date End Date Taking?  Authorizing Provider   Multiple Vitamin (MULTIVITAMIN ADULT PO) Take by mouth    Historical Provider, MD   omeprazole (PRILOSEC) 40 MG delayed release capsule Take 1 capsule by mouth daily 6/13/23   Eduar Hernandez MD   busPIRone (BUSPAR) 10 MG tablet TAKE 1 TABLET BY MOUTH THREE TIMES DAILY 6/13/23   Eduar Hernandez MD   Memantine HCl-Donepezil HCl 28-10 MG CP24 Take 1 capsule by mouth daily 6/13/23   Eduar Hernandez MD   folic acid (FOLVITE) 1 MG tablet TAKE 1 TABLET BY MOUTH EVERY DAY 6/13/23   Eduar Hernandez problems and try to improve other chronic medical problems as able. 08/11/2022         Anemia [D64.9]  Noted to have mild anemia. Work-up was unremarkable. Continue to monitor closely. GI prophylaxis. 08/11/2022         Declining functional status [R53.81]  As mentioned under weakness. 08/10/2022         UTI (urinary tract infection) [N39.0]  Treat with antibiotics and follow culture result.   07/26/2023    Stage 3 chronic kidney disease ([address]) [N18.30]  Renal function seems to be around her baseline. Monitor closely. Try to avoid a nephrotoxic agent. 07/26/2023    Weakness [R53.1]  Worsening generalized weakness. Advanced dementia. Try to treat any acute issue and try to improve other chronic medical problems as able. PT/OT if able. Consult  for nursing home placement since family reported they are not able to meet her needs. 07/25/2023    Hypothyroidism [E03.9]  Continue current dose of levothyroxine. TSH within normal limits. 09/08/2016    Essential hypertension [I10]  Blood pressure has been slightly elevated. Continue current regimen and add Norvasc. Monitor closely.    08/17/2015         Latoya Benton MD certifies per CMS regulation for 42 CFR [phone]), that the patient may reasonably be expected to be discharged or transferred to a hospital within 96 hours after admission to [address] [place] [address]      Electronically signed by Latoya Benton MD on 7/26/2023 at 10:33 PM

## 2023-07-26 NOTE — PLAN OF CARE
Problem: Musculoskeletal - Adult  Goal: Return mobility to safest level of function  Outcome: Progressing

## 2023-07-26 NOTE — PROGRESS NOTES
SLP ALL NOTES  Facility/Department: [facility] SURG   CLINICAL BEDSIDE SWALLOW EVALUATION    NAME: Anaya Posadas  : 1935  MRN: [id number]    ADMISSION DATE: 2023  ADMITTING DIAGNOSIS: has Essential hypertension; Hypothyroidism; Urinary incontinence; B12 deficiency; Declining functional status; Dementia ([address]); Anemia; Weakness; UTI (urinary tract infection); and Stage 3 chronic kidney disease ([address]) on their problem list.  ONSET DATE: 2023    Recent Chest Xray/CT of Chest: (2023)  IMPRESSION:  1. Mild pulmonary edema. Date of Eval: 2023  Evaluating Therapist: KATHERINE Veras    Current Diet level:  Current Diet : Easy to chew  Current Liquid Diet : Thin    Primary Complaint  Patient with no complaints    Pain:  Pain Assessment  Pain Assessment: Pain Assessment in Advanced Dementia (PAINAD)  Pain Level: 0  Patient's Stated Pain Goal: 0 - No pain  Non-Pharmaceutical Pain Intervention(s): None - no pain observed    Reason for Referral  Anaya Posadas was referred for a bedside swallow evaluation to assess the efficiency of her swallow function, identify signs and symptoms of aspiration and make recommendations regarding safe dietary consistencies, effective compensatory strategies, and safe eating environment. Impression  Dysphagia Diagnosis: Mild oral stage dysphagia  Dysphagia Impression : Patient presents with mild oropharyngeal dysphagia. Dysphagia Outcome Severity Scale: Level 5: Mild dysphagia- Distant supervision. May need one diet consistency restricted     Treatment Plan  Requires SLP Intervention: Yes  Duration of Treatment: 2 weeks  D/C Recommendations: To be determined    Recommended Diet and Intervention  Diet Solids Recommendation: Soft & Bite Sized  Liquid Consistency Recommendation:  Thin  Recommended Form of Meds: Crushed in puree as able (Patient cargiver reports patient prefers meds crushed in applesauce.)  Recommendations: Therapeutic feeds with SLP only;Setup assist  Therapeutic Interventions: Bolus control exercises;Diet tolerance monitoring;Patient/Family education    Compensatory Swallowing Strategies  Compensatory Swallowing Strategies : Alternate solids and liquids; Lingual sweep;Small bites/sips; Set up assist;Upright as possible for all oral intake;Remain upright for 30-45 minutes after meals; External pacing    Treatment/Goals  Short-term Goals  Timeframe for Short-term Goals: 1-4x per week  Goal 1: Patient will tolerate safest, least restrictive diet with no clinical signs of aspiration with 100% accuracy over 3 consecutive sessions. Goal 2: Patient/family will participate in education and recall compensatory strategies with 90% accuracy over 3 consecutive sessions. Long-term Goals  Timeframe for Long-term Goals: 2 weeks  Goal 1: Patient will tolerate safest, least restrictive diet with no clinical signs of dysphagia with 100% accuracy to improve overall quality of life. General  Chart Reviewed: Yes  Subjective  Subjective: BSE completed per MD request secondary to dementia and weakness. Patient alert, cooperative, and oriented to self. Behavior/Cognition: Alert; Cooperative;Pleasant mood  Respiratory Status: Room air  O2 Device: None (Room air)  Communication Observation: Aphasia  Follows Directions: Simple  Dentition: Dentures top  Patient Positioning: Upright in bed  Baseline Vocal Quality: Normal  Volitional Cough: Strong  Prior Dysphagia History: BSE at this facility 08/11/2022 SLP reports swallow function WFL, recommended soft and bite sized diet with thin liquids. Consistencies Administered: Easy to chew; Thin - straw;Soft and Bite-Sized    Oral Motor Deficits  Labial: Decreased rate  Dentition: Upper dentures  Oral Hygiene: Moist;Clean  Oral Hygiene Comments: WFL  Lingual: Decreased rate;Decreased strength  Velum: No Impairment  Mandible: No impairment  Gag: No Impairment  Consistencies Administered: Easy to chew; Thin - straw;Soft and

## 2023-07-26 NOTE — FLOWSHEET NOTE
07/26/23 0845   Assessment   Charting Type Shift assessment   Psychosocial   Psychosocial (WDL) X   Neurological   Neuro (WDL) X   Level of Consciousness 0   Orientation Level Oriented to person;Disoriented to place; Disoriented to time;Disoriented to situation   Cognition Poor judgement;Poor attention/concentration;Poor safety awareness; Follows commands   Speech Global aphasia   Gag Present   HEENT (Head, Ears, Eyes, Nose, & Throat)   HEENT (WDL) X   Teeth Dentures upper  (lower not with her)   Respiratory   Respiratory (WDL) WDL   Breath Sounds   Right Upper Lobe Clear   Right Middle Lobe Diminished   Right Lower Lobe Diminished   Left Upper Lobe Clear   Left Lower Lobe Diminished   Cardiac   Cardiac (WDL) WDL   Gastrointestinal   Abdominal (WDL) WDL   Abdomen Inspection Soft   RUQ Bowel Sounds Active   LUQ Bowel Sounds Active   RLQ Bowel Sounds Active   LLQ Bowel Sounds Active   Genitourinary   Genitourinary (WDL) X  (incontinent at times)   Peripheral Vascular   Peripheral Vascular (WDL) X   Edema Right lower extremity; Left lower extremity   RLE Edema +2  (ankles)   LLE Edema +2  (ankles)   Skin Integumentary    Skin Integumentary (WDL) X   Skin Color Pale   Skin Condition/Temp Dry; Warm   Skin Integrity Redness  (blanchable)   Location sacrum   Skin Integrity Site 2   Skin Integrity Location 2 Ecchymosis   Location 2 upper legs [place] area abdomen   Assessed this shift Yes   Musculoskeletal   Musculoskeletal (WDL) X   RL Extremity Weakness   LL Extremity Weakness     Pt.  Alert to name am assessment completed- Breathing equal and unlabored- No complaints of pain- pt in bed -bed alarm on and working call bell at bedside-will monitor

## 2023-07-26 NOTE — ACP (ADVANCE CARE PLANNING)
Advance Care Planning     General Advance Care Planning (ACP) Conversation    Date of Conversation: 7/25/2023  Conducted with:  Healthcare Decision Maker: Named in Advance Directive or Healthcare Power of  (name) [address] [place] [address] Decision Maker:    Primary Decision Maker: Ibis Tsang - Child - [phone]    Secondary Decision Maker: Martha Garsia - [phone]    Supplemental (Other) Decision Maker: Daniel Moncada - Child - [phone]  Click here to complete Healthcare Decision Makers including selection of the Healthcare Decision Maker Relationship (ie \"Primary\"). Today we documented Decision Maker(s). The patient will provide ACP documents. Content/Action Overview:   Has ACP document(s) NOT on file - requested patient to provide  Reviewed DNR/DNI and patient confirms current DNR status - completed forms on file (place new order if needed)    Length of Voluntary ACP Conversation in minutes:  <16 minutes (Non-Billable)

## 2023-07-26 NOTE — PROGRESS NOTES
Occupational Therapy  Facility/Department: [facility]  Occupational Therapy Initial Assessment    Name: Beronica Grey  : 1935  MRN: [id number]  Date of Service: 2023    Discharge Recommendations:  [address] [place] [address] without OT  OT Equipment Recommendations  Equipment Needed: No       Patient Diagnosis(es): There were no encounter diagnoses. Past Medical History:  has a past medical history of Allergic rhinitis, Dementia ([address]), Hypertension, Hyperthyroidism, and Osteoarthritis. Past Surgical History:  has a past surgical history that includes Cholecystectomy; Tubal ligation; Hysterectomy; bladder suspension (2015); and Insertable Cardiac Monitor (10/2022). Assessment   Performance deficits / Impairments: Decreased functional mobility ; Decreased ADL status; Decreased strength;Decreased endurance;Decreased high-level IADLs;Decreased cognition;Decreased safe awareness;Decreased balance  Assessment: This 80year old female was referred to OT services upon admission following onset of weakness. Pt is alert to person only with verbal cuing. Daughter present in room providing all PLOF. Pt sleeping on entry but easily awakened and agreeable to participate upon waking. Pt transferred to sitting at EOB with min assist. Pt sat unsupported at EOB. Pt requires max assist for dressing. Pt required max assist for combing hair seated at EOB. Pt has difficulty following basic one step commands. Pt AROM WFL, unable to complete MMT secondary to pt cognitive status. Pt come to stand at EOB with min assist with max verbal cuing. Pt ambulated to bathroom with min assist with max verbal cuing. Pt completed toileting transfer with min assist and max verbal cuing. Pt required max assist for LB clothing management and col[place] hygiene. Pt come to stand with MIN A and required max assist for LB clothing management in standing.  Pt ambulated to EOB with min assist and transferred to sitting at EOB with min assist with max verbal cuing. Pt will benefit from skilled OT services while IP. Pt family is requesting NHP as they are unable to care for her in home any longer. Pt will benefit from LTC to provide 24 hour supervision and assistance for ADL's as pt is requiring significant assist with all aspects of care.   Prognosis: Guarded  Decision Making: Low Complexity  REQUIRES OT FOLLOW-UP: Yes  Activity Tolerance  Activity Tolerance: Treatment limited secondary to decreased cognition        Plan         Restrictions  Restrictions/Precautions  Restrictions/Precautions: Fall Risk, General Precautions  Required Braces or Orthoses?: No    Subjective   General  Chart Reviewed: Yes  Patient assessed for rehabilitation services?: Yes  Family / Caregiver Present: Yes  Referring Practitioner: Lachelle Sal MD  Diagnosis: Weakness     Social/Functional History  Social/Functional History  Lives With: Daughter, Other (comment) (Lives with daughter and FRED)  Type of Home: Mobile home  Home Layout: One level  Home Access: Ramped entrance  Bathroom Shower/Tub: Tub/Shower unit  Bathroom Toilet: Handicap height  Bathroom Equipment: Shower chair, Grab bars in shower, 3-in-1 commode  Bathroom Accessibility: Walker accessible  Home Equipment: Hospital bed, Walker, rolling, Wheelchair-manual  Has the patient had two or more falls in the past year or any fall with injury in the past year?: Yes  Receives Help From: Family  ADL Assistance: Needs assistance (Assisted to shower 1x/week; sponge bath otherwise; family assists with all hygiene)  Toileting: Needs assistance  Homemaking Assistance: Needs assistance  Homemaking Responsibilities: No  Ambulation Assistance: Needs assistance (Family assist and RW.)  Transfer Assistance: Needs assistance  Active : No       Objective   Pulse: 68  Heart Rate Source: Monitor  BP: (!) 140/70  BP Location: Left upper arm  MAP (Calculated): 93  Respirations: 18  SpO2: 96 %  O2 Device: None (Room air)

## 2023-07-26 NOTE — CARE COORDINATION
Case Management Assessment  Initial Evaluation    Date/Time of Evaluation: 7/26/2023 12:10 PM  Assessment Completed by: Alexa Skinner RN    If patient is discharged prior to next notation, then this note serves as note for discharge by case management. Patient Name: Justin Jimenez                   YOB: 1935  Diagnosis: Dementia ([address]) [F03.90]  UTI (urinary tract infection) [N39.0]  Dehydration [E86.0]  Weakness [R53.1]                   Date / Time: 7/25/2023  4:22 PM    Patient Admission Status: Observation   Readmission Risk (Low < 19, Mod (19-27), High > 27): Readmission Risk Score: 10.8    Current PCP: Shanel Frederick MD  PCP verified by CM? Yes    Chart Reviewed: Yes      History Provided by: Medical Record  Patient Orientation: Person    Patient Cognition: Alert    Hospitalization in the last 30 days (Readmission):  No    If yes, Readmission Assessment in CM Navigator will be completed.     Advance Directives:      Code Status: DNR   Patient's Primary Decision Maker is: Patient Declined (Legal Next of Kin Remains as Decision Maker)    Primary Decision Maker: Paras Bradley - [phone]    Secondary Decision Maker: Conchis Pablo - [phone]    Supplemental (Other) Decision Maker: Mine Ledbetter - Child - [phone]    Discharge Planning:    Patient lives with: Children, Family Members Type of Home: Trailer/Mobile Home  Primary Care Giver: Family  Patient Support Systems include: Children, Family Members   Current Financial resources: Medicaid, Medicare  Current community resources: None  Current services prior to admission: Durable Medical Equipment            Current DME: Shower Chair, Bedside Commode, [employer], [place], Wheelchair (RW)            Type of Home Care services:  Nursing Services, OT, PT    ADLS  Prior functional level: Assistance with the following:, Bathing, Dressing, Toileting, Cooking, Housework, Shopping, Mobility  Current functional level:

## 2023-07-26 NOTE — CONSULTS
Comprehensive Nutrition Assessment    Type and Reason for Visit:  Initial, Consult    Nutrition Recommendations/Plan:   Continue current diet as medically appropriate and tolerated. Obtain bedscale weight once medically feasible. Continue to encourage PO intakes as appropriate. Avg PO intakes of 39-63% x 2 meals. Nepro ordered once daily. RD to follow pt and available PRN. Malnutrition Assessment:  Malnutrition Status:  No malnutrition (07/26/23 1115)    Context:  Acute Illness     Findings of the 6 clinical characteristics of malnutrition:  Energy Intake:  Mild decrease in energy intake (Comment) (39-63% x 2)  Weight Loss:  No significant weight loss (Weight gain of 4.5% in the last ~ 2 months using stated bw's)     Body Fat Loss:        Muscle Mass Loss:       Fluid Accumulation:  Mild Extremities   Strength:  Not Performed    Nutrition Assessment:    Pt admitted with weakness. Pt is at moderate risk for ongoing nutritional compromise r/t fair PO intakes. Nutrition Related Findings:    Weight gain of 4.5% in the last ~ 2 months using stated body weights. Overweight utilizing stated body weight. PMH dementia, HTN, B12 deficiency, CKD. Medications: protonix, lipitor, megace, folic acid, MVI. Dentures. Labs: Cl- 109, GFR 48, alk phos 107, bilirubin < 0.2. + 2 edema bilateral ankles. Redness sacrum. Wound Type: None       Current Nutrition Intake & Therapies:    Average Meal Intake: 26-50%, 51-75% (39-63% x 2 meals)     ADULT DIET; Dysphagia - Soft and Bite Sized  ADULT ORAL NUTRITION SUPPLEMENT; Dinner; Renal Oral Supplement    Anthropometric Measures:  Height: 4' 11\" (149.9 cm)  Ideal Body Weight (IBW): 95 lbs (43 kg)       Current Body Weight: 133 lb (60.3 kg), 140 % IBW. Weight Source: Stated  Current BMI (kg/m2): 26.8        Weight Adjustment For: No Adjustment                 BMI Categories: Overweight (BMI 25.0-29. 9)    Estimated Daily Nutrient Needs:  Energy Requirements Based On:

## 2023-07-26 NOTE — CARE COORDINATION
Family requesting NHP at [facility]. Referral sent via fax. Karina notified and aware. Lyla Kaplan has already spoken to the family prior to admit. CM will send H&P when ready.

## 2023-07-26 NOTE — PROGRESS NOTES
Physical Therapy  Facility/Department: [facility]  Physical Therapy Initial Assessment    Name: Aarti Marshall  : 1935  MRN: [id number]  Date of Service: 2023    Discharge Recommendations:  [address] [place] [address] with PT, IP Rehab, Continue to assess pending progress          Patient Diagnosis(es): There were no encounter diagnoses. Past Medical History:  has a past medical history of Allergic rhinitis, Dementia ([address]), Hypertension, Hyperthyroidism, and Osteoarthritis. Past Surgical History:  has a past surgical history that includes Cholecystectomy; Tubal ligation; Hysterectomy; bladder suspension (2015); and Insertable Cardiac Monitor (10/2022). Assessment   Body Structures, Functions, Activity Limitations Requiring Skilled Therapeutic Intervention: Decreased functional mobility ; Decreased strength;Decreased posture;Decreased safe awareness;Decreased cognition;Decreased endurance;Decreased balance  Assessment: PT eval completed on this patient admitted to hospital with primary diagnosis of weakness. She is received lying in bed on RA with daughter present at bedside. Daughter is her primary caregiver and gives most of the patient's background information d/t patient's cognitive deficits. Patient is oriented to person only. NAD. Pleasant and cooperative. Patient requires min A sup to sit. Able to sit to stand and ambulate 100' with RW and min A. Requires assist for RW advancement. Decreased step length and height, B flexed knees and FF posture noted. Patient returned to sup in bed with min A. She presents with deficits in functional mobility but is expected to benefit from continued skilled PT intervention to maximize her functional status prior to D/c.   Therapy Prognosis: Good  Decision Making: Low Complexity  Requires PT Follow-Up: Yes  Activity Tolerance  Activity Tolerance: Patient tolerated treatment well;Patient limited by fatigue;Patient limited by endurance;Treatment limited secondary to Cognition   Orientation  Orientation Level: Oriented to person  Cognition  Cognition Comment: STM deficits; poor safety awareness. Objective   Pulse: 68  Heart Rate Source: Monitor  BP: (!) 140/70  BP Location: Left upper arm  MAP (Calculated): 93  Respirations: 18  SpO2: 96 %  O2 Device: None (Room air)     Observation/Palpation  Observation: Patient received lying in bed with daughter present at bedside. RA. NAD. Pleasant and cooperative. Oriented to person only. Gross Assessment  AROM: Within functional limits  PROM: Within functional limits  Strength: Generally decreased, functional  Coordination: Within functional limits                    Bed mobility  Supine to Sit: Minimal assistance  Sit to Supine: Minimal assistance  Scooting: Contact guard assistance  Transfers  Sit to Stand: Minimal Assistance  Stand to Sit: Minimal Assistance  Bed to Chair: Minimal assistance  Ambulation  Surface: Level tile  Device: Rolling Walker  Assistance: Minimal assistance  Gait Deviations: Slow [place];Decreased step length;Decreased step height  Distance: 100'  Comments: flexed knees/ FF posture; decreased ELLIOT     Balance  Posture: Fair (kyphosis)  Sitting - Static: Good  Sitting - Dynamic: Good  Standing - Static: Good;-  Standing - Dynamic: Fair;+                Goals  Short Term Goals  Time Frame for Short Term Goals: 10 days  Short Term Goal 1: Patient will perform all bed mobility with no more than CGA. Short Term Goal 2: Patient will perform sit to stand and transfers with RW and  no more than CGA. Short Term Goal 3: Patient will ambulate 200'x2 with RW and no more than CGA. Short Term Goal 4: Patient will perform B LE ther ex x 15 reps. Education  Patient Education  Education Given To: Patient; Family  Education Provided: Role of Therapy;Plan of Care  Education Method: Demonstration;Verbal  Barriers to Learning: Cognition  Education Outcome: Continued education needed      Therapy Time   Individual

## 2023-07-27 LAB
ALBUMIN SERPL-MCNC: 3.6 G/DL (ref 3.4–4.8)
ALBUMIN/GLOB SERPL: 1.3 {RATIO} (ref 0.8–2)
ALP SERPL-CCNC: 103 U/L (ref 25–100)
ALT SERPL-CCNC: 10 U/L (ref 4–36)
ANION GAP SERPL CALCULATED.3IONS-SCNC: 12 MMOL/L (ref 3–16)
AST SERPL-CCNC: 13 U/L (ref 8–33)
BACTERIA UR CULT: NORMAL
BILIRUB SERPL-MCNC: <0.2 MG/DL (ref 0.3–1.2)
BUN SERPL-MCNC: 19 MG/DL (ref 6–20)
CALCIUM SERPL-MCNC: 8.6 MG/DL (ref 8.5–10.5)
CHLORIDE SERPL-SCNC: 109 MMOL/L (ref 98–107)
CO2 SERPL-SCNC: 20 MMOL/L (ref 20–30)
CREAT SERPL-MCNC: 1 MG/DL (ref 0.4–1.2)
ERYTHROCYTE [DISTWIDTH] IN BLOOD BY AUTOMATED COUNT: 13.4 % (ref 11–16)
GFR SERPLBLD CREATININE-BSD FMLA CKD-EPI: 54 ML/MIN/{1.73_M2}
GLOBULIN SER CALC-MCNC: 2.8 G/DL
GLUCOSE SERPL-MCNC: 91 MG/DL (ref 74–106)
HCT VFR BLD AUTO: 35.6 % (ref 37–47)
HGB BLD-MCNC: 11.5 G/DL (ref 11.5–16.5)
MCH RBC QN AUTO: 31.3 PG (ref 27–32)
MCHC RBC AUTO-ENTMCNC: 32.3 G/DL (ref 31–35)
MCV RBC AUTO: 96.7 FL (ref 80–100)
PLATELET # BLD AUTO: 153 K/UL (ref 150–400)
PMV BLD AUTO: 10.3 FL (ref 6–10)
POTASSIUM SERPL-SCNC: 4.3 MMOL/L (ref 3.4–5.1)
PROT SERPL-MCNC: 6.4 G/DL (ref 6.4–8.3)
RBC # BLD AUTO: 3.68 M/UL (ref 3.8–5.8)
SODIUM SERPL-SCNC: 141 MMOL/L (ref 136–145)
WBC # BLD AUTO: 6.3 K/UL (ref 4–11)

## 2023-07-27 PROCEDURE — 96372 THER/PROPH/DIAG INJ SC/IM: CPT

## 2023-07-27 PROCEDURE — G0378 HOSPITAL OBSERVATION PER HR: HCPCS

## 2023-07-27 PROCEDURE — 97530 THERAPEUTIC ACTIVITIES: CPT

## 2023-07-27 PROCEDURE — 85027 COMPLETE CBC AUTOMATED: CPT

## 2023-07-27 PROCEDURE — 2580000003 HC RX 258: Performed by: PHYSICIAN ASSISTANT

## 2023-07-27 PROCEDURE — 99231 SBSQ HOSP IP/OBS SF/LOW 25: CPT | Performed by: INTERNAL MEDICINE

## 2023-07-27 PROCEDURE — 36415 COLL VENOUS BLD VENIPUNCTURE: CPT

## 2023-07-27 PROCEDURE — 92526 ORAL FUNCTION THERAPY: CPT

## 2023-07-27 PROCEDURE — 97116 GAIT TRAINING THERAPY: CPT

## 2023-07-27 PROCEDURE — 80053 COMPREHEN METABOLIC PANEL: CPT

## 2023-07-27 PROCEDURE — 6370000000 HC RX 637 (ALT 250 FOR IP): Performed by: PHYSICIAN ASSISTANT

## 2023-07-27 PROCEDURE — 6360000002 HC RX W HCPCS: Performed by: PHYSICIAN ASSISTANT

## 2023-07-27 PROCEDURE — 96366 THER/PROPH/DIAG IV INF ADDON: CPT

## 2023-07-27 RX ADMIN — AMLODIPINE BESYLATE 5 MG: 5 TABLET ORAL at 08:27

## 2023-07-27 RX ADMIN — TROSPIUM CHLORIDE 20 MG: 20 TABLET, FILM COATED ORAL at 22:11

## 2023-07-27 RX ADMIN — MEGESTROL ACETATE 20 MG: 40 TABLET ORAL at 08:21

## 2023-07-27 RX ADMIN — MEMANTINE HYDROCHLORIDE 10 MG: 5 TABLET ORAL at 22:11

## 2023-07-27 RX ADMIN — FOLIC ACID 1 MG: 1 TABLET ORAL at 08:21

## 2023-07-27 RX ADMIN — MEMANTINE HYDROCHLORIDE 10 MG: 5 TABLET ORAL at 08:20

## 2023-07-27 RX ADMIN — DONEPEZIL HYDROCHLORIDE 10 MG: 5 TABLET, FILM COATED ORAL at 22:11

## 2023-07-27 RX ADMIN — CEFTRIAXONE 1000 MG: 1 INJECTION, POWDER, FOR SOLUTION INTRAMUSCULAR; INTRAVENOUS at 17:46

## 2023-07-27 RX ADMIN — BUSPIRONE HYDROCHLORIDE 10 MG: 5 TABLET ORAL at 22:11

## 2023-07-27 RX ADMIN — SERTRALINE HYDROCHLORIDE 50 MG: 50 TABLET ORAL at 08:21

## 2023-07-27 RX ADMIN — PANTOPRAZOLE SODIUM 40 MG: 40 TABLET, DELAYED RELEASE ORAL at 06:51

## 2023-07-27 RX ADMIN — QUETIAPINE FUMARATE 50 MG: 25 TABLET ORAL at 22:12

## 2023-07-27 RX ADMIN — MULTIPLE VITAMINS W/ MINERALS TAB 1 TABLET: TAB at 08:21

## 2023-07-27 RX ADMIN — ATORVASTATIN CALCIUM 10 MG: 10 TABLET, FILM COATED ORAL at 08:20

## 2023-07-27 RX ADMIN — BUSPIRONE HYDROCHLORIDE 10 MG: 5 TABLET ORAL at 13:31

## 2023-07-27 RX ADMIN — BUSPIRONE HYDROCHLORIDE 10 MG: 5 TABLET ORAL at 08:22

## 2023-07-27 RX ADMIN — LEVOTHYROXINE SODIUM 75 MCG: 0.07 TABLET ORAL at 08:22

## 2023-07-27 RX ADMIN — ENOXAPARIN SODIUM 30 MG: 100 INJECTION SUBCUTANEOUS at 17:43

## 2023-07-27 NOTE — PLAN OF CARE
Problem: Discharge Planning  Goal: Discharge to home or other facility with appropriate resources  Outcome: Progressing     Problem: Pain  Goal: Verbalizes/displays adequate comfort level or baseline comfort level  Outcome: Progressing     Problem: Safety - Adult  Goal: Free from fall injury  7/26/2023 2323 by Roderick Whitt RN  Outcome: Progressing  7/26/2023 0944 by Beverly Maria RN  Outcome: Progressing     Problem: Confusion  Goal: Confusion, delirium, dementia, or psychosis is improved or at baseline  Description: INTERVENTIONS:  1. Assess for possible contributors to thought disturbance, including medications, impaired vision or hearing, underlying metabolic abnormalities, dehydration, psychiatric diagnoses, and notify attending LIP  2. [place] high risk fall precautions, as indicated  3. Provide frequent short contacts to provide reality reorientation, refocusing and direction  4. Decrease environmental stimuli, including noise as appropriate  5. Monitor and intervene to maintain adequate nutrition, hydration, elimination, sleep and activity  6. If unable to ensure safety without constant attention obtain sitter and review sitter guidelines with assigned personnel  7.  Initiate Psychosocial CNS and Spiritual Care consult, as indicated  Outcome: Progressing     Problem: Musculoskeletal - Adult  Goal: Return mobility to safest level of function  7/26/2023 0944 by Beverly Maria RN  Outcome: Progressing

## 2023-07-27 NOTE — PROGRESS NOTES
Devices  Type of Devices: Call light within reach, Left in chair, Chair alarm in place, Nurse notified  Restraints  Restraints Initially in Place: No     Restrictions  Restrictions/Precautions  Restrictions/Precautions: Fall Risk, General Precautions  Required Braces or Orthoses?: No     Subjective   Pain: No c/o pain. Social/Functional History  Social/Functional History  Lives With: Daughter, Other (comment) (Lives with daughter and FRED)  Type of Home: Mobile home  Home Layout: One level  Home Access: Ramped entrance  Bathroom Shower/Tub: Tub/Shower unit  Bathroom Toilet: Handicap height  Bathroom Equipment: Shower chair, Grab bars in shower, 3-in-1 commode  Bathroom Accessibility: Walker accessible  Home Equipment: Hospital bed, Walker, rolling, Wheelchair-manual  Has the patient had two or more falls in the past year or any fall with injury in the past year?: Yes  Receives Help From: Family  ADL Assistance: Needs assistance (Assisted to shower 1x/week; sponge bath otherwise; family assists with all hygiene)  Toileting: Needs assistance  Homemaking Assistance: Needs assistance  Homemaking Responsibilities: No  Ambulation Assistance: Needs assistance (Family assist and RW.)  Transfer Assistance: Needs assistance  Active : No  Vision/Hearing       Cognition         Objective   Pulse: 65  Heart Rate Source: Monitor  BP: 93/78  MAP (Calculated): 83  Respirations: 16  SpO2: 99 %  O2 Device: None (Room air)     Observation/Palpation  Observation: Patient received lying in bed. RA. NAD. Pleasantly confused and cooperative with PT.                        Bed mobility  Supine to Sit: Minimal assistance  Scooting: Contact guard assistance  Transfers  Sit to Stand: Minimal Assistance  Stand to Sit: Contact guard assistance;Minimal Assistance  Bed to Chair: Minimal assistance  Ambulation  Surface: Level tile  Device: Rolling Walker  Assistance: Minimal assistance  Gait Deviations: Slow [place];Decreased step length;Decreased step height  Distance: 100'x2 with one sitting rest break; w/c follow  Comments: flexed knees/ FF posture; decreased ELLIOT     Balance  Posture: Fair  Sitting - Static: Good  Sitting - Dynamic: Good  Standing - Static: Good;-  Standing - Dynamic: Fair;-                Goals  Short Term Goals  Time Frame for Short Term Goals: 10 days  Short Term Goal 1: Patient will perform all bed mobility with no more than CGA. Short Term Goal 2: Patient will perform sit to stand and transfers with RW and  no more than CGA. Short Term Goal 3: Patient will ambulate 200'x2 with RW and no more than CGA. Short Term Goal 4: Patient will perform B LE ther ex x 15 reps.        Education  Patient Education  Education Given To: Patient  Education Provided: Role of Therapy;Plan of Care  Education Method: Demonstration;Verbal  Barriers to Learning: Cognition  Education Outcome: Continued education needed      Therapy Time   Individual Concurrent Group Co-treatment   Time In 0915         Time Out 0944         Minutes [address], PT

## 2023-07-27 NOTE — PROGRESS NOTES
[id number] [facility]  SPEECH/LANGUAGE PATHOLOGY   INPATIENT DAILY NOTE      [x] Daily           [] Discharge    Patient:Aurora Nuñez      LQG:[id number]/66/[id number]  PLP:[id number]  Rehab Dx/Hx: Dementia ([address]) [F03.90]  UTI (urinary tract infection) [N39.0]  Dehydration [E86.0]  Weakness [R53.1]   Allergies   Allergen Reactions    Sulfa Antibiotics      Precautions: Sit up for all meals and thereafter for 30 minutes, Eat with small bites (1/2 tsp; 1 tsp), Alternate solids with liquids, Check mouth for food residue after snacks and meals, and Take your medication with apple sauce;  Restrictions/Precautions: Fall Risk, General Precautions    Home Situation/IADL:   Social/Functional History  Lives With: Daughter, Other (comment) (Lives with daughter and FRED)  Type of Home: Mobile home  Home Layout: One level  Home Access: Ramped entrance  Bathroom Shower/Tub: Tub/Shower unit  Bathroom Toilet: Handicap height  Bathroom Equipment: Shower chair, Grab bars in shower, 3-in-1 commode  Bathroom Accessibility: Walker accessible  Home Equipment: Hospital bed, Walker, rolling, Wheelchair-manual  Has the patient had two or more falls in the past year or any fall with injury in the past year?: Yes  Receives Help From: Family  ADL Assistance: Needs assistance (Assisted to shower 1x/week; sponge bath otherwise; family assists with all hygiene)  Toileting: Needs assistance  Homemaking Assistance: Needs assistance  Homemaking Responsibilities: No  Ambulation Assistance: Needs assistance (Family assist and RW.)  Transfer Assistance: Needs assistance  Active : No  Date of Admit: 7/25/2023  Room #: G110/G110-01      Number of Minutes/Billable Intervention  Cog/Memory Deficits     Aphasia/Language     Dysarthria/Speech     Apraxia/Speech     Dysphagia/Swallowing  16   Group     Other    TOTAL Minutes Billed  16    Variance          Date: 7/27/2023  Day of ARU Week:  3     Time in: 1642  Time out: 1658  Total: 16 MIN [place] in: [place] out-   [place] total-      Variance/Reason:  [] Refusal due to   [] Medical hold/reason  [] Illness   [] Off Unit for test/procedure  [] Extra time needed to complete task  [] Other (specify)    Activity completed: Therapeutic feeds with SLP: Patient presented trials of thin liquid via straw, puree, and soft and bite sized texture. Patient with no overt s/sx of aspiration on 3/3 trials of each texture/consistency. Nurse educated on aspiration precautions and compensatory strategies. Pain: None reported or indicated  Current Diet: ADULT DIET; Dysphagia - Soft and Bite Sized  ADULT ORAL NUTRITION SUPPLEMENT; Dinner; Renal Oral Supplement  Subjective: Patient upright in bed, pleasant and agreeable to ST eval.       Goals and POC: Co-treats where appropriate with PT or OT to facilitate patient goals in functional tasks. LTG      Timeframe for Long-term Goals: 2 weeks       Short-term Goals  Timeframe for Short-term Goals: 1-4x per week  Goal 1: Patient will tolerate safest, least restrictive diet with no clinical signs of aspiration with 100% accuracy over 3 consecutive sessions. Goal 2: Patient/family will participate in education and recall compensatory strategies with 90% accuracy over 3 consecutive sessions. Alarm placed: [x]bed []chair   []other:          Barriers to progress:   [] Fatigue        [x] Cognitive Deficits   [x] Memory Deficits   [] Reduced Attn   [] Self Limiting Behaviors    [] Reduced insight/awareness     [] Visual Deficits   [] Premorbid Conditions   [] Impulsivity     [] Other      Education/Interventions used this date: Aspiration precaution guidelines/safe swallow strategies   [x] Progress was updated and reviewed with patient and/or family this date.       Interventions used this date:  [] Speech/Language Treatment    [] Instruction in HEP    Group   [x] Dysphagia Treatment   [] Cognitive Skill [place]    Other:         Assessment / Impression

## 2023-07-27 NOTE — FLOWSHEET NOTE
07/27/23 0800   Assessment   Charting Type Shift assessment   Psychosocial   Psychosocial (WDL) X   Neurological   Neuro (WDL) X   Level of Consciousness 0   Orientation Level Oriented to person;Disoriented to place; Disoriented to time;Disoriented to situation   Cognition Poor judgement;Poor attention/concentration;Poor safety awareness; Follows commands   Speech Global aphasia   Gag Present   [place] Coma Scale   Eye Opening 4   Best Verbal Response 4   Best Motor Response 6   [place] Coma Scale Score 14   HEENT (Head, Ears, Eyes, Nose, & Throat)   HEENT (WDL) X   Teeth Dentures upper  (lower not with her)   Respiratory   Respiratory (WDL) WDL   Breath Sounds   Right Upper Lobe Clear   Right Middle Lobe Diminished   Right Lower Lobe Diminished   Left Upper Lobe Clear   Left Lower Lobe Diminished   Cardiac   Cardiac (WDL) WDL   Gastrointestinal   Abdominal (WDL) WDL   Abdomen Inspection Soft   RUQ Bowel Sounds Active   LUQ Bowel Sounds Active   RLQ Bowel Sounds Active   LLQ Bowel Sounds Active   Genitourinary   Genitourinary (WDL) X  (incontinent at times)   Urine Assessment   Urine Color Yellow/straw   Urine Appearance Clear   Urine Odor No odor   Peripheral Vascular   Peripheral Vascular (WDL) X   Edema Right lower extremity; Left lower extremity   RLE Edema +2  (ankles)   LLE Edema +2  (ankles)   Skin Integumentary    Skin Integumentary (WDL) X   Skin Color Pale   Skin Condition/Temp Dry; Warm   Skin Integrity Redness  (blanchable)   Location sacrum   Skin Integrity Site 2   Skin Integrity Location 2 Ecchymosis   Location 2 upper legs, perineum   Preventative Dressing No   Assessed this shift Yes   Musculoskeletal   Musculoskeletal (WDL) X   RL Extremity Weakness   LL Extremity Weakness

## 2023-07-27 NOTE — FLOWSHEET NOTE
07/26/23 2040   Assessment   Charting Type Shift assessment   Psychosocial   Psychosocial (WDL) X   Neurological   Neuro (WDL) X   Level of Consciousness 0   Orientation Level Oriented to person;Disoriented to place; Disoriented to time;Disoriented to situation   Cognition Poor judgement;Poor attention/concentration;Poor safety awareness; Follows commands   Speech Global aphasia   Gag Present   [place] Coma Scale   Eye Opening 4   Best Verbal Response 4   Best Motor Response 6   [place] Coma Scale Score 14   HEENT (Head, Ears, Eyes, Nose, & Throat)   HEENT (WDL) X   Teeth Dentures upper  (lower not with her)   Respiratory   Respiratory (WDL) WDL   Breath Sounds   Right Upper Lobe Clear   Right Middle Lobe Diminished   Right Lower Lobe Diminished   Left Upper Lobe Clear   Left Lower Lobe Diminished   Cardiac   Cardiac (WDL) WDL   Gastrointestinal   Abdominal (WDL) WDL   Abdomen Inspection Soft   RUQ Bowel Sounds Active   LUQ Bowel Sounds Active   RLQ Bowel Sounds Active   LLQ Bowel Sounds Active   Genitourinary   Genitourinary (WDL) X  (incontinent at times)   Urine Assessment   Urine Color Yellow/straw   Urine Appearance Clear   Urine Odor No odor   Peripheral Vascular   Peripheral Vascular (WDL) X   Edema Right lower extremity; Left lower extremity   RLE Edema +2  (ankles)   LLE Edema +2  (ankles)   Skin Integumentary    Skin Integumentary (WDL) X   Skin Color Pale   Skin Condition/Temp Warm;Dry   Skin Integrity Redness; Other (comment)  (blanchable)   Location sacrum   Skin Integrity Site 2   Skin Integrity Location 2 Ecchymosis   Location 2 upper legs, [place] area   Musculoskeletal   Musculoskeletal (WDL) X   RL Extremity Weakness   LL Extremity Weakness

## 2023-07-27 NOTE — PROGRESS NOTES
Occupational Therapy  Facility/Department: [address] [place] [address] MED SURG  Daily Treatment Note  NAME: Anibal Wilkins  : 1935  MRN: [id number]    Date of Service: 2023    Discharge Recommendations:  [address] [place] [address] without OT         Patient Diagnosis(es): There were no encounter diagnoses. Assessment    Assessment: Pt received supine in bed on this date easily awakened. Pt transferred to sitting at EOB with Min assist. Pt sat upright at EOB and come to stand with min assist. Pt ambulated 100 feet with RW with min assist and chair follow for safety. Pt had seated rest break secondary to fatigue and come to stand again with min assist ambulating 100 feet. Pt requires cues for hand placement and safety with sit to stand transfer. Pt transported back to room via chair left with call light in reach upon exit, chair alarm in place. Activity Tolerance: Patient tolerated treatment well;Patient limited by fatigue;Patient limited by endurance;Treatment limited secondary to decreased cognition      Plan         Restrictions  Restrictions/Precautions  Restrictions/Precautions: Fall Risk;General Precautions  Required Braces or Orthoses?: No    Subjective   Subjective  Subjective: I am tired  Pain: No c/o pain. Objective    Vitals     Balance  Sitting: Intact  Standing: High guard  Gait  Overall Level of Assistance: Minimum assistance (with chair follow)  Distance (ft): 100 Feet (100)  Assistive Device: Walker, rolling (chair follow)              Safety Devices  Type of Devices: Call light within reach; Left in chair;Chair alarm in place;Nurse notified          Goals  Short Term Goals  Time Frame for Short Term Goals: 1 week  Short Term Goal 1: Pt to complete ADL transfers with CGA min verbal cuing. Short Term Goal 2: Pt to complete hygiene/grooming with SBA. Short Term Goal 3: Pt to complete toileting with MOD A. Short Term Goal 4: Pt to tolerate x10 minutes of activity with no s/s of fatigue.        Therapy Time

## 2023-07-27 NOTE — PLAN OF CARE
Problem: Confusion  Goal: Confusion, delirium, dementia, or psychosis is improved or at baseline  Description: INTERVENTIONS:  1. Assess for possible contributors to thought disturbance, including medications, impaired vision or hearing, underlying metabolic abnormalities, dehydration, psychiatric diagnoses, and notify attending LIP  2. [place] high risk fall precautions, as indicated  3. Provide frequent short contacts to provide reality reorientation, refocusing and direction  4. Decrease environmental stimuli, including noise as appropriate  5. Monitor and intervene to maintain adequate nutrition, hydration, elimination, sleep and activity  6. If unable to ensure safety without constant attention obtain sitter and review sitter guidelines with assigned personnel  7.  Initiate Psychosocial CNS and Spiritual Care consult, as indicated  7/27/2023 1059 by Sarah Burks RN  Outcome: Progressing  7/26/2023 5373 by Alexus Lan RN  Outcome: Progressing

## 2023-07-28 VITALS
DIASTOLIC BLOOD PRESSURE: 73 MMHG | RESPIRATION RATE: 18 BRPM | OXYGEN SATURATION: 98 % | HEART RATE: 69 BPM | TEMPERATURE: 97.7 F | WEIGHT: 133 LBS | BODY MASS INDEX: 26.81 KG/M2 | HEIGHT: 59 IN | SYSTOLIC BLOOD PRESSURE: 134 MMHG

## 2023-07-28 LAB
BACTERIA UR CULT: ABNORMAL
ORGANISM: ABNORMAL

## 2023-07-28 PROCEDURE — G0378 HOSPITAL OBSERVATION PER HR: HCPCS

## 2023-07-28 PROCEDURE — 6370000000 HC RX 637 (ALT 250 FOR IP): Performed by: PHYSICIAN ASSISTANT

## 2023-07-28 PROCEDURE — 99238 HOSP IP/OBS DSCHRG MGMT 30/<: CPT | Performed by: PHYSICIAN ASSISTANT

## 2023-07-28 PROCEDURE — 99305 1ST NF CARE MODERATE MDM 35: CPT | Performed by: FAMILY MEDICINE

## 2023-07-28 RX ORDER — CEFDINIR 300 MG/1
300 CAPSULE ORAL 2 TIMES DAILY
Qty: 6 CAPSULE | Refills: 0 | Status: SHIPPED | OUTPATIENT
Start: 2023-07-28 | End: 2023-07-31

## 2023-07-28 RX ORDER — POLYETHYLENE GLYCOL 3350 17 G/17G
17 POWDER, FOR SOLUTION ORAL DAILY PRN
Qty: 30 PACKET | Refills: 0 | Status: SHIPPED | OUTPATIENT
Start: 2023-07-28 | End: 2023-08-27

## 2023-07-28 RX ORDER — AMLODIPINE BESYLATE 5 MG/1
5 TABLET ORAL DAILY
Qty: 30 TABLET | Refills: 3 | Status: SHIPPED | OUTPATIENT
Start: 2023-07-29

## 2023-07-28 RX ADMIN — FOLIC ACID 1 MG: 1 TABLET ORAL at 08:26

## 2023-07-28 RX ADMIN — SERTRALINE HYDROCHLORIDE 50 MG: 50 TABLET ORAL at 08:26

## 2023-07-28 RX ADMIN — MEGESTROL ACETATE 20 MG: 40 TABLET ORAL at 08:26

## 2023-07-28 RX ADMIN — AMLODIPINE BESYLATE 5 MG: 5 TABLET ORAL at 08:26

## 2023-07-28 RX ADMIN — MULTIPLE VITAMINS W/ MINERALS TAB 1 TABLET: TAB at 08:26

## 2023-07-28 RX ADMIN — MEMANTINE HYDROCHLORIDE 10 MG: 5 TABLET ORAL at 08:26

## 2023-07-28 RX ADMIN — ATORVASTATIN CALCIUM 10 MG: 10 TABLET, FILM COATED ORAL at 08:26

## 2023-07-28 RX ADMIN — LEVOTHYROXINE SODIUM 75 MCG: 0.07 TABLET ORAL at 08:26

## 2023-07-28 RX ADMIN — PANTOPRAZOLE SODIUM 40 MG: 40 TABLET, DELAYED RELEASE ORAL at 06:07

## 2023-07-28 RX ADMIN — BUSPIRONE HYDROCHLORIDE 10 MG: 5 TABLET ORAL at 08:26

## 2023-07-28 NOTE — PROGRESS NOTES
Discharged to [facility]. PIV removed. Report called to nurse at [facility]. Transport per EMS. Daughter at bedside.

## 2023-07-28 NOTE — PLAN OF CARE
Problem: Discharge Planning  Goal: Discharge to home or other facility with appropriate resources  7/28/2023 1023 by Sandi Tong RN  Outcome: Completed  7/28/2023 0132 by Tavares Cortez RN  Outcome: Progressing     Problem: Pain  Goal: Verbalizes/displays adequate comfort level or baseline comfort level  Outcome: Completed     Problem: Safety - Adult  Goal: Free from fall injury  7/28/2023 1023 by Sandi Tong RN  Outcome: Completed  7/28/2023 0132 by Tavares Cortez RN  Outcome: Progressing     Problem: Confusion  Goal: Confusion, delirium, dementia, or psychosis is improved or at baseline  Description: INTERVENTIONS:  1. Assess for possible contributors to thought disturbance, including medications, impaired vision or hearing, underlying metabolic abnormalities, dehydration, psychiatric diagnoses, and notify attending LIP  2. [place] high risk fall precautions, as indicated  3. Provide frequent short contacts to provide reality reorientation, refocusing and direction  4. Decrease environmental stimuli, including noise as appropriate  5. Monitor and intervene to maintain adequate nutrition, hydration, elimination, sleep and activity  6. If unable to ensure safety without constant attention obtain sitter and review sitter guidelines with assigned personnel  7. Initiate Psychosocial CNS and Spiritual Care consult, as indicated  Outcome: Completed     Problem: Skin/Tissue Integrity  Goal: Absence of new skin breakdown  Description: 1. Monitor for areas of redness and/or skin breakdown  2. Assess vascular access sites hourly  3. Every 4-6 hours minimum:  Change oxygen saturation probe site  4. Every 4-6 hours:  If on nasal continuous positive airway pressure, respiratory therapy assess nares and determine need for appliance change or resting period.   Outcome: Completed     Problem: ABCDS Injury Assessment  Goal: Absence of physical injury  Outcome: Completed     Problem: Musculoskeletal - Adult  Goal: Return mobility to safest level of function  Outcome: Completed  Goal: Maintain proper alignment of affected body part  Outcome: Completed  Goal: Return ADL status to a safe level of function  Outcome: Completed     Problem: Musculoskeletal - Adult  Goal: Maintain proper alignment of affected body part  Outcome: Completed     Problem: Nutrition Deficit:  Goal: Optimize nutritional status  7/28/2023 1023 by Rashi Westbrook RN  Outcome: Completed  7/28/2023 0132 by Josephine Mcclellan RN  Outcome: Progressing

## 2023-07-28 NOTE — CARE COORDINATION
Pt has bed at [facility] today. Nurse to all report to [phone]. DC summary to be faxed to [phone]. (CM faxed DC summary). 07/28/23 2401 [address] Discharge   Transition of Care Consult (CM Consult) SNF   Services At/After Discharge [facility] (SNF)   [employer] Information Provided? No   Mode of Transport at Discharge BLS   Confirm Follow Up Transport Other (see comment)   Condition of Participation: Discharge Planning   The Plan for Transition of Care is related to the following treatment goals: pt to go to [facility]   The Patient and/or Patient Representative was provided with a Choice of Provider? Patient   The Patient and/Or Patient Representative agree with the Discharge Plan? Yes   Freedom of Choice list was provided with basic dialogue that supports the patient's individualized plan of care/goals, treatment preferences, and shares the quality data associated with the providers?   Yes

## 2023-07-28 NOTE — FLOWSHEET NOTE
07/28/23 0915   Assessment   Charting Type Shift assessment   Psychosocial   Psychosocial (WDL) X   Neurological   Neuro (WDL) X   Level of Consciousness 0   Orientation Level Oriented to person;Disoriented to place; Disoriented to time;Disoriented to situation   Cognition Poor judgement;Poor attention/concentration;Poor safety awareness; Follows commands   Speech Global aphasia   Gag Present   [place] Coma Scale   Eye Opening 4   Best Verbal Response 4   Best Motor Response 6   [place] Coma Scale Score 14   HEENT (Head, Ears, Eyes, Nose, & Throat)   HEENT (WDL) X   Teeth Dentures upper  (lower not with her)   Respiratory   Respiratory (WDL) WDL   Breath Sounds   Right Upper Lobe Clear   Right Middle Lobe Diminished   Right Lower Lobe Diminished   Left Upper Lobe Clear   Left Lower Lobe Diminished   Cardiac   Cardiac (WDL) WDL   Gastrointestinal   Abdominal (WDL) WDL   Abdomen Inspection Soft   RUQ Bowel Sounds Active   LUQ Bowel Sounds Active   RLQ Bowel Sounds Active   LLQ Bowel Sounds Active   Genitourinary   Genitourinary (WDL) X  (incontinent at times)   Urine Assessment   Urine Color Yellow/straw   Urine Appearance Clear   Urine Odor No odor   Peripheral Vascular   Peripheral Vascular (WDL) X   Edema Right lower extremity; Left lower extremity   RLE Edema +2  (ankles)   LLE Edema +2  (ankles)   Skin Integumentary    Skin Integumentary (WDL) X   Skin Color Pale   Skin Condition/Temp Dry; Warm   Skin Integrity Redness   Location sacrum   Skin Integrity Site 2   Skin Integrity Location 2 Ecchymosis   Location 2 upper legs, [place] area   Assessed this shift Yes   Musculoskeletal   Musculoskeletal (WDL) X   RL Extremity Weakness   LL Extremity Weakness

## 2023-07-28 NOTE — DISCHARGE SUMMARY
Discharge Summary      Patient ID: Cheryl Goetz      Patient's PCP: Azucena Gu MD    Admit Date: 7/25/2023     Discharge Date:   7/28/2023    Admitting Provider: Xochitl Goramn MD    Discharging Provider: LANA Bowens     Reason for this admission:   Declining functional status    Discharge Diagnoses: Active Hospital Problems    Diagnosis Date Noted    [facility]) [F03.90] 08/11/2022     Priority: Medium    Anemia [D64.9] 08/11/2022     Priority: Medium    Declining functional status [R53.81] 08/10/2022     Priority: Medium    UTI (urinary tract infection) [N39.0] 07/26/2023    Stage 3 chronic kidney disease ([address]) [N18.30] 07/26/2023    Weakness [R53.1] 07/25/2023    Hypothyroidism [E03.9] 09/08/2016    Essential hypertension [I10] 08/17/2015       Procedures:  CT HEAD WO CONTRAST   Final Result   1. No evidence of acute intracranial pathology. 2.  Patchy areas of decreased white matter attenuation statistically favor   chronic ischemic leukoencephalopathy. 3.  Age related atrophy. XR CHEST PORTABLE   Final Result   1. Mild pulmonary edema. Consults:   IP CONSULT TO CASE MANAGEMENT  IP CONSULT TO DIETITIAN  PT OT      Briefly:   43-year-old female with PMH of seasonal allergies, advanced dementia, hypertension, hypothyroidism and OA admitted for declining functional status. Hospital Course:       Active Hospital Problems    Diagnosis Date Noted    Dementia [facility]) [F03.90] 08/11/2022     Priority: Medium    Anemia [D64.9] 08/11/2022     Priority: Medium    Declining functional status [R53.81] 08/10/2022     Priority: Medium    UTI (urinary tract infection) [N39.0] 07/26/2023    Stage 3 chronic kidney disease ([address]) [N18.30] 07/26/2023    Weakness [R53.1] 07/25/2023    Hypothyroidism [E03.9] 09/08/2016    Essential hypertension [I10] 08/17/2015     80year-old female with PMH of seasonal allergies, advanced dementia, hypertension, hypothyroidism and OA admitted for declining appropriate      Activity: activity as tolerated  Diet:  Soft and bite sized with renal oral nutritional supplements at dinner  Follow Up: Primary Care Physician in 1 week    Labs:  For convenience and continuity at follow-up the following most recent labs are provided:    CBC:   Lab Results   Component Value Date/Time    WBC 6.3 07/27/2023 05:25 AM    HGB 11.5 07/27/2023 05:25 AM    HCT 35.6 07/27/2023 05:25 AM     07/27/2023 05:25 AM       RENAL:   Lab Results   Component Value Date/Time     07/27/2023 05:25 AM    K 4.3 07/27/2023 05:25 AM     07/27/2023 05:25 AM    CO2 20 07/27/2023 05:25 AM    BUN 19 07/27/2023 05:25 AM    CREATININE 1.0 07/27/2023 05:25 AM         Discharge Medications:     Current Discharge Medication List             Details   amLODIPine (NORVASC) 5 MG tablet Take 1 tablet by mouth daily  Qty: 30 tablet, Refills: 3      cefdinir (OMNICEF) 300 MG capsule Take 1 capsule by mouth 2 times daily for 3 days  Qty: 6 capsule, Refills: 0      polyethylene glycol (GLYCOLAX) 17 g packet Take 1 packet by mouth daily as needed for Constipation  Qty: 30 packet, Refills: 0                Details   Multiple Vitamin (MULTIVITAMIN ADULT PO) Take by mouth      omeprazole (PRILOSEC) 40 MG delayed release capsule Take 1 capsule by mouth daily  Qty: 90 capsule, Refills: 3    Associated Diagnoses: Gastroesophageal reflux disease, unspecified whether esophagitis present      busPIRone (BUSPAR) 10 MG tablet TAKE 1 TABLET BY MOUTH THREE TIMES DAILY  Qty: 90 tablet, Refills: 3    Associated Diagnoses: Anxiety      Memantine HCl-Donepezil HCl 28-10 MG CP24 Take 1 capsule by mouth daily  Qty: 90 capsule, Refills: 3    Associated Diagnoses: Memory loss, short term      folic acid (FOLVITE) 1 MG tablet TAKE 1 TABLET BY MOUTH EVERY DAY  Qty: 90 tablet, Refills: 3    Comments: 04/23/2021 2:54:47 PM      mirabegron (MYRBETRIQ) 25 MG TB24 TAKE 1 TABLET BY MOUTH ONCE A DAY  Qty: 90 tablet, Refills: 3

## 2023-07-30 LAB
BACTERIA BLD CULT ORG #2: NORMAL
BACTERIA BLD CULT: NORMAL

## 2023-07-31 ENCOUNTER — CARE COORDINATION (OUTPATIENT)
Dept: PRIMARY CARE CLINIC | Age: 88
End: 2023-07-31

## 2023-07-31 ENCOUNTER — OUTSIDE SERVICES (OUTPATIENT)
Dept: FAMILY MEDICINE CLINIC | Age: 88
End: 2023-07-31
Payer: MEDICARE

## 2023-07-31 DIAGNOSIS — I10 ESSENTIAL HYPERTENSION: ICD-10-CM

## 2023-07-31 DIAGNOSIS — F03.C0 SEVERE DEMENTIA, UNSPECIFIED DEMENTIA TYPE, UNSPECIFIED WHETHER BEHAVIORAL, PSYCHOTIC, OR MOOD DISTURBANCE OR ANXIETY (HCC): ICD-10-CM

## 2023-07-31 DIAGNOSIS — R53.81 DECLINING FUNCTIONAL STATUS: ICD-10-CM

## 2023-07-31 DIAGNOSIS — R53.1 WEAKNESS: Primary | ICD-10-CM

## 2023-07-31 DIAGNOSIS — M19.90 ARTHRITIS: ICD-10-CM

## 2023-07-31 LAB
EKG ATRIAL RATE: 75 BPM
EKG DIAGNOSIS: NORMAL
EKG P AXIS: 135 DEGREES
EKG P-R INTERVAL: 158 MS
EKG Q-T INTERVAL: 442 MS
EKG QRS DURATION: 128 MS
EKG QTC CALCULATION (BAZETT): 490 MS
EKG R AXIS: -38 DEGREES
EKG T AXIS: 26 DEGREES
EKG VENTRICULAR RATE: 74 BPM

## 2023-07-31 ASSESSMENT — ENCOUNTER SYMPTOMS: BACK PAIN: 1

## 2023-07-31 NOTE — CARE COORDINATION
[address] [facility] Transitions Initial Follow Up Call    Call within 2 business days of discharge: Yes     Patient: Anabelle Ferrer Patient : 1935   MRN: [id number]  Reason for Admission: Declining Functional Status  Discharge Date: 23 RARS: Readmission Risk Score: 10.8       Spoke with: No Answer    Discharge department/facility: [facility]    Non-face-to-face services provided:  Patient was admitted into nursing home on 2023    Follow Up  Future Appointments   Date Time Provider [address]   2023  2:00 PM Kelly Thomas MD [address] [place] [address]   3/12/2024  1:30 PM Kelly Thomas MD [address] [place] [address]

## 2023-07-31 NOTE — PROGRESS NOTES
(7/27/2023) 6 - 20 mg/dL 17 (7/26/2023) 6 - 20 mg/dL   Calcium 8.6 (7/27/2023) 8.5 - 10.5 mg/dL 8.8 (7/26/2023) 8.5 - 10.5 mg/dL   Chloride 109 (H) (7/27/2023) 98 - 107 mmol/L 109 (H) (7/26/2023) 98 - 107 mmol/L   CO2 20 (7/27/2023) 20 - 30 mmol/L 20 (7/26/2023) 20 - 30 mmol/L   Creatinine 1.0 (7/27/2023) 0.4 - 1.2 mg/dL 1.1 (7/26/2023) 0.4 - 1.2 mg/dL   Est, Glom Filt Rate 54 (L) (7/27/2023) >59 48 (L) (7/26/2023) >59   Globulin 2.8 (7/27/2023) Not Established g/dL 2.8 (7/26/2023) Not Established g/dL   Glucose 91 (7/27/2023) 74 - 106 mg/dL 84 (7/26/2023) 74 - 106 mg/dL   Potassium reflex Magnesium 4.3 (7/27/2023) 3.4 - 5.1 mmol/L 4.1 (7/26/2023) 3.4 - 5.1 mmol/L   Sodium 141 (7/27/2023) 136 - 145 mmol/L 142 (7/26/2023) 136 - 145 mmol/L   Total Bilirubin <0.2 (L) (7/27/2023) 0.3 - 1.2 mg/dL <0.2 (L) (7/26/2023) 0.3 - 1.2 mg/dL   Total Protein 6.4 (7/27/2023) 6.4 - 8.3 g/dL 6.6 (7/26/2023) 6.4 - 8.3 g/dL     Hemoglobin A1C (%)   Date Value   08/05/2015 5.4     LDL Calculated (mg/dL)   Date Value   10/25/2022 72       Lab Results   Component Value Date/Time    WBC 6.3 07/27/2023 05:25 AM    NEUTROABS 5.1 08/13/2022 04:46 AM    HGB 11.5 07/27/2023 05:25 AM    HCT 35.6 07/27/2023 05:25 AM    MCV 96.7 07/27/2023 05:25 AM     07/27/2023 05:25 AM     Lab Results   Component Value Date    TSH 3.37 07/25/2023       ASSESSMENT/PLAN    Diagnosis Orders   1. Weakness        2. Severe dementia, unspecified dementia type, unspecified whether behavioral, psychotic, or mood disturbance or anxiety ([address])        3. Arthritis        4. Declining functional status        5. Essential hypertension            No orders of the defined types were placed in this encounter. There are no discontinued medications. Controlled Substances Monitoring:      Please note: This chart was generated using Dragon dictation software.  Although every effort was made to ensure the accuracy of this automated transcription, some errors in

## 2023-08-17 ENCOUNTER — OFFICE VISIT (OUTPATIENT)
Dept: FAMILY MEDICINE CLINIC | Age: 88
End: 2023-08-17
Payer: MEDICARE

## 2023-08-17 VITALS — HEART RATE: 68 BPM | DIASTOLIC BLOOD PRESSURE: 70 MMHG | OXYGEN SATURATION: 99 % | SYSTOLIC BLOOD PRESSURE: 118 MMHG

## 2023-08-17 DIAGNOSIS — F03.C0 SEVERE DEMENTIA WITHOUT BEHAVIORAL DISTURBANCE, PSYCHOTIC DISTURBANCE, MOOD DISTURBANCE, OR ANXIETY, UNSPECIFIED DEMENTIA TYPE (HCC): Primary | ICD-10-CM

## 2023-08-17 PROCEDURE — 99213 OFFICE O/P EST LOW 20 MIN: CPT | Performed by: NURSE PRACTITIONER

## 2023-08-17 PROCEDURE — 1124F ACP DISCUSS-NO DSCNMKR DOCD: CPT | Performed by: NURSE PRACTITIONER

## 2023-08-17 NOTE — PROGRESS NOTES
Chief Complaint   Patient presents with    Dementia     F/u from nursing home       Have you seen any other physician or provider since your last visit no    Have you had any other diagnostic tests since your last visit? no    Have you changed or stopped any medications since your last visit? no
normal.      Breath sounds: Normal breath sounds. Abdominal:      General: Bowel sounds are normal.      Palpations: Abdomen is soft. Musculoskeletal:         General: Normal range of motion. Cervical back: Normal range of motion and neck supple. Comments: Pt in wheelchair   Skin:     General: Skin is warm and dry. Capillary Refill: Capillary refill takes less than 2 seconds. Neurological:      General: No focal deficit present. Mental Status: She is alert and oriented to person, place, and time. Psychiatric:         Mood and Affect: Mood normal.         Behavior: Behavior normal.        ASSESSMENT/PLAN    1. Severe dementia without behavioral disturbance, psychotic disturbance, mood disturbance, or anxiety, unspecified dementia type ([address])  Advised pt's daughter to reach out to  through her mother's current home health visits and see what her options are. She should let me know if she has any other needs. Advised she continue to encourage fluids. She should monitor her mother's urinary frequency, color, amount, odor etc and f/u if her urine decreases or if a UTI is suspected. She is agreeable to poc. I personally spent a total of 20 minutes on patient visit today including chart review, face to face with the patient obtaining the history and physical exam, review of pertinent images and tests, counseling and discussion and/or coordination of care as described above, and documentation. Total time excludes time spent on other separate services such as performing procedures or test interpretation, if applicable.       Addis Peña, APRN - CNP

## 2023-08-25 PROBLEM — N39.0 UTI (URINARY TRACT INFECTION): Status: RESOLVED | Noted: 2023-07-26 | Resolved: 2023-08-25

## 2023-09-07 RX ORDER — QUETIAPINE FUMARATE 25 MG/1
TABLET, FILM COATED ORAL
Qty: 90 TABLET | Refills: 3 | OUTPATIENT
Start: 2023-09-07

## 2023-09-13 ENCOUNTER — OFFICE VISIT (OUTPATIENT)
Dept: FAMILY MEDICINE CLINIC | Age: 88
End: 2023-09-13
Payer: MEDICARE

## 2023-09-13 VITALS
TEMPERATURE: 98 F | RESPIRATION RATE: 16 BRPM | OXYGEN SATURATION: 96 % | BODY MASS INDEX: 27.98 KG/M2 | HEART RATE: 64 BPM | WEIGHT: 138.8 LBS | HEIGHT: 59 IN | DIASTOLIC BLOOD PRESSURE: 62 MMHG | SYSTOLIC BLOOD PRESSURE: 118 MMHG

## 2023-09-13 DIAGNOSIS — I10 ESSENTIAL HYPERTENSION: ICD-10-CM

## 2023-09-13 DIAGNOSIS — F03.C0 SEVERE DEMENTIA, UNSPECIFIED DEMENTIA TYPE, UNSPECIFIED WHETHER BEHAVIORAL, PSYCHOTIC, OR MOOD DISTURBANCE OR ANXIETY (HCC): Primary | ICD-10-CM

## 2023-09-13 DIAGNOSIS — R53.81 DECLINING FUNCTIONAL STATUS: ICD-10-CM

## 2023-09-13 PROCEDURE — 99213 OFFICE O/P EST LOW 20 MIN: CPT | Performed by: FAMILY MEDICINE

## 2023-09-13 PROCEDURE — 1124F ACP DISCUSS-NO DSCNMKR DOCD: CPT | Performed by: FAMILY MEDICINE

## 2023-09-13 ASSESSMENT — ENCOUNTER SYMPTOMS: BACK PAIN: 1

## 2023-09-13 NOTE — PROGRESS NOTES
Chief Complaint   Patient presents with    Hypertension     Reg F/U       Have you seen any other physician or provider since your last visit no    Have you had any other diagnostic tests since your last visit? no    Have you changed or stopped any medications since your last visit? no
disturbance or anxiety ([address])        2. Declining functional status        3. Essential hypertension            No orders of the defined types were placed in this encounter. There are no discontinued medications. Controlled Substances Monitoring:      Please note: This chart was generated using Dragon dictation software. Although every effort was made to ensure the accuracy of this automated transcription, some errors in transcription may have occurred.

## 2023-09-29 ENCOUNTER — OFFICE VISIT (OUTPATIENT)
Dept: FAMILY MEDICINE CLINIC | Age: 88
End: 2023-09-29
Payer: MEDICARE

## 2023-09-29 VITALS
RESPIRATION RATE: 18 BRPM | SYSTOLIC BLOOD PRESSURE: 100 MMHG | OXYGEN SATURATION: 98 % | DIASTOLIC BLOOD PRESSURE: 68 MMHG | HEART RATE: 70 BPM

## 2023-09-29 DIAGNOSIS — R19.7 DIARRHEA, UNSPECIFIED TYPE: Primary | ICD-10-CM

## 2023-09-29 DIAGNOSIS — R82.90 MALODOROUS URINE: ICD-10-CM

## 2023-09-29 PROCEDURE — 99213 OFFICE O/P EST LOW 20 MIN: CPT | Performed by: NURSE PRACTITIONER

## 2023-09-29 PROCEDURE — 1124F ACP DISCUSS-NO DSCNMKR DOCD: CPT | Performed by: NURSE PRACTITIONER

## 2023-09-29 RX ORDER — DIPHENOXYLATE HYDROCHLORIDE AND ATROPINE SULFATE 2.5; .025 MG/1; MG/1
1 TABLET ORAL 4 TIMES DAILY PRN
Qty: 30 TABLET | Refills: 0 | Status: ON HOLD | OUTPATIENT
Start: 2023-09-29 | End: 2023-10-09

## 2023-09-29 RX ORDER — NITROFURANTOIN 25; 75 MG/1; MG/1
100 CAPSULE ORAL 2 TIMES DAILY
Qty: 20 CAPSULE | Refills: 0 | Status: ON HOLD | OUTPATIENT
Start: 2023-09-29 | End: 2023-10-09

## 2023-09-29 ASSESSMENT — ENCOUNTER SYMPTOMS: DIARRHEA: 1

## 2023-09-29 NOTE — PROGRESS NOTES
Patient here today for sick visit only. Health Maintenance not reviewed during this visit.
Capillary Refill: Capillary refill takes less than 2 seconds. Neurological:      Mental Status: She is alert. Mental status is at baseline. Psychiatric:         Mood and Affect: Mood normal.         Behavior: Behavior normal.          ASSESSMENT/PLAN    1. Diarrhea, unspecified type  Advise daughter to continue the bland foods and push plenty of fluids. She can give medication as directed to help resolve the diarrhea. If it persists she should follow-up on Monday. She is worried about dehydration she should consider going somewhere over the weekend to get fluids. She is agreeable to plan of care. - diphenoxylate-atropine (LOMOTIL) 2.5-0.025 MG per tablet; Take 1 tablet by mouth 4 times daily as needed for Diarrhea for up to 10 days. Max Daily Amount: 4 tablets  Dispense: 30 tablet; Refill: 0    2. Malodorous urine  Assisted patient today with her mother in the restroom who was unable to provide a urine specimen. Provided daughter with hat and a cup to retrieve urine. Advised her to bring it in on Monday for culture. She should not start the antibiotic until she collects a urine. After that she may go ahead and begin taking the medication. We will follow-up with her once culture results are received. Daughter is agreeable to plan of care. - nitrofurantoin, macrocrystal-monohydrate, (MACROBID) 100 MG capsule; Take 1 capsule by mouth 2 times daily for 10 days  Dispense: 20 capsule;  Refill: 0             ARNULFO Dangelo - CNP

## 2023-10-01 ENCOUNTER — APPOINTMENT (OUTPATIENT)
Dept: CT IMAGING | Facility: HOSPITAL | Age: 88
End: 2023-10-01
Payer: MEDICARE

## 2023-10-01 ENCOUNTER — HOSPITAL ENCOUNTER (OUTPATIENT)
Facility: HOSPITAL | Age: 88
Setting detail: OBSERVATION
Discharge: HOME | End: 2023-10-02
Attending: FAMILY MEDICINE | Admitting: INTERNAL MEDICINE
Payer: MEDICARE

## 2023-10-01 DIAGNOSIS — R19.7 DIARRHEA OF PRESUMED INFECTIOUS ORIGIN: Primary | ICD-10-CM

## 2023-10-01 DIAGNOSIS — R53.1 GENERALIZED WEAKNESS: ICD-10-CM

## 2023-10-01 LAB
ALBUMIN SERPL-MCNC: 3.3 G/DL (ref 3.4–4.8)
ALBUMIN/GLOB SERPL: 1.1 {RATIO} (ref 0.8–2)
ALP SERPL-CCNC: 112 U/L (ref 25–100)
ALT SERPL-CCNC: 8 U/L (ref 4–36)
ANION GAP SERPL CALCULATED.3IONS-SCNC: 12 MMOL/L (ref 3–16)
AST SERPL-CCNC: 14 U/L (ref 8–33)
BACTERIA URNS QL MICRO: ABNORMAL /HPF
BASOPHILS # BLD: 0 K/UL (ref 0–0.1)
BASOPHILS NFR BLD: 0.5 %
BILIRUB SERPL-MCNC: 0.5 MG/DL (ref 0.3–1.2)
BILIRUB UR QL STRIP.AUTO: NEGATIVE
BUN SERPL-MCNC: 17 MG/DL (ref 6–20)
CALCIUM SERPL-MCNC: 8.5 MG/DL (ref 8.5–10.5)
CHLORIDE SERPL-SCNC: 106 MMOL/L (ref 98–107)
CLARITY UR: CLEAR
CO2 SERPL-SCNC: 23 MMOL/L (ref 20–30)
COLOR UR: YELLOW
CREAT SERPL-MCNC: 1.1 MG/DL (ref 0.4–1.2)
EOSINOPHIL # BLD: 0.4 K/UL (ref 0–0.4)
EOSINOPHIL NFR BLD: 4.3 %
EPI CELLS #/AREA URNS HPF: ABNORMAL /HPF (ref 0–5)
ERYTHROCYTE [DISTWIDTH] IN BLOOD BY AUTOMATED COUNT: 14 % (ref 11–16)
GFR SERPLBLD CREATININE-BSD FMLA CKD-EPI: 48 ML/MIN/{1.73_M2}
GLOBULIN SER CALC-MCNC: 2.9 G/DL
GLUCOSE SERPL-MCNC: 89 MG/DL (ref 74–106)
GLUCOSE UR STRIP.AUTO-MCNC: NEGATIVE MG/DL
HCT VFR BLD AUTO: 32.3 % (ref 37–47)
HGB BLD-MCNC: 10.8 G/DL (ref 11.5–16.5)
HGB UR QL STRIP.AUTO: NEGATIVE
IMM GRANULOCYTES # BLD: 0 K/UL
IMM GRANULOCYTES NFR BLD: 0.5 % (ref 0–5)
KETONES UR STRIP.AUTO-MCNC: NEGATIVE MG/DL
LACTATE BLDV-SCNC: 1.8 MMOL/L (ref 0.4–1.9)
LEUKOCYTE ESTERASE UR QL STRIP.AUTO: NEGATIVE
LIPASE SERPL-CCNC: 19 U/L (ref 5.6–51.3)
LYMPHOCYTES # BLD: 1.1 K/UL (ref 1.5–4)
LYMPHOCYTES NFR BLD: 13.1 %
MCH RBC QN AUTO: 31.2 PG (ref 27–32)
MCHC RBC AUTO-ENTMCNC: 33.4 G/DL (ref 31–35)
MCV RBC AUTO: 93.4 FL (ref 80–100)
MONOCYTES # BLD: 0.5 K/UL (ref 0.2–0.8)
MONOCYTES NFR BLD: 6 %
MUCOUS THREADS URNS QL MICRO: ABNORMAL /LPF
NEUTROPHILS # BLD: 6.3 K/UL (ref 2–7.5)
NEUTS SEG NFR BLD: 75.6 %
NITRITE UR QL STRIP.AUTO: NEGATIVE
PH UR STRIP.AUTO: 5.5 [PH] (ref 5–8)
PLATELET # BLD AUTO: 162 K/UL (ref 150–400)
PMV BLD AUTO: 10.7 FL (ref 6–10)
POTASSIUM SERPL-SCNC: 3.5 MMOL/L (ref 3.4–5.1)
PROT SERPL-MCNC: 6.2 G/DL (ref 6.4–8.3)
PROT UR STRIP.AUTO-MCNC: 30 MG/DL
RBC # BLD AUTO: 3.46 M/UL (ref 3.8–5.8)
RBC #/AREA URNS HPF: ABNORMAL /HPF (ref 0–4)
SARS-COV-2 RDRP RESP QL NAA+PROBE: NOT DETECTED
SODIUM SERPL-SCNC: 141 MMOL/L (ref 136–145)
SP GR UR STRIP.AUTO: 1.02 (ref 1–1.03)
UA COMPLETE W REFLEX CULTURE PNL UR: ABNORMAL
UA DIPSTICK W REFLEX MICRO PNL UR: YES
URN SPEC COLLECT METH UR: ABNORMAL
UROBILINOGEN UR STRIP-ACNC: 0.2 E.U./DL
WBC # BLD AUTO: 8.4 K/UL (ref 4–11)
WBC #/AREA URNS HPF: ABNORMAL /HPF (ref 0–5)

## 2023-10-01 PROCEDURE — 6360000002 HC RX W HCPCS: Performed by: FAMILY MEDICINE

## 2023-10-01 PROCEDURE — 87635 SARS-COV-2 COVID-19 AMP PRB: CPT

## 2023-10-01 PROCEDURE — 74176 CT ABD & PELVIS W/O CONTRAST: CPT

## 2023-10-01 PROCEDURE — 51702 INSERT TEMP BLADDER CATH: CPT

## 2023-10-01 PROCEDURE — G0378 HOSPITAL OBSERVATION PER HR: HCPCS

## 2023-10-01 PROCEDURE — 83605 ASSAY OF LACTIC ACID: CPT

## 2023-10-01 PROCEDURE — 36415 COLL VENOUS BLD VENIPUNCTURE: CPT

## 2023-10-01 PROCEDURE — 83690 ASSAY OF LIPASE: CPT

## 2023-10-01 PROCEDURE — 96365 THER/PROPH/DIAG IV INF INIT: CPT

## 2023-10-01 PROCEDURE — 87040 BLOOD CULTURE FOR BACTERIA: CPT

## 2023-10-01 PROCEDURE — 80053 COMPREHEN METABOLIC PANEL: CPT

## 2023-10-01 PROCEDURE — 85025 COMPLETE CBC W/AUTO DIFF WBC: CPT

## 2023-10-01 PROCEDURE — 81001 URINALYSIS AUTO W/SCOPE: CPT

## 2023-10-01 PROCEDURE — 99285 EMERGENCY DEPT VISIT HI MDM: CPT

## 2023-10-01 PROCEDURE — 2580000003 HC RX 258: Performed by: FAMILY MEDICINE

## 2023-10-01 PROCEDURE — 96361 HYDRATE IV INFUSION ADD-ON: CPT

## 2023-10-01 RX ORDER — ONDANSETRON 2 MG/ML
4 INJECTION INTRAMUSCULAR; INTRAVENOUS EVERY 6 HOURS PRN
Status: DISCONTINUED | OUTPATIENT
Start: 2023-10-01 | End: 2023-10-02 | Stop reason: HOSPADM

## 2023-10-01 RX ORDER — ACETAMINOPHEN 325 MG/1
650 TABLET ORAL EVERY 6 HOURS PRN
Status: DISCONTINUED | OUTPATIENT
Start: 2023-10-01 | End: 2023-10-02 | Stop reason: HOSPADM

## 2023-10-01 RX ORDER — ENOXAPARIN SODIUM 100 MG/ML
40 INJECTION SUBCUTANEOUS DAILY
Status: DISCONTINUED | OUTPATIENT
Start: 2023-10-02 | End: 2023-10-02 | Stop reason: HOSPADM

## 2023-10-01 RX ORDER — DONEPEZIL HYDROCHLORIDE 5 MG/1
10 TABLET, FILM COATED ORAL NIGHTLY
Status: DISCONTINUED | OUTPATIENT
Start: 2023-10-02 | End: 2023-10-02 | Stop reason: HOSPADM

## 2023-10-01 RX ORDER — MEGESTROL ACETATE 40 MG/1
20 TABLET ORAL DAILY
Status: DISCONTINUED | OUTPATIENT
Start: 2023-10-02 | End: 2023-10-02 | Stop reason: HOSPADM

## 2023-10-01 RX ORDER — POLYETHYLENE GLYCOL 3350 17 G/17G
17 POWDER, FOR SOLUTION ORAL DAILY PRN
Status: DISCONTINUED | OUTPATIENT
Start: 2023-10-01 | End: 2023-10-02 | Stop reason: HOSPADM

## 2023-10-01 RX ORDER — QUETIAPINE FUMARATE 25 MG/1
50 TABLET, FILM COATED ORAL NIGHTLY
Status: DISCONTINUED | OUTPATIENT
Start: 2023-10-02 | End: 2023-10-02 | Stop reason: HOSPADM

## 2023-10-01 RX ORDER — DIPHENOXYLATE HYDROCHLORIDE AND ATROPINE SULFATE 2.5; .025 MG/1; MG/1
1 TABLET ORAL 4 TIMES DAILY PRN
Status: DISCONTINUED | OUTPATIENT
Start: 2023-10-01 | End: 2023-10-02 | Stop reason: HOSPADM

## 2023-10-01 RX ORDER — PANTOPRAZOLE SODIUM 40 MG/1
40 TABLET, DELAYED RELEASE ORAL
Status: DISCONTINUED | OUTPATIENT
Start: 2023-10-02 | End: 2023-10-02 | Stop reason: ALTCHOICE

## 2023-10-01 RX ORDER — METRONIDAZOLE 500 MG/1
500 TABLET ORAL EVERY 8 HOURS SCHEDULED
Status: DISCONTINUED | OUTPATIENT
Start: 2023-10-02 | End: 2023-10-02 | Stop reason: HOSPADM

## 2023-10-01 RX ORDER — ACETAMINOPHEN 650 MG/1
650 SUPPOSITORY RECTAL EVERY 6 HOURS PRN
Status: DISCONTINUED | OUTPATIENT
Start: 2023-10-01 | End: 2023-10-02 | Stop reason: HOSPADM

## 2023-10-01 RX ORDER — SODIUM CHLORIDE 9 MG/ML
INJECTION, SOLUTION INTRAVENOUS CONTINUOUS
Status: DISCONTINUED | OUTPATIENT
Start: 2023-10-01 | End: 2023-10-02 | Stop reason: HOSPADM

## 2023-10-01 RX ORDER — FOLIC ACID 1 MG/1
1 TABLET ORAL DAILY
Status: DISCONTINUED | OUTPATIENT
Start: 2023-10-02 | End: 2023-10-02 | Stop reason: HOSPADM

## 2023-10-01 RX ORDER — METRONIDAZOLE 500 MG/100ML
500 INJECTION, SOLUTION INTRAVENOUS ONCE
Status: COMPLETED | OUTPATIENT
Start: 2023-10-01 | End: 2023-10-01

## 2023-10-01 RX ORDER — LEVOTHYROXINE SODIUM 0.07 MG/1
75 TABLET ORAL DAILY
Status: DISCONTINUED | OUTPATIENT
Start: 2023-10-02 | End: 2023-10-02 | Stop reason: HOSPADM

## 2023-10-01 RX ORDER — ATORVASTATIN CALCIUM 10 MG/1
10 TABLET, FILM COATED ORAL DAILY
Status: DISCONTINUED | OUTPATIENT
Start: 2023-10-02 | End: 2023-10-02 | Stop reason: HOSPADM

## 2023-10-01 RX ORDER — BUSPIRONE HYDROCHLORIDE 5 MG/1
10 TABLET ORAL 3 TIMES DAILY
Status: DISCONTINUED | OUTPATIENT
Start: 2023-10-02 | End: 2023-10-02 | Stop reason: HOSPADM

## 2023-10-01 RX ORDER — 0.9 % SODIUM CHLORIDE 0.9 %
500 INTRAVENOUS SOLUTION INTRAVENOUS ONCE
Status: COMPLETED | OUTPATIENT
Start: 2023-10-01 | End: 2023-10-01

## 2023-10-01 RX ORDER — TROSPIUM CHLORIDE 20 MG/1
20 TABLET, FILM COATED ORAL NIGHTLY
Status: DISCONTINUED | OUTPATIENT
Start: 2023-10-02 | End: 2023-10-02 | Stop reason: HOSPADM

## 2023-10-01 RX ORDER — AMLODIPINE BESYLATE 5 MG/1
5 TABLET ORAL DAILY
Status: DISCONTINUED | OUTPATIENT
Start: 2023-10-02 | End: 2023-10-02 | Stop reason: HOSPADM

## 2023-10-01 RX ORDER — MEMANTINE HYDROCHLORIDE 5 MG/1
10 TABLET ORAL 2 TIMES DAILY
Status: DISCONTINUED | OUTPATIENT
Start: 2023-10-02 | End: 2023-10-02 | Stop reason: HOSPADM

## 2023-10-01 RX ORDER — ONDANSETRON 4 MG/1
4 TABLET, ORALLY DISINTEGRATING ORAL EVERY 8 HOURS PRN
Status: DISCONTINUED | OUTPATIENT
Start: 2023-10-01 | End: 2023-10-02 | Stop reason: HOSPADM

## 2023-10-01 RX ADMIN — METRONIDAZOLE 500 MG: 500 INJECTION, SOLUTION INTRAVENOUS at 21:58

## 2023-10-01 RX ADMIN — SODIUM CHLORIDE: 9 INJECTION, SOLUTION INTRAVENOUS at 22:38

## 2023-10-01 RX ADMIN — SODIUM CHLORIDE 500 ML: 9 INJECTION, SOLUTION INTRAVENOUS at 21:01

## 2023-10-01 ASSESSMENT — ENCOUNTER SYMPTOMS: DIARRHEA: 1

## 2023-10-01 NOTE — ED PROVIDER NOTES
nephrolithiasis. PCR stool panel along with C. difficile was ordered. Flagyl 500 mg IV was ordered. Going to admit the patient for possible infectious diarrheal etiology as well as increased weakness from continuous diarrhea. CONSULTS: (Who and What was discussed)  Yes    Discussion with Other Profesionals : Admitting Team called spoke to Dr. Kristyn Coleman about the patient, exam, lab findings as well as CT and he will admit at this time. Social Determinants : Pt with dementia    Chronic Conditions:  has a past medical history of Allergic rhinitis, Dementia ([address]), Hypertension, Hypothyroidism, and Osteoarthritis. Records Reviewed : Other Reviewed recent 9/29/23 office visit note from PCP    Disposition Considerations (include 1 Tests not done, Shared Decision Making, Pt Expectation of Test or Tx.): Discussed about testing to be performed. Discussed possible [place] due to diarrhea to keep off vaginal area, possible admission due to diarrhea and progressive weakness        I am the Primary Clinician of Record. FINAL IMPRESSION      1. Diarrhea of presumed infectious origin    2. Generalized weakness          DISPOSITION/PLAN     DISPOSITION Admitted 10/01/2023 10:47:38 PM      PATIENT REFERRED TO:  No follow-up provider specified.     DISCHARGE MEDICATIONS:  New Prescriptions    No medications on file       DISCONTINUED MEDICATIONS:  Discontinued Medications    No medications on file              (Please note that portions of this note were completed with a voice recognition program.  Efforts were made to edit the dictations but occasionally words are mis-transcribed.)    Geraldine Feldman DO (electronically signed)            Geraldine Feldman DO  10/01/23 6088

## 2023-10-01 NOTE — ED TRIAGE NOTES
Pt arrived via [facility] ems. Per ems family states that pt has had diarrhea for the past two weeks. Per family a \"family friend\" thought that she may have C-Diff. Pt A&O x1. Daughter brought to bedside.

## 2023-10-02 VITALS
BODY MASS INDEX: 26.11 KG/M2 | HEIGHT: 59 IN | TEMPERATURE: 98.1 F | RESPIRATION RATE: 18 BRPM | DIASTOLIC BLOOD PRESSURE: 73 MMHG | HEART RATE: 72 BPM | OXYGEN SATURATION: 93 % | SYSTOLIC BLOOD PRESSURE: 104 MMHG | WEIGHT: 129.5 LBS

## 2023-10-02 PROBLEM — E87.6 HYPOKALEMIA: Status: ACTIVE | Noted: 2023-10-02

## 2023-10-02 LAB
ANION GAP SERPL CALCULATED.3IONS-SCNC: 12 MMOL/L (ref 3–16)
BASOPHILS # BLD: 0 K/UL (ref 0–0.1)
BASOPHILS NFR BLD: 0.5 %
BUN SERPL-MCNC: 15 MG/DL (ref 6–20)
CALCIUM SERPL-MCNC: 8.1 MG/DL (ref 8.5–10.5)
CHLORIDE SERPL-SCNC: 111 MMOL/L (ref 98–107)
CO2 SERPL-SCNC: 20 MMOL/L (ref 20–30)
CREAT SERPL-MCNC: 1 MG/DL (ref 0.4–1.2)
EOSINOPHIL # BLD: 0.3 K/UL (ref 0–0.4)
EOSINOPHIL NFR BLD: 5.3 %
ERYTHROCYTE [DISTWIDTH] IN BLOOD BY AUTOMATED COUNT: 14 % (ref 11–16)
FERRITIN SERPL IA-MCNC: 536.4 NG/ML (ref 22–322)
GFR SERPLBLD CREATININE-BSD FMLA CKD-EPI: 54 ML/MIN/{1.73_M2}
GLUCOSE SERPL-MCNC: 83 MG/DL (ref 74–106)
HCT VFR BLD AUTO: 28.6 % (ref 37–47)
HGB BLD-MCNC: 9.6 G/DL (ref 11.5–16.5)
IMM GRANULOCYTES # BLD: 0 K/UL
IMM GRANULOCYTES NFR BLD: 0.5 % (ref 0–5)
IRON SATN MFR SERPL: 14 % (ref 15–50)
IRON SERPL-MCNC: 21 UG/DL (ref 37–145)
LYMPHOCYTES # BLD: 1 K/UL (ref 1.5–4)
LYMPHOCYTES NFR BLD: 17.4 %
MAGNESIUM SERPL-MCNC: 2 MG/DL (ref 1.7–2.4)
MCH RBC QN AUTO: 31.3 PG (ref 27–32)
MCHC RBC AUTO-ENTMCNC: 33.6 G/DL (ref 31–35)
MCV RBC AUTO: 93.2 FL (ref 80–100)
MONOCYTES # BLD: 0.4 K/UL (ref 0.2–0.8)
MONOCYTES NFR BLD: 6.7 %
NEUTROPHILS # BLD: 4 K/UL (ref 2–7.5)
NEUTS SEG NFR BLD: 69.6 %
PLATELET # BLD AUTO: 152 K/UL (ref 150–400)
PMV BLD AUTO: 11.2 FL (ref 6–10)
POTASSIUM SERPL-SCNC: 3.2 MMOL/L (ref 3.4–5.1)
RBC # BLD AUTO: 3.07 M/UL (ref 3.8–5.8)
SODIUM SERPL-SCNC: 143 MMOL/L (ref 136–145)
TIBC SERPL-MCNC: 155 UG/DL (ref 250–450)
WBC # BLD AUTO: 5.8 K/UL (ref 4–11)

## 2023-10-02 PROCEDURE — 2580000003 HC RX 258: Performed by: INTERNAL MEDICINE

## 2023-10-02 PROCEDURE — 36415 COLL VENOUS BLD VENIPUNCTURE: CPT

## 2023-10-02 PROCEDURE — 6370000000 HC RX 637 (ALT 250 FOR IP): Performed by: PHYSICIAN ASSISTANT

## 2023-10-02 PROCEDURE — 96372 THER/PROPH/DIAG INJ SC/IM: CPT

## 2023-10-02 PROCEDURE — 99235 HOSP IP/OBS SAME DATE MOD 70: CPT | Performed by: INTERNAL MEDICINE

## 2023-10-02 PROCEDURE — 6370000000 HC RX 637 (ALT 250 FOR IP): Performed by: INTERNAL MEDICINE

## 2023-10-02 PROCEDURE — 80048 BASIC METABOLIC PNL TOTAL CA: CPT

## 2023-10-02 PROCEDURE — 83550 IRON BINDING TEST: CPT

## 2023-10-02 PROCEDURE — 83540 ASSAY OF IRON: CPT

## 2023-10-02 PROCEDURE — 97116 GAIT TRAINING THERAPY: CPT

## 2023-10-02 PROCEDURE — 97535 SELF CARE MNGMENT TRAINING: CPT

## 2023-10-02 PROCEDURE — 97530 THERAPEUTIC ACTIVITIES: CPT

## 2023-10-02 PROCEDURE — 96361 HYDRATE IV INFUSION ADD-ON: CPT

## 2023-10-02 PROCEDURE — G0378 HOSPITAL OBSERVATION PER HR: HCPCS

## 2023-10-02 PROCEDURE — 97161 PT EVAL LOW COMPLEX 20 MIN: CPT

## 2023-10-02 PROCEDURE — 85025 COMPLETE CBC W/AUTO DIFF WBC: CPT

## 2023-10-02 PROCEDURE — 82728 ASSAY OF FERRITIN: CPT

## 2023-10-02 PROCEDURE — 6360000002 HC RX W HCPCS: Performed by: INTERNAL MEDICINE

## 2023-10-02 PROCEDURE — 92610 EVALUATE SWALLOWING FUNCTION: CPT

## 2023-10-02 PROCEDURE — 83735 ASSAY OF MAGNESIUM: CPT

## 2023-10-02 PROCEDURE — 97165 OT EVAL LOW COMPLEX 30 MIN: CPT

## 2023-10-02 RX ORDER — LANSOPRAZOLE 30 MG/1
30 TABLET, ORALLY DISINTEGRATING, DELAYED RELEASE ORAL DAILY
Status: DISCONTINUED | OUTPATIENT
Start: 2023-10-02 | End: 2023-10-02 | Stop reason: HOSPADM

## 2023-10-02 RX ORDER — POTASSIUM CHLORIDE 750 MG/1
40 CAPSULE, EXTENDED RELEASE ORAL ONCE
Status: COMPLETED | OUTPATIENT
Start: 2023-10-02 | End: 2023-10-02

## 2023-10-02 RX ORDER — FERROUS SULFATE TAB EC 324 MG (65 MG FE EQUIVALENT) 324 (65 FE) MG
324 TABLET DELAYED RESPONSE ORAL
Qty: 30 TABLET | Refills: 0 | Status: SHIPPED | OUTPATIENT
Start: 2023-10-03

## 2023-10-02 RX ORDER — FERROUS SULFATE TAB EC 324 MG (65 MG FE EQUIVALENT) 324 (65 FE) MG
324 TABLET DELAYED RESPONSE ORAL
Status: DISCONTINUED | OUTPATIENT
Start: 2023-10-03 | End: 2023-10-02 | Stop reason: HOSPADM

## 2023-10-02 RX ORDER — METRONIDAZOLE 500 MG/1
500 TABLET ORAL EVERY 8 HOURS SCHEDULED
Qty: 30 TABLET | Refills: 0 | Status: SHIPPED | OUTPATIENT
Start: 2023-10-02 | End: 2023-10-02

## 2023-10-02 RX ADMIN — AMLODIPINE BESYLATE 5 MG: 5 TABLET ORAL at 09:17

## 2023-10-02 RX ADMIN — QUETIAPINE FUMARATE 50 MG: 25 TABLET ORAL at 00:40

## 2023-10-02 RX ADMIN — FOLIC ACID 1 MG: 1 TABLET ORAL at 09:20

## 2023-10-02 RX ADMIN — ATORVASTATIN CALCIUM 10 MG: 10 TABLET, FILM COATED ORAL at 09:17

## 2023-10-02 RX ADMIN — BUSPIRONE HYDROCHLORIDE 10 MG: 5 TABLET ORAL at 09:17

## 2023-10-02 RX ADMIN — PANTOPRAZOLE SODIUM 40 MG: 40 TABLET, DELAYED RELEASE ORAL at 06:36

## 2023-10-02 RX ADMIN — ENOXAPARIN SODIUM 40 MG: 100 INJECTION SUBCUTANEOUS at 09:16

## 2023-10-02 RX ADMIN — LEVOTHYROXINE SODIUM 75 MCG: 0.07 TABLET ORAL at 09:17

## 2023-10-02 RX ADMIN — METRONIDAZOLE 500 MG: 500 TABLET ORAL at 06:36

## 2023-10-02 RX ADMIN — TROSPIUM CHLORIDE 20 MG: 20 TABLET, FILM COATED ORAL at 00:40

## 2023-10-02 RX ADMIN — MEGESTROL ACETATE 20 MG: 40 TABLET ORAL at 09:18

## 2023-10-02 RX ADMIN — MEMANTINE HYDROCHLORIDE 10 MG: 5 TABLET ORAL at 00:40

## 2023-10-02 RX ADMIN — POTASSIUM CHLORIDE 40 MEQ: 10 CAPSULE, COATED, EXTENDED RELEASE ORAL at 09:20

## 2023-10-02 RX ADMIN — DONEPEZIL HYDROCHLORIDE 10 MG: 5 TABLET, FILM COATED ORAL at 00:40

## 2023-10-02 RX ADMIN — MEMANTINE HYDROCHLORIDE 10 MG: 5 TABLET ORAL at 09:17

## 2023-10-02 RX ADMIN — SODIUM CHLORIDE: 9 INJECTION, SOLUTION INTRAVENOUS at 05:09

## 2023-10-02 RX ADMIN — B-COMPLEX W/ C & FOLIC ACID TAB 1 TABLET: TAB at 09:18

## 2023-10-02 ASSESSMENT — PAIN SCALES - WONG BAKER
WONGBAKER_NUMERICALRESPONSE: 0

## 2023-10-02 NOTE — DISCHARGE SUMMARY
Short Stay Summary      Patient ID: Antonio Garcia      Patient's PCP: María Molina MD    Admit Date: 10/1/2023     Discharge Date:   10/2/2023    Admitting Physician: Milly Cruz MD    Discharge Physician: LANA Osborne     Reason for this admission:   Weakness  Diarrhea    Discharge Diagnoses: Active Hospital Problems    Diagnosis Date Noted    Dementia [facility]) [F03.90] 08/11/2022     Priority: Medium    Hypokalemia [E87.6] 10/02/2023    Diarrhea [R19.7] 10/01/2023    Stage 3 chronic kidney disease ([address]) [N18.30] 07/26/2023    Hypothyroidism [E03.9] 09/08/2016    Essential hypertension [I10] 08/17/2015       Procedures:  CT ABDOMEN PELVIS WO CONTRAST Additional Contrast? None   Final Result      1. Small bilateral pleural effusions. 2.  Bilateral nephrolithiasis. 3.  No CT evidence of colitis as clinically queried. Consults:   Case management  PT OT    HISTORY OF PRESENT ILLNESS:   The patient is a 80 y.o. female with PMH of advanced dementia, seasonal allergies, HTN, hypothyroidism and OA who presented to the emergency department with family for further evaluation of diarrhea and generalized weakness. Daughter stated diarrhea has been intermittent for the past 2 weeks. Patient reportedly having diarrhea \"any time anything gets on her stomach. \"  Diarrhea described as brownish/yellow in nature. Sister is a retired nurse and thought she may have C. difficile. She was recently started on Macrobid for UTI on Friday. Patient has no history of C. difficile. No recent antibiotic use reported. No fevers. Patient currently lives with her daughter. She was in a nursing facility for 1 week in July but since then has been cared for at home. Patient also reportedly so weak she was not able to stand on her own. She has required significant help getting to the bedside commode.     Review of system  Unable to obtain with patient's dementia    Past Medical History:      Diagnosis Date

## 2023-10-02 NOTE — ED NOTES
Pt alert to person. She is resting quietly on stretcher. Daughter at bedside. Flagyl infusing at this time. Pt has been changed, cleaned, repositioned, blankets, iv is wrapped with kerlex.   Report to James Chris RN  10/01/23 7691

## 2023-10-02 NOTE — CARE COORDINATION
Case Management Assessment Discharge Note    Date / Time of Note:  10/02/23 12:07 PM  Discharge Note Completed by: Dre Matute RN      Patient Name: Anitha Wagoner   YOB: 1935  Diagnosis: Diarrhea of presumed infectious origin [R19.7]  Diarrhea [R19.7]  Generalized weakness [R53.1]   Date / Time: 10/1/2023  6:31 PM    Current PCP: Candido Pro MD  Clinic patient: Yes    Hospitalization in the last 30 days: No    Advance Directives:  Code Status: Full Code    Financial:  Payor: Pito Celaya / Plan: Dee Dee Bird / Product Type: *No Product type* /      Pharmacy:    [facility] #[id number] - [address] [phone] [address] [place] [id number]  Phone: [phone] Fax: [address], [address] [facility] [address] [address]  Phone: [phone] Fax: [phone]      Assistance purchasing medications?:  none  Assistance provided by Case Management: None at this time    Does patient want to participate in local refill/ meds to beds program?: Yes    Meds To Beds General Rules:  1. Can ONLY be done Monday- Friday between 8:30am-5pm  2. Prescription(s) must be in pharmacy by 3pm to be filled same day  3. Copy of patient's insurance/ prescription drug card and patient face sheet must be sent along with the prescription(s)  4. Cost of Rx cannot be added to hospital bill. If financial assistance is needed, please contact unit  or ;  or  CANNOT provide pharmacy voucher for patients co-pays  5.  Patients can then  the prescription on their way out of the hospital at discharge, or pharmacy can deliver to the bedside if staff is available. (payment due at time of pick-up or delivery - cash, check, or card accepted)     Able to afford home medications/ co-pay costs: Yes    DISCHARGE Disposition: Home

## 2023-10-02 NOTE — CARE COORDINATION
Lives With: Daughter  Type of Home: Mobile home  Home Layout: One level  Home Access: Ramped entrance  Bathroom Shower/Tub: Tub/Shower unit  Bathroom Toilet: Bedside commode  Bathroom Equipment: Tub transfer bench, Grab bars in shower, 3-in-1 commode  Bathroom Accessibility: Walker accessible  [facility], Walker, rolling, Wheelchair-manual  Has the patient had two or more falls in the past year or any fall with injury in the past year?: Yes  Receives Help From: Family  ADL Assistance: Needs assistance  Homemaking Assistance: Needs assistance  Homemaking Responsibilities: No  Ambulation Assistance: Needs assistance  Transfer Assistance: Needs assistance  Active : No    Lives with daughter. Has BSC, cane, bed, RW, WC. Order for patient lift requested per family. Family requested Amor SMITH at [place]. Has 24/7 care.

## 2023-10-02 NOTE — DISCHARGE INSTRUCTIONS
Activity: activity as tolerated  Diet: Dysphagia minced and moist  Follow Up: Primary Care Physician in one week

## 2023-10-02 NOTE — FLOWSHEET NOTE
10/02/23 0920   Assessment   Charting Type Shift assessment   Psychosocial   Psychosocial (WDL) WDL   Neurological   Neuro (WDL) X   Level of Consciousness 0   Orientation Level Oriented to person;Disoriented to place; Disoriented to time;Disoriented to situation   Gag Present   Swallow Screening   Is the patient unable to remain alert for testing? No   Is the patient on a modified diet (thickened liquids) due to pre-existing dysphagia? No   Is there presence of existing enteral tube feeding via the stomach or nose? No   Is there presence of head-of-bed restrictions (less than 30 degrees)? No   Is there presence of tracheotomy tube? No   Is the patient ordered nothing-by-mouth status? No   3 oz Water Swallow Screen Pass   Blackwell Coma Scale   Eye Opening 4   Best Verbal Response 5   Best Motor Response 6   Blackwell Coma Scale Score 15   NIHSS Stroke Scale   NIHSS Stroke Scale Assessed No   HEENT (Head, Ears, Eyes, Nose, & Throat)   HEENT (WDL) X   Teeth Dentures upper;Dentures lower   Respiratory   Respiratory (WDL) WDL   L Breath Sounds Clear;Diminished   R Breath Sounds Clear;Diminished   Cardiac   Cardiac (WDL) WDL   Gastrointestinal   Abdominal (WDL) X   RUQ Bowel Sounds Active   LUQ Bowel Sounds Active   RLQ Bowel Sounds Active   LLQ Bowel Sounds Active   GI Symptoms Diarrhea   Genitourinary   Genitourinary (WDL) X  (incontinent at times)   Skin Integumentary    Skin Integumentary (WDL) X   Skin Color Pale   Skin Condition/Temp Dry; Warm   Location BUE abdomen   Multiple Skin Integrity Sites Yes   Skin Integrity Ecchymosis   Skin Integrity Site 2   Skin Integrity Location 2 Ecchymosis   Location 2 BLE   Assessed this shift Yes   Musculoskeletal   Musculoskeletal (WDL) WDL   Urinary Catheter 10/01/23 Pabon   Placement Date/Time: 10/01/23 2120   Present on Admission/Arrival: No  Inserted by:   2nd Staff Assisting: emy renae  Insertion attempts: 1  Catheter Type: Pabon  Catheter Size: 14 FR  Catheter Balloon

## 2023-10-02 NOTE — ACP (ADVANCE CARE PLANNING)
Advance Care Planning     General Advance Care Planning (ACP) Conversation    Date of Conversation: 10/2/2023  Conducted with: Patient with Decision Making Capacity    Healthcare Decision Maker:    Primary Decision Maker: Susan Buck - [phone]    Secondary Decision Maker: Colby y - [phone]    Supplemental (Other) Decision Maker: Beth Buck - [phone]  Click here to complete Healthcare Decision Makers including selection of the Healthcare Decision Maker Relationship (ie \"Primary\"). Today we documented Decision Maker(s) consistent with Legal Next of Kin hierarchy. Content/Action Overview:   Has ACP document(s) on file - reflects the patient's care preferences  Reviewed DNR/DNI and patient elects DNR order - completed portable DNR form & placed order  artificial nutrition, ventilation preferences, and resuscitation preferences      Length of Voluntary ACP Conversation in minutes:  <16 minutes (Non-Billable)    Remer Bernheim

## 2023-10-02 NOTE — PROGRESS NOTES
CLINICAL PHARMACY NOTE: MEDS TO BEDS    Total # of Prescriptions Filled: 2   The following medications were delivered to the patient:  Current Discharge Medication List        START taking these medications    Details   ferrous sulfate 324 (65 Fe) MG EC tablet Take 1 tablet by mouth daily (with breakfast)  Qty: 30 tablet, Refills: 0      metroNIDAZOLE (FLAGYL) 500 MG tablet Take 1 tablet by mouth every 8 hours for 10 days  Qty: 30 tablet, Refills: 0           Ferrous sulfate 325mg tablets dispensed in place of 324mg tablets    Additional Documentation:  Medications were delivered to patient's room and signed for by Ibis Tsang
No stool sample at this time- No noted bowel movement
Occupational Therapy  Facility/Department: [facility]  Occupational Therapy Initial Assessment    Name: Ronnie Lopez  : 1935  MRN: [id number]  Date of Service: 10/2/2023    Discharge Recommendations:  24 hour supervision or assist          Patient Diagnosis(es): The primary encounter diagnosis was Diarrhea of presumed infectious origin. A diagnosis of Generalized weakness was also pertinent to this visit. Past Medical History:  has a past medical history of Allergic rhinitis, Dementia ([address]), Hypertension, Hypothyroidism, and Osteoarthritis. Past Surgical History:  has a past surgical history that includes Cholecystectomy; Tubal ligation; Hysterectomy; bladder suspension (2015); and Insertable Cardiac Monitor (10/2022). Assessment   Performance deficits / Impairments: Decreased strength;Decreased endurance;Decreased high-level IADLs;Decreased ADL status; Decreased cognition;Decreased functional mobility ; Decreased safe awareness;Decreased balance  Assessment: This 80year old female was referred to OT services upon admission following onset of diarrhea. Pt is alert to person only. Pt completed bed mobility with MOD A. Pt sat at EOB SBA. Pt come to stand with min assist and transferred had seated rest break. Pt come to stand 2nd trial with min assist and transferred to [facility] with min x2 with RW. Pt required max assist for toileting tasks. Pt come to stand with MOD A from toilet and stepped to recliner with RW and min x2 assist. Pt positioned in recliner for comfort. Call light in reach. Daughter requesting demi lift for home. Pt will require 24 hour supervision upon DC. Prognosis: Good  Decision Making: Low Complexity  REQUIRES OT FOLLOW-UP: Yes  Activity Tolerance  Activity Tolerance: Patient Tolerated treatment well        Plan   Occupational Therapy Plan  Times Per Week: 3-5  Times Per Day:  Once a day  Days Per Week: 5 Days  Therapy Duration: 4-7 Days  Current Treatment
Offered spiritual care to patient. Told pt to reach out if they needed anything further.
Physical Therapy  Facility/Department: [facility]  Physical Therapy Initial Assessment    Name: Patrick Jones  : 1935  MRN: [id number]  Date of Service: 10/2/2023    Discharge Recommendations:  24 hour supervision or assist, Home with Home health PT   PT Equipment Recommendations  Equipment Needed: Yes (Patient's daughter requests mechanical lift at home.)      Patient Diagnosis(es): The primary encounter diagnosis was Diarrhea of presumed infectious origin. A diagnosis of Generalized weakness was also pertinent to this visit. Past Medical History:  has a past medical history of Allergic rhinitis, Dementia ([address]), Hypertension, Hypothyroidism, and Osteoarthritis. Past Surgical History:  has a past surgical history that includes Cholecystectomy; Tubal ligation; Hysterectomy; bladder suspension (2015); and Insertable Cardiac Monitor (10/2022). Assessment   Body Structures, Functions, Activity Limitations Requiring Skilled Therapeutic Intervention: Decreased functional mobility ; Decreased strength;Decreased safe awareness;Decreased cognition;Decreased endurance;Decreased balance  Assessment: PT eval completed on this patient admitted to hospital with primary diagnosis of diarrhea. She is received lying in bed on RA with daughter present at bedside. NAD. Patient is drowsy but easily arouses and is pleasant and cooperative. Oriented to person only. Patient requires mod A sup to sit. Able to maintain sitting balance EOB with SBA. Sit to stand with RW assist varying between mod x 1 and min A x2. Posterior lean in stand. Able to ambulate 2 steps x 2 with RW and min A x 2. Max v/c for sequencing and safety. Quick fatigue. Patient left [facility] with chair alarm and daughter present. She presents with deficits in functional mobility but is expected to benefit from continued skilled PT intervention to improve her mobility status prior to D/C.   Therapy Prognosis: Fair  Decision Making: Low Complexity  Requires
Pt arrived to floor from ED via stretch. Patient pleasantly confused. She was accompanies by her daughter that patient lives with. Vitals taken and recorded. Head to toe assessment done. Appropriate meds administered. HOB elevated. Bed alarm on. Call light with in reach.
Pt discharged home-  IV removed cath tip intact gauze and tape applied- dumont catheter removed per Jose Richardson -Pt tolerated well- OK to send pt home without voiding after catheter removal per Jose Richardson - Daughter received discharge instructions- Daughter verbalized understanding of all instructions-- Pt getting assistance at this time with getting ready-
Pt left via wheelchair  and assisted into private vehicle with daughter at side - All items taken and accounted for- No acute distress noted
and bite sized. Patient demonstrated decreased rate, strength, and ROM. Patient with mild oral stasis. Patient with 10% oral residue on trials of soft and bite sized texture, cleared effectively with liquid wash. Patient with suspected delayed swallow initiation of 3-5 seconds. Patient with no overt s/sx of aspiration on any of the trials presented despite bolus size/consistency/texture. Indicators of Pharyngeal Phase Dysfunction   Pharyngeal Phase  Pharyngeal Phase: WFL  Pharyngeal Phase   Pharyngeal Phase: WFL    Prognosis  Prognosis: Good  Prognosis Considerations: Age;Medical Diagnosis; Medical Prognosis;Previous Level of Function  Consulted and agree with results and recommendations: Patient; Family member  Family member consulted: Daughter    Education  Patient Education: Patient with daughter at bedside, educated on aspiration precaution and safe swallow protocol. Patient Education Response: Verbalizes understanding;Demonstrated understanding  Safety Devices in place: Yes  Type of devices: All fall risk precautions in place; Patient at risk for falls; Bed alarm in place;Call light within reach; Left in bed;Sitter present       Therapy Time  [id number]  [address] [place] [address]  10/2/2023 3:30 PM

## 2023-10-03 RX ORDER — METRONIDAZOLE 375 MG/1
375 CAPSULE ORAL 3 TIMES DAILY
Qty: 30 CAPSULE | Refills: 0 | Status: SHIPPED | OUTPATIENT
Start: 2023-10-03 | End: 2023-10-13

## 2023-10-03 NOTE — CARE COORDINATION
[facility].

## 2023-10-03 NOTE — CARE COORDINATION
Placed call to patient regarding recent hospital stay. Spoke with patient daughter regarding care. Daughter stated when they got her home she just had a accident one time other than that she is doing great so far. Daughter stated she wishes they would have not sent her home so fast. Daughter also stated with the antibiotic she was prescribed she could not crush it, so she called family doctor they ordered her a liquid form of this medication. Asked daughter if she had any other questions or concerns stated not at this time.

## 2023-10-04 ENCOUNTER — CARE COORDINATION (OUTPATIENT)
Dept: CARE COORDINATION | Age: 88
End: 2023-10-04

## 2023-10-05 NOTE — CARE COORDINATION
Care Transitions Initial Follow Up Call    Call within 2 business days of discharge: Yes    Patient Current Location:  Home: [address] [place] [address]    Care Transition Nurse contacted the family by telephone to perform post hospital discharge assessment. Verified name and  with family as identifiers. Provided introduction to self, and explanation of the Care Transition Nurse role. Patient: Shimon Pelaez Patient : 1935   MRN: [id number]  Reason for Admission: diarrhea  Discharge Date: 10/2/23 RARS: Readmission Risk Score: 10.8      Last Discharge Facility       Date Complaint Diagnosis Description Type Department Provider    10/1/23 Diarrhea Diarrhea of presumed infectious origin . .. ED to Hosp-Admission (Discharged) (ADMITTED) [facility] Rosa M Mujica MD; Mildred Bashir. .. Was this an external facility discharge? No Discharge Facility: [facility]    Challenges to be reviewed by the provider   Additional needs identified to be addressed with provider: No  none               Method of communication with provider: chart routing. Daughter states that they got antibiotic started yesterday because they were trying to get in a liquid form. They had to get capsules. She denies any diarrhea for past 2 days. We discussed HFU appointment. Care Transition Nurse reviewed discharge instructions with family who verbalized understanding. The family was given an opportunity to ask questions and does not have any further questions or concerns at this time. Were discharge instructions available to patient? Yes. Reviewed appropriate site of care based on symptoms and resources available to patient including: PCP. The family agrees to contact the PCP office for questions related to their healthcare. Advance Care Planning:   Does patient have an Advance Directive: not on file; education provided.     Medication reconciliation was performed with family, who verbalizes understanding of

## 2023-10-05 NOTE — PATIENT INSTRUCTIONS
Pre-Visit chart review completed. All lab and imaging orders reviewed for outstanding orders and none found. Referrals reviewed and none outstanding. All Health Maintenance requirements reviewed and noted for any that are due at upcoming visit. Any hospital admission documentation, ER documentation or consult notes scanned to chart since last visit have been reviewed and noted for provider to review during upcoming visit. 10/1 - 10/2 [facility] The medication list included in this document is our record of what you are currently taking, including any changes that were made at today's visit. If you find any differences when compared to your medications at home, or have any questions that were not answered at your visit, please contact the office. Keep a list of your medicines with you. List all of the prescription medicines, nonprescription medicines, supplements, natural remedies, and vitamins that you take. Tell your healthcare providers who treat you about all of the products you are taking. Your provider can provide you with a form to keep track of them. Just ask. Follow the directions that come with your medicine, including information about food or alcohol. Make sure you know how and when to take your medicine. Do not take more or less than you are supposed to take. Keep all medicines out of the reach of children. Store medicines according to the directions on the label. Monitor yourself. Learn to know how your body reacts to your new medicine and keep track of how it makes you feel before attempting (If your provider has allowed you to do so) to drive or go to work. Seek emergency medical attention if you think you have used too much of this medicine. An overdose of any prescription medicine can be fatal. Overdose symptoms may include extreme drowsiness, muscle weakness, confusion, cold and clammy skin, pinpoint pupils, shallow breathing, slow heart rate, fainting, or coma.   Don't

## 2023-10-06 LAB
BACTERIA BLD CULT ORG #2: NORMAL
BACTERIA BLD CULT: NORMAL

## 2023-10-10 ENCOUNTER — OFFICE VISIT (OUTPATIENT)
Dept: FAMILY MEDICINE CLINIC | Age: 88
End: 2023-10-10

## 2023-10-10 VITALS
HEIGHT: 59 IN | SYSTOLIC BLOOD PRESSURE: 112 MMHG | DIASTOLIC BLOOD PRESSURE: 64 MMHG | RESPIRATION RATE: 16 BRPM | OXYGEN SATURATION: 96 % | TEMPERATURE: 97.2 F | HEART RATE: 75 BPM | BODY MASS INDEX: 26 KG/M2 | WEIGHT: 129 LBS

## 2023-10-10 DIAGNOSIS — R41.3 MEMORY LOSS, SHORT TERM: ICD-10-CM

## 2023-10-10 DIAGNOSIS — F41.9 ANXIETY: ICD-10-CM

## 2023-10-10 DIAGNOSIS — R63.4 WEIGHT LOSS: ICD-10-CM

## 2023-10-10 DIAGNOSIS — D50.9 IRON DEFICIENCY ANEMIA, UNSPECIFIED IRON DEFICIENCY ANEMIA TYPE: ICD-10-CM

## 2023-10-10 DIAGNOSIS — I10 ESSENTIAL HYPERTENSION: ICD-10-CM

## 2023-10-10 DIAGNOSIS — K21.9 GASTROESOPHAGEAL REFLUX DISEASE, UNSPECIFIED WHETHER ESOPHAGITIS PRESENT: ICD-10-CM

## 2023-10-10 DIAGNOSIS — R32 URINARY INCONTINENCE, UNSPECIFIED TYPE: ICD-10-CM

## 2023-10-10 DIAGNOSIS — Z13.220 SCREENING, LIPID: ICD-10-CM

## 2023-10-10 DIAGNOSIS — E03.9 HYPOTHYROIDISM, UNSPECIFIED TYPE: ICD-10-CM

## 2023-10-10 DIAGNOSIS — R19.7 DIARRHEA, UNSPECIFIED TYPE: Primary | ICD-10-CM

## 2023-10-10 RX ORDER — OMEPRAZOLE 40 MG/1
40 CAPSULE, DELAYED RELEASE ORAL DAILY
Qty: 90 CAPSULE | Refills: 3 | Status: SHIPPED | OUTPATIENT
Start: 2023-10-10

## 2023-10-10 RX ORDER — METRONIDAZOLE 375 MG/1
375 CAPSULE ORAL 3 TIMES DAILY
Qty: 30 CAPSULE | Refills: 0 | Status: CANCELLED | OUTPATIENT
Start: 2023-10-10 | End: 2023-10-20

## 2023-10-10 RX ORDER — FOLIC ACID 1 MG/1
TABLET ORAL
Qty: 90 TABLET | Refills: 3 | Status: SHIPPED | OUTPATIENT
Start: 2023-10-10

## 2023-10-10 RX ORDER — BUSPIRONE HYDROCHLORIDE 10 MG/1
TABLET ORAL
Qty: 90 TABLET | Refills: 3 | Status: SHIPPED | OUTPATIENT
Start: 2023-10-10

## 2023-10-10 RX ORDER — AMLODIPINE BESYLATE 5 MG/1
5 TABLET ORAL DAILY
Qty: 90 TABLET | Refills: 3 | Status: SHIPPED | OUTPATIENT
Start: 2023-10-10

## 2023-10-10 RX ORDER — DIPHENOXYLATE HYDROCHLORIDE AND ATROPINE SULFATE 2.5; .025 MG/1; MG/1
1 TABLET ORAL 4 TIMES DAILY PRN
Qty: 30 TABLET | Refills: 0 | Status: CANCELLED | OUTPATIENT
Start: 2023-10-10 | End: 2023-10-20

## 2023-10-10 RX ORDER — ATORVASTATIN CALCIUM 10 MG/1
10 TABLET, FILM COATED ORAL DAILY
Qty: 90 TABLET | Refills: 3 | Status: SHIPPED | OUTPATIENT
Start: 2023-10-10

## 2023-10-10 RX ORDER — LEVOTHYROXINE SODIUM 0.07 MG/1
75 TABLET ORAL DAILY
Qty: 90 TABLET | Refills: 3 | Status: SHIPPED | OUTPATIENT
Start: 2023-10-10

## 2023-10-10 RX ORDER — FERROUS SULFATE TAB EC 324 MG (65 MG FE EQUIVALENT) 324 (65 FE) MG
324 TABLET DELAYED RESPONSE ORAL
Qty: 30 TABLET | Refills: 0 | Status: SHIPPED | OUTPATIENT
Start: 2023-10-10

## 2023-10-10 RX ORDER — MEGESTROL ACETATE 40 MG/1
20 TABLET ORAL DAILY
Qty: 15 TABLET | Refills: 11 | Status: SHIPPED | OUTPATIENT
Start: 2023-10-10

## 2023-10-10 ASSESSMENT — ENCOUNTER SYMPTOMS: BACK PAIN: 1

## 2023-10-16 ENCOUNTER — HOSPITAL ENCOUNTER (OUTPATIENT)
Facility: HOSPITAL | Age: 88
Discharge: HOME | End: 2023-10-16
Payer: MEDICARE

## 2023-10-16 PROCEDURE — 87324 CLOSTRIDIUM AG IA: CPT

## 2023-10-16 PROCEDURE — 87449 NOS EACH ORGANISM AG IA: CPT

## 2023-10-17 LAB
REASON FOR REJECTION: NORMAL
REJECTED TEST: NORMAL

## 2023-11-02 ENCOUNTER — CARE COORDINATION (OUTPATIENT)
Dept: PRIMARY CARE CLINIC | Age: 88
End: 2023-11-02

## 2023-11-02 NOTE — CARE COORDINATION
Luke Pratt, RN  Manager Care Coordination  [phone]     Attempting fu call, unsuccessful. Unable to leave message.

## 2023-11-06 PROBLEM — Z51.5 ENCOUNTER FOR PALLIATIVE CARE: Status: ACTIVE | Noted: 2023-11-06

## 2023-12-06 ENCOUNTER — TELEPHONE (OUTPATIENT)
Dept: FAMILY MEDICINE CLINIC | Age: 88
End: 2023-12-06

## 2023-12-06 NOTE — TELEPHONE ENCOUNTER
----- Message from Charissa Garcia sent at 2023 10:18 AM EST -----  Subject: Message to Provider    QUESTIONS  Information for Provider? pt's daughter is checking on the order for   mattress, pillows, diapers that comes through a Medicaid waiver program.   ---------------------------------------------------------------------------  --------------  Harika Hustle Kirk  [phone]; OK to leave message on voicemail  ---------------------------------------------------------------------------  --------------  SCRIPT ANSWERS  Relationship to Patient? Other/Third Party  Representative Name? Juliet Hairston  Is the representative on the Communication Release of Information (SHUN)   form in Epic?  Yes

## 2023-12-06 NOTE — TELEPHONE ENCOUNTER
Spoke with Keyanna Payton and she stated that [facility]  was going to fax over orders for a mattress, pillows, briefs and chucks. I explained to her that I hadn't received any paperwork or anything. She gave me Cinthia's phone number ([phone]) and I attempted to contact her and left a message asking her to contact office.

## 2023-12-13 ENCOUNTER — TELEMEDICINE (OUTPATIENT)
Dept: FAMILY MEDICINE CLINIC | Age: 88
End: 2023-12-13
Payer: MEDICARE

## 2023-12-13 DIAGNOSIS — I10 ESSENTIAL HYPERTENSION: ICD-10-CM

## 2023-12-13 DIAGNOSIS — F03.C0 SEVERE DEMENTIA WITHOUT BEHAVIORAL DISTURBANCE, PSYCHOTIC DISTURBANCE, MOOD DISTURBANCE, OR ANXIETY, UNSPECIFIED DEMENTIA TYPE (HCC): ICD-10-CM

## 2023-12-13 DIAGNOSIS — R53.81 DECLINING FUNCTIONAL STATUS: Primary | ICD-10-CM

## 2023-12-13 DIAGNOSIS — M19.90 ARTHRITIS: ICD-10-CM

## 2023-12-13 PROCEDURE — 1124F ACP DISCUSS-NO DSCNMKR DOCD: CPT | Performed by: FAMILY MEDICINE

## 2023-12-13 PROCEDURE — 99213 OFFICE O/P EST LOW 20 MIN: CPT | Performed by: FAMILY MEDICINE

## 2023-12-13 RX ORDER — QUETIAPINE FUMARATE 50 MG/1
TABLET, FILM COATED ORAL
Qty: 45 TABLET | Refills: 2 | Status: SHIPPED | OUTPATIENT
Start: 2023-12-13

## 2023-12-13 NOTE — PROGRESS NOTES
SUBJECTIVE:    Patient ID: Roxanne Mace is a 80 y.o. female. No chief complaint on file. HPI: office visit      Review of Systems   Constitutional:  Positive for fatigue. Musculoskeletal:  Positive for arthralgias, back pain, gait problem, joint swelling and myalgias. Neurological:  Positive for weakness. Psychiatric/Behavioral:  Positive for confusion, decreased concentration, hallucinations and sleep disturbance. Negative for agitation and behavioral problems. The patient is nervous/anxious. All other systems reviewed and are negative. OBJECTIVE:  There were no vitals taken for this visit. Wt Readings from Last 3 Encounters:   10/10/23 58.5 kg (129 lb)   10/02/23 58.7 kg (129 lb 8 oz)   09/13/23 63 kg (138 lb 12.8 oz)     BP Readings from Last 3 Encounters:   10/10/23 112/64   10/02/23 104/73   09/29/23 100/68      Pulse Readings from Last 3 Encounters:   10/10/23 75   10/02/23 72   09/29/23 70     There is no height or weight on file to calculate BMI. Resp Readings from Last 3 Encounters:   10/10/23 16   10/02/23 18   09/29/23 18     Past medical, surgical, family and social history were reviewed and updated with the patient. Physical Exam  Vitals and nursing note reviewed. Constitutional:       Appearance: She is well-developed. HENT:      Head: Normocephalic and atraumatic. Right Ear: Decreased hearing noted. Left Ear: Decreased hearing noted. Nose: Nose normal.   Eyes:      Pupils: Pupils are equal, round, and reactive to light. Cardiovascular:      Rate and Rhythm: Normal rate and regular rhythm. Heart sounds: Normal heart sounds, S1 normal and S2 normal. No murmur heard. No friction rub. No gallop. Pulmonary:      Effort: Pulmonary effort is normal.      Breath sounds: Normal breath sounds. Abdominal:      General: Bowel sounds are normal.      Palpations: Abdomen is soft. Musculoskeletal:         General: Normal range of motion.

## 2023-12-14 RX ORDER — DIMETHICONE 5 G/100ML
1 CREAM TOPICAL 2 TIMES DAILY
Qty: 113 G | Refills: 5 | Status: SHIPPED | OUTPATIENT
Start: 2023-12-14

## 2023-12-26 NOTE — PROGRESS NOTES
Jeffry Paci, was evaluated through a synchronous (real-time) audio-video encounter. The patient (or guardian if applicable) is aware that this is a billable service, which includes applicable co-pays. This Virtual Visit was conducted with patient's (and/or legal guardian's) consent. Patient identification was verified, and a caregiver was present when appropriate. The patient was located at Home: [address] [place] [address]  Provider was located at [facility] ([facility]): [facility]. [place],  [address] [place] [address]      Jeffry Hobson (:  1935) is a Established patient, presenting virtually for evaluation of the following:    Assessment & Plan   Below is the assessment and plan developed based on review of pertinent history, physical exam, labs, studies, and medications. 1. Declining functional status  2. Severe dementia without behavioral disturbance, psychotic disturbance, mood disturbance, or anxiety, unspecified dementia type ([address])  3. Essential hypertension  4. Arthritis    Return in about 4 weeks (around 1/10/2024). Subjective   HPI video visit with her and her daughter today. She is doing relatively well at home. They have got a sitter which is helping quite a bit. She seems to be continuing to decline. She is pretty much bedridden at this point. She seems to be feeling like she is doing relatively well with her current regimen. She has not had any chest pain. She has not had any shortness of breath. Her blood pressures are doing well.   Review of Systems       Objective   Patient-Reported Vitals  No data recorded     Physical Exam             --Donnie Sampson MD

## 2024-01-01 ENCOUNTER — TELEPHONE (OUTPATIENT)
Dept: FAMILY MEDICINE CLINIC | Age: 89
End: 2024-01-01

## 2024-01-04 DIAGNOSIS — F41.9 ANXIETY: ICD-10-CM

## 2024-01-04 RX ORDER — BUSPIRONE HYDROCHLORIDE 10 MG/1
TABLET ORAL
Qty: 90 TABLET | Refills: 3 | Status: SHIPPED | OUTPATIENT
Start: 2024-01-04

## 2024-01-04 NOTE — TELEPHONE ENCOUNTER
Refill request received from pharmacy      Next Office Visit Date:  Future Appointments   Date Time Provider Department Center   3/12/2024  1:30 PM Ginna Lagos MD [facility] - Please review via PDMP        Last Office Visit:    12/13/2023

## 2024-01-16 ENCOUNTER — TELEMEDICINE (OUTPATIENT)
Dept: FAMILY MEDICINE CLINIC | Age: 89
End: 2024-01-16
Payer: MEDICARE

## 2024-01-16 DIAGNOSIS — M19.90 ARTHRITIS: ICD-10-CM

## 2024-01-16 DIAGNOSIS — R53.81 DECLINING FUNCTIONAL STATUS: ICD-10-CM

## 2024-01-16 DIAGNOSIS — D50.9 IRON DEFICIENCY ANEMIA, UNSPECIFIED IRON DEFICIENCY ANEMIA TYPE: ICD-10-CM

## 2024-01-16 DIAGNOSIS — F03.C0 SEVERE DEMENTIA WITHOUT BEHAVIORAL DISTURBANCE, PSYCHOTIC DISTURBANCE, MOOD DISTURBANCE, OR ANXIETY, UNSPECIFIED DEMENTIA TYPE (HCC): ICD-10-CM

## 2024-01-16 DIAGNOSIS — I10 ESSENTIAL HYPERTENSION: Primary | ICD-10-CM

## 2024-01-16 PROCEDURE — 1124F ACP DISCUSS-NO DSCNMKR DOCD: CPT | Performed by: FAMILY MEDICINE

## 2024-01-16 PROCEDURE — 99214 OFFICE O/P EST MOD 30 MIN: CPT | Performed by: FAMILY MEDICINE

## 2024-01-16 RX ORDER — FERROUS SULFATE 324(65)MG
324 TABLET, DELAYED RELEASE (ENTERIC COATED) ORAL
Qty: 30 TABLET | Refills: 0 | Status: CANCELLED | OUTPATIENT
Start: 2024-01-16

## 2024-01-16 RX ORDER — MELOXICAM 15 MG/1
15 TABLET ORAL DAILY
Qty: 30 TABLET | Refills: 5 | Status: SHIPPED | OUTPATIENT
Start: 2024-01-16

## 2024-01-16 ASSESSMENT — PATIENT HEALTH QUESTIONNAIRE - PHQ9
SUM OF ALL RESPONSES TO PHQ QUESTIONS 1-9: 0
SUM OF ALL RESPONSES TO PHQ QUESTIONS 1-9: 0
1. LITTLE INTEREST OR PLEASURE IN DOING THINGS: 0
SUM OF ALL RESPONSES TO PHQ QUESTIONS 1-9: 0
SUM OF ALL RESPONSES TO PHQ9 QUESTIONS 1 & 2: 0
2. FEELING DOWN, DEPRESSED OR HOPELESS: 0
SUM OF ALL RESPONSES TO PHQ QUESTIONS 1-9: 0

## 2024-01-16 NOTE — PROGRESS NOTES
Aurora Watson, was evaluated through a synchronous (real-time) audio-video encounter. The patient (or guardian if applicable) is aware that this is a billable service, which includes applicable co-pays. This Virtual Visit was conducted with patient's (and/or legal guardian's) consent. Patient identification was verified, and a caregiver was present when appropriate.   The patient was located at Home: [address] [place] [address] [place] [address]  Provider was located at Facility (Appt Dept): [address] [place] [id number]      Aurora Watson (:  1935) is a Established patient, presenting virtually for evaluation of the following:    Assessment & Plan   Below is the assessment and plan developed based on review of pertinent history, physical exam, labs, studies, and medications.  1. Essential hypertension  -     CBC; Future  -     Comprehensive Metabolic Panel; Future  2. Iron deficiency anemia, unspecified iron deficiency anemia type  -     CBC; Future  -     Iron; Future    Return in about 4 weeks (around 2024).       Subjective   HPI  she is having some increase in her arthritic pain.   Review of Systems       Objective   Patient-Reported Vitals  No data recorded     Physical Exam             --Ginna Lagos MD

## 2024-01-16 NOTE — PROGRESS NOTES
Chief Complaint   Patient presents with    Arthritis     Bilateral hands, right shoulder, and arm pain.    Hypertension     Per patient her blood pressure is still running low.         Have you seen any other physician or provider since your last visit no    Have you had any other diagnostic tests since your last visit? no    Have you changed or stopped any medications since your last visit? no

## 2024-01-26 NOTE — PROGRESS NOTES
Aurora Watson, was evaluated through a synchronous (real-time) audio-video encounter. The patient (or guardian if applicable) is aware that this is a billable service, which includes applicable co-pays. This Virtual Visit was conducted with patient's (and/or legal guardian's) consent. Patient identification was verified, and a caregiver was present when appropriate.   The patient was located at Home: [address] [place] [address] [place] [address]  Provider was located at Facility (Appt Dept): [address] [place] [id number]      Aurora Watson (:  1935) is a Established patient, presenting virtually for evaluation of the following:    Assessment & Plan   Below is the assessment and plan developed based on review of pertinent history, physical exam, labs, studies, and medications.  1. Essential hypertension  -     CBC; Future  -     Comprehensive Metabolic Panel; Future  2. Iron deficiency anemia, unspecified iron deficiency anemia type  -     CBC; Future  -     Iron; Future  3. Arthritis  -     meloxicam (MOBIC) 15 MG tablet; Take 1 tablet by mouth daily, Disp-30 tablet, R-5Normal  4. Severe dementia without behavioral disturbance, psychotic disturbance, mood disturbance, or anxiety, unspecified dementia type (HCC)  5. Declining functional status    Return in about 4 weeks (around 2024).       Subjective   HPI video visit with she and her daughter today.  She has been doing relatively well at home.  She is got a caregiver now.  That does seem to be helping them.  It did not go well when she was at the nursing home.  Her daughter was not satisfied with her care.  She is eating a little better.  She is bedbound.  They seem to be doing okay with all of that.  She has not had any recent falls or injuries.  She has had some complained of  significant arthritic pain.  She is feeling like her medications are working pretty well.  Her blood pressures have been doing pretty well.  Review of Systems       Objective

## 2024-01-30 ENCOUNTER — PATIENT MESSAGE (OUTPATIENT)
Dept: FAMILY MEDICINE CLINIC | Age: 89
End: 2024-01-30

## 2024-01-30 NOTE — TELEPHONE ENCOUNTER
From: Aurora Watson  To: Dr. Ginna Lagos  Sent: 1/30/2024 1:02 PM EST  Subject: Blood work     Did Dr Lagos get in touch with [facility] to schedule them coming out to do blood work on Aurora Watson. Thank you

## 2024-01-31 ENCOUNTER — TELEPHONE (OUTPATIENT)
Dept: FAMILY MEDICINE CLINIC | Age: 89
End: 2024-01-31

## 2024-01-31 NOTE — TELEPHONE ENCOUNTER
Candelaria called and stated that you had put in orders for labs for Aurora when she had her last virtual visit. She said that Aurora doesn't get home health anymore so [facility] can't go out and draw them for her. She said she's on another program where someone comes and sits with Aurora 4 days a week but they don't do labs or anything. She was wondering what she should do about labs where she can't get her out of the house.

## 2024-02-05 RX ORDER — MULTIVITAMIN
1 TABLET ORAL DAILY
Qty: 90 TABLET | Refills: 3 | Status: SHIPPED | OUTPATIENT
Start: 2024-02-05

## 2024-02-05 NOTE — TELEPHONE ENCOUNTER
Refill request received from pharmacy      Next Office Visit Date:  Future Appointments   Date Time Provider Department Center   2/13/2024  1:45 PM Ginna Lagos MD [facility]   3/12/2024  1:30 PM Ginna Lagos MD [facility] - Please review via PDMP        Last Office Visit:    1/16/2024

## 2024-02-13 ENCOUNTER — TELEMEDICINE (OUTPATIENT)
Dept: FAMILY MEDICINE CLINIC | Age: 89
End: 2024-02-13
Payer: MEDICARE

## 2024-02-13 DIAGNOSIS — Z13.220 SCREENING, LIPID: ICD-10-CM

## 2024-02-13 DIAGNOSIS — R41.3 MEMORY LOSS, SHORT TERM: ICD-10-CM

## 2024-02-13 DIAGNOSIS — M19.90 ARTHRITIS: ICD-10-CM

## 2024-02-13 DIAGNOSIS — F41.9 ANXIETY: ICD-10-CM

## 2024-02-13 DIAGNOSIS — R53.81 DECLINING FUNCTIONAL STATUS: Primary | ICD-10-CM

## 2024-02-13 DIAGNOSIS — I10 ESSENTIAL HYPERTENSION: ICD-10-CM

## 2024-02-13 PROCEDURE — 1124F ACP DISCUSS-NO DSCNMKR DOCD: CPT | Performed by: FAMILY MEDICINE

## 2024-02-13 PROCEDURE — 99213 OFFICE O/P EST LOW 20 MIN: CPT | Performed by: FAMILY MEDICINE

## 2024-02-13 RX ORDER — QUETIAPINE FUMARATE 50 MG/1
TABLET, FILM COATED ORAL
Qty: 135 TABLET | Refills: 3 | Status: SHIPPED | OUTPATIENT
Start: 2024-02-13

## 2024-02-13 RX ORDER — LEVOTHYROXINE SODIUM 0.07 MG/1
75 TABLET ORAL DAILY
Qty: 90 TABLET | Refills: 3 | Status: SHIPPED | OUTPATIENT
Start: 2024-02-13

## 2024-02-13 RX ORDER — ATORVASTATIN CALCIUM 10 MG/1
10 TABLET, FILM COATED ORAL DAILY
Qty: 90 TABLET | Refills: 3 | Status: SHIPPED | OUTPATIENT
Start: 2024-02-13

## 2024-02-13 RX ORDER — BUSPIRONE HYDROCHLORIDE 10 MG/1
10 TABLET ORAL 3 TIMES DAILY
Qty: 270 TABLET | Refills: 3 | Status: SHIPPED | OUTPATIENT
Start: 2024-02-13

## 2024-02-13 NOTE — PROGRESS NOTES
Aurora Watson, was evaluated through a synchronous (real-time) audio-video encounter. The patient (or guardian if applicable) is aware that this is a billable service, which includes applicable co-pays. This Virtual Visit was conducted with patient's (and/or legal guardian's) consent. Patient identification was verified, and a caregiver was present when appropriate.   The patient was located at Home: [address] [place] [address] [place] [address]  Provider was located at Facility (Appt Dept): [address] [place] [id number]      Aurora Watson (:  1935) is a Established patient, presenting virtually for evaluation of the following:    Assessment & Plan   Below is the assessment and plan developed based on review of pertinent history, physical exam, labs, studies, and medications.  1. Declining functional status  2. Screening, lipid  -     atorvastatin (LIPITOR) 10 MG tablet; Take 1 tablet by mouth daily, Disp-90 tablet, R-3Normal  3. Anxiety  -     busPIRone (BUSPAR) 10 MG tablet; Take 1 tablet by mouth 3 times daily, Disp-270 tablet, R-3Normal  4. Arthritis  5. Essential hypertension  6. Memory loss, short term    Return in about 3 months (around 2024).       Subjective   HPI she has skinned her   Review of Systems       Objective   Patient-Reported Vitals  No data recorded     Physical Exam             --Ginna Lagos MD

## 2024-04-23 ENCOUNTER — APPOINTMENT (OUTPATIENT)
Dept: GENERAL RADIOLOGY | Facility: HOSPITAL | Age: 89
End: 2024-04-23
Attending: HOSPITALIST
Payer: MEDICARE

## 2024-04-23 ENCOUNTER — HOSPITAL ENCOUNTER (EMERGENCY)
Facility: HOSPITAL | Age: 89
Discharge: HOME | End: 2024-04-23
Attending: HOSPITALIST
Payer: MEDICARE

## 2024-04-23 VITALS
BODY MASS INDEX: 25.25 KG/M2 | DIASTOLIC BLOOD PRESSURE: 64 MMHG | SYSTOLIC BLOOD PRESSURE: 129 MMHG | HEART RATE: 76 BPM | TEMPERATURE: 97.9 F | OXYGEN SATURATION: 98 % | RESPIRATION RATE: 17 BRPM | WEIGHT: 125 LBS

## 2024-04-23 DIAGNOSIS — R40.20 LOSS OF CONSCIOUSNESS (HCC): Primary | ICD-10-CM

## 2024-04-23 DIAGNOSIS — E86.0 DEHYDRATION: ICD-10-CM

## 2024-04-23 DIAGNOSIS — R79.89 ELEVATED TROPONIN: ICD-10-CM

## 2024-04-23 LAB
ALBUMIN SERPL-MCNC: 4.1 G/DL (ref 3.4–4.8)
ALBUMIN/GLOB SERPL: 1.4 {RATIO} (ref 0.8–2)
ALP SERPL-CCNC: 69 U/L (ref 25–100)
ALT SERPL-CCNC: 10 U/L (ref 4–36)
ANION GAP SERPL CALCULATED.3IONS-SCNC: 12 MMOL/L (ref 3–16)
AST SERPL-CCNC: 15 U/L (ref 8–33)
BASOPHILS # BLD: 0.1 K/UL (ref 0–0.1)
BASOPHILS NFR BLD: 1.1 %
BILIRUB SERPL-MCNC: <0.2 MG/DL (ref 0.3–1.2)
BUN SERPL-MCNC: 40 MG/DL (ref 6–20)
CALCIUM SERPL-MCNC: 9.1 MG/DL (ref 8.5–10.5)
CHLORIDE SERPL-SCNC: 106 MMOL/L (ref 98–107)
CO2 SERPL-SCNC: 21 MMOL/L (ref 20–30)
CREAT SERPL-MCNC: 1.6 MG/DL (ref 0.4–1.2)
EOSINOPHIL # BLD: 0.2 K/UL (ref 0–0.4)
EOSINOPHIL NFR BLD: 2.4 %
ERYTHROCYTE [DISTWIDTH] IN BLOOD BY AUTOMATED COUNT: 13.8 % (ref 11–16)
GFR SERPLBLD CREATININE-BSD FMLA CKD-EPI: 31 ML/MIN/{1.73_M2}
GLOBULIN SER CALC-MCNC: 2.9 G/DL
GLUCOSE SERPL-MCNC: 113 MG/DL (ref 74–106)
HCT VFR BLD AUTO: 36.5 % (ref 37–47)
HGB BLD-MCNC: 11.7 G/DL (ref 11.5–16.5)
IMM GRANULOCYTES # BLD: 0.1 K/UL
IMM GRANULOCYTES NFR BLD: 0.5 % (ref 0–5)
LYMPHOCYTES # BLD: 2 K/UL (ref 1.5–4)
LYMPHOCYTES NFR BLD: 21.3 %
MCH RBC QN AUTO: 31.5 PG (ref 27–32)
MCHC RBC AUTO-ENTMCNC: 32.1 G/DL (ref 31–35)
MCV RBC AUTO: 98.4 FL (ref 80–100)
MONOCYTES # BLD: 0.6 K/UL (ref 0.2–0.8)
MONOCYTES NFR BLD: 6.5 %
NEUTROPHILS # BLD: 6.4 K/UL (ref 2–7.5)
NEUTS SEG NFR BLD: 68.2 %
PLATELET # BLD AUTO: 192 K/UL (ref 150–400)
PMV BLD AUTO: 10.4 FL (ref 6–10)
POTASSIUM SERPL-SCNC: 4.6 MMOL/L (ref 3.4–5.1)
PROT SERPL-MCNC: 7 G/DL (ref 6.4–8.3)
RBC # BLD AUTO: 3.71 M/UL (ref 3.8–5.8)
SODIUM SERPL-SCNC: 139 MMOL/L (ref 136–145)
TROPONIN, HIGH SENSITIVITY: 54 NG/L (ref 0–14)
TROPONIN, HIGH SENSITIVITY: 60 NG/L (ref 0–14)
WBC # BLD AUTO: 9.4 K/UL (ref 4–11)

## 2024-04-23 PROCEDURE — 36415 COLL VENOUS BLD VENIPUNCTURE: CPT

## 2024-04-23 PROCEDURE — 84484 ASSAY OF TROPONIN QUANT: CPT

## 2024-04-23 PROCEDURE — 99285 EMERGENCY DEPT VISIT HI MDM: CPT

## 2024-04-23 PROCEDURE — 2580000003 HC RX 258: Performed by: HOSPITALIST

## 2024-04-23 PROCEDURE — 96360 HYDRATION IV INFUSION INIT: CPT

## 2024-04-23 PROCEDURE — 85025 COMPLETE CBC W/AUTO DIFF WBC: CPT

## 2024-04-23 PROCEDURE — 80053 COMPREHEN METABOLIC PANEL: CPT

## 2024-04-23 PROCEDURE — 71045 X-RAY EXAM CHEST 1 VIEW: CPT

## 2024-04-23 PROCEDURE — 96361 HYDRATE IV INFUSION ADD-ON: CPT

## 2024-04-23 RX ORDER — 0.9 % SODIUM CHLORIDE 0.9 %
500 INTRAVENOUS SOLUTION INTRAVENOUS ONCE
Status: COMPLETED | OUTPATIENT
Start: 2024-04-23 | End: 2024-04-23

## 2024-04-23 RX ADMIN — SODIUM CHLORIDE 500 ML: 9 INJECTION, SOLUTION INTRAVENOUS at 17:27

## 2024-04-23 RX ADMIN — SODIUM CHLORIDE 500 ML: 9 INJECTION, SOLUTION INTRAVENOUS at 18:42

## 2024-04-23 ASSESSMENT — PAIN - FUNCTIONAL ASSESSMENT
PAIN_FUNCTIONAL_ASSESSMENT: NONE - DENIES PAIN
PAIN_FUNCTIONAL_ASSESSMENT: 0-10

## 2024-04-23 ASSESSMENT — PAIN SCALES - GENERAL: PAINLEVEL_OUTOF10: 0

## 2024-04-23 NOTE — ED NOTES
Reviewed discharge plan with Aurora Watson's daughter.  Encouraged her to f/u with Ginna Lagos MD and she understood.  NAD noted on discharge.     Report given to [facility].  Pt is bed bound and will be taken home by ems.        Electronically signed by Susi Carr RN on 4/23/2024 at 7:47 PM

## 2024-04-23 NOTE — ED PROVIDER NOTES
Previous creatinine has ranged anywhere from 1.0-1.3.  On 10/2/2023 she was 1.0 with a BUN of 15    Portable chest radiograph read as no acute findings    Patient's radiological diagnostic studies were discussed with family they do state understanding.  Patient was given additional 500 mL normal saline for complete 1 L infusion after reviewing patient's renal function.  Advised the findings so far.  Patient is a DO NOT RESUSCITATE she is demented and nonambulatory she is not a candidate for any type of intervention.  Family understands this and states that it really does not matter what the next enzyme is even though the first 1 is elevated she has had elevated troponin in the past I do suspect this is most likely secondary to some of her renal status also.  Family states though they do not want her admitted they would like to take her back home because she would rest more comfortably there.  They advised that they can check MyChart for the result of the next troponin if needed if the patient's fluids have run in and EMS is here to pick her up prior at time that it finalizes and proximal to the chart.  And again they do state that it does not matter if it goes up that they really do not want anything invasive procedures or any type of transfer admission.  They state they would just like her to go home where she can rest more comfortably.  Otherwise patient will be discharged to home per family recommendations.  She will have to go back by EMS because she is nonambulatory.  Otherwise advised they do need to follow-up with a regular family physician within the next 1 to 2 days for evaluation.  Also given instructions if symptoms worsens or new symptoms arise that should return back to emergency department for further evaluation workup.      MEDICAL DECISION MAKING:   I considered, but did not perform, additional testing such a chest CT, VQ scan, or venous doppler ultrasonography, as well as admission or transfer to a

## 2024-04-23 NOTE — ED TRIAGE NOTES
Pt brought in by [facility] for a c/o syncope.  Pt has a hx of syncope which is usually related to bm.  Today she went unresponsive after having her hair cut.  Family advised ems that it lasted about 3 minutes.  Per ems pt is back at her baseline.  She has a hx of dementia as well.  Pt is alert to person, name only.pt is currently bed bound.

## 2024-04-25 ENCOUNTER — TELEMEDICINE (OUTPATIENT)
Dept: FAMILY MEDICINE CLINIC | Age: 89
End: 2024-04-25
Payer: MEDICARE

## 2024-04-25 DIAGNOSIS — R55 SYNCOPE AND COLLAPSE: Primary | ICD-10-CM

## 2024-04-25 DIAGNOSIS — R53.81 DECLINING FUNCTIONAL STATUS: ICD-10-CM

## 2024-04-25 DIAGNOSIS — F03.C0 SEVERE DEMENTIA, UNSPECIFIED DEMENTIA TYPE, UNSPECIFIED WHETHER BEHAVIORAL, PSYCHOTIC, OR MOOD DISTURBANCE OR ANXIETY (HCC): ICD-10-CM

## 2024-04-25 PROCEDURE — 1124F ACP DISCUSS-NO DSCNMKR DOCD: CPT | Performed by: FAMILY MEDICINE

## 2024-04-25 PROCEDURE — 99213 OFFICE O/P EST LOW 20 MIN: CPT | Performed by: FAMILY MEDICINE

## 2024-04-25 RX ORDER — CEPHALEXIN 500 MG/1
500 CAPSULE ORAL 3 TIMES DAILY
Qty: 20 CAPSULE | Refills: 0 | Status: SHIPPED | OUTPATIENT
Start: 2024-04-25 | End: 2024-05-05

## 2024-04-25 NOTE — PROGRESS NOTES
No chief complaint on file.      Have you seen any other physician or provider since your last visit yes - [facility] Tuesday    Have you had any other diagnostic tests since your last visit? yes - Labs    Have you changed or stopped any medications since your last visit? no

## 2024-04-25 NOTE — PROGRESS NOTES
Aurora Watson, was evaluated through a synchronous (real-time) audio-video encounter. The patient (or guardian if applicable) is aware that this is a billable service, which includes applicable co-pays. This Virtual Visit was conducted with patient's (and/or legal guardian's) consent. Patient identification was verified, and a caregiver was present when appropriate.   The patient was located at Home: [address] [facility] [place] [address]  Provider was located at Facility (Appt Dept): [address] [place] [address].  [place] [address]  Confirm you are appropriately licensed, registered, or certified to deliver care in the state where the patient is located as indicated above. If you are not or unsure, please re-schedule the visit: Yes, I confirm.     Aurora Watson (:  1935) is a Established patient, presenting virtually for evaluation of the following:    Assessment & Plan   Below is the assessment and plan developed based on review of pertinent history, physical exam, labs, studies, and medications.  1. Syncope and collapse  2. Declining functional status  3. Severe dementia, unspecified dementia type, unspecified whether behavioral, psychotic, or mood disturbance or anxiety (HCC)    No follow-ups on file.       Subjective   HPI video visit with she and her daughter about her recent syncopal episode.  They were concerned that she had COVID.  It sounds like there was some question on whether she actually had a heartbeat or was breathing.  EMS was called and she was taken to the hospital.  At that point she was alert and breathing on her own.  Her daughter opted not to put her through any other evaluations.  They are seeming to do relatively well at home right now.  She does still have a caregiver to help.  She is eating a little.  She is still bedfast.  She is got 1 little place on her sacral area that there continuing to monitor.  Review of Systems       Objective   Patient-Reported Vitals  No data recorded

## 2024-06-05 RX ORDER — CEPHALEXIN 500 MG/1
500 CAPSULE ORAL 3 TIMES DAILY
Qty: 30 CAPSULE | Refills: 0 | Status: SHIPPED | OUTPATIENT
Start: 2024-06-05 | End: 2024-06-15

## 2024-07-08 DIAGNOSIS — M19.90 ARTHRITIS: ICD-10-CM

## 2024-07-08 NOTE — TELEPHONE ENCOUNTER
Refill request received from pharmacy      Next Office Visit Date:  No future appointments.    [facility] - Please review via PDMP        Last Office Visit:    4/25/2024

## 2024-07-09 RX ORDER — MELOXICAM 15 MG/1
15 TABLET ORAL DAILY
Qty: 30 TABLET | Refills: 5 | Status: SHIPPED | OUTPATIENT
Start: 2024-07-09

## 2024-07-30 ENCOUNTER — HOSPITAL ENCOUNTER (EMERGENCY)
Facility: HOSPITAL | Age: 89
Discharge: HOME | End: 2024-07-30
Attending: FAMILY MEDICINE
Payer: MEDICARE

## 2024-07-30 VITALS
HEIGHT: 59 IN | DIASTOLIC BLOOD PRESSURE: 61 MMHG | SYSTOLIC BLOOD PRESSURE: 138 MMHG | OXYGEN SATURATION: 100 % | TEMPERATURE: 97.9 F | BODY MASS INDEX: 24.19 KG/M2 | HEART RATE: 64 BPM | RESPIRATION RATE: 16 BRPM | WEIGHT: 120 LBS

## 2024-07-30 DIAGNOSIS — R19.7 DIARRHEA, UNSPECIFIED TYPE: Primary | ICD-10-CM

## 2024-07-30 LAB
ALBUMIN SERPL-MCNC: 3.5 G/DL (ref 3.4–4.8)
ALBUMIN/GLOB SERPL: 1.2 {RATIO} (ref 0.8–2)
ALP SERPL-CCNC: 69 U/L (ref 25–100)
ALT SERPL-CCNC: 12 U/L (ref 4–36)
ANION GAP SERPL CALCULATED.3IONS-SCNC: 14 MMOL/L (ref 3–16)
AST SERPL-CCNC: 14 U/L (ref 8–33)
BASOPHILS # BLD: 0.1 K/UL (ref 0–0.1)
BASOPHILS NFR BLD: 1.2 %
BILIRUB SERPL-MCNC: <0.2 MG/DL (ref 0.3–1.2)
BILIRUB UR QL STRIP.AUTO: NEGATIVE
BUN SERPL-MCNC: 28 MG/DL (ref 6–20)
CALCIUM SERPL-MCNC: 8.6 MG/DL (ref 8.5–10.5)
CHLORIDE SERPL-SCNC: 105 MMOL/L (ref 98–107)
CLARITY UR: CLEAR
CO2 SERPL-SCNC: 19 MMOL/L (ref 20–30)
COLOR UR: YELLOW
CREAT SERPL-MCNC: 1.6 MG/DL (ref 0.4–1.2)
EOSINOPHIL # BLD: 0.3 K/UL (ref 0–0.4)
EOSINOPHIL NFR BLD: 4 %
ERYTHROCYTE [DISTWIDTH] IN BLOOD BY AUTOMATED COUNT: 13.2 % (ref 11–16)
GFR SERPLBLD CREATININE-BSD FMLA CKD-EPI: 31 ML/MIN/{1.73_M2}
GLOBULIN SER CALC-MCNC: 2.9 G/DL
GLUCOSE SERPL-MCNC: 82 MG/DL (ref 74–106)
GLUCOSE UR STRIP.AUTO-MCNC: NEGATIVE MG/DL
HCT VFR BLD AUTO: 33.4 % (ref 37–47)
HGB BLD-MCNC: 10.7 G/DL (ref 11.5–16.5)
HGB UR QL STRIP.AUTO: NEGATIVE
IMM GRANULOCYTES # BLD: 0 K/UL
IMM GRANULOCYTES NFR BLD: 0.3 % (ref 0–5)
KETONES UR STRIP.AUTO-MCNC: NEGATIVE MG/DL
LEUKOCYTE ESTERASE UR QL STRIP.AUTO: NEGATIVE
LIPASE SERPL-CCNC: 51 U/L (ref 5.6–51.3)
LYMPHOCYTES # BLD: 2.9 K/UL (ref 1.5–4)
LYMPHOCYTES NFR BLD: 37.3 %
MCH RBC QN AUTO: 31.8 PG (ref 27–32)
MCHC RBC AUTO-ENTMCNC: 32 G/DL (ref 31–35)
MCV RBC AUTO: 99.1 FL (ref 80–100)
MONOCYTES # BLD: 0.5 K/UL (ref 0.2–0.8)
MONOCYTES NFR BLD: 6.9 %
NEUTROPHILS # BLD: 3.9 K/UL (ref 2–7.5)
NEUTS SEG NFR BLD: 50.3 %
NITRITE UR QL STRIP.AUTO: NEGATIVE
PH UR STRIP.AUTO: 6 [PH] (ref 5–8)
PLATELET # BLD AUTO: 165 K/UL (ref 150–400)
PMV BLD AUTO: 10.5 FL (ref 6–10)
POTASSIUM SERPL-SCNC: 4.7 MMOL/L (ref 3.4–5.1)
PROT SERPL-MCNC: 6.4 G/DL (ref 6.4–8.3)
PROT UR STRIP.AUTO-MCNC: NEGATIVE MG/DL
RBC # BLD AUTO: 3.37 M/UL (ref 3.8–5.8)
SODIUM SERPL-SCNC: 138 MMOL/L (ref 136–145)
SP GR UR STRIP.AUTO: 1.01 (ref 1–1.03)
UA COMPLETE W REFLEX CULTURE PNL UR: NORMAL
UA DIPSTICK W REFLEX MICRO PNL UR: NORMAL
URN SPEC COLLECT METH UR: NORMAL
UROBILINOGEN UR STRIP-ACNC: 0.2 E.U./DL
WBC # BLD AUTO: 7.8 K/UL (ref 4–11)

## 2024-07-30 PROCEDURE — 51701 INSERT BLADDER CATHETER: CPT

## 2024-07-30 PROCEDURE — 83690 ASSAY OF LIPASE: CPT

## 2024-07-30 PROCEDURE — 85025 COMPLETE CBC W/AUTO DIFF WBC: CPT

## 2024-07-30 PROCEDURE — 81003 URINALYSIS AUTO W/O SCOPE: CPT

## 2024-07-30 PROCEDURE — 80053 COMPREHEN METABOLIC PANEL: CPT

## 2024-07-30 PROCEDURE — 36415 COLL VENOUS BLD VENIPUNCTURE: CPT

## 2024-07-30 PROCEDURE — 99283 EMERGENCY DEPT VISIT LOW MDM: CPT

## 2024-07-30 RX ORDER — DONEPEZIL HYDROCHLORIDE 5 MG/1
5 TABLET, FILM COATED ORAL NIGHTLY
COMMUNITY

## 2024-07-30 NOTE — ED NOTES
Reviewed discharge plan with Aurora Watson's daughter.  Encouraged her to f/u with Ginna Lagos MD and she understood.  NAD noted on discharge. No new medications or medication changes.  Pt leaves with [facility] to her home.      Electronically signed by Susi Carr RN on 7/30/2024 at 7:32 PM

## 2024-07-30 NOTE — ED PROVIDER NOTES
[facility]  EMERGENCY DEPARTMENT ENCOUNTER        Pt Name: Aurora Watson  MRN: [id number]  Birthdate 1935  Date of evaluation: 7/30/2024  Provider: Santosh Queen MD  PCP: Ginna Lagos MD  Note Started: 5:59 PM EDT 7/30/24    CHIEF COMPLAINT       Chief Complaint   Patient presents with    Diarrhea       HISTORY OF PRESENT ILLNESS: 1 or more Elements     History from : Patient, Family  , and EMS    Limitations to history : Dementia    Aurora Watson is a 89 y.o. female who presents to the Emergency Department via EMS after having diarrhea that started 10:30 this morning.  Patient denies any abdominal pain no nausea no vomiting no fevers no chills no ill contacts no recent travel no exposure to COVID-19.  Caregiver and family are also here noted some strong urine.  Not given her any medications yet.  Patient did eat omelette this morning has not had much to eat or drink after that.  Family is worried that she is dehydrated.  Patient has had more than 10 loose stools no blood in either.  Patient has not been on any antibiotics in the last 30 days.    Nursing Notes were all reviewed and agreed with or any disagreements were addressed in the HPI.    REVIEW OF SYSTEMS :      Review of Systems    All systems reviewed and negative except as in HPI/MDM    SURGICAL HISTORY     Past Surgical History:   Procedure Laterality Date    BLADDER SUSPENSION  09/01/2015    CHOLECYSTECTOMY      HYSTERECTOMY (CERVIX STATUS UNKNOWN)      INSERTABLE CARDIAC MONITOR  10/2022    TUBAL LIGATION         CURRENTMEDICATIONS       Previous Medications    ATORVASTATIN (LIPITOR) 10 MG TABLET    Take 1 tablet by mouth daily    BUSPIRONE (BUSPAR) 10 MG TABLET    Take 1 tablet by mouth 3 times daily    DONEPEZIL (ARICEPT) 5 MG TABLET    Take 1 tablet by mouth nightly    FERROUS SULFATE 324 (65 FE) MG EC TABLET    Take 1 tablet by mouth daily (with breakfast)    FOLIC ACID (FOLVITE) 1 MG TABLET    TAKE 1

## 2024-07-30 NOTE — ED TRIAGE NOTES
Pt arrives via ems for a call out for diarrhea.  Family advised ems that pt has had multiple episodes of diarrhea today.  She has a 20g left hand she received 400 ml lr en route.  Pt has no complaints.  Vs wnl.

## (undated) DEVICE — PATIENT RETURN ELECTRODE, SINGLE-USE, CONTACT QUALITY MONITORING, ADULT, WITH 9FT CORD, FOR PATIENTS WEIGING OVER 33LBS. (15KG): Brand: MEGADYNE

## (undated) DEVICE — STCKNT IMPERV 12IN STRL

## (undated) DEVICE — PEG PAD FOR SURG DISP

## (undated) DEVICE — HANDPIECE SET WITH HIGH FLOW TIP AND SUCTION TUBE: Brand: INTERPULSE

## (undated) DEVICE — SST TWIST DRILL, STANDARD, 2MM DIA. X 127MM: Brand: MICROAIRE®

## (undated) DEVICE — ANTIBACTERIAL UNDYED BRAIDED (POLYGLACTIN 910), SYNTHETIC ABSORBABLE SUTURE: Brand: COATED VICRYL

## (undated) DEVICE — TRAP FLD MINIVAC MEGADYNE 100ML

## (undated) DEVICE — 1010 S-DRAPE TOWEL DRAPE 10/BX: Brand: STERI-DRAPE™

## (undated) DEVICE — HDRST POSTIN FM CRDL TRACH SLOT 9X8X4IN

## (undated) DEVICE — ELECTRD BLD EZ CLN STD 6.5IN

## (undated) DEVICE — PK HIP TOTL UNIV 10

## (undated) DEVICE — SYS MIX CLEARMIX SGL/DBL VAC

## (undated) DEVICE — HEWSON SUTURE RETRIEVER: Brand: HEWSON SUTURE RETRIEVER

## (undated) DEVICE — SYS CLS SKIN PREMIERPRO EXOFINFUSION 22CM

## (undated) DEVICE — SUT MONOCRYL PLS ANTIB UND 3/0  PS1 27IN

## (undated) DEVICE — SST TWIST DRILL, STANDARD, 2.4MM DIA. X 127MM: Brand: MICROAIRE®

## (undated) DEVICE — TBG PENCL TELESCP MEGADYNE SMOKE EVAC 10FT

## (undated) DEVICE — GLV SURG PREMIERPRO MIC LTX PF SZ8 BRN

## (undated) DEVICE — GLV SURG PREMIERPRO GAMMEX NEOPRN PF SZ8 GRN

## (undated) DEVICE — BLANKT WARM UPPR/BDY ARM/OUT 57X196CM